# Patient Record
Sex: FEMALE | Race: WHITE | NOT HISPANIC OR LATINO | Employment: FULL TIME | URBAN - METROPOLITAN AREA
[De-identification: names, ages, dates, MRNs, and addresses within clinical notes are randomized per-mention and may not be internally consistent; named-entity substitution may affect disease eponyms.]

---

## 2017-01-10 ENCOUNTER — OFFICE VISIT (OUTPATIENT)
Dept: URGENT CARE | Age: 27
End: 2017-01-10
Payer: COMMERCIAL

## 2017-01-10 PROCEDURE — 99213 OFFICE O/P EST LOW 20 MIN: CPT | Performed by: FAMILY MEDICINE

## 2017-02-13 ENCOUNTER — GENERIC CONVERSION - ENCOUNTER (OUTPATIENT)
Dept: OTHER | Facility: OTHER | Age: 27
End: 2017-02-13

## 2017-02-14 ENCOUNTER — ALLSCRIPTS OFFICE VISIT (OUTPATIENT)
Dept: OTHER | Facility: OTHER | Age: 27
End: 2017-02-14

## 2017-02-14 LAB
BACTERIA UR QL AUTO: NORMAL
CLUE CELL (HISTORICAL): NORMAL
HYPHAL YEAST (HISTORICAL): NORMAL
KOH PREP (HISTORICAL): NORMAL
PH UR STRIP.AUTO: 5 [PH]
TRICHOMONAS (HISTORICAL): NORMAL
YEAST (HISTORICAL): NORMAL

## 2017-04-25 ENCOUNTER — LAB REQUISITION (OUTPATIENT)
Dept: LAB | Facility: HOSPITAL | Age: 27
End: 2017-04-25
Payer: COMMERCIAL

## 2017-04-25 ENCOUNTER — ALLSCRIPTS OFFICE VISIT (OUTPATIENT)
Dept: OTHER | Facility: OTHER | Age: 27
End: 2017-04-25

## 2017-04-25 DIAGNOSIS — R31.9 HEMATURIA: ICD-10-CM

## 2017-04-25 DIAGNOSIS — R35.0 FREQUENCY OF MICTURITION: ICD-10-CM

## 2017-04-25 DIAGNOSIS — N30.90 CYSTITIS WITHOUT HEMATURIA: ICD-10-CM

## 2017-04-25 LAB
BACTERIA UR QL AUTO: ABNORMAL /HPF
BILIRUB UR QL STRIP: NEGATIVE
CLARITY UR: ABNORMAL
COLOR UR: YELLOW
GLUCOSE UR STRIP-MCNC: NEGATIVE MG/DL
HGB UR QL STRIP.AUTO: ABNORMAL
KETONES UR STRIP-MCNC: NEGATIVE MG/DL
LEUKOCYTE ESTERASE UR QL STRIP: ABNORMAL
NITRITE UR QL STRIP: POSITIVE
NON-SQ EPI CELLS URNS QL MICRO: ABNORMAL /HPF
PH UR STRIP.AUTO: 6 [PH] (ref 4.5–8)
PROT UR STRIP-MCNC: ABNORMAL MG/DL
RBC #/AREA URNS AUTO: ABNORMAL /HPF
SP GR UR STRIP.AUTO: 1.02 (ref 1–1.03)
UROBILINOGEN UR QL STRIP.AUTO: 0.2 E.U./DL
WBC #/AREA URNS AUTO: ABNORMAL /HPF

## 2017-04-25 PROCEDURE — 87186 SC STD MICRODIL/AGAR DIL: CPT | Performed by: PHYSICIAN ASSISTANT

## 2017-04-25 PROCEDURE — 81001 URINALYSIS AUTO W/SCOPE: CPT | Performed by: PHYSICIAN ASSISTANT

## 2017-04-25 PROCEDURE — 87077 CULTURE AEROBIC IDENTIFY: CPT | Performed by: PHYSICIAN ASSISTANT

## 2017-04-25 PROCEDURE — 87086 URINE CULTURE/COLONY COUNT: CPT | Performed by: PHYSICIAN ASSISTANT

## 2017-04-27 LAB
BACTERIA UR CULT: NORMAL
BACTERIA UR CULT: NORMAL

## 2017-05-15 ENCOUNTER — APPOINTMENT (OUTPATIENT)
Dept: LAB | Facility: CLINIC | Age: 27
End: 2017-05-15
Payer: COMMERCIAL

## 2017-05-15 DIAGNOSIS — N30.90 CYSTITIS WITHOUT HEMATURIA: ICD-10-CM

## 2017-05-15 LAB
BACTERIA UR QL AUTO: ABNORMAL /HPF
BILIRUB UR QL STRIP: NEGATIVE
CLARITY UR: ABNORMAL
COLOR UR: YELLOW
GLUCOSE UR STRIP-MCNC: NEGATIVE MG/DL
HGB UR QL STRIP.AUTO: ABNORMAL
HYALINE CASTS #/AREA URNS LPF: ABNORMAL /LPF
KETONES UR STRIP-MCNC: NEGATIVE MG/DL
LEUKOCYTE ESTERASE UR QL STRIP: ABNORMAL
NITRITE UR QL STRIP: NEGATIVE
NON-SQ EPI CELLS URNS QL MICRO: ABNORMAL /HPF
PH UR STRIP.AUTO: 7.5 [PH] (ref 4.5–8)
PROT UR STRIP-MCNC: NEGATIVE MG/DL
RBC #/AREA URNS AUTO: ABNORMAL /HPF
SP GR UR STRIP.AUTO: 1.01 (ref 1–1.03)
UROBILINOGEN UR QL STRIP.AUTO: 0.2 E.U./DL
WBC #/AREA URNS AUTO: ABNORMAL /HPF

## 2017-05-15 PROCEDURE — 87086 URINE CULTURE/COLONY COUNT: CPT

## 2017-05-15 PROCEDURE — 81001 URINALYSIS AUTO W/SCOPE: CPT

## 2017-05-16 LAB — BACTERIA UR CULT: NORMAL

## 2017-05-30 ENCOUNTER — ALLSCRIPTS OFFICE VISIT (OUTPATIENT)
Dept: OTHER | Facility: OTHER | Age: 27
End: 2017-05-30

## 2017-07-28 ENCOUNTER — TRANSCRIBE ORDERS (OUTPATIENT)
Dept: ADMINISTRATIVE | Facility: HOSPITAL | Age: 27
End: 2017-07-28

## 2017-07-28 ENCOUNTER — ALLSCRIPTS OFFICE VISIT (OUTPATIENT)
Dept: OTHER | Facility: OTHER | Age: 27
End: 2017-07-28

## 2017-07-28 ENCOUNTER — APPOINTMENT (OUTPATIENT)
Dept: LAB | Facility: HOSPITAL | Age: 27
End: 2017-07-28
Attending: UROLOGY
Payer: COMMERCIAL

## 2017-07-28 DIAGNOSIS — R35.0 FREQUENCY OF MICTURITION: ICD-10-CM

## 2017-07-28 DIAGNOSIS — R35.0 URINARY FREQUENCY: Primary | ICD-10-CM

## 2017-07-28 DIAGNOSIS — R31.9 HEMATURIA: ICD-10-CM

## 2017-07-28 DIAGNOSIS — N39.0 URINARY TRACT INFECTION: ICD-10-CM

## 2017-07-28 LAB
BACTERIA UR QL AUTO: NORMAL /HPF
BILIRUB UR QL STRIP: NEGATIVE
BILIRUB UR QL STRIP: NORMAL
CLARITY UR: CLEAR
CLARITY UR: NORMAL
COLOR UR: YELLOW
COLOR UR: YELLOW
GLUCOSE (HISTORICAL): NORMAL
GLUCOSE UR STRIP-MCNC: NEGATIVE MG/DL
HGB UR QL STRIP.AUTO: NEGATIVE
HGB UR QL STRIP.AUTO: NORMAL
HYALINE CASTS #/AREA URNS LPF: NORMAL /LPF
KETONES UR STRIP-MCNC: NEGATIVE MG/DL
KETONES UR STRIP-MCNC: NORMAL MG/DL
LEUKOCYTE ESTERASE UR QL STRIP: NEGATIVE
LEUKOCYTE ESTERASE UR QL STRIP: NORMAL
NITRITE UR QL STRIP: NEGATIVE
NITRITE UR QL STRIP: NORMAL
NON-SQ EPI CELLS URNS QL MICRO: NORMAL /HPF
PH UR STRIP.AUTO: 6 [PH] (ref 4.5–8)
PH UR STRIP.AUTO: 7 [PH]
PROT UR STRIP-MCNC: NEGATIVE MG/DL
PROT UR STRIP-MCNC: NORMAL MG/DL
RBC #/AREA URNS AUTO: NORMAL /HPF
SP GR UR STRIP.AUTO: 1.01
SP GR UR STRIP.AUTO: 1.01 (ref 1–1.03)
UROBILINOGEN UR QL STRIP.AUTO: 0.2 E.U./DL
UROBILINOGEN UR QL STRIP.AUTO: NORMAL
WBC #/AREA URNS AUTO: NORMAL /HPF

## 2017-07-28 PROCEDURE — 87086 URINE CULTURE/COLONY COUNT: CPT

## 2017-07-28 PROCEDURE — 81001 URINALYSIS AUTO W/SCOPE: CPT

## 2017-07-29 LAB — BACTERIA UR CULT: NORMAL

## 2017-08-17 ENCOUNTER — HOSPITAL ENCOUNTER (OUTPATIENT)
Dept: ULTRASOUND IMAGING | Facility: HOSPITAL | Age: 27
Discharge: HOME | End: 2017-08-17
Attending: UROLOGY
Payer: COMMERCIAL

## 2017-08-17 DIAGNOSIS — R35.0 URINARY FREQUENCY: ICD-10-CM

## 2017-08-17 PROCEDURE — 51798 US URINE CAPACITY MEASURE: CPT

## 2017-08-23 ENCOUNTER — ALLSCRIPTS OFFICE VISIT (OUTPATIENT)
Dept: OTHER | Facility: OTHER | Age: 27
End: 2017-08-23

## 2017-11-06 ENCOUNTER — GENERIC CONVERSION - ENCOUNTER (OUTPATIENT)
Dept: OTHER | Facility: OTHER | Age: 27
End: 2017-11-06

## 2017-11-07 ENCOUNTER — LAB REQUISITION (OUTPATIENT)
Dept: LAB | Facility: HOSPITAL | Age: 27
End: 2017-11-07
Payer: COMMERCIAL

## 2017-11-07 ENCOUNTER — ALLSCRIPTS OFFICE VISIT (OUTPATIENT)
Dept: OTHER | Facility: OTHER | Age: 27
End: 2017-11-07

## 2017-11-07 DIAGNOSIS — R39.9 UNSPECIFIED SYMPTOMS AND SIGNS INVOLVING THE GENITOURINARY SYSTEM: ICD-10-CM

## 2017-11-07 DIAGNOSIS — R10.2 PELVIC AND PERINEAL PAIN: ICD-10-CM

## 2017-11-07 DIAGNOSIS — N39.0 URINARY TRACT INFECTION: ICD-10-CM

## 2017-11-07 DIAGNOSIS — N93.0 POSTCOITAL AND CONTACT BLEEDING: ICD-10-CM

## 2017-11-07 DIAGNOSIS — Z87.440 PERSONAL HISTORY OF URINARY INFECTION: ICD-10-CM

## 2017-11-07 LAB
BACTERIA UR QL AUTO: NORMAL
CLUE CELL (HISTORICAL): NORMAL
HYPHAL YEAST (HISTORICAL): NORMAL
KOH PREP (HISTORICAL): NORMAL
PH UR STRIP.AUTO: 4 [PH]
TRICHOMONAS (HISTORICAL): NORMAL
YEAST (HISTORICAL): NORMAL

## 2017-11-07 PROCEDURE — 87510 GARDNER VAG DNA DIR PROBE: CPT | Performed by: PHYSICIAN ASSISTANT

## 2017-11-07 PROCEDURE — G0145 SCR C/V CYTO,THINLAYER,RESCR: HCPCS | Performed by: PHYSICIAN ASSISTANT

## 2017-11-07 PROCEDURE — 87591 N.GONORRHOEAE DNA AMP PROB: CPT | Performed by: PHYSICIAN ASSISTANT

## 2017-11-07 PROCEDURE — 87480 CANDIDA DNA DIR PROBE: CPT | Performed by: PHYSICIAN ASSISTANT

## 2017-11-07 PROCEDURE — 87491 CHLMYD TRACH DNA AMP PROBE: CPT | Performed by: PHYSICIAN ASSISTANT

## 2017-11-07 PROCEDURE — 87660 TRICHOMONAS VAGIN DIR PROBE: CPT | Performed by: PHYSICIAN ASSISTANT

## 2017-11-09 LAB
CANDIDA RRNA VAG QL PROBE: NEGATIVE
CHLAMYDIA DNA CVX QL NAA+PROBE: NORMAL
G VAGINALIS RRNA GENITAL QL PROBE: NEGATIVE
N GONORRHOEA DNA GENITAL QL NAA+PROBE: NORMAL
T VAGINALIS RRNA GENITAL QL PROBE: NEGATIVE

## 2017-11-14 ENCOUNTER — HOSPITAL ENCOUNTER (OUTPATIENT)
Dept: ULTRASOUND IMAGING | Facility: HOSPITAL | Age: 27
Discharge: HOME | End: 2017-11-14
Payer: COMMERCIAL

## 2017-11-14 DIAGNOSIS — R10.2 PELVIC AND PERINEAL PAIN: ICD-10-CM

## 2017-11-14 PROCEDURE — 76830 TRANSVAGINAL US NON-OB: CPT

## 2017-11-14 PROCEDURE — 76856 US EXAM PELVIC COMPLETE: CPT

## 2017-11-15 LAB
LAB AP GYN PRIMARY INTERPRETATION: NORMAL
Lab: NORMAL

## 2017-11-23 NOTE — PROGRESS NOTES
Assessment    1  Pelvic cramping (625 9) (R10 2)   2  Cervicitis (616 0) (N72)    Plan  Cervicitis    · Clindamycin Phosphate 2 % Vaginal Cream; INSERT 1 APPLICATORFULINTRAVAGINALLY AT BEDTIME   Rx By: Nilda Villaseñor; Dispense: 7 Days ; #:1 X 40 GM Tube; Refill: 0;Cervicitis; LIMA = N; Verified Transmission to [address] [place] [address]; Last Updated By: System, [employer]; 11/7/2017 8:02:21 AM  Postcoital bleeding    · (1) CHLAMYDIA/GC AMPLIFIED DNA, PCR; Source:Vaginal; Status:Active; Requestedfor:33Oni8796;    Perform:[facility] [place]; TBQ:67XCZ0508; Ordered; For:Postcoital bleeding; Ordered By:Carol Banks;  Postcoital bleeding, Vaginal discharge    · (1) VAGINITIS/ VAGINOSIS, DNA ( AFFIRM); Status:Active; Requested JIP:31MAB0258;    Perform:[address]; PKW:33LRC2858; Ordered; For:Postcoital bleeding, Vaginal discharge; Ordered By:Carol Banks;  Vaginal discharge    · [address]; Status:Complete;   Done: 39GTZ9784 09:22AM   Performed: In Office; QRY:87YHH2929;MVBEOMF; Today;discharge; Ordered By:Carol Banks; Discussion/Summary  Discussion Summary: We will treat for cervicitis with clindamycin cream QHS x 7  Will await GC/chlamydia cultures, Affirm, and Pap  Will check pelvic ultrasound  Chief Complaint  Chief Complaint Free Text Note Form: Pt is here c/o vaginal discharge, pelvic pain, postcoital bleeding and painful intercourse  History of Present Illness  HPI: 24year old white female here for evaluation  She had a normal LMP 10/21  Following that she has had continued irregular brown spotting off and on, worse with intercourse  She has some left pelvic discomfort  She notes vaginal discharge without itching, burning and with a sour cream odor  She has the same sexual partner  She now has pain with intercourse which is new for her  Active Problems  1  Bulging lumbar disc (722 10) (M51 26)   2   Degenerative lumbar disc (389 99) (M51 36)   3  Encounter for gynecological examination without abnormal finding (V72 31) (Z01 419)   4  Hematuria (599 70) (R31 9)   5  Increased frequency of urination (788 41) (R35 0)   6  Lumbar radiculopathy (724 4) (M54 16)   7  Piriformis syndrome (355 0) (G57 00)   8  Sacroiliitis (720 2) (M46 1)   9  Urinary tract infection (599 0) (N39 0)   10  Vitamin D insufficiency (268 9) (E55 9)    Past Medical History  1  History of Acute cystitis without hematuria (595 0) (N30 00)   2  History of Acute UTI (599 0) (N39 0)   3  History of Encounter for routine gynecological examination (V72 31) (Z01 419)   4  History of backache (V13 59) (Z87 39)   5  History of candidal vulvovaginitis (V13 29) (Z86 19)   6  History of candidiasis (V12 09) (Z86 19)   7  History of vaginal discharge (V13 29) (Z87 42)   8  History of Postcoital bleeding (626 7) (N93 0)   9  History of Screening examination for STD (sexually transmitted disease) (V74 5) (Z11 3)   10  Urinary tract infection (599 0) (N39 0)   11  History of Urinary tract infection (599 0) (N39 0)   12  History of Vaginal itching (698 1) (L29 8)    Surgical History  1  History of Dental Surgery    Family History  Mother    1  Family history of hypothyroidism (V18 19) (Z83 49)  Family History    2  Family history of Arthritis (V17 7)   3  Family history of Benign Essential Hypertension   4  Family history of Breast Cancer (V16 3)   5  Family history of Breast Disorders   6  Family history of amyotrophic lateral sclerosis (V17 2) (Z82 0)   7  Family history of Osteoporosis (V17 81)    Social History     · Denied: History of Being A Social Drinker   · Caffeine Use   · Exercising Regularly   · Never a smoker   · Denied: History of Never Used Drugs    Current Meds   1  Oxybutynin Chloride ER 5 MG Oral Tablet Extended Release 24 Hour; Take 1 tabet PO QHS; Therapy: 26Yxd4161 to (Evaluate:81Jly7433); Last Rx:70Ngn0953 Ordered   2   Xulane 150-35 MCG/24HR Transdermal Patch Weekly; APPLY 1 PATCH WEEKLY  USE FOR 3 WEEKS ON AND 1 WEEK OFF; Therapy: 47AVD7830 to (Last Rx:82Bcp3026)  Requested for: 12ORY4411 Ordered    Allergies  1  Bactrim TABS   2  Sulfa Drugs    Vitals  Vital Signs    Recorded: 17QNK8886 33:13CK   Systolic 700, LUE, Sitting   Diastolic 60, LUE, Sitting   Weight 138 lb    LMP 21Oct2017       Physical Exam   Constitutional  General appearance: No acute distress, well appearing and well nourished  Neck  Neck: Normal, supple, trachea midline, no masses  Genitourinary  External genitalia: Normal and no lesions appreciated  Vagina: Abnormal  -- small amt yellowish discharge  Urethra: Normal    Urethral meatus: Normal    Bladder: Normal, soft, non-tender and no prolapse or masses appreciated  Cervix: Abnormal  -- friable  Uterus: Normal, non-tender, not enlarged, and no palpable masses  Adnexa/parametria: Abnormal  -- full on the right, nontender  Left is clear  Abdomen  Abdomen: Normal, non-tender, and no organomegaly noted         Results/Data  [facility] 65UOY9240 09:22AM Michael Hamilton     Test Name Result Flag Reference   BACTERIA neg     CLUE CELL neg     TRICHOMONAS neg     YEAST WITH HYPHAE neg     YEAST neg     [place] 30 UA 4 0     KOH PREP neg whiff         Future Appointments    Date/Time Provider Specialty Site   05/31/2018 08:20 SHAYNA Hamilton [facility] Obstetrics/Gynecology [facility]       Signatures   Electronically signed by : Lauri Perez [facility]; Nov 7 2017  8:03AM EST                       (Author)    Electronically signed by : FAIZA Finnegan ; Nov 22 2017 10:32AM EST                       (Author)

## 2017-11-27 ENCOUNTER — GENERIC CONVERSION - ENCOUNTER (OUTPATIENT)
Dept: OTHER | Facility: OTHER | Age: 27
End: 2017-11-27

## 2017-11-28 ENCOUNTER — GENERIC CONVERSION - ENCOUNTER (OUTPATIENT)
Dept: OTHER | Facility: OTHER | Age: 27
End: 2017-11-28

## 2017-12-07 ENCOUNTER — LAB REQUISITION (OUTPATIENT)
Dept: LAB | Facility: HOSPITAL | Age: 27
End: 2017-12-07
Payer: COMMERCIAL

## 2017-12-07 ENCOUNTER — ALLSCRIPTS OFFICE VISIT (OUTPATIENT)
Dept: OTHER | Facility: OTHER | Age: 27
End: 2017-12-07

## 2017-12-07 DIAGNOSIS — N93.0 POSTCOITAL AND CONTACT BLEEDING: ICD-10-CM

## 2017-12-07 DIAGNOSIS — R10.2 PELVIC AND PERINEAL PAIN: ICD-10-CM

## 2017-12-07 PROCEDURE — 88305 TISSUE EXAM BY PATHOLOGIST: CPT | Performed by: OBSTETRICS & GYNECOLOGY

## 2017-12-08 NOTE — PROCEDURES
Assessment  1  Postcoital bleeding (626 7) (N93 0)1      1 Amended By: Trey Solorzano; Dec 07 2017 10:36 AM EST    Active Problems  1  Bulging lumbar disc (722 10) (M51 26)  2  Cervicitis (616 0) (N72)  3  Degenerative lumbar disc (722 52) (M51 36)  4  Encounter for gynecological examination without abnormal finding (V72 31) (Z01 419)  5  Hematuria (599 70) (R31 9)  6  History of UTI (V13 02) (Z87 440)  7  Increased frequency of urination (788 41) (R35 0)  8  Lumbar radiculopathy (724 4) (M54 16)  9  Pelvic cramping (625 9) (R10 2)  10  Piriformis syndrome (355 0) (G57 00)  11  Postcoital bleeding (626 7) (N93 0)  12  Sacroiliitis (720 2) (M46 1)  13  Urinary tract infection (599 0) (N39 0)  14  UTI symptoms (788 99) (R39 9)  15  Vaginal discharge (623 5) (N89 8)  16  Vitamin D insufficiency (268 9) (E55 9)    Current Meds    1  Xulane 150-35 MCG/24HR Transdermal Patch Weekly; APPLY 1 PATCH WEEKLY  USE FOR 3 WEEKS ON AND 1 WEEK OFF; Therapy: 97QOO1150 to (Last Rx:22Vdc9893)  Requested for: 16NCJ3688 Ordered    2  Oxybutynin Chloride ER 5 MG Oral Tablet Extended Release 24 Hour; Take 1 tabet PO QHS; Therapy: 81Pmr7028 to (Evaluate:09Vne9648); Last Rx:30Ehm3856 Ordered    3  Nitrofurantoin Macrocrystal 100 MG Oral Capsule; Therapy: 69BZW4045 to Recorded    Allergies    1  Bactrim TABS  2  Sulfa Drugs    Procedure   Procedure: colposcopy  Post Coital Bleeding  Were discussed with the patient  We discussed possible complications, including infection,-- bleeding-- and-- allergic reaction  Written consent was obtained prior to the procedure  Procedure Note:   A cervical Pap smear was not performed  The squamocolumnar junction was fully visualized  Observation without staining showed no abnormalities  After bathing the cervix in acetic acid, evaluation showed no abnormalities  Staining with Lugol's solution showed normal staining  Vaginal Vault: no abnormalities seen    Cervical Biopsy: 1 biopsies taken of the cervix  -- the biopsies were taken at 6 o'clock  Hemostasis was obtained with Monsel's solution  Patient Status: the patient tolerated the procedure well  Complications: there were no complications       nl T zone         Future Appointments    Date/Time Provider Specialty Site   05/31/2018 08:20 AM Ari Brumfield, [facility] Obstetrics/Gynecology [facility]       Signatures   Electronically signed by : FAIZA Vance ; Dec  7 2017 10:36AM EST                       (Author)    Electronically signed by : FAIZA Vance ; Dec  7 2017 10:36AM EST                       (Author)

## 2017-12-28 ENCOUNTER — APPOINTMENT (OUTPATIENT)
Dept: LAB | Age: 27
End: 2017-12-28
Attending: FAMILY MEDICINE
Payer: COMMERCIAL

## 2017-12-28 ENCOUNTER — APPOINTMENT (OUTPATIENT)
Dept: LAB | Age: 27
End: 2017-12-28
Payer: COMMERCIAL

## 2017-12-28 ENCOUNTER — OFFICE VISIT (OUTPATIENT)
Dept: URGENT CARE | Age: 27
End: 2017-12-28
Payer: COMMERCIAL

## 2017-12-28 ENCOUNTER — TRANSCRIBE ORDERS (OUTPATIENT)
Dept: URGENT CARE | Age: 27
End: 2017-12-28

## 2017-12-28 DIAGNOSIS — R39.9 UNSPECIFIED SYMPTOMS AND SIGNS INVOLVING THE GENITOURINARY SYSTEM: ICD-10-CM

## 2017-12-28 LAB
BILIRUB UR QL STRIP: NORMAL
CLARITY UR: NORMAL
COLOR UR: YELLOW
GLUCOSE (HISTORICAL): NORMAL
HGB UR QL STRIP.AUTO: NORMAL
KETONES UR STRIP-MCNC: NORMAL MG/DL
LEUKOCYTE ESTERASE UR QL STRIP: NORMAL
NITRITE UR QL STRIP: NORMAL
PH UR STRIP.AUTO: 5 [PH]
PROT UR STRIP-MCNC: NORMAL MG/DL
SP GR UR STRIP.AUTO: 1.02
UROBILINOGEN UR QL STRIP.AUTO: 0.2

## 2017-12-28 PROCEDURE — 87086 URINE CULTURE/COLONY COUNT: CPT

## 2017-12-28 PROCEDURE — 87186 SC STD MICRODIL/AGAR DIL: CPT

## 2017-12-28 PROCEDURE — S9088 SERVICES PROVIDED IN URGENT: HCPCS | Performed by: FAMILY MEDICINE

## 2017-12-28 PROCEDURE — 87077 CULTURE AEROBIC IDENTIFY: CPT

## 2017-12-28 PROCEDURE — 81002 URINALYSIS NONAUTO W/O SCOPE: CPT | Performed by: FAMILY MEDICINE

## 2017-12-28 PROCEDURE — 99213 OFFICE O/P EST LOW 20 MIN: CPT | Performed by: FAMILY MEDICINE

## 2017-12-29 NOTE — PROGRESS NOTES
Assessment   1  Urinary tract infection (599 0) (N39 0)    Plan   Urinary tract infection    · Ciprofloxacin HCl - 500 MG Oral Tablet (Cipro); TAKE 1 TABLET TWICE DAILY  UTI symptoms    · (1) URINE CULTURE; Source:Urine, Clean Catch; Status:Active - Retrospective    Authorization; Requested for:91Kbq8394;    · Urine Dip Non-Automated- POC; Status:Active - Retrospective By Protocol Authorization; Requested for:27Yiw7807;     Discussion/Summary   Discussion Summary:    Cipro twice a day until finished ( please take probiotics)  fluids (cranberry juice )  follow-up with family physician, GYN, or Urology as needed  go to the hospital emergency department if worse  Medication Side Effects Reviewed: Possible side effects of new medications were reviewed with the patient/guardian today  Understands and agrees with treatment plan: The treatment plan was reviewed with the patient/guardian  The patient/guardian understands and agrees with the treatment plan    Counseling Documentation With Imm: The patient was counseled regarding  Follow Up Instructions: Follow Up with your Primary Care Provider in 7-10 days  If your symptoms worsen, go to the nearest [place] [address] Emergency Department  Chief Complaint   1  Urinary Frequency  Chief Complaint Free Text Note Form: urinary frequency, burning x2 days      History of Present Illness   HPI: Urinary frequency, urinary burning; no chills/fever, no nausea / vomiting, no back pain, no vaginal discharge or lesions per patient; patient states history of previous urinary tract infections - states recently on [facility] Required Assessment:      Pain Assessment      the patient states they do not have pain  Depression And Suicide Screen  Yes, the patient has had thoughts of hurting themself  No, the patient has not felt depressed in the past 7 days  Prefered Language is  [place]  Primary Language is  English  Review of Systems   Focused-Female:      Constitutional: as noted in HPI,-- no fever-- and-- no chills  ENT: no ear ache, no loss of hearing, no nosebleeds or nasal discharge, no sore throat or hoarseness  Cardiovascular: no complaints of slow or fast heart rate, no chest pain, no palpitations, no leg claudication or lower extremity edema  Respiratory: no complaints of shortness of breath, no wheezing, no dyspnea on exertion, no orthopnea or PND  Breasts: no complaints of breast pain, breast lump or nipple discharge  Gastrointestinal: no complaints of abdominal pain, no constipation, no nausea or diarrhea, no vomiting, no bloody stools,-- no nausea-- and-- no vomiting  Genitourinary: dysuria, but-- as noted in HPI,-- no vaginal discharge,-- no incontinence-- and-- no unexplained vaginal bleeding  Musculoskeletal: no complaints of arthralgia, no myalgia, no joint swelling or stiffness, no limb pain or swelling  Integumentary: no complaints of skin rash or lesion, no itching or dry skin, no skin wounds  Neurological: no complaints of headache, no confusion, no numbness or tingling, no dizziness or fainting  ROS Reviewed:    ROS reviewed  Active Problems   1  Bulging lumbar disc (722 10) (M51 26)   2  Cervicitis (616 0) (N72)   3  Degenerative lumbar disc (722 52) (M51 36)   4  Encounter for gynecological examination without abnormal finding (V72 31) (Z01 419)   5  Hematuria (599 70) (R31 9)   6  History of UTI (V13 02) (Z87 440)   7  Increased frequency of urination (788 41) (R35 0)   8  Lumbar radiculopathy (724 4) (M54 16)   9  Pelvic cramping (625 9) (R10 2)   10  Piriformis syndrome (355 0) (G57 00)   11  Postcoital bleeding (626 7) (N93 0)   12  Sacroiliitis (720 2) (M46 1)   13  Urinary tract infection (599 0) (N39 0)   14  UTI symptoms (788 99) (R39 9)   15  Vaginal discharge (623 5) (N89 8)   16   Vitamin D insufficiency (268 9) (E55 9)    Past Medical History   1  History of Acute cystitis without hematuria (595 0) (N30 00)   2  History of Acute UTI (599 0) (N39 0)   3  History of Encounter for routine gynecological examination (V72 31) (Z01 419)   4  History of backache (V13 59) (Z87 39)   5  History of candidal vulvovaginitis (V13 29) (Z86 19)   6  History of candidiasis (V12 09) (Z86 19)   7  History of vaginal discharge (V13 29) (Z87 42)   8  History of Postcoital bleeding (626 7) (N93 0)   9  History of Screening examination for STD (sexually transmitted disease) (V74 5) (Z11 3)   10  Urinary tract infection (599 0) (N39 0)   11  History of Urinary tract infection (599 0) (N39 0)   12  History of Vaginal itching (698 1) (L29 8)  Active Problems And Past Medical History Reviewed: The active problems and past medical history were reviewed and updated today  Family History   Mother    1  Family history of hypothyroidism (V18 19) (Z83 49)  Family History    2  Family history of Arthritis (V17 7)   3  Family history of Benign Essential Hypertension   4  Family history of Breast Cancer (V16 3)   5  Family history of Breast Disorders   6  Family history of amyotrophic lateral sclerosis (V17 2) (Z82 0)   7  Family history of Osteoporosis (V17 81)  Family History Reviewed: The family history was reviewed and updated today  Social History    · Denied: History of Being A Social Drinker   · Caffeine Use   · Exercising Regularly   · Never a smoker   · Denied: History of Never Used Drugs  Social History Reviewed: The social history was reviewed and updated today  The social history was reviewed and is unchanged  Surgical History   1  History of Colposcopy Cervix With Biopsy(S) With Endocervical Curettage   2  History of Dental Surgery  Surgical History Reviewed: The surgical history was reviewed and updated today  Current Meds    1  Oxybutynin Chloride ER 5 MG Oral Tablet Extended Release 24 Hour; Take 1 tabet PO     QHS;      Therapy: 93GBH0177 to (Evaluate:85Pef2740); Last Rx:08Lkd3201 Ordered   2  Xulane 150-35 MCG/24HR Transdermal Patch Weekly; APPLY 1 PATCH WEEKLY  USE     FOR 3 WEEKS ON AND 1 WEEK OFF; Therapy: 33LHV0244 to (Last Rx:48Xlh6787)  Requested for: 76AVI9332 Ordered  Medication List Reviewed: The medication list was reviewed and updated today  Allergies   1  Bactrim TABS   2  Sulfa Drugs    Vitals   Signs   Recorded: 44Mav5932 09:39AM   Temperature: 97 7 F  Heart Rate: 97  Respiration: 16  Systolic: 324  Diastolic: 59  Height: 5 ft 2 5 in  Weight: 138 lb   BMI Calculated: 24 84  BSA Calculated: 1 64  O2 Saturation: 99  LMP: 34VHV8344    Physical Exam        Constitutional      General appearance: No acute distress, well appearing and well nourished         Psychiatric      Orientation to person, place, and time: Normal        Mood and affect: Normal        Future Appointments      Date/Time Provider Specialty Site   05/31/2018 08:20 AM Rosy De Jesus [facility] Obstetrics/Gynecology [facility]     Signatures    Electronically signed by : Becky Sebastian DO; Dec 28 2017  9:54AM EST                       (Author)

## 2017-12-30 LAB — BACTERIA UR CULT: ABNORMAL

## 2018-01-12 VITALS — DIASTOLIC BLOOD PRESSURE: 72 MMHG | WEIGHT: 135 LBS | SYSTOLIC BLOOD PRESSURE: 100 MMHG

## 2018-01-12 NOTE — MISCELLANEOUS
Message   Recorded as Task   Date: 11/06/2017 10:19 AM, Created By: Shea Gordon   Task Name: Medical Complaint Callback   Assigned To: Shea Gordon   Regarding Patient: Mary Cortez, Status: In Progress   Nader Munguia - 06 Nov 2017 10:19 AM     TASK CREATED  Caller: Self; Medical Complaint; [phone] (Home); [phone] (Work)  Lesley Rose-    Patient is calling with multiple symptoms  She c/o itching, vaginal discharge that is whitish/brown in color, bleeding with intercourse, and dyspareunia  She does notice a slight odor as well  She has been seen for BV previously  No new partner  She states this has been going on for about a week and a half now  She wants to know if she needs a OVL with you? Elsie Banks - 06 Nov 2017 10:32 AM     TASK REPLIED TO: Previously Assigned To Elsie Banks  yes she needs an appointment   Shea Gordon - 06 Nov 2017 11:36 AM     TASK IN PROGRESS   Rita Amaya - 06 Nov 2017 11:39 AM     TASK EDITED  Scheduled for 11/6/17 @ 7:20 with W87  Active Problems    1  Bulging lumbar disc (722 10) (M51 26)   2  Degenerative lumbar disc (722 52) (M51 36)   3  Encounter for gynecological examination without abnormal finding (V72 31) (Z01 419)   4  Hematuria (599 70) (R31 9)   5  Increased frequency of urination (788 41) (R35 0)   6  Lumbar radiculopathy (724 4) (M54 16)   7  Piriformis syndrome (355 0) (G57 00)   8  Sacroiliitis (720 2) (M46 1)   9  Urinary tract infection (599 0) (N39 0)   10  Vitamin D insufficiency (268 9) (E55 9)    Current Meds   1  Oxybutynin Chloride ER 5 MG Oral Tablet Extended Release 24 Hour; Take 1 tabet PO   QHS; Therapy: 41Mxs9452 to (Evaluate:80Adi9718); Last Rx:82Qmf8643 Ordered   2  Xulane 150-35 MCG/24HR Transdermal Patch Weekly; APPLY 1 PATCH WEEKLY  USE   FOR 3 WEEKS ON AND 1 WEEK OFF; Therapy: 25LTN9963 to (Last Rx:85Qdv9984)  Requested for: 77RVA5754 Ordered    Allergies    1  Bactrim TABS   2   Sulfa Drugs    Signatures Electronically signed by :  Demar Farley, ; Nov 6 2017 11:40AM EST                       (Author)

## 2018-01-13 VITALS
BODY MASS INDEX: 24.32 KG/M2 | WEIGHT: 137.25 LBS | HEART RATE: 72 BPM | HEIGHT: 63 IN | DIASTOLIC BLOOD PRESSURE: 72 MMHG | SYSTOLIC BLOOD PRESSURE: 110 MMHG

## 2018-01-13 VITALS
WEIGHT: 133 LBS | DIASTOLIC BLOOD PRESSURE: 72 MMHG | BODY MASS INDEX: 23.57 KG/M2 | HEIGHT: 63 IN | SYSTOLIC BLOOD PRESSURE: 114 MMHG

## 2018-01-13 NOTE — MISCELLANEOUS
Message   Recorded as Task   Date: 11/24/2017 01:00 PM, Created By: Angie Chew   Task Name: Care Coordination   Assigned To: ROBERTO GYN,Team   Regarding Patient: Ulysses Slipper, Status: In Progress   Comment:    Angie Chew - 24 Nov 2017 1:00 PM     TASK CREATED  This patient called the office requesting results from her U/S performed on 11/14/17  They are in her chart  Please advise  Best contact # for pt is [phone]  Ella Diaz - 27 Nov 2017 8:14 AM     TASK IN PROGRESS   Ella Diaz - 27 Nov 2017 8:19 AM     TASK EDITED  Spoke with pt - she is aware of the pelvic u/s results - she is still having left pelvic pain continuously  During intercourse she states she is still spotting and in pain  Wants to know if she should have an OV? Please advise  Thanks! Elsie Banks - 27 Nov 2017 10:31 AM     TASK REPLIED TO: Previously Assigned To Elsie Banks  she can come in for colpo to further evaluate her spotting with intercourse and also to discuss her pain with a doc   Patricia Galvin - 27 Nov 2017 1:09 PM     TASK EDITED  Pt given colp apt - 2 advil prior        Active Problems    1  Bulging lumbar disc (722 10) (M51 26)   2  Cervicitis (616 0) (N72)   3  Degenerative lumbar disc (722 52) (M51 36)   4  Encounter for gynecological examination without abnormal finding (V72 31) (Z01 419)   5  Hematuria (599 70) (R31 9)   6  Increased frequency of urination (788 41) (R35 0)   7  Lumbar radiculopathy (724 4) (M54 16)   8  Pelvic cramping (625 9) (R10 2)   9  Piriformis syndrome (355 0) (G57 00)   10  Postcoital bleeding (626 7) (N93 0)   11  Sacroiliitis (720 2) (M46 1)   12  Urinary tract infection (599 0) (N39 0)   13  Vaginal discharge (623 5) (N89 8)   14  Vitamin D insufficiency (268 9) (E55 9)    Current Meds   1  Clindamycin Phosphate 2 % Vaginal Cream; INSERT 1 APPLICATORFUL   INTRAVAGINALLY AT BEDTIME;    Therapy: 14SBP6170 to (Evaluate:34Jmd8771)  Requested for: 45TLU8695; Last MI:86TWE1704 Ordered   2  Oxybutynin Chloride ER 5 MG Oral Tablet Extended Release 24 Hour; Take 1 tabet PO   QHS; Therapy: 37Guj4930 to (Evaluate:18Cxh6866); Last Rx:03Tqd5809 Ordered   3  Xulane 150-35 MCG/24HR Transdermal Patch Weekly; APPLY 1 PATCH WEEKLY  USE   FOR 3 WEEKS ON AND 1 WEEK OFF; Therapy: 34UOD6730 to (Last Rx:25Lkz3563)  Requested for: 34ZEP8186 Ordered    Allergies    1  Bactrim TABS   2  Sulfa Drugs    Signatures   Electronically signed by :  Gypsy Franks, ; Nov 27 2017  1:09PM EST                       (Author)

## 2018-01-14 VITALS — DIASTOLIC BLOOD PRESSURE: 62 MMHG | BODY MASS INDEX: 23.56 KG/M2 | WEIGHT: 133 LBS | SYSTOLIC BLOOD PRESSURE: 100 MMHG

## 2018-01-15 VITALS
BODY MASS INDEX: 24.1 KG/M2 | HEART RATE: 76 BPM | HEIGHT: 63 IN | SYSTOLIC BLOOD PRESSURE: 98 MMHG | WEIGHT: 136 LBS | DIASTOLIC BLOOD PRESSURE: 66 MMHG

## 2018-01-15 VITALS — BODY MASS INDEX: 24.84 KG/M2 | DIASTOLIC BLOOD PRESSURE: 60 MMHG | SYSTOLIC BLOOD PRESSURE: 108 MMHG | WEIGHT: 138 LBS

## 2018-01-15 NOTE — MISCELLANEOUS
Message   Recorded as Task   Date: 09/26/2016 12:20 PM, Created By: Sobeida Bonilla   Task Name: Call Back   Assigned To: Calista Tucker   Regarding Patient: Tc Pierson, Status: In Progress   Comment:    MegShyanne - 26 Sep 2016 12:20 PM     TASK CREATED  Caller: Self; Other; [phone] (Home)  Pt  called an stated she still has the yeast infection and have took all the prescribed medication and will like to know what she should do next  Martene Aj - 26 Sep 2016 1:11 PM     TASK IN PROGRESS   Martene Aj - 26 Sep 2016 1:24 PM     TASK EDITED                 discharge yellow d/c and strong smell  rx [facility] sent for metronidazole  Active Problems    1  Bulging lumbar disc (722 10) (M51 26)   2  Chronic UTI (599 0) (N39 0)   3  Degenerative lumbar disc (722 52) (M51 36)   4  Encounter for gynecological examination without abnormal finding (V72 31) (Z01 419)   5  Increased frequency of urination (788 41) (R35 0)   6  Low back pain (724 2) (M54 5)   7  Lumbar radiculopathy (724 4) (M54 16)   8  Piriformis syndrome (355 0) (G57 00)   9  Sacroiliitis (720 2) (M46 1)   10  Vaginal discharge (623 5) (N89 8)   11  Vaginal odor (625 8) (N89 8)   12  Vitamin D insufficiency (268 9) (E55 9)    Current Meds   1  Fluconazole 150 MG Oral Tablet; 1 tab every other day for 3 doses; Therapy: 24Kye2082 to (Last Rx:97Wwt8067)  Requested for: 40Uqq1395 Ordered   2  Meloxicam 15 MG Oral Tablet; TAKE 1 TABLET DAILY WITH FOOD; Therapy: 75ILH3529 to (Marcy Dc)  Requested for: 49UTB2891; Last   Rx:20Tjh9581 Ordered   3  Xulane 150-35 MCG/24HR Transdermal Patch Weekly; APPLY 1 PATCH WEEKLY  USE   FOR 3 WEEKS ON AND 1 WEEK OFF; Therapy: 70MRX2426 to (Evaluate:16Tnz2052); Last Rx:16Pbt2428 Ordered    Allergies    1  Bactrim TABS   2   Sulfa Drugs    Signatures   Electronically signed by : Dylan Gomez LPN; Sep 26 4287  1:36TY EST                       (Author)

## 2018-01-16 NOTE — MISCELLANEOUS
Message   Recorded as Task   Date: 05/26/2016 04:20 PM, Created By: Darryl Stanford   Task Name: Follow Up   Assigned To: [facility]line clinical,Team   Regarding Patient: Regi Woods, Status: Active   Comment:    Babs Whipple - 26 May 2016 4:20 PM     TASK CREATED  please contact patient and let her know that Dr Katya Lock reviewed her info and does not feel like RFA would be appropriate tx for her  Jannet Joy - 27 May 2016 8:35 AM     TASK EDITED  Pt advised of the same  Active Problems    1  Bulging lumbar disc (722 10) (M51 26)   2  Chronic UTI (599 0) (N39 0)   3  Degenerative lumbar disc (722 52) (M51 36)   4  Encounter for gynecological examination without abnormal finding (V72 31) (Z01 419)   5  Increased frequency of urination (788 41) (R35 0)   6  Low back pain (724 2) (M54 5)   7  Lumbar radiculopathy (724 4) (M54 16)   8  Piriformis syndrome (355 0) (G57 00)   9  Sacroiliitis (720 2) (M46 1)   10  Vitamin D insufficiency (268 9) (E55 9)    Current Meds   1  Meloxicam 15 MG Oral Tablet; TAKE 1 TABLET DAILY WITH FOOD; Therapy: 17DFB4507 to (Michelet Camacho)  Requested for: 32QGI7118; Last   Rx:42Ndu9224 Ordered   2  Xulane 150-35 MCG/24HR Transdermal Patch Weekly; APPLY 1 PATCH WEEKLY  USE   FOR 3 WEEKS ON AND 1 WEEK OFF; Therapy: 80LVF2324 to (Evaluate:15Ygk5215); Last Rx:44Jwt1254 Ordered    Allergies    1  Bactrim TABS   2   Sulfa Drugs    Signatures   Electronically signed by : Raya Chong, ; May 27 2016  8:35AM EST                       (Author)

## 2018-01-16 NOTE — RESULT NOTES
Message   Recorded as Task   Date: 09/12/2016 12:26 PM, Created By: Dilia Nelson   Task Name: Verify Patient Results   Assigned To: Rosa Lundberg   Regarding Patient: Marino Coyne, Status: Active   CommentDeruth Christiansen - 12 Sep 2016 12:26 PM        Rosa Lundberg - 12 Sep 2016 1:59 PM     TASK EDITED   Called patient and informed her she has a yeast infection prescription was put through for Diflucan and discussed ways to avoid more yeast infections  Plan  Vaginal discharge    · Fluconazole 150 MG Oral Tablet; 1 tab every other day for 3 doses    Signatures   Electronically signed by : Minh Turner CNM;  Sep 12 2016  1:59PM EST                       (Author)

## 2018-01-18 NOTE — MISCELLANEOUS
Message   Recorded as Task   Date: 02/13/2017 01:15 PM, Created By: Abundio Issa   Task Name: Medical Complaint Callback   Assigned To: Vinicius Serna   Regarding Patient: Nova Rodriguez, Status: In Progress   Comment:    ManuelmaxwellAngela - 13 Feb 2017 1:15 PM     TASK CREATED  Caller: Self; Medical Complaint; [phone] (Home)  Pt called w complaint of BV and would like an Rx if possible  Pt is @ [phone]   Glenisdany Warddon - 13 Feb 2017 1:44 PM     TASK IN PROGRESS   Glenisdany Rosie - 13 Feb 2017 2:05 PM     TASK EDITED  Pt has discharge - white, yellow, itching, some odor  I gave pt ov for cultures        Active Problems    1  Bacterial vaginosis (616 10,041 9) (N76 0,B96 89)   2  Bulging lumbar disc (722 10) (M51 26)   3  Chronic UTI (599 0) (N39 0)   4  Degenerative lumbar disc (722 52) (M51 36)   5  Dysfunction of left eustachian tube (381 81) (H69 82)   6  Encounter for gynecological examination without abnormal finding (V72 31) (Z01 419)   7  Increased frequency of urination (788 41) (R35 0)   8  Low back pain (724 2) (M54 5)   9  Lumbar radiculopathy (724 4) (M54 16)   10  Piriformis syndrome (355 0) (G57 00)   11  Sacroiliitis (720 2) (M46 1)   12  Vaginal discharge (623 5) (N89 8)   13  Vaginal odor (625 8) (N89 8)   14  Vitamin D insufficiency (268 9) (E55 9)    Current Meds   1  Xulane 150-35 MCG/24HR Transdermal Patch Weekly; APPLY 1 PATCH WEEKLY  USE   FOR 3 WEEKS ON AND 1 WEEK OFF; Therapy: [id number] to (Evaluate:[id number]); Last Rx:[id number] Ordered    Allergies    1  Bactrim TABS   2  Sulfa Drugs    Signatures   Electronically signed by :  Shraddha Franks, ; Feb 13 2017  2:05PM EST                       (Author)

## 2018-01-23 VITALS
HEIGHT: 63 IN | WEIGHT: 138 LBS | SYSTOLIC BLOOD PRESSURE: 107 MMHG | BODY MASS INDEX: 24.45 KG/M2 | TEMPERATURE: 97.7 F | HEART RATE: 97 BPM | RESPIRATION RATE: 16 BRPM | DIASTOLIC BLOOD PRESSURE: 59 MMHG | OXYGEN SATURATION: 99 %

## 2018-01-23 VITALS
BODY MASS INDEX: 24.45 KG/M2 | WEIGHT: 138 LBS | SYSTOLIC BLOOD PRESSURE: 108 MMHG | DIASTOLIC BLOOD PRESSURE: 62 MMHG | HEIGHT: 63 IN

## 2018-02-08 ENCOUNTER — OFFICE VISIT (OUTPATIENT)
Dept: URGENT CARE | Age: 28
End: 2018-02-08
Payer: COMMERCIAL

## 2018-02-08 VITALS
DIASTOLIC BLOOD PRESSURE: 72 MMHG | TEMPERATURE: 98.1 F | HEART RATE: 66 BPM | HEIGHT: 62 IN | OXYGEN SATURATION: 99 % | WEIGHT: 139.2 LBS | RESPIRATION RATE: 18 BRPM | SYSTOLIC BLOOD PRESSURE: 108 MMHG | BODY MASS INDEX: 25.62 KG/M2

## 2018-02-08 DIAGNOSIS — J06.9 VIRAL UPPER RESPIRATORY TRACT INFECTION: Primary | ICD-10-CM

## 2018-02-08 PROCEDURE — S9088 SERVICES PROVIDED IN URGENT: HCPCS | Performed by: FAMILY MEDICINE

## 2018-02-08 PROCEDURE — 99213 OFFICE O/P EST LOW 20 MIN: CPT | Performed by: FAMILY MEDICINE

## 2018-02-08 RX ORDER — OXYBUTYNIN CHLORIDE 5 MG/1
1 TABLET, EXTENDED RELEASE ORAL DAILY PRN
COMMUNITY
Start: 2017-08-23 | End: 2020-05-13 | Stop reason: ALTCHOICE

## 2018-02-09 NOTE — PROGRESS NOTES
[facility]        NAME: Giovanny Patton is a 24 y o  female  : 1990    MRN: [id number]  DATE: 2018  TIME: 10:02 PM    Assessment and Plan   Viral upper respiratory tract infection [J06 9, B97 89]  1  Viral upper respiratory tract infection           Patient Instructions   Very unlikely to be influenza  Pt opts out of testing  Supportive care  OTC cold meds as needed/as directed  Sinus massages  Follow up with PCP in 3-5 days  Proceed to  ER if symptoms worsen  Chief Complaint     Chief Complaint   Patient presents with    Cold Like Symptoms     Since Tuesday - nasal congestion with PND, ticklish throat, dry cough, HA, and facial pain  No N/V or diarrhea  History of Present Illness       25 y/o female with c/o nasal congestion, sinus pressure, no nasal drainage, mild scratchy sore throat, and slight cough x 3 days  No fevers  Had chills this morning, but states it is cold where she works  SHe was exposed to flu  Of note, 2 weeks ago, she had fevers, harsh cough, chest pain and extreme fatigue x 2 days  Review of Systems   Review of Systems   Constitutional: Positive for chills  Negative for activity change, fatigue and fever  HENT: Positive for congestion, sinus pain and sore throat  Negative for ear pain, facial swelling and rhinorrhea  Respiratory: Positive for cough  Negative for shortness of breath and wheezing  Cardiovascular: Negative for chest pain  Neurological: Negative for headaches  All other systems reviewed and are negative          Current Medications     Long-Term Prescriptions   Medication Sig Dispense Refill    norelgestromin-ethinyl estradiol Clorinda Gab) 150-35 MCG/24HR Place on the skin once a week      oxybutynin (DITROPAN-XL) 5 mg 24 hr tablet Take 1 tablet by mouth daily as needed         Current Allergies     Allergies as of 2018 - Reviewed 2018   Allergen Reaction Noted    Sulfa antibiotics Fever and Fatigue 08/28/2015    Sulfamethoxazole-trimethoprim Fever and Fatigue 01/12/2015            The following portions of the patient's history were reviewed and updated as appropriate: allergies, current medications, past family history, past medical history, past social history, past surgical history and problem list     Objective   /72 (BP Location: Right arm, Patient Position: Sitting, Cuff Size: Standard)   Pulse 66   Temp 98 1 °F (36 7 °C) (Oral)   Resp 18   Ht 5' 2" (1 575 m)   Wt 63 1 kg (139 lb 3 2 oz)   LMP 01/08/2018 (Approximate)   SpO2 99%   BMI 25 46 kg/m²        Physical Exam     Physical Exam   Constitutional: She is oriented to person, place, and time  She appears well-developed and well-nourished  Pt very pleasant, smiles and laughs during exam   HENT:   Head: Normocephalic and atraumatic  Right Ear: External ear normal    Left Ear: External ear normal    Nose: Mucosal edema present  Mouth/Throat: Oropharynx is clear and moist    Neck: Neck supple  Cardiovascular: Normal rate and regular rhythm  Pulmonary/Chest: Effort normal and breath sounds normal    Neurological: She is alert and oriented to person, place, and time  Psychiatric: She has a normal mood and affect  Her behavior is normal  Thought content normal    Nursing note and vitals reviewed

## 2018-02-09 NOTE — PATIENT INSTRUCTIONS
Upper Respiratory Infection   AMBULATORY CARE:   An upper respiratory infection  is also called a common cold  It can affect your nose, throat, ears, and sinuses  Common signs and symptoms include the following:  Cold symptoms are usually worst for the first 3 to 5 days  You may have any of the following:  · Runny or stuffy nose    · Sneezing and coughing    · Sore throat or hoarseness    · Red, watery, and sore eyes    · Fatigue     · Chills and fever    · Headache, body aches, or sore muscles  Seek care immediately if:   · You have chest pain or trouble breathing  Contact your healthcare provider if:   · You have a fever over 102ºF (39°C)  · Your sore throat gets worse or you see white or yellow spots in your throat  · Your symptoms get worse after 3 to 5 days or your cold is not better in 14 days  · You have a rash anywhere on your skin  · You have large, tender lumps in your neck  · You have thick, green or yellow drainage from your nose  · You cough up thick yellow, green, or bloody mucus  · You have vomiting for more than 24 hours and cannot keep fluids down  · You have a bad earache  · You have questions or concerns about your condition or care  Treatment for a cold: There is no cure for the common cold  Colds are caused by viruses and do not get better with antibiotics  Most people get better in 7 to 14 days  You may continue to cough for 2 to 3 weeks  The following may help decrease your symptoms:  · Decongestants  help reduce nasal congestion and help you breathe more easily  If you take decongestant pills, they may make you feel restless or not able to sleep  Do not use decongestant sprays for more than a few days  · Cough suppressants  help reduce coughing  Ask your healthcare provider which type of cough medicine is best for you  · NSAIDs , such as ibuprofen, help decrease swelling, pain, and fever   NSAIDs can cause stomach bleeding or kidney problems in certain people  If you take blood thinner medicine, always ask your healthcare provider if NSAIDs are safe for you  Always read the medicine label and follow directions  · Acetaminophen  decreases pain and fever  It is available without a doctor's order  Ask how much to take and how often to take it  Follow directions  Read the labels of all other medicines you are using to see if they also contain acetaminophen, or ask your doctor or pharmacist  Acetaminophen can cause liver damage if not taken correctly  Do not use more than 4 grams (4,000 milligrams) total of acetaminophen in one day  Manage your cold:   · Rest as much as possible  Slowly start to do more each day  · Drink more liquids as directed  Liquids will help thin and loosen mucus so you can cough it up  Liquids will also help prevent dehydration  Liquids that help prevent dehydration include water, fruit juice, and broth  Do not drink liquids that contain caffeine  Caffeine can increase your risk for dehydration  Ask your healthcare provider how much liquid to drink each day  · Soothe a sore throat  Gargle with warm salt water  This helps your sore throat feel better  Make salt water by dissolving ¼ teaspoon salt in 1 cup warm water  You may also suck on hard candy or throat lozenges  You may use a sore throat spray  · Use a humidifier or vaporizer  Use a cool mist humidifier or a vaporizer to increase air moisture in your home  This may make it easier for you to breathe and help decrease your cough  · Use saline nasal drops as directed  These help relieve congestion  · Apply petroleum-based jelly around the outside of your nostrils  This can decrease irritation from blowing your nose  · Do not smoke  Nicotine and other chemicals in cigarettes and cigars can make your symptoms worse  They can also cause infections such as bronchitis or pneumonia   Ask your healthcare provider for information if you currently smoke and need help to quit  E-cigarettes or smokeless tobacco still contain nicotine  Talk to your healthcare provider before you use these products  Prevent spreading your cold to others:   · Try to stay away from other people during the first 2 to 3 days of your cold when it is more easily spread  · Do not share food or drinks  · Do not share hand towels with household members  · Wash your hands often, especially after you blow your nose  Turn away from other people and cover your mouth and nose with a tissue when you sneeze or cough  Follow up with your healthcare provider as directed:  Write down your questions so you remember to ask them during your visits  © 2017 [facility][note 3] Information is for End User's use only and may not be sold, redistributed or otherwise used for commercial purposes  All illustrations and images included in CareNotes® are the copyrighted property of [employer]  or Anuj Lin  The above information is an  only  It is not intended as medical advice for individual conditions or treatments  Talk to your doctor, nurse or pharmacist before following any medical regimen to see if it is safe and effective for you

## 2018-02-26 ENCOUNTER — OFFICE VISIT (OUTPATIENT)
Dept: URGENT CARE | Age: 28
End: 2018-02-26
Payer: COMMERCIAL

## 2018-02-26 VITALS
WEIGHT: 130 LBS | TEMPERATURE: 98 F | HEART RATE: 71 BPM | RESPIRATION RATE: 18 BRPM | DIASTOLIC BLOOD PRESSURE: 58 MMHG | OXYGEN SATURATION: 99 % | HEIGHT: 62 IN | SYSTOLIC BLOOD PRESSURE: 109 MMHG | BODY MASS INDEX: 23.92 KG/M2

## 2018-02-26 DIAGNOSIS — B83.9 WORMS IN STOOL: Primary | ICD-10-CM

## 2018-02-26 DIAGNOSIS — R19.7 DIARRHEA, UNSPECIFIED TYPE: ICD-10-CM

## 2018-02-26 PROCEDURE — 99213 OFFICE O/P EST LOW 20 MIN: CPT | Performed by: PREVENTIVE MEDICINE

## 2018-02-26 PROCEDURE — S9088 SERVICES PROVIDED IN URGENT: HCPCS | Performed by: PREVENTIVE MEDICINE

## 2018-02-26 NOTE — PATIENT INSTRUCTIONS
Obtain stool specimen containers and instructions from [facility]  After obtaining specimen(s), return them to lab  Await test results  Results take approximatey 3-7 days until return  Transmission precautions advised

## 2018-02-26 NOTE — PROGRESS NOTES
[facility]        NAME: Scott Suárez is a 24 y o  female  : 1990    MRN: [id number]  DATE: 2018  TIME: 12:32 PM    Assessment and Plan   Worms in stool [B83 9]  1  Worms in stool     2  Diarrhea, unspecified type       Handwritten prescription given for stool ova and parasites  This is a send out lab and [facility] microbiology informs me that results typically take 3-7 days to return  Patient Instructions     Patient Instructions   Obtain stool specimen containers and instructions from [facility] [id number] [facility]  After obtaining specimen(s), return them to lab  Await test results  Results take approximatey 3-7 days until return  Transmission precautions advised  Chief Complaint     Chief Complaint   Patient presents with    Diarrhea     c/o worms in stool x today, c/o increased mucous in stool x months         History of Present Illness   Scott Suárez presents to the clinic c/o    30-year-old female that comes in with complaint of passing worm in stool recently  She said she has had looser stools than normal for the last couple weeks  Denies any known exposures, travel  She has had some antibiotics in the recent past for frequent urinary tract infections and bacterial vaginosis  She does not eat raw meat  She typically eats chicken  She does not like to eat beef  She does not like sushi  She brings in a picture of what she saw in the toilet  It is approximately 5-6 inches  It was not moving  There was some flat and some round component to the narrow strand  She has had some increased mucus in her stools over the last couple months  No jose m bleeding but has on occasion had a little bit of bright red blood on toilet paper twice she believes  No weight loss, unusual fatigue, fever or chills  No rectal itching or pain  Patient brings in photos of worm  She states she cannot get into her family doctor until next week          Review of Systems   Review of Systems   Constitutional: Negative  Respiratory: Negative  Cardiovascular: Negative  Gastrointestinal: Negative  Genitourinary: Negative  Skin: Negative  Current Medications     Long-Term Prescriptions   Medication Sig Dispense Refill    norelgestromin-ethinyl estradiol Zoeleelee Tevin) 150-35 MCG/24HR Place on the skin once a week      oxybutynin (DITROPAN-XL) 5 mg 24 hr tablet Take 1 tablet by mouth daily as needed         Current Allergies     Allergies as of 02/26/2018 - Reviewed 02/26/2018   Allergen Reaction Noted    Sulfa antibiotics Fever and Fatigue 08/28/2015    Sulfamethoxazole-trimethoprim Fever and Fatigue 01/12/2015            The following portions of the patient's history were reviewed and updated as appropriate: allergies, current medications, past family history, past medical history, past social history, past surgical history and problem list     Objective   /58   Pulse 71   Temp 98 °F (36 7 °C) (Temporal)   Resp 18   Ht 5' 2" (1 575 m)   Wt 59 kg (130 lb)   LMP 02/05/2018   SpO2 99%   BMI 23 78 kg/m²        Physical Exam     Physical Exam   Constitutional: She is oriented to person, place, and time  She appears well-developed and well-nourished  No distress  Eyes: Conjunctivae are normal  Pupils are equal, round, and reactive to light  No scleral icterus  Neck: Normal range of motion  Neck supple  Cardiovascular: Normal rate, regular rhythm and normal heart sounds  Pulmonary/Chest: Effort normal and breath sounds normal    Abdominal: Soft  Bowel sounds are normal  She exhibits no distension and no mass  There is no tenderness  There is no rebound and no guarding  Lymphadenopathy:     She has no cervical adenopathy  Neurological: She is alert and oriented to person, place, and time  Skin: Skin is warm and dry  No rash noted  She is not diaphoretic  Psychiatric: She has a normal mood and affect  Nursing note and vitals reviewed

## 2018-03-06 ENCOUNTER — OFFICE VISIT (OUTPATIENT)
Dept: FAMILY MEDICINE CLINIC | Facility: CLINIC | Age: 28
End: 2018-03-06
Payer: COMMERCIAL

## 2018-03-06 VITALS
SYSTOLIC BLOOD PRESSURE: 110 MMHG | DIASTOLIC BLOOD PRESSURE: 80 MMHG | TEMPERATURE: 97.5 F | WEIGHT: 140.2 LBS | HEART RATE: 76 BPM | HEIGHT: 63 IN | BODY MASS INDEX: 24.84 KG/M2 | RESPIRATION RATE: 16 BRPM

## 2018-03-06 DIAGNOSIS — B82.0 INTESTINAL WORMS: Primary | ICD-10-CM

## 2018-03-06 PROBLEM — N72 CERVICITIS: Status: ACTIVE | Noted: 2017-11-07

## 2018-03-06 PROBLEM — N93.0 POSTCOITAL BLEEDING: Status: ACTIVE | Noted: 2017-11-07

## 2018-03-06 PROCEDURE — 99203 OFFICE O/P NEW LOW 30 MIN: CPT | Performed by: FAMILY MEDICINE

## 2018-03-06 NOTE — ASSESSMENT & PLAN NOTE
Will treat for roundworm (photo looks like whipworm) and await Stool O+P  Patient denies pregnancy  On OCPs with no missed periods  Patient notified that boyfriend should be evaluated by his PCP if stool + recommend treatment

## 2018-03-06 NOTE — PROGRESS NOTES
Timothy Marsh 1990 female MRN: [id number]    Family Medicine- New Patient- Establish Care    ASSESSMENT/PLAN  Problem List Items Addressed This Visit     Intestinal worms - Primary     Will treat for roundworm (photo looks like whipworm) and await Stool O+P  Patient denies pregnancy  On OCPs with no missed periods  Patient notified that boyfriend should be evaluated by his PCP if stool + recommend treatment  Relevant Medications    mebendazole (VERMOX) 100 MG chewable tablet              Future Appointments  Date Time Provider Emmy Masters   5/31/2018 8:20 AM Katlyn Carreon PA-C [facility]          SUBJECTIVE  CC: Establish Care (new PT found worm in stool, feb 26th )      HPI:  Timothy Marsh is a 24 y o  female who presents for incidental finding of worm in her stool  Brings photo of what looks like a whipworm  Was seen at [facility] and stool sample ordered  She brought that today for submission to the lab  She has been asymptomatic  If anything, she may have noticed some rectal itching and a slight change in her Bowel habits over the past 2 months-- she is having BM daily and is slightly softer than her usual which is to have BM every 3 days with larger quantity and firmer stool  Denies fever, malaise, abd pain, weight loss  Review of Systems   Constitutional: Negative for activity change, appetite change, fatigue, fever and unexpected weight change  Gastrointestinal: Negative  Negative for abdominal pain, blood in stool, diarrhea, nausea, rectal pain and vomiting  Musculoskeletal: Negative for arthralgias and myalgias  Skin: Negative for rash  Neurological: Negative for light-headedness         Historical Information   The patient history was reviewed as follows:    Past Medical History:   Diagnosis Date    Candidal vulvovaginitis     Last Assessed 2/25/2016    Degenerative lumbar disc 8/28/2015    Overactive bladder     Postcoital bleeding     Last Assessed 5/28/2015    Vaginal discharge     Resolved 2/25/2016    Vaginal itching     Resolved Vaginal itching    Vitamin D deficiency      Past Surgical History:   Procedure Laterality Date    COLPOSCOPY W/ BIOPSY / CURETTAGE      Resolved 12/07/2017    WISDOM TOOTH EXTRACTION       Family History   Problem Relation Age of Onset    Hypothyroidism Mother     Arthritis Other     Hypertension Other      Benign Essential    Breast cancer Other     ALS Other     Osteoporosis Other       Social History   History   Alcohol Use    1 2 oz/week    1 Glasses of wine, 1 Standard drinks or equivalent per week     Comment: monthly; Denied per allscript     History   Drug Use No     History   Smoking Status    Never Smoker   Smokeless Tobacco    Never Used       Medications:     Current Outpatient Prescriptions:     norelgestromin-ethinyl estradiol (Vonnie Alisudheer) 150-35 MCG/24HR, Place on the skin once a week, Disp: , Rfl:     oxybutynin (DITROPAN-XL) 5 mg 24 hr tablet, Take 1 tablet by mouth daily as needed, Disp: , Rfl:     mebendazole (VERMOX) 100 MG chewable tablet, Chew 1 tablet (100 mg total) 2 (two) times a day for 3 days, Disp: 6 tablet, Rfl: 0  Allergies   Allergen Reactions    Sulfa Antibiotics Fever and Fatigue    Sulfamethoxazole-Trimethoprim Fever and Fatigue       OBJECTIVE    Vitals:   Vitals:    03/06/18 1034   BP: 110/80   Pulse: 76   Resp: 16   Temp: 97 5 °F (36 4 °C)   Weight: 63 6 kg (140 lb 3 2 oz)   Height: 5' 3 1" (1 603 m)           Physical Exam   Constitutional: She is oriented to person, place, and time  She appears well-developed and well-nourished  HENT:   Head: Normocephalic and atraumatic  Mouth/Throat: Oropharynx is clear and moist    Eyes: Conjunctivae are normal  Pupils are equal, round, and reactive to light  No scleral icterus  Neck: Neck supple  No thyromegaly present  Cardiovascular: Normal rate, regular rhythm, normal heart sounds and intact distal pulses      Pulmonary/Chest: Effort normal and breath sounds normal    Abdominal: Soft  Bowel sounds are normal  She exhibits no mass  There is no tenderness  Neurological: She is alert and oriented to person, place, and time  Skin: Skin is warm and dry  No rash noted  Psychiatric: She has a normal mood and affect                    Solis Palma, [address] [place] [address]   3/6/2018

## 2018-03-07 ENCOUNTER — LAB REQUISITION (OUTPATIENT)
Dept: LAB | Facility: HOSPITAL | Age: 28
End: 2018-03-07
Payer: COMMERCIAL

## 2018-03-07 DIAGNOSIS — B83.9: ICD-10-CM

## 2018-03-07 DIAGNOSIS — R19.7 DIARRHEA: ICD-10-CM

## 2018-03-07 PROCEDURE — 87177 OVA AND PARASITES SMEARS: CPT | Performed by: PHYSICIAN ASSISTANT

## 2018-03-07 PROCEDURE — 87209 SMEAR COMPLEX STAIN: CPT | Performed by: PHYSICIAN ASSISTANT

## 2018-03-12 LAB — O+P STL CONC: NORMAL

## 2018-03-13 ENCOUNTER — TELEPHONE (OUTPATIENT)
Dept: URGENT CARE | Age: 28
End: 2018-03-13

## 2018-03-13 NOTE — TELEPHONE ENCOUNTER
Call to patient to inform that stool O and P results were negative  She said that she artery access them on her my chart account  She has sought treatment from her family doctor who prescribed a pill  She has not been able to get it but thinks she will be able to get it tomorrow  Has not seen any more warms in her stool  Not having any abdominal pain or diarrhea at this time  Instructed to follow up with her family doctor as needed  She expresses understanding

## 2018-03-19 DIAGNOSIS — Z30.45 ENCOUNTER FOR SURVEILLANCE OF TRANSDERMAL PATCH HORMONAL CONTRACEPTIVE DEVICE: Primary | ICD-10-CM

## 2018-03-19 RX ORDER — NORELGESTROMIN AND ETHINYL ESTRADIOL 150; 35 UG/D; UG/D
PATCH TRANSDERMAL
Qty: 1 PATCH | Refills: 2 | Status: SHIPPED | OUTPATIENT
Start: 2018-03-19 | End: 2018-06-06 | Stop reason: SDUPTHER

## 2018-06-06 ENCOUNTER — ANNUAL EXAM (OUTPATIENT)
Dept: OBGYN CLINIC | Facility: CLINIC | Age: 28
End: 2018-06-06
Payer: COMMERCIAL

## 2018-06-06 VITALS
WEIGHT: 139 LBS | HEIGHT: 63 IN | BODY MASS INDEX: 24.63 KG/M2 | SYSTOLIC BLOOD PRESSURE: 100 MMHG | DIASTOLIC BLOOD PRESSURE: 64 MMHG

## 2018-06-06 DIAGNOSIS — R35.0 URINARY FREQUENCY: ICD-10-CM

## 2018-06-06 DIAGNOSIS — Z30.45 ENCOUNTER FOR SURVEILLANCE OF TRANSDERMAL PATCH HORMONAL CONTRACEPTIVE DEVICE: ICD-10-CM

## 2018-06-06 DIAGNOSIS — Z01.419 ENCNTR FOR GYN EXAM (GENERAL) (ROUTINE) W/O ABN FINDINGS: Primary | ICD-10-CM

## 2018-06-06 PROBLEM — B82.0 INTESTINAL WORMS: Status: RESOLVED | Noted: 2018-03-06 | Resolved: 2018-06-06

## 2018-06-06 PROBLEM — N32.81 OVERACTIVE BLADDER: Status: ACTIVE | Noted: 2018-06-06

## 2018-06-06 PROBLEM — N93.0 POSTCOITAL BLEEDING: Status: RESOLVED | Noted: 2017-11-07 | Resolved: 2018-06-06

## 2018-06-06 PROCEDURE — 87147 CULTURE TYPE IMMUNOLOGIC: CPT | Performed by: PHYSICIAN ASSISTANT

## 2018-06-06 PROCEDURE — 87086 URINE CULTURE/COLONY COUNT: CPT | Performed by: PHYSICIAN ASSISTANT

## 2018-06-06 PROCEDURE — 99395 PREV VISIT EST AGE 18-39: CPT | Performed by: PHYSICIAN ASSISTANT

## 2018-06-06 NOTE — PROGRESS NOTES
Doreen Viera  1990    CC:  Yearly exam    S:  24 y o  female here for yearly exam  Her cycles are regular, not heavy or crampy  She is sexually active with the same partner  She uses Xulane patches for contraception  She does not request STD testing today  She notes urinary frequency and urgency; she had evaluation by Urology and is felt to have overactive bladder  She was given oxybutynin to use "as needed when I don't feel like peeing every 10 minutes "  She is still very bothered by her symptoms  I suggested using this every day  She admits she doesn't like taking pills  I did tell her she could try the OTC patch available to see if that was helpful  About a month ago she had a two week period where she had significant left sided pelvic pain during intercourse  This has since almost completely resolved and now she has rare discomfort with intercourse  Her postcoital bleeding has completely resolved  Last Pap 2017 neg      Current Outpatient Prescriptions:     oxybutynin (DITROPAN-XL) 5 mg 24 hr tablet, Take 1 tablet by mouth daily as needed, Disp: , Rfl:     XULANE 150-35 MCG/24HR, APPLY 1 PATCH WEEKLY USE FOR 3 WEEKS ON AND 1 WEEK OFF, Disp: 1 patch, Rfl: 2  Social History     Social History    Marital status: Single     Spouse name: N/A    Number of children: N/A    Years of education: N/A     Occupational History    Not on file       Social History Main Topics    Smoking status: Never Smoker    Smokeless tobacco: Never Used    Alcohol use 1 2 oz/week     1 Glasses of wine, 1 Standard drinks or equivalent per week      Comment: monthly; Denied per allscript    Drug use: No    Sexual activity: Yes     Partners: Male     Birth control/ protection: Patch     Other Topics Concern    Not on file     Social History Narrative    Caffeine Use    Exercising Regularly     always seatbelt in care     employed      Family History   Problem Relation Age of Onset    Hypothyroidism Mother     Arthritis Other     Hypertension Other      Benign Essential    Breast cancer Other     ALS Other     Osteoporosis Other     ALS Maternal Grandfather      Past Medical History:   Diagnosis Date    Abnormal Pap smear of cervix     Candidal vulvovaginitis     Last Assessed 2/25/2016    Degenerative lumbar disc 8/28/2015    Overactive bladder     Postcoital bleeding     Last Assessed 5/28/2015    Urinary tract infection     Vaginal discharge     Resolved 2/25/2016    Vaginal itching     Resolved Vaginal itching    Vitamin D deficiency          O:  Blood pressure 100/64, height 5' 2 6" (1 59 m), weight 63 kg (139 lb), last menstrual period 05/30/2018  Patient appears well and is not in distress  Neck is supple without masses  Breasts are symmetrical without mass, tenderness, nipple discharge, skin changes or adenopathy  Abdomen is soft and nontender without masses  External genitals are normal without lesions or rashes  Vagina is normal without discharge or bleeding  Cervix is normal without discharge or lesion  Uterus is normal, mobile, nontender without palpable mass  Adnexa are normal, nontender, without palpable mass  A:  Yearly exam      P:   Pap 2020    Xulane patches sent to mail order    RTO one year for yearly exam or sooner as needed

## 2018-06-07 LAB — BACTERIA UR CULT: ABNORMAL

## 2018-06-11 ENCOUNTER — TELEPHONE (OUTPATIENT)
Dept: OBGYN CLINIC | Facility: CLINIC | Age: 28
End: 2018-06-11

## 2018-06-11 DIAGNOSIS — R10.2 PELVIC PAIN: Primary | ICD-10-CM

## 2018-06-11 NOTE — TELEPHONE ENCOUNTER
----- Message from Mike Mcburney, PA-C sent at 6/11/2018  9:07 AM EDT -----  Regarding: urine culture  I believe I already addressed this, but Enriqueta's urine culture is negative  GBS is normal in the urine and we only treat if she is pregnant (which she is not)

## 2018-06-11 NOTE — PROGRESS NOTES
Spoke with pt  Left sided pelvic pain during intercourse persists  Will check pelvic US  Also now with some left flank pain - urine culture neg, will see PCP for this

## 2018-06-11 NOTE — TELEPHONE ENCOUNTER
Spoke with pt - she is aware of urine results  Patient stated she would like to have W87 to call her back personally  She wants to talk about pain she is having  She is having kidney pain that started yesterday and other pain  Stated she didn't want to go into details with me   Best # to reach her is [phone]

## 2018-06-19 ENCOUNTER — TELEPHONE (OUTPATIENT)
Dept: OBGYN CLINIC | Facility: CLINIC | Age: 28
End: 2018-06-19

## 2018-07-12 ENCOUNTER — HOSPITAL ENCOUNTER (OUTPATIENT)
Dept: ULTRASOUND IMAGING | Facility: HOSPITAL | Age: 28
Discharge: HOME | End: 2018-07-12
Payer: COMMERCIAL

## 2018-07-12 DIAGNOSIS — R10.2 PELVIC PAIN: ICD-10-CM

## 2018-07-12 PROCEDURE — 76856 US EXAM PELVIC COMPLETE: CPT

## 2018-07-12 PROCEDURE — 76830 TRANSVAGINAL US NON-OB: CPT

## 2018-08-08 ENCOUNTER — TELEPHONE (OUTPATIENT)
Dept: FAMILY MEDICINE CLINIC | Facility: CLINIC | Age: 28
End: 2018-08-08

## 2018-08-28 ENCOUNTER — OFFICE VISIT (OUTPATIENT)
Dept: FAMILY MEDICINE CLINIC | Facility: CLINIC | Age: 28
End: 2018-08-28
Payer: COMMERCIAL

## 2018-08-28 ENCOUNTER — APPOINTMENT (OUTPATIENT)
Dept: LAB | Facility: CLINIC | Age: 28
End: 2018-08-28
Payer: COMMERCIAL

## 2018-08-28 VITALS
WEIGHT: 143.6 LBS | RESPIRATION RATE: 16 BRPM | TEMPERATURE: 97.7 F | BODY MASS INDEX: 25.45 KG/M2 | SYSTOLIC BLOOD PRESSURE: 120 MMHG | HEART RATE: 78 BPM | DIASTOLIC BLOOD PRESSURE: 80 MMHG | HEIGHT: 63 IN

## 2018-08-28 DIAGNOSIS — R53.83 FATIGUE, UNSPECIFIED TYPE: ICD-10-CM

## 2018-08-28 DIAGNOSIS — Z00.00 HEALTHCARE MAINTENANCE: Primary | ICD-10-CM

## 2018-08-28 DIAGNOSIS — Z00.00 HEALTHCARE MAINTENANCE: ICD-10-CM

## 2018-08-28 LAB
25(OH)D3 SERPL-MCNC: 32 NG/ML (ref 30–100)
ALBUMIN SERPL BCP-MCNC: 3.9 G/DL (ref 3.5–5)
ALP SERPL-CCNC: 54 U/L (ref 46–116)
ALT SERPL W P-5'-P-CCNC: 23 U/L (ref 12–78)
ANION GAP SERPL CALCULATED.3IONS-SCNC: 7 MMOL/L (ref 4–13)
AST SERPL W P-5'-P-CCNC: 34 U/L (ref 5–45)
BASOPHILS # BLD AUTO: 0.07 THOUSANDS/ΜL (ref 0–0.1)
BASOPHILS NFR BLD AUTO: 1 % (ref 0–1)
BILIRUB SERPL-MCNC: 0.68 MG/DL (ref 0.2–1)
BUN SERPL-MCNC: 16 MG/DL (ref 5–25)
CALCIUM SERPL-MCNC: 8.9 MG/DL (ref 8.3–10.1)
CHLORIDE SERPL-SCNC: 103 MMOL/L (ref 100–108)
CHOLEST SERPL-MCNC: 197 MG/DL (ref 50–200)
CO2 SERPL-SCNC: 27 MMOL/L (ref 21–32)
CREAT SERPL-MCNC: 0.84 MG/DL (ref 0.6–1.3)
EOSINOPHIL # BLD AUTO: 0.32 THOUSAND/ΜL (ref 0–0.61)
EOSINOPHIL NFR BLD AUTO: 5 % (ref 0–6)
ERYTHROCYTE [DISTWIDTH] IN BLOOD BY AUTOMATED COUNT: 12.1 % (ref 11.6–15.1)
EST. AVERAGE GLUCOSE BLD GHB EST-MCNC: 91 MG/DL
FERRITIN SERPL-MCNC: 8 NG/ML (ref 8–388)
GFR SERPL CREATININE-BSD FRML MDRD: 97 ML/MIN/1.73SQ M
GLUCOSE P FAST SERPL-MCNC: 77 MG/DL (ref 65–99)
HBA1C MFR BLD: 4.8 % (ref 4.2–6.3)
HCT VFR BLD AUTO: 41 % (ref 34.8–46.1)
HDLC SERPL-MCNC: 83 MG/DL (ref 40–60)
HGB BLD-MCNC: 12.9 G/DL (ref 11.5–15.4)
IMM GRANULOCYTES # BLD AUTO: 0.02 THOUSAND/UL (ref 0–0.2)
IMM GRANULOCYTES NFR BLD AUTO: 0 % (ref 0–2)
LDLC SERPL CALC-MCNC: 100 MG/DL (ref 0–100)
LYMPHOCYTES # BLD AUTO: 2.64 THOUSANDS/ΜL (ref 0.6–4.47)
LYMPHOCYTES NFR BLD AUTO: 42 % (ref 14–44)
MCH RBC QN AUTO: 29.7 PG (ref 26.8–34.3)
MCHC RBC AUTO-ENTMCNC: 31.5 G/DL (ref 31.4–37.4)
MCV RBC AUTO: 95 FL (ref 82–98)
MONOCYTES # BLD AUTO: 0.54 THOUSAND/ΜL (ref 0.17–1.22)
MONOCYTES NFR BLD AUTO: 9 % (ref 4–12)
NEUTROPHILS # BLD AUTO: 2.67 THOUSANDS/ΜL (ref 1.85–7.62)
NEUTS SEG NFR BLD AUTO: 43 % (ref 43–75)
NONHDLC SERPL-MCNC: 114 MG/DL
NRBC BLD AUTO-RTO: 0 /100 WBCS
PLATELET # BLD AUTO: 331 THOUSANDS/UL (ref 149–390)
PMV BLD AUTO: 10.9 FL (ref 8.9–12.7)
POTASSIUM SERPL-SCNC: 3.7 MMOL/L (ref 3.5–5.3)
PROT SERPL-MCNC: 8 G/DL (ref 6.4–8.2)
RBC # BLD AUTO: 4.34 MILLION/UL (ref 3.81–5.12)
SODIUM SERPL-SCNC: 137 MMOL/L (ref 136–145)
TRIGL SERPL-MCNC: 71 MG/DL
TSH SERPL DL<=0.05 MIU/L-ACNC: 1.79 UIU/ML (ref 0.36–3.74)
WBC # BLD AUTO: 6.26 THOUSAND/UL (ref 4.31–10.16)

## 2018-08-28 PROCEDURE — 85025 COMPLETE CBC W/AUTO DIFF WBC: CPT

## 2018-08-28 PROCEDURE — 84443 ASSAY THYROID STIM HORMONE: CPT

## 2018-08-28 PROCEDURE — 99395 PREV VISIT EST AGE 18-39: CPT | Performed by: NURSE PRACTITIONER

## 2018-08-28 PROCEDURE — 82306 VITAMIN D 25 HYDROXY: CPT

## 2018-08-28 PROCEDURE — 80053 COMPREHEN METABOLIC PANEL: CPT

## 2018-08-28 PROCEDURE — 82728 ASSAY OF FERRITIN: CPT

## 2018-08-28 PROCEDURE — 36415 COLL VENOUS BLD VENIPUNCTURE: CPT

## 2018-08-28 PROCEDURE — 80061 LIPID PANEL: CPT

## 2018-08-28 PROCEDURE — 83036 HEMOGLOBIN GLYCOSYLATED A1C: CPT

## 2018-08-28 NOTE — PROGRESS NOTES
Assessment/Plan     Healthy female exam  Problem List Items Addressed This Visit     Healthcare maintenance - Primary    Relevant Orders    Lipid panel    HEMOGLOBIN A1C W/ EAG ESTIMATION      Other Visit Diagnoses     Fatigue, unspecified type        Relevant Orders    TSH, 3rd generation with Free T4 reflex    CBC and differential    Comprehensive metabolic panel    Vitamin D 25 hydroxy    Ferritin    Lipid panel    HEMOGLOBIN A1C W/ EAG ESTIMATION       Patient Counseling:  --Nutrition: Stressed importance of moderation in sodium/caffeine intake, saturated fat and cholesterol, caloric balance, sufficient intake of fresh fruits, vegetables, fiber, calcium, iron, and 1 mg of folate   --Exercise: Stressed the importance of regular exercise  --Injury prevention: Discussed safety   --Dental health: Discussed importance of regular tooth brushing, flossing, and dental visits  --Immunizations reviewed  3  Discussed the patient's BMI with her  The BMI is in the acceptable range  4  Follow up in one year  Subjective     Laney Bustillo is a 24 y o  female and is here for a comprehensive physical exam  The patient reports problems - fatigue and concern about Thyroid and Anemia d/t family history'  Patient also working outside in heat and for long hours as a  at [employer]  Wants labs for Healthy You program and fatigue- Family history    Do you take any herbs or supplements that were not prescribed by a doctor? no  Are you taking calcium supplements? no  Are you taking aspirin daily? no     History:  LMP: Patient's last menstrual period was 2018 (exact date)      Last pap date: 2017  Abnormal pap? no  : 0      The following portions of the patient's history were reviewed and updated as appropriate: allergies, current medications, past family history, past medical history, past social history, past surgical history and problem list     Review of Systems  Do you have pain that bothers you in your daily life? yes but manageable, does not affect QO:  Pertinent items are noted in HPI       Objective     /80   Pulse 78   Temp 97 7 °F (36 5 °C)   Resp 16   Ht 5' 3 1" (1 603 m)   Wt 65 1 kg (143 lb 9 6 oz)   LMP 08/24/2018 (Exact Date)   BMI 25 36 kg/m²     General Appearance:    Alert, cooperative, no distress, appears stated age   Head:    Normocephalic, without obvious abnormality, atraumatic   Eyes:    PERRL, conjunctiva/corneas clear   Ears:    Normal TM's and external ear canals, both ears   Nose:   Nares normal, septum midline, mucosa normal, no drainage    or sinus tenderness   Throat:   Lips, mucosa, and tongue normal; teeth and gums normal   Neck:   Supple, symmetrical, trachea midline, no adenopathy;     thyroid:  no enlargement/tenderness/nodules; no carotid    bruit or JVD   Back:     Symmetric, no curvature, ROM normal, no CVA tenderness   Lungs:     Clear to auscultation bilaterally, respirations unlabored   Chest Wall:    No tenderness or deformity    Heart:    Regular rate and rhythm, S1 and S2 normal, no murmur, rub   or gallop       Abdomen:     Soft, non-tender, bowel sounds active all four quadrants,     no masses, no organomegaly           Extremities:   Extremities normal, atraumatic, no cyanosis or edema   Pulses:   2+ and symmetric all extremities   Skin:   Skin color, texture, turgor normal, no rashes or lesions   Lymph nodes:   Cervical, supraclavicular, and axillary nodes normal   Neurologic:    normal strength, sensation and reflexes     throughout

## 2018-08-28 NOTE — PATIENT INSTRUCTIONS

## 2018-09-18 ENCOUNTER — OFFICE VISIT (OUTPATIENT)
Dept: FAMILY MEDICINE CLINIC | Facility: CLINIC | Age: 28
End: 2018-09-18
Payer: COMMERCIAL

## 2018-09-18 VITALS
WEIGHT: 144.2 LBS | DIASTOLIC BLOOD PRESSURE: 72 MMHG | SYSTOLIC BLOOD PRESSURE: 118 MMHG | TEMPERATURE: 97.4 F | HEIGHT: 63 IN | BODY MASS INDEX: 25.55 KG/M2 | HEART RATE: 76 BPM | RESPIRATION RATE: 16 BRPM

## 2018-09-18 DIAGNOSIS — Z00.00 HEALTHCARE MAINTENANCE: Primary | ICD-10-CM

## 2018-09-18 PROCEDURE — 99213 OFFICE O/P EST LOW 20 MIN: CPT | Performed by: NURSE PRACTITIONER

## 2018-09-18 PROCEDURE — 3008F BODY MASS INDEX DOCD: CPT | Performed by: NURSE PRACTITIONER

## 2018-09-18 NOTE — ASSESSMENT & PLAN NOTE
Labs normal  Continue with vitamin D  Get flu shot  Exercise and healthy eating  Follow up with GYN regarding contraception management

## 2018-09-18 NOTE — PROGRESS NOTES
Assessment/Plan:    No problem-specific Assessment & Plan notes found for this encounter  Problem List Items Addressed This Visit     Healthcare maintenance - Primary     Labs normal  Continue with vitamin D  Get flu shot  Exercise and healthy eating  Follow up with GYN regarding contraception management                 Subjective:      Patient ID: Mode Du is a 24 y o  female  24year old female presents for health maintenance and to review labs  She has normal menses is on estrogen patch for the past 3 years  She is an  at [employer]  She takes vitamin D on most days  No complaints offered except has some breakthrough bleeding  Will follow up with her GYN  She has a history of overactive bladder and was taking Ditropan but is no longer using this medication  The following portions of the patient's history were reviewed and updated as appropriate: allergies, current medications, past family history, past medical history, past social history, past surgical history and problem list     Review of Systems   Constitutional: Negative  HENT: Negative  Eyes: Negative  Respiratory: Negative  Cardiovascular: Negative  Gastrointestinal: Negative  Endocrine: Negative  Genitourinary: Positive for menstrual problem  Musculoskeletal: Negative  Skin: Negative  Allergic/Immunologic: Negative  Neurological: Negative  Hematological: Negative  Psychiatric/Behavioral: Negative  All other systems reviewed and are negative  Objective:      /72   Pulse 76   Temp (!) 97 4 °F (36 3 °C)   Resp 16   Ht 5' 3 2" (1 605 m)   Wt 65 4 kg (144 lb 3 2 oz)   LMP 08/24/2018 (Exact Date)   BMI 25 38 kg/m²          Physical Exam   Constitutional: She is oriented to person, place, and time  She appears well-developed and well-nourished  HENT:   Head: Normocephalic and atraumatic  Eyes: Pupils are equal, round, and reactive to light  Neck: Normal range of motion  Neck supple  Cardiovascular: Normal rate and regular rhythm  Pulmonary/Chest: Effort normal and breath sounds normal    Musculoskeletal: Normal range of motion  Neurological: She is alert and oriented to person, place, and time  Skin: Skin is warm and dry  Psychiatric: She has a normal mood and affect   Her behavior is normal  Judgment and thought content normal

## 2018-09-18 NOTE — PATIENT INSTRUCTIONS
All labs are within normal limits  Follow up with GYN to discuss birth control methods  Get flu shot

## 2018-10-30 ENCOUNTER — TELEPHONE (OUTPATIENT)
Dept: OBGYN CLINIC | Facility: CLINIC | Age: 28
End: 2018-10-30

## 2018-10-30 DIAGNOSIS — Z30.45 ENCOUNTER FOR SURVEILLANCE OF TRANSDERMAL PATCH HORMONAL CONTRACEPTIVE DEVICE: ICD-10-CM

## 2018-10-30 DIAGNOSIS — R35.0 FREQUENT URINATION: Primary | ICD-10-CM

## 2018-10-30 RX ORDER — NITROFURANTOIN 25; 75 MG/1; MG/1
100 CAPSULE ORAL 2 TIMES DAILY
Qty: 10 CAPSULE | Refills: 0 | Status: SHIPPED | OUTPATIENT
Start: 2018-10-30 | End: 2018-11-04

## 2018-10-30 NOTE — TELEPHONE ENCOUNTER
Patient returned call - symptoms started 2 days ago  She has frequent urination, abdominal pressure and pain at the end of urine stream  Does not know if her urine is cloudy - is on day three of her menses  No lower back pain  No OTC product used  Patient is going for a urine culture and ua  Orders already in pt chart  Per protocol, ok to order Bactrim DS? Please advise  Patient would also like to have a refill of her OCP  OK to fill both?

## 2018-10-30 NOTE — TELEPHONE ENCOUNTER
Patient called and said she needs a refill of her Birth control sent to the [address]  Patient also thinks she may have a UTI and would like medication for that

## 2018-10-30 NOTE — TELEPHONE ENCOUNTER
Patient is allergic to sulfa - Will order Macrobid, Take 1 pill twice a day for 5 days per W[facility]  Orders in pt chart  Please sign  Thanks! Wants refill of the birth control and medication to to go to [address]   It is listed in pt chart

## 2018-11-01 ENCOUNTER — APPOINTMENT (OUTPATIENT)
Dept: LAB | Facility: CLINIC | Age: 28
End: 2018-11-01
Payer: COMMERCIAL

## 2018-11-01 DIAGNOSIS — N39.0 URINARY TRACT INFECTION: ICD-10-CM

## 2018-11-01 DIAGNOSIS — Z87.440 PERSONAL HISTORY OF URINARY INFECTION: ICD-10-CM

## 2018-11-01 DIAGNOSIS — R35.0 FREQUENT URINATION: ICD-10-CM

## 2018-11-01 DIAGNOSIS — R39.9 UNSPECIFIED SYMPTOMS AND SIGNS INVOLVING THE GENITOURINARY SYSTEM: ICD-10-CM

## 2018-11-01 LAB
BILIRUB UR QL STRIP: NEGATIVE
CLARITY UR: CLEAR
COLOR UR: YELLOW
GLUCOSE UR STRIP-MCNC: NEGATIVE MG/DL
HGB UR QL STRIP.AUTO: NEGATIVE
KETONES UR STRIP-MCNC: NEGATIVE MG/DL
LEUKOCYTE ESTERASE UR QL STRIP: NEGATIVE
NITRITE UR QL STRIP: NEGATIVE
PH UR STRIP.AUTO: 6 [PH] (ref 4.5–8)
PROT UR STRIP-MCNC: NEGATIVE MG/DL
SP GR UR STRIP.AUTO: 1.02 (ref 1–1.03)
UROBILINOGEN UR QL STRIP.AUTO: 0.2 E.U./DL

## 2018-11-01 PROCEDURE — 81003 URINALYSIS AUTO W/O SCOPE: CPT

## 2018-11-01 PROCEDURE — 87086 URINE CULTURE/COLONY COUNT: CPT

## 2018-11-02 ENCOUNTER — TELEPHONE (OUTPATIENT)
Dept: OBGYN CLINIC | Facility: CLINIC | Age: 28
End: 2018-11-02

## 2018-11-02 LAB — BACTERIA UR CULT: NORMAL

## 2018-11-02 NOTE — TELEPHONE ENCOUNTER
----- Message from Margot Guerrero PA-C sent at 11/2/2018 11:26 AM EDT -----  Please let Krysta Pavan know that her urine showed no infection  If she is feeling better, great  If not, find out what's going on   Thx

## 2018-11-02 NOTE — TELEPHONE ENCOUNTER
Left voicemail message today @ [phone] per Pt's signed communication consent form in Pt's EHR  Pt informed, via vm message, that her recent UC result showed no infection per TOÑO Banks' review  Pt further informed , via vm message, that if she still is not feeling well to contact office

## 2018-12-10 ENCOUNTER — TELEPHONE (OUTPATIENT)
Dept: OBGYN CLINIC | Facility: CLINIC | Age: 28
End: 2018-12-10

## 2018-12-10 NOTE — TELEPHONE ENCOUNTER
Pt called said she is still having pain with intercourse, she said she has had testing done and has followed your recommendations but still has this problem, not sure if you want to see her for ovl again, she has also had heavy bleeding but has not been using the patches because she said her pharmacy never called her to say they were ready and therefore never picked them up

## 2018-12-10 NOTE — TELEPHONE ENCOUNTER
Can come in for reevaluation - I haven't seen her since June when she told me the pain with intercourse was rare and not bothersome to her    She can restart patches whenever she wants to

## 2018-12-19 ENCOUNTER — OFFICE VISIT (OUTPATIENT)
Dept: OBGYN CLINIC | Facility: CLINIC | Age: 28
End: 2018-12-19
Payer: COMMERCIAL

## 2018-12-19 VITALS — WEIGHT: 144 LBS | DIASTOLIC BLOOD PRESSURE: 80 MMHG | BODY MASS INDEX: 25.35 KG/M2 | SYSTOLIC BLOOD PRESSURE: 110 MMHG

## 2018-12-19 DIAGNOSIS — N94.10 DYSPAREUNIA, FEMALE: ICD-10-CM

## 2018-12-19 DIAGNOSIS — N93.0 POSTCOITAL BLEEDING: Primary | ICD-10-CM

## 2018-12-19 PROCEDURE — 87591 N.GONORRHOEAE DNA AMP PROB: CPT | Performed by: PHYSICIAN ASSISTANT

## 2018-12-19 PROCEDURE — G0145 SCR C/V CYTO,THINLAYER,RESCR: HCPCS | Performed by: PHYSICIAN ASSISTANT

## 2018-12-19 PROCEDURE — 87491 CHLMYD TRACH DNA AMP PROBE: CPT | Performed by: PHYSICIAN ASSISTANT

## 2018-12-19 PROCEDURE — 99213 OFFICE O/P EST LOW 20 MIN: CPT | Performed by: PHYSICIAN ASSISTANT

## 2018-12-19 NOTE — PROGRESS NOTES
Félix Garcia  1990    S:  34 y o  female here for a problem visit  She has two issues:     1  Pain with intercourse  She reports that she is with the same partner for years  This was initially not an issue  She says that she feels a deep pain with penetration, like he is hitting a wall  She admits that she is not always very aroused prior to intercourse but that she does become aroused through the course of relations and she has this pain the entire time  They have tried lubricants which help a little  She reports that this pain is in any position  2  Postcoital bleeding  She had a normal Pap and cultures in December 2017 and a subsequently normal colpo for the same problem  Her bleeding had resolved but has now recurred again  She denies dryness with intercourse  She was treated in the past with clindamycin cream which made no change in her postcoital bleeding  3  Irregular bleeding  There was a problem at the pharmacy level where they did not give her her patches on time and she was out of them for two months  During that time, she had daily and worsening vaginal bleeding  She restarted her patches 3 weeks ago and her irregular bleeding has now resolved  They are getting  next year and plan a pregnancy in about 1 1/2 years       Past Medical History:   Diagnosis Date    Degenerative lumbar disc 8/28/2015    Overactive bladder      Family History   Problem Relation Age of Onset    Hypothyroidism Mother     Arthritis Other     Hypertension Other         Benign Essential    Breast cancer Other     ALS Other     Osteoporosis Other     ALS Maternal Grandfather      Social History     Social History    Marital status: Single     Spouse name: N/A    Number of children: N/A    Years of education: N/A     Social History Main Topics    Smoking status: Never Smoker    Smokeless tobacco: Never Used    Alcohol use 1 2 oz/week     1 Glasses of wine, 1 Standard drinks or equivalent per week      Comment: monthly; Denied per allscript    Drug use: No    Sexual activity: Yes     Partners: Male     Birth control/ protection: Patch     Other Topics Concern    None     Social History Narrative    Caffeine Use    Exercising Regularly     always seatbelt in care     employed        O:  /80 (BP Location: Right arm, Patient Position: Sitting, Cuff Size: Standard)   Wt 65 3 kg (144 lb)   BMI 25 35 kg/m²   She appears well and is in no distress  Abdomen is soft and nontender  External genitals are normal without lesions or rashes  Vagina is normal, no discharge or bleeding noted  Cervix is friable in the endocervix, no lesions or discharge  Uterus is nontender, no masses  Adnexa are nontender, no pelvic masses appreciated    A/P:  1  Dyspareunia  Referred to pelvic PT for further evaluation  2  Postcoital bleeding  Check Pap and cultures  May need to consider LEEP in the future if this is persistent

## 2018-12-21 LAB
C TRACH DNA SPEC QL NAA+PROBE: NEGATIVE
N GONORRHOEA DNA SPEC QL NAA+PROBE: NEGATIVE

## 2018-12-24 LAB
LAB AP GYN PRIMARY INTERPRETATION: NORMAL
Lab: NORMAL

## 2019-03-27 DIAGNOSIS — Z30.45 ENCOUNTER FOR SURVEILLANCE OF TRANSDERMAL PATCH HORMONAL CONTRACEPTIVE DEVICE: ICD-10-CM

## 2019-03-27 RX ORDER — NORELGESTROMIN AND ETHINYL ESTRADIOL 150; 35 UG/D; UG/D
PATCH TRANSDERMAL
Qty: 9 PATCH | Refills: 0 | Status: SHIPPED | OUTPATIENT
Start: 2019-03-27 | End: 2019-06-12 | Stop reason: ALTCHOICE

## 2019-06-12 ENCOUNTER — ANNUAL EXAM (OUTPATIENT)
Dept: OBGYN CLINIC | Facility: CLINIC | Age: 29
End: 2019-06-12
Payer: COMMERCIAL

## 2019-06-12 VITALS
HEIGHT: 62 IN | DIASTOLIC BLOOD PRESSURE: 62 MMHG | SYSTOLIC BLOOD PRESSURE: 110 MMHG | BODY MASS INDEX: 26.31 KG/M2 | WEIGHT: 143 LBS

## 2019-06-12 DIAGNOSIS — Z01.419 ENCNTR FOR GYN EXAM (GENERAL) (ROUTINE) W/O ABN FINDINGS: ICD-10-CM

## 2019-06-12 DIAGNOSIS — N93.0 POSTCOITAL BLEEDING: Primary | ICD-10-CM

## 2019-06-12 PROCEDURE — 99395 PREV VISIT EST AGE 18-39: CPT | Performed by: PHYSICIAN ASSISTANT

## 2019-06-12 PROCEDURE — G0145 SCR C/V CYTO,THINLAYER,RESCR: HCPCS | Performed by: PHYSICIAN ASSISTANT

## 2019-06-20 ENCOUNTER — EVALUATION (OUTPATIENT)
Dept: PHYSICAL THERAPY | Facility: CLINIC | Age: 29
End: 2019-06-20
Payer: COMMERCIAL

## 2019-06-20 DIAGNOSIS — N94.10 PAIN IN FEMALE GENITALIA ON INTERCOURSE: Primary | ICD-10-CM

## 2019-06-20 DIAGNOSIS — M54.50 CHRONIC BILATERAL LOW BACK PAIN WITHOUT SCIATICA: ICD-10-CM

## 2019-06-20 DIAGNOSIS — N39.41 URGENCY INCONTINENCE: ICD-10-CM

## 2019-06-20 DIAGNOSIS — G89.29 CHRONIC BILATERAL LOW BACK PAIN WITHOUT SCIATICA: ICD-10-CM

## 2019-06-20 LAB
LAB AP GYN PRIMARY INTERPRETATION: NORMAL
Lab: NORMAL

## 2019-06-20 PROCEDURE — 97112 NEUROMUSCULAR REEDUCATION: CPT | Performed by: PHYSICAL THERAPIST

## 2019-06-20 PROCEDURE — 97162 PT EVAL MOD COMPLEX 30 MIN: CPT | Performed by: PHYSICAL THERAPIST

## 2019-06-25 ENCOUNTER — OFFICE VISIT (OUTPATIENT)
Dept: PHYSICAL THERAPY | Facility: CLINIC | Age: 29
End: 2019-06-25
Payer: COMMERCIAL

## 2019-06-25 DIAGNOSIS — G89.29 CHRONIC BILATERAL LOW BACK PAIN WITHOUT SCIATICA: ICD-10-CM

## 2019-06-25 DIAGNOSIS — N94.10 PAIN IN FEMALE GENITALIA ON INTERCOURSE: Primary | ICD-10-CM

## 2019-06-25 DIAGNOSIS — M54.50 CHRONIC BILATERAL LOW BACK PAIN WITHOUT SCIATICA: ICD-10-CM

## 2019-06-25 DIAGNOSIS — N39.41 URGENCY INCONTINENCE: ICD-10-CM

## 2019-06-25 PROCEDURE — 97112 NEUROMUSCULAR REEDUCATION: CPT | Performed by: PHYSICAL THERAPIST

## 2019-07-01 ENCOUNTER — OFFICE VISIT (OUTPATIENT)
Dept: PHYSICAL THERAPY | Facility: CLINIC | Age: 29
End: 2019-07-01
Payer: COMMERCIAL

## 2019-07-01 DIAGNOSIS — N94.10 PAIN IN FEMALE GENITALIA ON INTERCOURSE: Primary | ICD-10-CM

## 2019-07-01 DIAGNOSIS — G89.29 CHRONIC BILATERAL LOW BACK PAIN WITHOUT SCIATICA: ICD-10-CM

## 2019-07-01 DIAGNOSIS — M54.50 CHRONIC BILATERAL LOW BACK PAIN WITHOUT SCIATICA: ICD-10-CM

## 2019-07-01 DIAGNOSIS — N39.41 URGENCY INCONTINENCE: ICD-10-CM

## 2019-07-01 PROCEDURE — 97110 THERAPEUTIC EXERCISES: CPT | Performed by: PHYSICAL THERAPIST

## 2019-07-01 PROCEDURE — 97112 NEUROMUSCULAR REEDUCATION: CPT | Performed by: PHYSICAL THERAPIST

## 2019-07-01 NOTE — PROGRESS NOTES
Daily Note     Today's date: 2019  Patient name: Rae Lowe  : 1990  MRN: [id number]  Referring provider: Candis Marcial PA-C  Dx:   Encounter Diagnosis     ICD-10-CM    1  Pain in female genitalia on intercourse N94 10    2  Urgency incontinence N39 41    3  Chronic bilateral low back pain without sciatica M54 5     G89 29                   Subjective: Patient reports that she did her exercises every day  She notes that during relaxation phase, she only has a brief pause instead of the 2 phase relaxation she had last visit  Objective: See treatment diary below    Diagnosis: Urinary urge incontinence, LBP, and pain with intercourse   Precautions: POTS syndrome  Manual Therapy 19                             Pelvic floor assessment  HJS, PT              Exercise Diary         Multifidus NMR        Kegel with focus on relaxing  [id number]      Kegel + TrA        Kegel, Glut set, relax glut set, relax PF  10x      Bridges        BI, ARLIN kegel  [id number] Biofeedback 10x     BI, ARLIN TrA act  [id number] Biofeedback 10x     Biofeedback         - PFMC endurance holds   20x10 sec     -PFMC quick flicks 3 reps   00Y     - BI, ARLIN, Kegel, TrA   10x     - BI, ARLIN, Kegel, Bridge, hold PFMC on descent   10x                                                              Pt education PT, expectations, and POC Relaxation techniques HEP, biofeedback     Modalities                                Assessment: Tolerated treatment well  Patient exhibited good technique with therapeutic exercises  Patient fatigued quickly, and required cues to perform exercises without compensation  She is improving slowly toward long term goals  Plan: Continue per plan of care

## 2019-07-08 ENCOUNTER — APPOINTMENT (OUTPATIENT)
Dept: PHYSICAL THERAPY | Facility: CLINIC | Age: 29
End: 2019-07-08
Payer: COMMERCIAL

## 2019-07-15 ENCOUNTER — APPOINTMENT (OUTPATIENT)
Dept: PHYSICAL THERAPY | Facility: CLINIC | Age: 29
End: 2019-07-15
Payer: COMMERCIAL

## 2019-07-17 ENCOUNTER — PROCEDURE VISIT (OUTPATIENT)
Dept: OBGYN CLINIC | Age: 29
End: 2019-07-17
Payer: COMMERCIAL

## 2019-07-17 VITALS
BODY MASS INDEX: 26.87 KG/M2 | HEIGHT: 62 IN | SYSTOLIC BLOOD PRESSURE: 100 MMHG | WEIGHT: 146 LBS | DIASTOLIC BLOOD PRESSURE: 82 MMHG

## 2019-07-17 DIAGNOSIS — N93.0 PCB (POST COITAL BLEEDING): Primary | ICD-10-CM

## 2019-07-17 DIAGNOSIS — N94.10 DYSPAREUNIA, FEMALE: ICD-10-CM

## 2019-07-17 PROCEDURE — 99215 OFFICE O/P EST HI 40 MIN: CPT | Performed by: OBSTETRICS & GYNECOLOGY

## 2019-07-17 PROCEDURE — 88305 TISSUE EXAM BY PATHOLOGIST: CPT | Performed by: PATHOLOGY

## 2019-07-17 PROCEDURE — 57455 BIOPSY OF CERVIX W/SCOPE: CPT | Performed by: OBSTETRICS & GYNECOLOGY

## 2019-07-17 NOTE — PROGRESS NOTES
Colposcopy  Date/Time: 7/17/2019 5:36 PM  Performed by: Dajuan Painter DO  Authorized by: Dajuan Painter DO     Consent:     Consent obtained:  Written    Consent given by:  Patient    Procedural risks discussed:  Bleeding, damage to other organs, infection and repeat procedure    Patient questions answered: yes      Patient agrees, verbalizes understanding, and wants to proceed: yes    Pre-procedure:     Prepped with: acetic acid    Indication:     Indications: Postcoital bleeding  Procedure:     Procedure: Colposcopy w/ biopsy of cervix      Under satisfactory analgesia the patient was prepped and draped in the dorsal lithotomy position: yes      [place] speculum was placed in the vagina: yes      Under colposcopic examination the transition zone was seen in entirety: yes      Cervical biopsy performed with a cervical biopsy punch: yes      Biopsy(s): yes      Location:  Cervix at 12 o'clock and 11 o'clock    Specimen to pathology: yes    Post-procedure:     Findings: Bleeding      Impression: Low grade cervical dysplasia    Comments:      Patient demonstrated 2 visible vesicular red lesions on the cervix that are consistent with endometriosis implants  These were biopsied  There was also a biopsy obtained from the aceto-white lesion that was consistent with mild cervical dysplasia  Follow-up will be by phone with the above results

## 2019-07-17 NOTE — PROGRESS NOTES
Assessment/Plan:    No problem-specific Assessment & Plan notes found for this encounter  Diagnoses and all orders for this visit:    PCB (post coital bleeding)  -     Tissue Exam    Dyspareunia, female        Extensive discussion was had with the patient  She also underwent colposcopy which will be under a separate heading  Grossly and under colposcopic evaluation there were 2 separate red vesicular lesions on the cervix consistent with endometriosis implants  There was also a separate aceto-white lesion that will be described in this colposcopic report  Given the constellation of symptoms as well as findings today on examination, I strongly suspect that this patient has endometriosis  I believe her cervical implants will return as endometriosis  We discussed the diagnosis as well as implications of endometriosis including some of the challenges with management  I have asked that the patient remain off of her contraceptive patch for the next few months to see how her pelvic pain and other symptoms respond as some of her cervical issues may be related to estrogen stimulation  I suspect the patient will respond better to suppression with a contraceptive method it if endometriosis is confirmed  I will forego any consideration for cryotherapy at this time as that was a consideration for helping with her postcoital bleeding  There may be more complexity to this and therefore we will await the biopsy results as well as her clinical course of the next few cycles  I discussed the need for close follow-up as there was a aceto-white lesion recognized on colposcopy  Follow-up for these issues will be discussed directly with the patient when those results become available  Patient voiced understanding with the plan as well as my clinical suspicions  Patient will return to the office in about 2 months for re-evaluation pending above results      Total face-to-face time for this visit was 45 minutes with greater than 50% of that afforded to counseling and coordination of care  Subjective:      Patient ID: Doreen Viera is a 34 y o  female  Patient returns for consultation relative to a complex history  Patient with history of dyspareunia for many years  Patient also with complex pelvic pain disorder that has resulted in procedures including cystoscopy as well as pelvic ultrasound  Both of these have failed to yield a discrete etiology for her discomfort  Patient now with a extended history of postcoital bleeding  This has been significant at times with need for multiple pads or tampons  Patient has some improvement in her overall symptoms when she is on hormonal contraception  She does continue to have some bladder spasm and urgency  She does have deep dyspareunia  She also has pain with menses  She has discontinued her contraceptive hormone patch for the past month  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 [employer]  The following portions of the patient's history were reviewed and updated as appropriate:   She  has a past medical history of Degenerative lumbar disc (8/28/2015), Overactive bladder, and Urinary tract infection  She   Patient Active Problem List    Diagnosis Date Noted    Dyspareunia, female 12/19/2018    Healthcare maintenance 08/28/2018    Overactive bladder 06/06/2018    Lumbar radiculopathy 05/26/2016    Bulging lumbar disc 08/28/2015    Degenerative lumbar disc 08/28/2015    Vitamin D insufficiency 08/12/2015    Piriformis syndrome 06/25/2015    Sacroiliitis ([facility] [address] ) 06/25/2015     She  has a past surgical history that includes [place] tooth extraction; Colposcopy w/ biopsy / curettage; and Colposcopy  Her family history includes ALS in her maternal grandfather and other; Arthritis in her other; Breast cancer in her other; Hypertension in her other; Hypothyroidism in her mother; Osteoporosis in her other  She  reports that she has never smoked  She has never used smokeless tobacco  She reports that she drinks about 2 0 standard drinks of alcohol per week  She reports that she does not use drugs  Current Outpatient Medications   Medication Sig Dispense Refill    oxybutynin (DITROPAN-XL) 5 mg 24 hr tablet Take 1 tablet by mouth daily as needed       No current facility-administered medications for this visit  Current Outpatient Medications on File Prior to Visit   Medication Sig    oxybutynin (DITROPAN-XL) 5 mg 24 hr tablet Take 1 tablet by mouth daily as needed     No current facility-administered medications on file prior to visit  She is allergic to sulfa antibiotics and sulfamethoxazole-trimethoprim       Review of Systems   All other systems reviewed and are negative  Objective:      /82 (BP Location: Left arm, Patient Position: Sitting, Cuff Size: Standard)   Ht 5' 1 81" (1 57 m)   Wt 66 2 kg (146 lb)   LMP 06/26/2019 (Within Days)   Breastfeeding? No   BMI 26 87 kg/m²          Physical Exam   Constitutional: She appears well-developed and well-nourished  Abdominal: Soft  There is no tenderness  Genitourinary:   Genitourinary Comments: External genitalia demonstrate normal female without lesions  Vagina healthy without lesions or discharge  Cervix to separate 2-3 mm purple red lesions are noted at the 11-12 o'clock quadrant of the anterior lip of the cervix  Additional findings under the colposcopy heading  Uterus normal size, exquisitely tender to deep palpation without obvious mass in the posterior cul-de-sac  Adnexa mild tenderness bilaterally without obvious mass

## 2019-07-18 ENCOUNTER — OFFICE VISIT (OUTPATIENT)
Dept: PHYSICAL THERAPY | Facility: CLINIC | Age: 29
End: 2019-07-18
Payer: COMMERCIAL

## 2019-07-18 DIAGNOSIS — G89.29 CHRONIC BILATERAL LOW BACK PAIN WITHOUT SCIATICA: ICD-10-CM

## 2019-07-18 DIAGNOSIS — M54.50 CHRONIC BILATERAL LOW BACK PAIN WITHOUT SCIATICA: ICD-10-CM

## 2019-07-18 DIAGNOSIS — N94.10 PAIN IN FEMALE GENITALIA ON INTERCOURSE: Primary | ICD-10-CM

## 2019-07-18 DIAGNOSIS — N39.41 URGENCY INCONTINENCE: ICD-10-CM

## 2019-07-18 PROCEDURE — 97112 NEUROMUSCULAR REEDUCATION: CPT | Performed by: PHYSICAL THERAPIST

## 2019-07-18 PROCEDURE — 97110 THERAPEUTIC EXERCISES: CPT | Performed by: PHYSICAL THERAPIST

## 2019-07-18 NOTE — PROGRESS NOTES
Daily Note     Today's date: 2019  Patient name: Yury Stack  : 1990  MRN: [id number]  Referring provider: Chase Paz PA-C  Dx:   Encounter Diagnosis     ICD-10-CM    1  Pain in female genitalia on intercourse N94 10    2  Urgency incontinence N39 41    3  Chronic bilateral low back pain without sciatica M54 5     G89 29                   Subjective: Patient reports that she had a culposcopy yesterday, and the physician believes that she has cervical endometriosis  She reports that she will have the results of the biopsy back in 1 week  Objective: See treatment diary below    Diagnosis: Urinary urge incontinence, LBP, and pain with intercourse   Precautions: POTS syndrome  Manual Therapy 19                            Pelvic floor assessment  HJS, PT              Exercise Diary         Multifidus NMR    NV    Triple threats        X-walks        Leg lengtheners        Sitting multifidus activation                                Bridges        BI, ARLIN kegel  [id number] Biofeedback 10x     BI, ARLIN TrA act  [id number] Biofeedback 10x D/c     Biofeedback         - PFMC endurance holds   20x10 sec 20x 10sec    -PFMC quick flicks 3 reps   93N BI/ARLIN/bridge/ kegel 3x relax: 10x    - BI, ARLIN, Kegel, TrA   10x 10x    - Bridge, Kegel, hold PFMC on descent   10x  10x    BI, ARLIN, kegel, bridge, hold kegel until resting    10x                                                     Pt education PT, expectations, and POC Relaxation techniques HEP, biofeedback HEP    Modalities                                    Assessment: Tolerated treatment well  Patient exhibited good technique with therapeutic exercises  Patient was able to perform all exercises today without increased pain, but did require the use of biofeedback to perform them  She is progressing well toward long term goals  Will focus on LBP at next visit  Plan: Continue per plan of care

## 2019-07-22 ENCOUNTER — OFFICE VISIT (OUTPATIENT)
Dept: PHYSICAL THERAPY | Facility: CLINIC | Age: 29
End: 2019-07-22
Payer: COMMERCIAL

## 2019-07-22 DIAGNOSIS — N39.41 URGENCY INCONTINENCE: ICD-10-CM

## 2019-07-22 DIAGNOSIS — G89.29 CHRONIC BILATERAL LOW BACK PAIN WITHOUT SCIATICA: Primary | ICD-10-CM

## 2019-07-22 DIAGNOSIS — M54.50 CHRONIC BILATERAL LOW BACK PAIN WITHOUT SCIATICA: Primary | ICD-10-CM

## 2019-07-22 DIAGNOSIS — N94.10 PAIN IN FEMALE GENITALIA ON INTERCOURSE: ICD-10-CM

## 2019-07-22 PROCEDURE — 97112 NEUROMUSCULAR REEDUCATION: CPT | Performed by: PHYSICAL THERAPIST

## 2019-07-22 PROCEDURE — 97110 THERAPEUTIC EXERCISES: CPT | Performed by: PHYSICAL THERAPIST

## 2019-07-22 NOTE — PROGRESS NOTES
Daily Note     Today's date: 2019  Patient name: Jose Francisco Busby  : 1990  MRN: [id number]  Referring provider: Daphne Martinez PA-C  Dx:   Encounter Diagnosis     ICD-10-CM    1  Chronic bilateral low back pain without sciatica M54 5     G89 29    2  Pain in female genitalia on intercourse N94 10    3  Urgency incontinence N39 41                   Subjective: Patient reports that she has increased back pain today secondary to an 11 5 hour car ride yesterday  She reports that the leaking and urgency has improved tremendously with the exercises  She reports that she will have increased pain in the low back with bending over and lifting (like in a rowing motion)  Objective: See treatment diary below    Diagnosis: Urinary urge incontinence, LBP, and pain with intercourse   Precautions: POTS syndrome  Manual Therapy 19                           Pelvic floor assessment  HJS, PT              Exercise Diary         Multifidus NMR    NV [id number]   Triple threats        X-walks     YTB 2x    Leg lengtheners     20x   Sitting multifidus activation        Supine nerve glides     20x ea                   Bridges        BI, ARLIN kegel  [id number] Biofeedback 10x     BI, ARLIN TrA act  [id number] Biofeedback 10x D/c     Biofeedback         - PFMC endurance holds   20x10 sec 20x 10sec    -PFMC quick flicks 3 reps   19S BI/ARLIN/bridge/ kegel 3x relax: 10x    - BI, ARLIN, Kegel, TrA   10x 10x    - Bridge, Kegel, hold PFMC on descent   10x  10x    BI, ARLIN, kegel, bridge, hold kegel until resting    10x                                                     Pt education PT, expectations, and POC Relaxation techniques HEP, biofeedback HEP HEP, multifidus   Modalities                                    Assessment: Tolerated treatment well  Patient exhibited good technique with therapeutic exercises    Patient was able to perform all exercises noted today, but did need cues to perform without compensation  She is improving slowly toward long term goals  She reports decreased pain after extensive cuing with exercises to activate multifidus  Plan: Continue per plan of care

## 2019-07-25 ENCOUNTER — TELEPHONE (OUTPATIENT)
Dept: OBGYN CLINIC | Facility: CLINIC | Age: 29
End: 2019-07-25

## 2019-07-25 NOTE — TELEPHONE ENCOUNTER
Reviewed colposcopic biopsies with patient  Patient does not manifest evidence of endometriosis unfortunately  There was also no evidence cervical dysplasia  We discussed having patient remain hormone free for the next 2 months to see what her bleeding pattern is  Patient will return to the office in approximately 2 months to re-evaluate  I did discuss with her that now that she appears to have focal lesions that are chronic inflammatory in nature and vascular that we would be able to re-enter attain the idea of cryotherapy to these specific areas  Patient's questions were answered to her satisfaction

## 2019-09-18 ENCOUNTER — OFFICE VISIT (OUTPATIENT)
Dept: OBGYN CLINIC | Facility: CLINIC | Age: 29
End: 2019-09-18
Payer: COMMERCIAL

## 2019-09-18 VITALS — DIASTOLIC BLOOD PRESSURE: 60 MMHG | SYSTOLIC BLOOD PRESSURE: 92 MMHG | BODY MASS INDEX: 27.64 KG/M2 | WEIGHT: 150.2 LBS

## 2019-09-18 DIAGNOSIS — R10.2 PELVIC PAIN: Primary | ICD-10-CM

## 2019-09-18 DIAGNOSIS — N93.0 POSTCOITAL AND CONTACT BLEEDING: ICD-10-CM

## 2019-09-18 PROCEDURE — 57511 CRYOCAUTERY OF CERVIX: CPT | Performed by: OBSTETRICS & GYNECOLOGY

## 2019-09-18 PROCEDURE — 99213 OFFICE O/P EST LOW 20 MIN: CPT | Performed by: OBSTETRICS & GYNECOLOGY

## 2019-09-18 RX ORDER — DROSPIRENONE AND ETHINYL ESTRADIOL 0.02-3(28)
1 KIT ORAL DAILY
Qty: 84 TABLET | Refills: 1 | Status: SHIPPED | OUTPATIENT
Start: 2019-09-18 | End: 2020-01-01 | Stop reason: SDUPTHER

## 2019-09-18 NOTE — PROGRESS NOTES
Assessment/Plan:    No problem-specific Assessment & Plan notes found for this encounter  Diagnoses and all orders for this visit:    Pelvic pain  -     drospirenone-ethinyl estradiol (SCOTT) 3-0 02 MG per tablet; Take 1 tablet by mouth daily    Postcoital and contact bleeding        We discussed action plan of restarting a hormonal contraceptive  We have both agreed on a low-dose OCP she will begin this with the onset of her next menses she will use condoms during the 1st month for protection  Patient also interested in proceeding with cryotherapy today this was accomplished and will be noted under separate heading  Patient to return to the office in 3 months for re-evaluation  Subjective:      Patient ID: Tootie Norman is a 34 y o  female  Patient returns for re-evaluation of pelvic pain as well as postcoital spotting  Patient did have a normal colposcopy with normal colposcopic biopsies  It was initially thought that she had endometriotic implants on her cervix however pathology returned as normal   There was no evidence of dysplasia  Patient did discontinue the patch and has noted exacerbation of her pelvic pain as well as her acne  She has desire to go back on something hormonal   We had also discussed re-evaluation of her cervix and potential cryotherapy for the friable lesions  The following portions of the patient's history were reviewed and updated as appropriate:   She  has a past medical history of Degenerative lumbar disc (8/28/2015), Overactive bladder, and Urinary tract infection    She   Patient Active Problem List    Diagnosis Date Noted    Postcoital and contact bleeding 09/18/2019    Pelvic pain 09/18/2019    Dyspareunia, female 12/19/2018    Healthcare maintenance 08/28/2018    Overactive bladder 06/06/2018    Lumbar radiculopathy 05/26/2016    Bulging lumbar disc 08/28/2015    Degenerative lumbar disc 08/28/2015    Vitamin D insufficiency 08/12/2015    Piriformis syndrome 06/25/2015    Sacroiliitis ([facility] [address] ) 06/25/2015     She  has a past surgical history that includes [place] tooth extraction; Colposcopy w/ biopsy / curettage; and Colposcopy  Her family history includes ALS in her maternal grandfather and other; Arthritis in her other; Breast cancer in her other; Hypertension in her other; Hypothyroidism in her mother; Osteoporosis in her other  She  reports that she has never smoked  She has never used smokeless tobacco  She reports that she drinks about 2 0 standard drinks of alcohol per week  She reports that she does not use drugs  Current Outpatient Medications   Medication Sig Dispense Refill    oxybutynin (DITROPAN-XL) 5 mg 24 hr tablet Take 1 tablet by mouth daily as needed      drospirenone-ethinyl estradiol (SCOTT) 3-0 02 MG per tablet Take 1 tablet by mouth daily 84 tablet 1     No current facility-administered medications for this visit  Current Outpatient Medications on File Prior to Visit   Medication Sig    oxybutynin (DITROPAN-XL) 5 mg 24 hr tablet Take 1 tablet by mouth daily as needed     No current facility-administered medications on file prior to visit  She is allergic to sulfa antibiotics and sulfamethoxazole-trimethoprim       Review of Systems   All other systems reviewed and are negative  Objective:      BP 92/60 (BP Location: Left arm, Patient Position: Sitting, Cuff Size: Standard)   Wt 68 1 kg (150 lb 3 2 oz)   LMP 08/29/2019   BMI 27 64 kg/m²          Physical Exam   Constitutional: She appears well-developed and well-nourished  Abdominal: Soft  There is no tenderness  Genitourinary:   Genitourinary Comments: External genitalia demonstrate normal female without lesions  Vagina healthy without lesions or discharge  Cervix healthy without lesions or discharge; previously noted friable areas at 11-12 o clock   as well as a new vascular appearing area at 2:00   Uterus normal size nontender  Adnexa nontender without obvious mass

## 2019-09-18 NOTE — PROGRESS NOTES
Lesion Destruction  Date/Time: 9/18/2019 11:47 AM  Performed by: Britt Cherry DO  Authorized by: Britt Cherry DO     Procedure Details - Lesion Destruction:     Number of Lesions:  3  Lesion 1:     Body area: Anogenital    Initial size (mm):  3    Final defect size (mm):  3    Malignancy: benign lesion      Destruction method: cryotherapy    Lesion 2:     Body area: Anogenital    Initial size (mm):  3    Final defect size (mm):  3    Malignancy: benign lesion      Destruction method: cryotherapy    Lesion 3:     Body area: Anogenital    Initial size (mm):  3    Final defect size (mm):  3    Malignancy: benign lesion      Destruction method: cryotherapy    Lesion 6:      Three separate lesions were cryo cauterized on the anterior cervix  Response was excellent with no visible lesion following the procedure  Patient tolerated the procedure well  She was given post cryo instructions  We will follow up as scheduled for her visit in 3 months

## 2019-09-22 ENCOUNTER — OFFICE VISIT (OUTPATIENT)
Dept: URGENT CARE | Facility: CLINIC | Age: 29
End: 2019-09-22
Payer: COMMERCIAL

## 2019-09-22 VITALS
WEIGHT: 145 LBS | SYSTOLIC BLOOD PRESSURE: 102 MMHG | RESPIRATION RATE: 18 BRPM | OXYGEN SATURATION: 98 % | TEMPERATURE: 97.9 F | BODY MASS INDEX: 26.68 KG/M2 | HEART RATE: 82 BPM | DIASTOLIC BLOOD PRESSURE: 62 MMHG | HEIGHT: 62 IN

## 2019-09-22 DIAGNOSIS — N30.90 CYSTITIS WITHOUT HEMATURIA: Primary | ICD-10-CM

## 2019-09-22 LAB
SL AMB  POCT GLUCOSE, UA: NEGATIVE
SL AMB LEUKOCYTE ESTERASE,UA: ABNORMAL
SL AMB POCT BILIRUBIN,UA: NEGATIVE
SL AMB POCT BLOOD,UA: NEGATIVE
SL AMB POCT CLARITY,UA: CLEAR
SL AMB POCT COLOR,UA: YELLOW
SL AMB POCT KETONES,UA: NEGATIVE
SL AMB POCT NITRITE,UA: NEGATIVE
SL AMB POCT PH,UA: 8.5
SL AMB POCT SPECIFIC GRAVITY,UA: 1
SL AMB POCT URINE PROTEIN: 300
SL AMB POCT UROBILINOGEN: 0.2

## 2019-09-22 PROCEDURE — 99213 OFFICE O/P EST LOW 20 MIN: CPT | Performed by: PHYSICIAN ASSISTANT

## 2019-09-22 PROCEDURE — 87086 URINE CULTURE/COLONY COUNT: CPT | Performed by: PHYSICIAN ASSISTANT

## 2019-09-22 PROCEDURE — S9088 SERVICES PROVIDED IN URGENT: HCPCS | Performed by: PHYSICIAN ASSISTANT

## 2019-09-22 PROCEDURE — 81002 URINALYSIS NONAUTO W/O SCOPE: CPT | Performed by: PHYSICIAN ASSISTANT

## 2019-09-22 RX ORDER — NITROFURANTOIN 25; 75 MG/1; MG/1
100 CAPSULE ORAL 2 TIMES DAILY
Qty: 10 CAPSULE | Refills: 0 | Status: SHIPPED | OUTPATIENT
Start: 2019-09-22 | End: 2019-09-27

## 2019-09-22 NOTE — PROGRESS NOTES
[address]        NAME: Cory Evans is a 34 y o  female  : 1990    MRN: [id number]  DATE: 2019  TIME: 12:46 PM    Assessment and Plan   Cystitis without hematuria [N30 90]  1  Cystitis without hematuria  POCT urine dip    Urine culture    nitrofurantoin (MACROBID) 100 mg capsule         Patient Instructions     Macrobid as directed  Follow up with PCP in 3-5 days  Proceed to  ER if symptoms worsen  Chief Complaint     Chief Complaint   Patient presents with    Possible UTI     x1 day, frequency, burning, urgency          History of Present Illness       Pt is a 34 yr old female presenting for burning with urination, urgency, frequency, bladder burning since this morning  No hematuria, f/c, back pain  Review of Systems   Review of Systems   Constitutional: Negative for chills and fever  Gastrointestinal: Negative for abdominal pain, nausea and vomiting  Genitourinary: Positive for dysuria, frequency and urgency  Negative for flank pain and hematuria  Musculoskeletal: Negative for back pain           Current Medications       Current Outpatient Medications:     drospirenone-ethinyl estradiol (SCOTT) 3-0 02 MG per tablet, Take 1 tablet by mouth daily, Disp: 84 tablet, Rfl: 1    oxybutynin (DITROPAN-XL) 5 mg 24 hr tablet, Take 1 tablet by mouth daily as needed, Disp: , Rfl:     nitrofurantoin (MACROBID) 100 mg capsule, Take 1 capsule (100 mg total) by mouth 2 (two) times a day for 5 days, Disp: 10 capsule, Rfl: 0    Current Allergies     Allergies as of 2019 - Reviewed 2019   Allergen Reaction Noted    Sulfa antibiotics Fever and Fatigue 2015    Sulfamethoxazole-trimethoprim Fever and Fatigue 2015            The following portions of the patient's history were reviewed and updated as appropriate: allergies, current medications, past family history, past medical history, past social history, past surgical history and problem list  Past Medical History:   Diagnosis Date    Degenerative lumbar disc 8/28/2015    Overactive bladder     Urinary tract infection        Past Surgical History:   Procedure Laterality Date    COLPOSCOPY      COLPOSCOPY W/ BIOPSY / CURETTAGE      Resolved 12/07/2017    WISDOM TOOTH EXTRACTION         Family History   Problem Relation Age of Onset    Hypothyroidism Mother     Arthritis Other     Hypertension Other         Benign Essential    Breast cancer Other     ALS Other     Osteoporosis Other     ALS Maternal Grandfather          Medications have been verified  Objective   /62   Pulse 82   Temp 97 9 °F (36 6 °C)   Resp 18   Ht 5' 2" (1 575 m)   Wt 65 8 kg (145 lb)   LMP 08/29/2019   SpO2 98%   BMI 26 52 kg/m²        Physical Exam     Physical Exam   Constitutional: She is oriented to person, place, and time  She appears well-developed and well-nourished  She does not have a sickly appearance  She does not appear ill  No distress  HENT:   Head: Normocephalic and atraumatic  Eyes: Conjunctivae are normal    Cardiovascular: Normal rate, regular rhythm and normal heart sounds  Pulmonary/Chest: Effort normal and breath sounds normal    Abdominal: There is no tenderness  There is no rigidity, no rebound, no guarding and no CVA tenderness  Neurological: She is alert and oriented to person, place, and time  Skin: Skin is warm and dry  She is not diaphoretic  Psychiatric: She has a normal mood and affect   Her behavior is normal

## 2019-09-23 LAB — BACTERIA UR CULT: NORMAL

## 2019-09-30 NOTE — PROGRESS NOTES
Patient has contacted by email to state she will be holding off on PT at this time  She will be doing further diagnostic testing  She will contact us should she need our services in the future

## 2020-01-01 DIAGNOSIS — R10.2 PELVIC PAIN: ICD-10-CM

## 2020-01-02 RX ORDER — DROSPIRENONE AND ETHINYL ESTRADIOL 0.02-3(28)
1 KIT ORAL DAILY
Qty: 84 TABLET | Refills: 0 | Status: SHIPPED | OUTPATIENT
Start: 2020-01-02 | End: 2020-04-23 | Stop reason: SDUPTHER

## 2020-01-08 ENCOUNTER — OFFICE VISIT (OUTPATIENT)
Dept: OBGYN CLINIC | Facility: CLINIC | Age: 30
End: 2020-01-08
Payer: COMMERCIAL

## 2020-01-08 VITALS — WEIGHT: 157 LBS | SYSTOLIC BLOOD PRESSURE: 110 MMHG | BODY MASS INDEX: 28.72 KG/M2 | DIASTOLIC BLOOD PRESSURE: 62 MMHG

## 2020-01-08 DIAGNOSIS — N94.10 DYSPAREUNIA, FEMALE: ICD-10-CM

## 2020-01-08 DIAGNOSIS — N93.0 POSTCOITAL AND CONTACT BLEEDING: Primary | ICD-10-CM

## 2020-01-08 PROCEDURE — 99213 OFFICE O/P EST LOW 20 MIN: CPT | Performed by: OBSTETRICS & GYNECOLOGY

## 2020-01-08 NOTE — PROGRESS NOTES
Maggie Mahoney  1990    S:  34 y o  female here for a problem visit  She was last seen in September - was started on OCP and also had cryotherapy of her cervix due to persistent post-coital bleeding  Since that time she has had significant improvement in her postcoital bleeding, and only now has very rare spotting  She has had persistent deep dyspareunia  She did see pelvic floor PT who cleared her and did not think she had any ongoing issues  She does think this has improved some  We discussed potential endometriosis and diagnosis/treatment with laparoscopy which she declines  Past Medical History:   Diagnosis Date    Degenerative lumbar disc 8/28/2015    Overactive bladder     Urinary tract infection      Family History   Problem Relation Age of Onset    Hypothyroidism Mother     Arthritis Other     Hypertension Other         Benign Essential    Breast cancer Other     ALS Other     Osteoporosis Other     ALS Maternal Grandfather      Social History     Socioeconomic History    Marital status: Single     Spouse name: Not on file    Number of children: Not on file    Years of education: Not on file    Highest education level: Not on file   Occupational History    Not on file   Social Needs    Financial resource strain: Not on file    Food insecurity:     Worry: Not on file     Inability: Not on file    Transportation needs:     Medical: Not on file     Non-medical: Not on file   Tobacco Use    Smoking status: Never Smoker    Smokeless tobacco: Never Used   Substance and Sexual Activity    Alcohol use:  Yes     Alcohol/week: 2 0 standard drinks     Types: 1 Glasses of wine, 1 Standard drinks or equivalent per week     Comment: monthly; Denied per allscript    Drug use: No    Sexual activity: Yes     Partners: Male     Birth control/protection: Condom   Lifestyle    Physical activity:     Days per week: Not on file     Minutes per session: Not on file    Stress: Not on file   Relationships    Social connections:     Talks on phone: Not on file     Gets together: Not on file     Attends Orthodoxy service: Not on file     Active member of club or organization: Not on file     Attends meetings of clubs or organizations: Not on file     Relationship status: Not on file    Intimate partner violence:     Fear of current or ex partner: Not on file     Emotionally abused: Not on file     Physically abused: Not on file     Forced sexual activity: Not on file   Other Topics Concern    Not on file   Social History Narrative    Caffeine Use    Exercising Regularly     always seatbelt in care     employed        O:  /62 (BP Location: Left arm, Patient Position: Sitting, Cuff Size: Standard)   Wt 71 2 kg (157 lb)   LMP 12/25/2019 (Exact Date)   BMI 28 72 kg/m²   She appears well and is in no distress  Abdomen is soft and nontender  External genitals are normal without lesions or rashes  Vagina is normal, no discharge or bleeding noted  Cervix is normal, no lesions or discharge    A/P:  Postcoital Bleeding - resolved s/p cryo  Deep Dyspareunia - improving  Continue combined OCP, call if needs refills

## 2020-02-13 ENCOUNTER — TELEPHONE (OUTPATIENT)
Dept: OBGYN CLINIC | Facility: CLINIC | Age: 30
End: 2020-02-13

## 2020-02-13 DIAGNOSIS — N30.00 ACUTE CYSTITIS WITHOUT HEMATURIA: Primary | ICD-10-CM

## 2020-02-13 NOTE — TELEPHONE ENCOUNTER
Dear Dr Vera Esparza:      Pt called office today c/o UTI symptoms  Pt states she has UTI history  Pt saw you on 1/8/20 ([facility])  Pt was previously prescribed Macrobid 100 mg tablet (take 1 capsule PO BID for 5 days) by PCP on 9/22/19  Pt states she has been experiencing urinary symptoms for two days now  Pt reports urinary burning, and urinary frequency accompanied by weak urine stream   Pt further reports vaginal cramping, previously diagnosed with endometriosis  Pt denies fever, pyelo symptoms, and gross hematuria/"cloudy" urine at this time  UA & UC lab orders completed in Epic (Pt states she uses [facility] lab facilities)  Pt states she is allergic to sulfa drugs  Should Pt be prescribed medication per your UTI protocol or be scheduled for an appointment to see a provider? Please advise  Thank you!     Sapna Blankenship MA

## 2020-02-20 ENCOUNTER — TRANSCRIBE ORDERS (OUTPATIENT)
Dept: ADMINISTRATIVE | Age: 30
End: 2020-02-20

## 2020-02-20 ENCOUNTER — APPOINTMENT (OUTPATIENT)
Dept: LAB | Age: 30
End: 2020-02-20
Payer: COMMERCIAL

## 2020-02-20 DIAGNOSIS — N30.00 ACUTE CYSTITIS WITHOUT HEMATURIA: ICD-10-CM

## 2020-02-20 LAB
BACTERIA UR QL AUTO: ABNORMAL /HPF
BILIRUB UR QL STRIP: NEGATIVE
CLARITY UR: ABNORMAL
COLOR UR: YELLOW
GLUCOSE UR STRIP-MCNC: NEGATIVE MG/DL
HGB UR QL STRIP.AUTO: NEGATIVE
HYALINE CASTS #/AREA URNS LPF: ABNORMAL /LPF
KETONES UR STRIP-MCNC: NEGATIVE MG/DL
LEUKOCYTE ESTERASE UR QL STRIP: ABNORMAL
NITRITE UR QL STRIP: NEGATIVE
NON-SQ EPI CELLS URNS QL MICRO: ABNORMAL /HPF
PH UR STRIP.AUTO: 6 [PH]
PROT UR STRIP-MCNC: NEGATIVE MG/DL
RBC #/AREA URNS AUTO: ABNORMAL /HPF
SP GR UR STRIP.AUTO: 1.03 (ref 1–1.03)
UROBILINOGEN UR QL STRIP.AUTO: 0.2 E.U./DL
WBC #/AREA URNS AUTO: ABNORMAL /HPF

## 2020-02-20 PROCEDURE — 81001 URINALYSIS AUTO W/SCOPE: CPT

## 2020-02-20 PROCEDURE — 87086 URINE CULTURE/COLONY COUNT: CPT

## 2020-02-21 LAB — BACTERIA UR CULT: NORMAL

## 2020-02-28 DIAGNOSIS — R30.9 PAIN WITH URINATION: Primary | ICD-10-CM

## 2020-02-28 RX ORDER — NITROFURANTOIN 25; 75 MG/1; MG/1
CAPSULE ORAL
Qty: 10 CAPSULE | Refills: 0 | OUTPATIENT
Start: 2020-02-28

## 2020-02-28 NOTE — TELEPHONE ENCOUNTER
What issues is she having? Burning with urination? Frequency, urgency?    Her urine testing is normal

## 2020-02-28 NOTE — TELEPHONE ENCOUNTER
Pt advised then she said she is still having an issue and she saw on my chart the red arrows and thinks there is a problem with her urine

## 2020-02-28 NOTE — TELEPHONE ENCOUNTER
Her urine culture was negative, so no antibiotic is needed  Please check with her to see how she is feeling

## 2020-04-23 DIAGNOSIS — R10.2 PELVIC PAIN: ICD-10-CM

## 2020-04-24 RX ORDER — DROSPIRENONE AND ETHINYL ESTRADIOL 0.02-3(28)
1 KIT ORAL DAILY
Qty: 84 TABLET | Refills: 1 | Status: SHIPPED | OUTPATIENT
Start: 2020-04-24 | End: 2020-05-13

## 2020-05-13 ENCOUNTER — TELEMEDICINE (OUTPATIENT)
Dept: OBGYN CLINIC | Facility: CLINIC | Age: 30
End: 2020-05-13
Payer: COMMERCIAL

## 2020-05-13 DIAGNOSIS — R63.5 WEIGHT GAIN: ICD-10-CM

## 2020-05-13 DIAGNOSIS — Z30.09 BIRTH CONTROL COUNSELING: Primary | ICD-10-CM

## 2020-05-13 PROBLEM — Z00.00 HEALTHCARE MAINTENANCE: Status: RESOLVED | Noted: 2018-08-28 | Resolved: 2020-05-13

## 2020-05-13 PROCEDURE — 99214 OFFICE O/P EST MOD 30 MIN: CPT | Performed by: PHYSICIAN ASSISTANT

## 2020-07-16 ENCOUNTER — OFFICE VISIT (OUTPATIENT)
Dept: FAMILY MEDICINE CLINIC | Facility: CLINIC | Age: 30
End: 2020-07-16
Payer: COMMERCIAL

## 2020-07-16 VITALS
WEIGHT: 156.8 LBS | HEIGHT: 62 IN | RESPIRATION RATE: 16 BRPM | TEMPERATURE: 97.6 F | HEART RATE: 75 BPM | OXYGEN SATURATION: 99 % | BODY MASS INDEX: 28.85 KG/M2 | DIASTOLIC BLOOD PRESSURE: 66 MMHG | SYSTOLIC BLOOD PRESSURE: 108 MMHG

## 2020-07-16 DIAGNOSIS — R07.2 PRECORDIAL PAIN: ICD-10-CM

## 2020-07-16 DIAGNOSIS — R00.2 PALPITATION: Primary | ICD-10-CM

## 2020-07-16 PROCEDURE — 1036F TOBACCO NON-USER: CPT | Performed by: FAMILY MEDICINE

## 2020-07-16 PROCEDURE — 3008F BODY MASS INDEX DOCD: CPT | Performed by: FAMILY MEDICINE

## 2020-07-16 PROCEDURE — 99214 OFFICE O/P EST MOD 30 MIN: CPT | Performed by: FAMILY MEDICINE

## 2020-07-16 NOTE — ASSESSMENT & PLAN NOTE
Unclear etiology, though the constellation of symptoms suggests arrhythmia  With history of murmur, should also consider structural abnormality  Transition to new estrogen containing birth control correlates to some extent    Recommend checking CBC, CMP, TSH, Holter, echocardiogram   If unremarkable, will consider cardiology referral

## 2020-07-16 NOTE — PROGRESS NOTES
Family Medicine Follow-Up Office Visit  Rudy Godfrey 34 y o  female   MRN: [id number] : 1990  ENCOUNTER: 2020 10:10 AM    Assessment and Plan   Precordial pain  Unclear etiology, though the constellation of symptoms suggests arrhythmia  With history of murmur, should also consider structural abnormality  Transition to new estrogen containing birth control correlates to some extent  Recommend checking CBC, CMP, TSH, Holter, echocardiogram   If unremarkable, will consider cardiology referral     I will call with results and discuss next steps    Chief Complaint     Chief Complaint   Patient presents with   [address]     chest pain/tightness since        History of Present Illness   Rudy Godfrey is a 34y o -year-old female who presents today for establishment of care  She would like to discuss acute on chronic chest pain  Chest pain:  - had near-black out, fell into wall, had chest pain  Had normal EKG, echo, holter, barium swallow that the time  Never saw cardiologist   Resolved itself  Since then, has come and gone over the intervening years, and now has had 4-5 episodes in the past month  Often in the shower  Sometimes feels long periods without a heartbeat  Pain is central and stabbing, with some tightness and SOB  Episodes lasting longer, most recently had HA afterwards  No clear tie to sleep, nutrition, substances  Had a murmur as a baby, but was told that she outgrew it  Exercises frequently  Nutrition is "pretty good "  Tries to eat plenty of vegetables, though she doesn't like many  Bakes over fries her food  Little if any caffeine  Very little alcohol  No drugs  Sees GYN for dyspareunia, bleeding, has been told she has endometriosis  Using [place] instead of generic Lennie  This transition was made about 6 weeks ago  Had wisdom tooth extraction, no other surgeries        Family hx remarkable for thyroid disease (mother is hypothyroid)  Review of Systems   Review of Systems   Constitutional: Negative for activity change, chills, fatigue and fever  HENT: Negative for congestion, sinus pressure, sinus pain and sore throat  Respiratory: Positive for chest tightness and shortness of breath  Negative for cough and wheezing  Cardiovascular: Negative for chest pain, palpitations and leg swelling  Gastrointestinal: Negative for abdominal pain, diarrhea, nausea and vomiting  Genitourinary: Negative for decreased urine volume, dysuria, frequency and urgency  Musculoskeletal: Negative for arthralgias, myalgias, neck pain and neck stiffness  Skin: Negative for rash  Neurological: Positive for headaches  Negative for dizziness, light-headedness and numbness  Active Problem List     Patient Active Problem List   Diagnosis    Vitamin D insufficiency    Overactive bladder    Dyspareunia, female    Pelvic pain    Palpitation    Precordial pain       Past Medical History, Past Surgical History, Family History, and Social History were reviewed and updated today as appropriate  Objective   /66 (BP Location: Right arm, Patient Position: Sitting, Cuff Size: Adult)   Pulse 75   Temp 97 6 °F (36 4 °C) (Tympanic)   Resp 16   Ht 5' 2" (1 575 m)   Wt 71 1 kg (156 lb 12 8 oz)   SpO2 99%   BMI 28 68 kg/m²     Physical Exam   Constitutional: She is oriented to person, place, and time  She appears well-developed and well-nourished  No distress  HENT:   Head: Normocephalic and atraumatic  Eyes: Pupils are equal, round, and reactive to light  Neck: Normal range of motion  Neck supple  Cardiovascular: Normal rate, regular rhythm and normal heart sounds  Exam reveals no gallop and no friction rub  No murmur heard  Pulmonary/Chest: Effort normal and breath sounds normal  No respiratory distress  She has no wheezes  She has no rales  Abdominal: Soft  She exhibits no distension     Musculoskeletal: Normal range of motion  Neurological: She is alert and oriented to person, place, and time  Psychiatric: She has a normal mood and affect  Her behavior is normal  Thought content normal      Diabetic Foot Exam    Pertinent Laboratory/Diagnostic Studies:  Lab Results   Component Value Date    BUN 16 08/28/2018    CREATININE 0 84 08/28/2018    CALCIUM 8 9 08/28/2018    K 3 7 08/28/2018    CO2 27 08/28/2018     08/28/2018     Lab Results   Component Value Date    ALT 23 08/28/2018    AST 34 08/28/2018    ALKPHOS 54 08/28/2018       Lab Results   Component Value Date    WBC 6 26 08/28/2018    HGB 12 9 08/28/2018    HCT 41 0 08/28/2018    MCV 95 08/28/2018     08/28/2018       No results found for: TSH    No results found for: CHOL  Lab Results   Component Value Date    TRIG 71 08/28/2018     Lab Results   Component Value Date    HDL 83 (H) 08/28/2018     Lab Results   Component Value Date    LDLCALC 100 08/28/2018     Lab Results   Component Value Date    HGBA1C 4 8 08/28/2018       Results for orders placed or performed in visit on 02/20/20   Urine culture   Result Value Ref Range    Urine Culture 10,000-19,000 cfu/ml         Orders Placed This Encounter   Procedures    TSH, 3rd generation with Free T4 reflex    Lipid Panel with Direct LDL reflex    Comprehensive metabolic panel    CBC and differential    Holter monitor - 48 hour    Echo complete with contrast if indicated         Current Medications     Current Outpatient Medications   Medication Sig Dispense Refill    norgestrel-ethinyl estradiol (LO/OVRAL) 0 3 mg-30 mcg per tablet Take 1 tablet by mouth daily 90 tablet 0     No current facility-administered medications for this visit          ALLERGIES:  Allergies   Allergen Reactions    Sulfa Antibiotics Fever and Fatigue    Sulfamethoxazole-Trimethoprim Fever and Fatigue       Health Maintenance     Health Maintenance   Topic Date Due    DTaP,Tdap,and Td Vaccines (1 - Tdap) 10/28/2003    HIV Screening  10/28/2007    BMI: Followup Plan  10/28/2010    Annual Physical  06/12/2020    Depression Screening PHQ  06/20/2020    Influenza Vaccine  07/01/2020    BMI: Adult  07/16/2021    Cervical Cancer Screening  06/12/2022    Pneumococcal Vaccine: 65+ Years (1 of 2 - PCV13) 10/28/2057    Pneumococcal Vaccine: Pediatrics (0 to 5 Years) and At-Risk Patients (6 to 59 Years)  Aged Out    HIB Vaccine  Aged Out    Hepatitis B Vaccine  Aged Out    IPV Vaccine  Aged Out    Hepatitis A Vaccine  Aged Out    Meningococcal ACWY Vaccine  Aged Out    HPV Vaccine  Aged Out       There is no immunization history on file for this patient  Jonathan Li MD   [address]  7/16/2020  10:10 AM    Parts of this note were dictated using [employer] dictation software and may have sounds-like errors due to variation in pronunciation

## 2020-07-18 ENCOUNTER — APPOINTMENT (OUTPATIENT)
Dept: LAB | Facility: CLINIC | Age: 30
End: 2020-07-18
Payer: COMMERCIAL

## 2020-07-18 DIAGNOSIS — R00.2 PALPITATION: ICD-10-CM

## 2020-07-18 LAB
ALBUMIN SERPL BCP-MCNC: 4.1 G/DL (ref 3.5–5)
ALP SERPL-CCNC: 54 U/L (ref 46–116)
ALT SERPL W P-5'-P-CCNC: 28 U/L (ref 12–78)
ANION GAP SERPL CALCULATED.3IONS-SCNC: 6 MMOL/L (ref 4–13)
AST SERPL W P-5'-P-CCNC: 25 U/L (ref 5–45)
BASOPHILS # BLD AUTO: 0.05 THOUSANDS/ΜL (ref 0–0.1)
BASOPHILS NFR BLD AUTO: 1 % (ref 0–1)
BILIRUB SERPL-MCNC: 0.63 MG/DL (ref 0.2–1)
BUN SERPL-MCNC: 11 MG/DL (ref 5–25)
CALCIUM SERPL-MCNC: 8.9 MG/DL (ref 8.3–10.1)
CHLORIDE SERPL-SCNC: 102 MMOL/L (ref 100–108)
CHOLEST SERPL-MCNC: 183 MG/DL (ref 50–200)
CO2 SERPL-SCNC: 29 MMOL/L (ref 21–32)
CREAT SERPL-MCNC: 0.92 MG/DL (ref 0.6–1.3)
EOSINOPHIL # BLD AUTO: 0.15 THOUSAND/ΜL (ref 0–0.61)
EOSINOPHIL NFR BLD AUTO: 2 % (ref 0–6)
ERYTHROCYTE [DISTWIDTH] IN BLOOD BY AUTOMATED COUNT: 11.9 % (ref 11.6–15.1)
GFR SERPL CREATININE-BSD FRML MDRD: 86 ML/MIN/1.73SQ M
GLUCOSE P FAST SERPL-MCNC: 80 MG/DL (ref 65–99)
HCT VFR BLD AUTO: 43.9 % (ref 34.8–46.1)
HDLC SERPL-MCNC: 66 MG/DL
HGB BLD-MCNC: 14.1 G/DL (ref 11.5–15.4)
IMM GRANULOCYTES # BLD AUTO: 0.01 THOUSAND/UL (ref 0–0.2)
IMM GRANULOCYTES NFR BLD AUTO: 0 % (ref 0–2)
LDLC SERPL CALC-MCNC: 106 MG/DL (ref 0–100)
LYMPHOCYTES # BLD AUTO: 2.51 THOUSANDS/ΜL (ref 0.6–4.47)
LYMPHOCYTES NFR BLD AUTO: 38 % (ref 14–44)
MCH RBC QN AUTO: 30.1 PG (ref 26.8–34.3)
MCHC RBC AUTO-ENTMCNC: 32.1 G/DL (ref 31.4–37.4)
MCV RBC AUTO: 94 FL (ref 82–98)
MONOCYTES # BLD AUTO: 0.5 THOUSAND/ΜL (ref 0.17–1.22)
MONOCYTES NFR BLD AUTO: 8 % (ref 4–12)
NEUTROPHILS # BLD AUTO: 3.39 THOUSANDS/ΜL (ref 1.85–7.62)
NEUTS SEG NFR BLD AUTO: 51 % (ref 43–75)
NRBC BLD AUTO-RTO: 0 /100 WBCS
PLATELET # BLD AUTO: 295 THOUSANDS/UL (ref 149–390)
PMV BLD AUTO: 10 FL (ref 8.9–12.7)
POTASSIUM SERPL-SCNC: 3.6 MMOL/L (ref 3.5–5.3)
PROT SERPL-MCNC: 7.9 G/DL (ref 6.4–8.2)
RBC # BLD AUTO: 4.69 MILLION/UL (ref 3.81–5.12)
SODIUM SERPL-SCNC: 137 MMOL/L (ref 136–145)
TRIGL SERPL-MCNC: 57 MG/DL
TSH SERPL DL<=0.05 MIU/L-ACNC: 2.34 UIU/ML (ref 0.36–3.74)
WBC # BLD AUTO: 6.61 THOUSAND/UL (ref 4.31–10.16)

## 2020-07-18 PROCEDURE — 80053 COMPREHEN METABOLIC PANEL: CPT

## 2020-07-18 PROCEDURE — 36415 COLL VENOUS BLD VENIPUNCTURE: CPT

## 2020-07-18 PROCEDURE — 84443 ASSAY THYROID STIM HORMONE: CPT

## 2020-07-18 PROCEDURE — 85025 COMPLETE CBC W/AUTO DIFF WBC: CPT

## 2020-07-18 PROCEDURE — 80061 LIPID PANEL: CPT

## 2020-07-28 ENCOUNTER — HOSPITAL ENCOUNTER (OUTPATIENT)
Dept: NON INVASIVE DIAGNOSTICS | Facility: HOSPITAL | Age: 30
Discharge: HOME | End: 2020-07-28
Payer: COMMERCIAL

## 2020-07-28 DIAGNOSIS — R00.2 PALPITATION: ICD-10-CM

## 2020-07-28 PROCEDURE — 93226 XTRNL ECG REC<48 HR SCAN A/R: CPT

## 2020-07-28 PROCEDURE — 93225 XTRNL ECG REC<48 HRS REC: CPT

## 2020-07-28 PROCEDURE — 93306 TTE W/DOPPLER COMPLETE: CPT

## 2020-07-28 RX ADMIN — PERFLUTREN 0.3 ML/MIN: 6.52 INJECTION, SUSPENSION INTRAVENOUS at 09:07

## 2020-07-29 PROCEDURE — 93306 TTE W/DOPPLER COMPLETE: CPT | Performed by: INTERNAL MEDICINE

## 2020-08-09 PROCEDURE — 93227 XTRNL ECG REC<48 HR R&I: CPT | Performed by: INTERNAL MEDICINE

## 2020-08-17 DIAGNOSIS — Z30.09 BIRTH CONTROL COUNSELING: ICD-10-CM

## 2020-09-06 ENCOUNTER — HOSPITAL ENCOUNTER (EMERGENCY)
Facility: HOSPITAL | Age: 30
Discharge: HOME | End: 2020-09-06
Attending: EMERGENCY MEDICINE | Admitting: EMERGENCY MEDICINE
Payer: COMMERCIAL

## 2020-09-06 VITALS
WEIGHT: 159.83 LBS | SYSTOLIC BLOOD PRESSURE: 130 MMHG | DIASTOLIC BLOOD PRESSURE: 66 MMHG | TEMPERATURE: 98.6 F | HEART RATE: 67 BPM | BODY MASS INDEX: 29.23 KG/M2 | RESPIRATION RATE: 18 BRPM

## 2020-09-06 DIAGNOSIS — S61.112A LACERATION OF LEFT THUMB WITHOUT FOREIGN BODY WITH DAMAGE TO NAIL, INITIAL ENCOUNTER: Primary | ICD-10-CM

## 2020-09-06 PROCEDURE — 99284 EMERGENCY DEPT VISIT MOD MDM: CPT | Performed by: EMERGENCY MEDICINE

## 2020-09-06 PROCEDURE — 99282 EMERGENCY DEPT VISIT SF MDM: CPT

## 2020-09-06 PROCEDURE — 12001 RPR S/N/AX/GEN/TRNK 2.5CM/<: CPT | Performed by: EMERGENCY MEDICINE

## 2020-09-06 RX ORDER — LIDOCAINE HYDROCHLORIDE 10 MG/ML
2 INJECTION, SOLUTION EPIDURAL; INFILTRATION; INTRACAUDAL; PERINEURAL ONCE
Status: COMPLETED | OUTPATIENT
Start: 2020-09-06 | End: 2020-09-06

## 2020-09-06 RX ORDER — LIDOCAINE 40 MG/G
CREAM TOPICAL ONCE
Status: COMPLETED | OUTPATIENT
Start: 2020-09-06 | End: 2020-09-06

## 2020-09-06 RX ORDER — GINSENG 100 MG
1 CAPSULE ORAL ONCE
Status: COMPLETED | OUTPATIENT
Start: 2020-09-06 | End: 2020-09-06

## 2020-09-06 RX ADMIN — LIDOCAINE HYDROCHLORIDE 2 ML: 10 INJECTION, SOLUTION EPIDURAL; INFILTRATION; INTRACAUDAL; PERINEURAL at 17:17

## 2020-09-06 RX ADMIN — LIDOCAINE 1 APPLICATION: 40 CREAM TOPICAL at 17:06

## 2020-09-06 RX ADMIN — BACITRACIN 1 SMALL APPLICATION: 500 OINTMENT TOPICAL at 17:52

## 2020-09-06 NOTE — ED PROVIDER NOTES
History  Chief Complaint   Patient presents with    Finger Laceration     cut left thumb on knife     Patient is a 72-year-old female with no significant past medical history who presents with a left thumb laceration  Patient states that she was using a clean kitchen knife and cut her left thumb  She states that it was bleeding quite extensively but has subsided with direct pressure  She denies numbness or tingling in the fingertips  Her tetanus is up-to-date  She has no other complaints at this time  History provided by:  Patient  Finger Laceration   Location:  Finger  Finger laceration location:  L thumb  Length:  1 cm  Depth:  Cutaneous  Quality: straight    Laceration mechanism:  Knife  Pain details:     Severity:  Mild  Foreign body present:  No foreign bodies  Relieved by:  Pressure  Tetanus status:  Up to date  Associated symptoms: no fever, no rash, no redness and no swelling        Prior to Admission Medications   Prescriptions Last Dose Informant Patient Reported? Taking?   norgestrel-ethinyl estradiol (LO/OVRAL) 0 3 mg-30 mcg per tablet   No No   Sig: Take 1 tablet by mouth daily      Facility-Administered Medications: None       Past Medical History:   Diagnosis Date    Degenerative lumbar disc 8/28/2015    Overactive bladder     Urinary tract infection        Past Surgical History:   Procedure Laterality Date    COLPOSCOPY      COLPOSCOPY W/ BIOPSY / CURETTAGE      Resolved 12/07/2017    WISDOM TOOTH EXTRACTION         Family History   Problem Relation Age of Onset    Hypothyroidism Mother     Arthritis Other     Hypertension Other         Benign Essential    Breast cancer Other     ALS Other     Osteoporosis Other     ALS Maternal Grandfather      I have reviewed and agree with the history as documented      E-Cigarette/Vaping    E-Cigarette Use Never User      E-Cigarette/Vaping Substances    Nicotine No     THC No     CBD No     Flavoring No     Other No     Unknown No Social History     Tobacco Use    Smoking status: Never Smoker    Smokeless tobacco: Never Used   Substance Use Topics    Alcohol use: Yes     Alcohol/week: 2 0 standard drinks     Types: 1 Glasses of wine, 1 Standard drinks or equivalent per week     Frequency: Monthly or less     Drinks per session: 1 or 2     Binge frequency: Never     Comment: monthly; Denied per allscript    Drug use: No       Review of Systems   Constitutional: Negative for chills, diaphoresis and fever  HENT: Negative for nosebleeds, sore throat and trouble swallowing  Eyes: Negative for photophobia, pain and visual disturbance  Respiratory: Negative for cough, chest tightness and shortness of breath  Cardiovascular: Negative for chest pain, palpitations and leg swelling  Gastrointestinal: Negative for abdominal pain, constipation, diarrhea, nausea and vomiting  Endocrine: Negative for polydipsia and polyuria  Genitourinary: Negative for difficulty urinating, dysuria, hematuria, pelvic pain, vaginal bleeding and vaginal discharge  Musculoskeletal: Negative for back pain, neck pain and neck stiffness  Skin: Positive for wound  Negative for pallor and rash  Neurological: Negative for dizziness, seizures, light-headedness and headaches  All other systems reviewed and are negative  Physical Exam  Physical Exam  Vitals signs and nursing note reviewed  Constitutional:       General: She is not in acute distress  Appearance: She is well-developed  HENT:      Head: Normocephalic and atraumatic  Eyes:      Pupils: Pupils are equal, round, and reactive to light  Neck:      Musculoskeletal: Normal range of motion and neck supple  Cardiovascular:      Rate and Rhythm: Normal rate and regular rhythm  Pulses: Normal pulses  Heart sounds: Normal heart sounds  Pulmonary:      Effort: Pulmonary effort is normal  No respiratory distress  Breath sounds: Normal breath sounds     Abdominal: General: There is no distension  Palpations: Abdomen is soft  Abdomen is not rigid  Tenderness: There is no abdominal tenderness  There is no guarding or rebound  Musculoskeletal: Normal range of motion  General: No tenderness  Lymphadenopathy:      Cervical: No cervical adenopathy  Skin:     General: Skin is warm and dry  Capillary Refill: Capillary refill takes less than 2 seconds  Findings: Laceration (1 cm laceration to the left thumb  No active bleeding ) present  Neurological:      Mental Status: She is alert and oriented to person, place, and time  Cranial Nerves: No cranial nerve deficit  Sensory: No sensory deficit  Vital Signs  ED Triage Vitals [09/06/20 1621]   Temperature Pulse Respirations Blood Pressure SpO2   98 6 °F (37 °C) 67 18 130/66 --      Temp Source Heart Rate Source Patient Position - Orthostatic VS BP Location FiO2 (%)   Oral Monitor -- -- --      Pain Score       3           Vitals:    09/06/20 1621   BP: 130/66   Pulse: 67         Visual Acuity      ED Medications  Medications   lidocaine (LMX) 4 % cream (1 application Topical Given 9/6/20 1706)   lidocaine (PF) (XYLOCAINE-MPF) 1 % injection 2 mL (2 mL Infiltration Given by Other 9/6/20 1717)   bacitracin topical ointment 1 small application (1 small application Topical Given 9/6/20 1752)       Diagnostic Studies  Results Reviewed     None                 No orders to display              Procedures  Laceration repair    Date/Time: 9/6/2020 5:30 PM  Performed by: Jean Smith DO  Authorized by: Jean Smith DO   Consent: Verbal consent obtained  Written consent not obtained    Risks and benefits: risks, benefits and alternatives were discussed  Consent given by: patient  Patient understanding: patient states understanding of the procedure being performed  Patient identity confirmed: verbally with patient and arm band  Body area: upper extremity  Location details: left thumb  Laceration length: 1 5 cm  Foreign bodies: no foreign bodies  Tendon involvement: none  Nerve involvement: none  Vascular damage: no  Anesthesia: digital block    Anesthesia:  Local Anesthetic: lidocaine 1% without epinephrine  Anesthetic total: 2 mL    Wound Dehiscence:  Superficial Wound Dehiscence: simple closure      Procedure Details:  Preparation: Patient was prepped and draped in the usual sterile fashion  Irrigation solution: saline  Irrigation method: syringe  Amount of cleaning: standard  Debridement: none  Degree of undermining: none  Skin closure: 5-0 nylon  Number of sutures: 2  Technique: simple  Approximation: close  Approximation difficulty: simple  Dressing: gauze roll  Patient tolerance: Patient tolerated the procedure well with no immediate complications               ED Course       US AUDIT      Most Recent Value   Initial Alcohol Screen: US AUDIT-C    1  How often do you have a drink containing alcohol? 1 Filed at: 09/06/2020 1622   2  How many drinks containing alcohol do you have on a typical day you are drinking? 1 Filed at: 09/06/2020 1622   3a  Male UNDER 65: How often do you have five or more drinks on one occasion? 0 Filed at: 09/06/2020 1622   3b  FEMALE Any Age, or MALE 65+: How often do you have 4 or more drinks on one occassion? 0 Filed at: 09/06/2020 1622   Audit-C Score  2 Filed at: 09/06/2020 1622                  JULIO/DAST-10      Most Recent Value   JULIO: How many times in the past year have you    Used an illegal drug or used a prescription medication for non-medical reasons? Never Filed at: 09/06/2020 1622                                MDM  Number of Diagnoses or Management Options  Laceration of left thumb without foreign body with damage to nail, initial encounter: new and does not require workup  Diagnosis management comments: Patient presents with laceration to the left thumb    The laceration does involve the distal end of the nail but does not involve nail bed  Digital block performed and laceration repaired with 2 simple interrupted sutures  Patient buys to return if she develops signs or symptoms of wound infection  Otherwise she should follow-up in 10 days for suture removal   Patient is stable for discharge  Amount and/or Complexity of Data Reviewed  Review and summarize past medical records: yes    Risk of Complications, Morbidity, and/or Mortality  Presenting problems: moderate  Diagnostic procedures: minimal  Management options: moderate    Patient Progress  Patient progress: stable        Disposition  Final diagnoses:   Laceration of left thumb without foreign body with damage to nail, initial encounter     Time reflects when diagnosis was documented in both MDM as applicable and the Disposition within this note     Time User Action Codes Description Comment    9/6/2020  5:45 PM Lai Long Add [[id number] 679D] Laceration of left thumb without foreign body with damage to nail, initial encounter       ED Disposition     ED Disposition Condition Date/Time Comment    Discharge Stable Sun Sep 6, 2020  5:44 PM Karan Mora discharge to home/self care  Follow-up Information     Follow up With Specialties Details Why Contact Info    Elyse Monique MD Family Medicine, Obstetrics and Gynecology, Obstetrics, Gynecology Schedule an appointment as soon as possible for a visit  Follow-up in 10 days for suture removal   Return to ED sooner if you develop increased pain, swelling, redness or wound drainage  [address] [facility]  [phone]            Discharge Medication List as of 9/6/2020  5:45 PM      CONTINUE these medications which have NOT CHANGED    Details   norgestrel-ethinyl estradiol (LO/OVRAL) 0 3 mg-30 mcg per tablet Take 1 tablet by mouth daily, Starting Mon 8/17/2020, Normal           No discharge procedures on file      PDMP Review     None          ED Provider  Electronically Signed by           Raymond Luke Maureen Jenkins,   09/09/20 9557

## 2020-09-18 ENCOUNTER — ANNUAL EXAM (OUTPATIENT)
Dept: OBGYN CLINIC | Facility: CLINIC | Age: 30
End: 2020-09-18
Payer: COMMERCIAL

## 2020-09-18 VITALS
BODY MASS INDEX: 28.89 KG/M2 | WEIGHT: 157 LBS | HEIGHT: 62 IN | TEMPERATURE: 97.1 F | SYSTOLIC BLOOD PRESSURE: 116 MMHG | DIASTOLIC BLOOD PRESSURE: 60 MMHG

## 2020-09-18 DIAGNOSIS — Z01.419 ENCNTR FOR GYN EXAM (GENERAL) (ROUTINE) W/O ABN FINDINGS: Primary | ICD-10-CM

## 2020-09-18 PROBLEM — R10.2 PELVIC PAIN: Status: RESOLVED | Noted: 2019-09-18 | Resolved: 2020-09-18

## 2020-09-18 PROCEDURE — 99395 PREV VISIT EST AGE 18-39: CPT | Performed by: PHYSICIAN ASSISTANT

## 2020-09-18 NOTE — PROGRESS NOTES
Hiwot Brock  1990    CC:  Yearly exam    S:  34 y o  female here for yearly exam      Sexual activity: She is sexually active with the same partner    She continues to have issues with deep dyspareunia as well as bleeding with intercourse  She says that these occur separate from each other  Both are better than last year but certainly not resolved  Last year she had a normal Pap and GC/chlamydia testing and underwent cryotherapy of the cervix on 9/18/19  Since then, she notes less coital bleeding but it is still present  Contraception:  She uses Cryselle for contraception  Her cycles are light and not painful with this  We reviewed ASCCP guidelines for Pap testing  Last Pap  6/12/19 neg    Family hx of breast cancer: no  Family hx of ovarian cancer: no  Family hx of colon cancer: no      Current Outpatient Medications:     norgestrel-ethinyl estradiol (LO/OVRAL) 0 3 mg-30 mcg per tablet, Take 1 tablet by mouth daily, Disp: 90 tablet, Rfl: 0  Social History     Socioeconomic History    Marital status: Single     Spouse name: Not on file    Number of children: Not on file    Years of education: Not on file    Highest education level: Not on file   Occupational History     Employer: [employer]   Social Needs    Financial resource strain: Not hard at all   [employer] insecurity     Worry: Never true     Inability: Never true   [employer] needs     Medical: No     Non-medical: No   Tobacco Use    Smoking status: Never Smoker    Smokeless tobacco: Never Used   Substance and Sexual Activity    Alcohol use:  Yes     Alcohol/week: 2 0 standard drinks     Types: 1 Glasses of wine, 1 Standard drinks or equivalent per week     Frequency: Monthly or less     Drinks per session: 1 or 2     Binge frequency: Never     Comment: monthly; Denied per allscript    Drug use: No    Sexual activity: Yes     Partners: Male     Birth control/protection: OCP   Lifestyle    Physical activity     Days per week: 5 days     Minutes per session: 40 min    Stress: Not at all   Relationships    Social connections     Talks on phone: More than three times a week     Gets together: More than three times a week     Attends [employer] service: Not on file     Active member of club or organization: Not on file     Attends meetings of clubs or organizations: Not on file     Relationship status: Never     Intimate partner violence     Fear of current or ex partner: No     Emotionally abused: No     Physically abused: No     Forced sexual activity: No   Other Topics Concern    Not on file   Social History Narrative    Caffeine Use    Exercising Regularly     always seatbelt in care     employed      Family History   Problem Relation Age of Onset    Hypothyroidism Mother     Arthritis Other     Hypertension Other         Benign Essential    Breast cancer Other     ALS Other     Osteoporosis Other     ALS Maternal Grandfather      Past Medical History:   Diagnosis Date    Degenerative lumbar disc 8/28/2015    Overactive bladder     Urinary tract infection        Review of Systems   Respiratory: Negative  Cardiovascular: Negative  Gastrointestinal: Negative for constipation and diarrhea  Genitourinary: Negative for difficulty urinating, pelvic pain, vaginal bleeding, vaginal discharge, itching or odor  O:  Blood pressure 116/60, temperature (!) 97 1 °F (36 2 °C), temperature source Tympanic, height 5' 1 81" (1 57 m), weight 71 2 kg (157 lb), last menstrual period 09/05/2020, not currently breastfeeding  Patient appears well and is not in distress  Neck is supple without masses  Breasts are symmetrical without mass, tenderness, nipple discharge, skin changes or adenopathy  Abdomen is soft and nontender without masses  External genitals are normal without lesions or rashes    Urethra and urethral meatus are normal  Bladder is normal to palpation  Vagina is normal without discharge or bleeding  Cervix is normal without discharge or lesion  Friable in the endocervix  Uterus is normal, mobile, nontender without palpable mass  Adnexa are normal, nontender, without palpable mass  No pelvic floor tenderness     A:   Yearly exam     Dyspareunia   Postcoital bleeding    P:   Pap 2022  Postcoital bleeding  Long discussion with patient regarding option for LEEP procedure  At this point she and her  are considering pregnancy in the next few months; she would prefer to avoid this for now  Dyspareunia  Discussed option to use her OCPs continuously or transition to Depo, however, due to desire for pregnancy in the very near future, will postpone these changes for now  Desire for fertility  Discussed timed intercourse  Start prenatal vitamin now  Discontinue pill at end of a pack  RTO one year for yearly exam or sooner as needed

## 2020-10-22 ENCOUNTER — OFFICE VISIT (OUTPATIENT)
Dept: FAMILY MEDICINE CLINIC | Facility: CLINIC | Age: 30
End: 2020-10-22
Payer: COMMERCIAL

## 2020-10-22 VITALS
TEMPERATURE: 97.4 F | OXYGEN SATURATION: 98 % | SYSTOLIC BLOOD PRESSURE: 110 MMHG | DIASTOLIC BLOOD PRESSURE: 72 MMHG | HEIGHT: 61 IN | HEART RATE: 81 BPM | WEIGHT: 159 LBS | BODY MASS INDEX: 30.02 KG/M2

## 2020-10-22 DIAGNOSIS — R07.2 PRECORDIAL PAIN: Primary | ICD-10-CM

## 2020-10-22 DIAGNOSIS — B07.8 COMMON WART: ICD-10-CM

## 2020-10-22 PROCEDURE — 99214 OFFICE O/P EST MOD 30 MIN: CPT | Performed by: FAMILY MEDICINE

## 2020-11-19 ENCOUNTER — TELEPHONE (OUTPATIENT)
Dept: FAMILY MEDICINE CLINIC | Facility: CLINIC | Age: 30
End: 2020-11-19

## 2020-11-19 DIAGNOSIS — R07.2 PRECORDIAL PAIN: Primary | ICD-10-CM

## 2020-12-16 ENCOUNTER — OFFICE VISIT (OUTPATIENT)
Dept: GASTROENTEROLOGY | Facility: CLINIC | Age: 30
End: 2020-12-16
Payer: COMMERCIAL

## 2020-12-16 VITALS
BODY MASS INDEX: 30.58 KG/M2 | DIASTOLIC BLOOD PRESSURE: 70 MMHG | HEIGHT: 61 IN | HEART RATE: 74 BPM | SYSTOLIC BLOOD PRESSURE: 112 MMHG | WEIGHT: 162 LBS | TEMPERATURE: 97 F

## 2020-12-16 DIAGNOSIS — R07.89 ATYPICAL CHEST PAIN: ICD-10-CM

## 2020-12-16 PROCEDURE — 99244 OFF/OP CNSLTJ NEW/EST MOD 40: CPT | Performed by: INTERNAL MEDICINE

## 2020-12-21 ENCOUNTER — OFFICE VISIT (OUTPATIENT)
Dept: DERMATOLOGY | Facility: CLINIC | Age: 30
End: 2020-12-21
Payer: COMMERCIAL

## 2020-12-21 VITALS — TEMPERATURE: 98.7 F | WEIGHT: 162 LBS | HEIGHT: 61 IN | BODY MASS INDEX: 30.58 KG/M2

## 2020-12-21 DIAGNOSIS — B07.9 VERRUCA VULGARIS: Primary | ICD-10-CM

## 2020-12-21 PROCEDURE — 17110 DESTRUCTION B9 LES UP TO 14: CPT | Performed by: DERMATOLOGY

## 2020-12-23 ENCOUNTER — IMMUNIZATIONS (OUTPATIENT)
Dept: FAMILY MEDICINE CLINIC | Facility: HOSPITAL | Age: 30
End: 2020-12-23
Payer: COMMERCIAL

## 2020-12-23 DIAGNOSIS — Z23 ENCOUNTER FOR IMMUNIZATION: ICD-10-CM

## 2020-12-23 PROCEDURE — 91301 SARS-COV-2 / COVID-19 MRNA VACCINE (MODERNA) 100 MCG: CPT

## 2020-12-23 PROCEDURE — 0011A SARS-COV-2 / COVID-19 MRNA VACCINE (MODERNA) 100 MCG: CPT

## 2020-12-28 ENCOUNTER — TELEMEDICINE (OUTPATIENT)
Dept: FAMILY MEDICINE CLINIC | Facility: CLINIC | Age: 30
End: 2020-12-28
Payer: COMMERCIAL

## 2020-12-28 DIAGNOSIS — Z20.822 EXPOSURE TO COVID-19 VIRUS: Primary | ICD-10-CM

## 2020-12-28 PROCEDURE — 99214 OFFICE O/P EST MOD 30 MIN: CPT | Performed by: FAMILY MEDICINE

## 2020-12-29 DIAGNOSIS — Z20.822 EXPOSURE TO COVID-19 VIRUS: ICD-10-CM

## 2020-12-29 LAB — SARS-COV-2 RNA RESP QL NAA+PROBE: NEGATIVE

## 2020-12-29 PROCEDURE — 87635 SARS-COV-2 COVID-19 AMP PRB: CPT | Performed by: FAMILY MEDICINE

## 2020-12-29 NOTE — RESULT ENCOUNTER NOTE
Spoke to patient regarding negative testing, however, due to symptoms could be false negative and advised to stay home from work for 10 days from symptom onset, provided note to return to work on 1/6 as long as she remains fever free 24 hours prior and to call office for worsening symptoms and can retest if necessary

## 2021-01-12 ENCOUNTER — TELEPHONE (OUTPATIENT)
Dept: OBGYN CLINIC | Facility: CLINIC | Age: 31
End: 2021-01-12

## 2021-01-12 NOTE — TELEPHONE ENCOUNTER
Patient called, first pregnancy 6 weeks  For the past 16 hours she has had nausea and vomiting  She says this came on suddenly  She has not experienced any morning sickness in the pregnancy so far  She also said she has a headache this morning  I went over signs of dehydration, told her if she is unable to tolerate any fluids today she should call her PCP as this sounded more GI related than pregnancy related  She had COVID testing on 12/29 which were negative  I told her to check with her PCP if she should be tested again  I also told her she can call us back with any questions

## 2021-01-13 ENCOUNTER — TELEMEDICINE (OUTPATIENT)
Dept: FAMILY MEDICINE CLINIC | Facility: CLINIC | Age: 31
End: 2021-01-13
Payer: COMMERCIAL

## 2021-01-13 DIAGNOSIS — O21.9 NAUSEA AND VOMITING IN PREGNANCY: Primary | ICD-10-CM

## 2021-01-13 PROCEDURE — 99213 OFFICE O/P EST LOW 20 MIN: CPT | Performed by: FAMILY MEDICINE

## 2021-01-13 RX ORDER — PYRIDOXINE HCL (VITAMIN B6) 100 MG
100 TABLET ORAL 3 TIMES DAILY
Qty: 90 TABLET | Refills: 1 | Status: SHIPPED | OUTPATIENT
Start: 2021-01-13 | End: 2021-05-18 | Stop reason: ALTCHOICE

## 2021-01-13 RX ORDER — PYRIDOXINE HCL (VITAMIN B6) 100 MG
100 TABLET ORAL DAILY
Status: CANCELLED | OUTPATIENT
Start: 2021-01-13

## 2021-01-13 NOTE — ASSESSMENT & PLAN NOTE
- Pt has had nausea and vomiting with onset of Monday 1/11  Since has had a total of 7 episodes of vomiting  Pt has had one loose stool  She denies fevers, chills, cough, sore throat, sob, chest pain, loss of smell or taste  - Pt recently had positive pregnancy test and believes to be about 6 weeks along    - Prescribed B-6 for nausea TID daily   - Discussed adding Unisom before bed to help sleep and with nausea if not able to tolerate B6   - Discussed increasing PO hydration as tolerated, slow sips  - Discussed eating bland foods to tolerate PO intake  - Please make a follow up visit for Monday to check in or sooner if s/s worsen or no relief  - ED precautions discussed

## 2021-01-13 NOTE — LETTER
January 13, 2021     Patient: Gideon Favorite   YOB: 1990   Date of Visit: 1/13/2021       To Whom it May Concern:    Mahsa Rodolfo is under my professional care  She was seen in my office on 1/13/2021  She may return to work on 1/15/2021  If you have any questions or concerns, please don't hesitate to call           Sincerely,          Lisset Stanford DO        CC: No Recipients

## 2021-01-13 NOTE — PROGRESS NOTES
Virtual Regular Visit      Assessment/Plan:    Problem List Items Addressed This Visit        Digestive    Nausea and vomiting in pregnancy - Primary     - Pt has had nausea and vomiting with onset of Monday 1/11  Since has had a total of 7 episodes of vomiting  Pt has had one loose stool  She denies fevers, chills, cough, sore throat, sob, chest pain, loss of smell or taste  - Pt recently had positive pregnancy test and believes to be about 6 weeks along    - Prescribed B-6 for nausea TID daily   - Discussed adding Unisom before bed to help sleep and with nausea if not able to tolerate B6   - Discussed increasing PO hydration as tolerated, slow sips  - Discussed eating bland foods to tolerate PO intake  - Please make a follow up visit for Monday to check in or sooner if s/s worsen or no relief  - ED precautions discussed  Relevant Medications    pyridoxine (B-6) 100 MG tablet    doxylamine (UNISON) 25 MG tablet               Reason for visit is   Chief Complaint   Patient presents with    Virtual Regular Visit        Encounter provider Amari Frey DO    Provider located at [address] [place] [address]  [phone]      Recent Visits  No visits were found meeting these conditions  Showing recent visits within past 7 days and meeting all other requirements     Today's Visits  Date Type Provider Dept   01/13/21 Telemedicine [facility] today's visits and meeting all other requirements     Future Appointments  No visits were found meeting these conditions  Showing future appointments within next 150 days and meeting all other requirements        The patient was identified by name and date of birth  Christen Hung was informed that this is a telemedicine visit and that the visit is being conducted through [facility] and patient was informed that this is a secure, HIPAA-compliant platform   She agrees to proceed     My office door was closed  No one else was in the room  She acknowledged consent and understanding of privacy and security of the video platform  The patient has agreed to participate and understands they can discontinue the visit at any time  Patient is aware this is a billable service  Hanh Regalado is a 31 y o  female who presents via virtual visit for nausea and vomiting  Pt recently had positive pregnancy test and believes to be about 6 weeks along  Pt denies having any fevers, chills, cough, sob, chest pains  She has not been able to hold very much down  Patient is concerned about taking B6 as she feels like she will continued to be nauseous and throw that as well  Patient denies having diarrhea but admits to a couple loose stools  Patient denies any recent sick contacts recent travel  Patient has been masking appropriately at work wear protection  HPI     Past Medical History:   Diagnosis Date    Degenerative lumbar disc 8/28/2015    Overactive bladder     Urinary tract infection        Past Surgical History:   Procedure Laterality Date    COLPOSCOPY      COLPOSCOPY W/ BIOPSY / CURETTAGE      Resolved 12/07/2017    GYNECOLOGIC CRYOSURGERY      WISDOM TOOTH EXTRACTION         Current Outpatient Medications   Medication Sig Dispense Refill    doxylamine (UNISON) 25 MG tablet Take 1 tablet (25 mg total) by mouth daily at bedtime as needed for sleep or nausea 30 tablet 2    Famotidine (PEPCID PO) Take by mouth      norgestrel-ethinyl estradiol (LO/OVRAL) 0 3 mg-30 mcg per tablet Take 1 tablet by mouth daily (Patient not taking: Reported on 10/22/2020) 90 tablet 0    pyridoxine (B-6) 100 MG tablet Take 1 tablet (100 mg total) by mouth 3 (three) times a day 90 tablet 1     No current facility-administered medications for this visit           Allergies   Allergen Reactions    Sulfa Antibiotics Fever and Fatigue    Sulfamethoxazole-Trimethoprim Fever and Fatigue       Review of Systems   Constitutional: Negative for chills, fever and unexpected weight change  HENT: Negative for congestion, postnasal drip, rhinorrhea, sinus pain, sneezing, sore throat and trouble swallowing  Respiratory: Negative for cough, shortness of breath and wheezing  Cardiovascular: Negative for chest pain and palpitations  Gastrointestinal: Positive for nausea and vomiting  Negative for constipation and diarrhea  Endocrine: Negative for polydipsia and polyuria  Genitourinary: Negative for dysuria, frequency and hematuria  Neurological: Positive for headaches  Negative for dizziness and weakness  Psychiatric/Behavioral: Negative for confusion  The patient is not nervous/anxious  Video Exam    There were no vitals filed for this visit  Physical Exam  Constitutional:       Appearance: Normal appearance  HENT:      Head: Normocephalic and atraumatic  Nose: Nose normal    Eyes:      General: No scleral icterus  Pulmonary:      Comments: No conversational dyspnea noted, no cough  Skin:     Comments: No facial rash noted, no facial edema   Neurological:      Mental Status: She is alert and oriented to person, place, and time  Psychiatric:         Mood and Affect: Mood normal          Behavior: Behavior normal           I spent 15 minutes directly with the patient during this visit      Lori Matthews 229 acknowledges that she has consented to an online visit or consultation  She understands that the online visit is based solely on information provided by her, and that, in the absence of a face-to-face physical evaluation by the physician, the diagnosis she receives is both limited and provisional in terms of accuracy and completeness  This is not intended to replace a full medical face-to-face evaluation by the physician  Christen Hung understands and accepts these terms

## 2021-01-17 ENCOUNTER — DOCUMENTATION (OUTPATIENT)
Dept: LABOR AND DELIVERY | Facility: HOSPITAL | Age: 31
End: 2021-01-17

## 2021-01-17 ENCOUNTER — TELEPHONE (OUTPATIENT)
Dept: OTHER | Facility: OTHER | Age: 31
End: 2021-01-17

## 2021-01-17 DIAGNOSIS — O21.9 NAUSEA AND VOMITING DURING PREGNANCY: Primary | ICD-10-CM

## 2021-01-17 RX ORDER — ONDANSETRON 4 MG/1
4 TABLET, ORALLY DISINTEGRATING ORAL EVERY 6 HOURS PRN
Qty: 20 TABLET | Refills: 1 | Status: SHIPPED | OUTPATIENT
Start: 2021-01-17 | End: 2021-01-27 | Stop reason: SDUPTHER

## 2021-01-17 NOTE — PROGRESS NOTES
Copied from [place]:    "Patient calling 7 weeks pregnant and vomiting  Patient says she cannot keep anything down  Wants advice from on call "    STP:  Has been struggling with n/v and food aversion most of the week  Fatigued and feeling week,  Tried Unisom + B6; worked until today  Rx'd Zofran 4mg and encouraged focusing on hydration only  If no improvement within 6 hours come to ED for IV hydration

## 2021-01-17 NOTE — TELEPHONE ENCOUNTER
Patient calling 7 weeks pregnant and vomiting  Patient says she cannot keep anything down  Wants advice from on call

## 2021-01-18 ENCOUNTER — TELEMEDICINE (OUTPATIENT)
Dept: FAMILY MEDICINE CLINIC | Facility: CLINIC | Age: 31
End: 2021-01-18
Payer: COMMERCIAL

## 2021-01-18 DIAGNOSIS — O21.9 NAUSEA AND VOMITING IN PREGNANCY: Primary | ICD-10-CM

## 2021-01-18 PROCEDURE — 99213 OFFICE O/P EST LOW 20 MIN: CPT | Performed by: FAMILY MEDICINE

## 2021-01-18 NOTE — PROGRESS NOTES
Virtual Regular Visit      Assessment/Plan:    Problem List Items Addressed This Visit        Digestive    Nausea and vomiting in pregnancy - Primary     Improved with addition of ondansetron  Continue as needed use, along with consistent doxylamine/B6 use  Call precautions emphasized  Pt will keep her scheduled appt with OB/GYN later this month  Reason for visit is   Chief Complaint   Patient presents with    Virtual Regular Visit        Encounter provider Jeannine Cody MD    Provider located at [address] [place] [id number] [phone]      Recent Visits  Date Type Provider Dept   01/13/21 [address], [address] recent visits within past 7 days and meeting all other requirements     Today's Visits  Date Type Provider Dept   01/18/21 Telemedicine Jeannine Cody MD [address] today's visits and meeting all other requirements     Future Appointments  No visits were found meeting these conditions  Showing future appointments within next 150 days and meeting all other requirements        The patient was identified by name and date of birth  Sreekanth Arnold was informed that this is a telemedicine visit and that the visit is being conducted through [facility] and patient was informed that this is a secure, HIPAA-compliant platform  She agrees to proceed     My office door was closed  No one else was in the room  She acknowledged consent and understanding of privacy and security of the video platform  The patient has agreed to participate and understands they can discontinue the visit at any time  Patient is aware this is a billable service  Subjective  Sreekanth Arnold is a 31 y o  female seen in followup of N&V associated with pregnancy    The patient spoke with OB/GYN team over the weekend and was prescribed ondansetron after having been given B6/Pyridoxone last week  The reason for the ondansetron is that she reached the point of being unable to keep even her PN vitamin down yesterday  Since then, she has been doing better - still nauseated but able to keep food/drink down, despite significant food aversion  Colt Castro has her first PN visit scheduled later this month  HPI     Past Medical History:   Diagnosis Date    Degenerative lumbar disc 8/28/2015    Overactive bladder     Urinary tract infection        Past Surgical History:   Procedure Laterality Date    COLPOSCOPY      COLPOSCOPY W/ BIOPSY / CURETTAGE      Resolved 12/07/2017    GYNECOLOGIC CRYOSURGERY      WISDOM TOOTH EXTRACTION         Current Outpatient Medications   Medication Sig Dispense Refill    doxylamine (UNISON) 25 MG tablet Take 1 tablet (25 mg total) by mouth daily at bedtime as needed for sleep or nausea 30 tablet 2    Famotidine (PEPCID PO) Take by mouth      norgestrel-ethinyl estradiol (LO/OVRAL) 0 3 mg-30 mcg per tablet Take 1 tablet by mouth daily (Patient not taking: Reported on 10/22/2020) 90 tablet 0    ondansetron (ZOFRAN-ODT) 4 mg disintegrating tablet Take 1 tablet (4 mg total) by mouth every 6 (six) hours as needed for nausea or vomiting 20 tablet 1    pyridoxine (B-6) 100 MG tablet Take 1 tablet (100 mg total) by mouth 3 (three) times a day 90 tablet 1     No current facility-administered medications for this visit  Allergies   Allergen Reactions    Sulfa Antibiotics Fever and Fatigue    Sulfamethoxazole-Trimethoprim Fever and Fatigue       Review of Systems    Video Exam    There were no vitals filed for this visit  Physical Exam  Constitutional:       General: She is not in acute distress  Appearance: She is well-developed  She is not diaphoretic  HENT:      Head: Normocephalic and atraumatic  Eyes:      General: No scleral icterus  Right eye: No discharge  Left eye: No discharge        Conjunctiva/sclera: Conjunctivae normal  Pupils: Pupils are equal, round, and reactive to light  Pulmonary:      Effort: Pulmonary effort is normal    Neurological:      Mental Status: She is alert and oriented to person, place, and time  Psychiatric:         Behavior: Behavior normal          Thought Content: Thought content normal          Judgment: Judgment normal           I spent 15 minutes directly with the patient during this visit      Lori [address] acknowledges that she has consented to an online visit or consultation  She understands that the online visit is based solely on information provided by her, and that, in the absence of a face-to-face physical evaluation by the physician, the diagnosis she receives is both limited and provisional in terms of accuracy and completeness  This is not intended to replace a full medical face-to-face evaluation by the physician  Johanna Nelson understands and accepts these terms

## 2021-01-18 NOTE — ASSESSMENT & PLAN NOTE
Improved with addition of ondansetron  Continue as needed use, along with consistent doxylamine/B6 use  Call precautions emphasized  Pt will keep her scheduled appt with OB/GYN later this month

## 2021-01-19 ENCOUNTER — IMMUNIZATIONS (OUTPATIENT)
Dept: FAMILY MEDICINE CLINIC | Facility: HOSPITAL | Age: 31
End: 2021-01-19

## 2021-01-19 DIAGNOSIS — Z23 ENCOUNTER FOR IMMUNIZATION: Primary | ICD-10-CM

## 2021-01-19 PROCEDURE — 0012A SARS-COV-2 / COVID-19 MRNA VACCINE (MODERNA) 100 MCG: CPT

## 2021-01-19 PROCEDURE — 91301 SARS-COV-2 / COVID-19 MRNA VACCINE (MODERNA) 100 MCG: CPT

## 2021-01-27 ENCOUNTER — ULTRASOUND (OUTPATIENT)
Dept: OBGYN CLINIC | Facility: CLINIC | Age: 31
End: 2021-01-27
Payer: COMMERCIAL

## 2021-01-27 VITALS — WEIGHT: 167 LBS | SYSTOLIC BLOOD PRESSURE: 114 MMHG | DIASTOLIC BLOOD PRESSURE: 78 MMHG | BODY MASS INDEX: 31.55 KG/M2

## 2021-01-27 DIAGNOSIS — N91.2 AMENORRHEA: Primary | ICD-10-CM

## 2021-01-27 DIAGNOSIS — O21.9 NAUSEA AND VOMITING DURING PREGNANCY: ICD-10-CM

## 2021-01-27 PROCEDURE — 76817 TRANSVAGINAL US OBSTETRIC: CPT | Performed by: STUDENT IN AN ORGANIZED HEALTH CARE EDUCATION/TRAINING PROGRAM

## 2021-01-27 RX ORDER — ONDANSETRON 4 MG/1
4 TABLET, ORALLY DISINTEGRATING ORAL EVERY 6 HOURS PRN
Qty: 30 TABLET | Refills: 1 | Status: SHIPPED | OUTPATIENT
Start: 2021-01-27 | End: 2021-02-19 | Stop reason: SDUPTHER

## 2021-01-27 NOTE — PROGRESS NOTES
EARLY PREGNANCY ULTRASOUND    Ultrasound Probe Disinfection    A transvaginal ultrasound was performed  Prior to use, disinfection was performed with High Level Disinfection Process ([employer])  Probe serial number SLOGA-B: [id number] was used    Rhett Marquez MD  21  9:18 AM      SUBJECTIVE    HPI: Johanna Nelson is a 31 y o   here today for early pregnancy ultrasound  Patient's last menstrual period was 2020 (exact date)    Menses are regular  This pregnancy was planned; she has been off OCPs for 3 months  She is accompanied by her  today  Taking a prenatal vitamin  Also taking B6, unisom, zofran for nausea  Not adequately controlled with B6, unisom, but zofran has been VERY helpful  She is taking q6 scheduled    Allergies   Allergen Reactions    Sulfa Antibiotics Fever and Fatigue    Sulfamethoxazole-Trimethoprim Fever and Fatigue       Current Outpatient Medications:     doxylamine (UNISON) 25 MG tablet, Take 1 tablet (25 mg total) by mouth daily at bedtime as needed for sleep or nausea, Disp: 30 tablet, Rfl: 2    ondansetron (ZOFRAN-ODT) 4 mg disintegrating tablet, Take 1 tablet (4 mg total) by mouth every 6 (six) hours as needed for nausea or vomiting, Disp: 30 tablet, Rfl: 1    pyridoxine (B-6) 100 MG tablet, Take 1 tablet (100 mg total) by mouth 3 (three) times a day, Disp: 90 tablet, Rfl: 1    Famotidine (PEPCID PO), Take by mouth, Disp: , Rfl:       OBJECTIVE  Vitals:    21 0838   BP: 114/78   BP Location: Left arm   Patient Position: Sitting   Cuff Size: Standard   Weight: 75 8 kg (167 lb)         Early OB Ultrasound Procedure Note: Transvaginal US    Technician: Study performed by the interpreting physician    Indications:  Early gestation, dating & viability    Procedure Details   The entire study was done at settings of 6 0 to 8 0 MHz      Gestational Sac: Present  Yolk sac: Present  Crown-rump length is 1 74cm and calculates to an estimated gestational age of 8 weeks, 1 days  Embryonic cardiac activity is seen at a rate of 169 b/min  Description of fetal anatomy Normal    Cul-de-sac: no fluid  Left ovary: wnl  Right ovary: wnl    Findings:  Viable, britt intrauterine pregnancy      ASSESSMENT  Early pregnancy at 8 weeks 2 days with a calculated LISBETH of Sept 6, 2021 based on LMP c/w early sono      PLAN    1  RTO for OB interview and PN-1 visit  2 - zofran controlling nausea, refilled today          All questions were answered & patient expressed understanding      Tiff Ardon MD

## 2021-02-10 ENCOUNTER — TELEMEDICINE (OUTPATIENT)
Dept: OBGYN CLINIC | Facility: CLINIC | Age: 31
End: 2021-02-10

## 2021-02-10 VITALS — WEIGHT: 150 LBS | HEIGHT: 62 IN | BODY MASS INDEX: 27.6 KG/M2

## 2021-02-10 DIAGNOSIS — Z34.01 FIRST PREGNANCY, FIRST TRIMESTER: Primary | ICD-10-CM

## 2021-02-10 PROCEDURE — OBC: Performed by: STUDENT IN AN ORGANIZED HEALTH CARE EDUCATION/TRAINING PROGRAM

## 2021-02-10 NOTE — PROGRESS NOTES
OB INTAKE INTERVIEW  **  *Hx of  delivery prior to 36 weeks 6 days: no  *Last Menstrual Period: Pt's LMP was: 20  *Ultrasound date:21   8weeks 1days  *Estimated date of delivery: 21   * Confirmed by: US    *Signs/Symptoms of Pregnancy   *Current pregnancy symptoms: Nausea, taking unisom, Vit b6, and Zofran  Nausea had been getting better but still struggles  Has significant food aversions  Eating pop tarts and ramen noodles often  Has been having heartburn      *Constipation - Yes, advised increased fluid intake, increased fiber, increased rufage   *Headaches - No    *Cramping/spotting - mild lower left sided cramping/pain, seems to occur with coughing and sneezing, present prior to pregnancy   *PICA cravings - No    *Diabetes- If you answer YES to 1 of the following, please order 1 hour GTT, 50g    *History of GDM: No    *BMI >35: No    *History of PCOS and/or on Metformin: No    *Prior history of LGA/Macrosomia (>9lb): NO    -If you answer YES to 2 or more of the following, please order 1 hour GTT, 50g    *BMI >30: No  *First degree relative with type 2 diabetes: NO    *AMA with other risk factors: No    *History of CHTN, Hyperlipidemia, Elevated A1c: No    *High risk race (, , ,  or [place]): No    *Hypertension- if you answer yes, please order preeclampsia labs including 24 hour urine protein   *Hx of chronic HTN: No   *Hx of gestational HTN: No   *Hx of preeclampsia, eclampsia, or HELLP syndrome: No    *Infection Screening-    *Does the pt have a hx of MRSA?: Yes    *If yes- please follow MRSA protocol and obtain a nasal swab for MRSA culture   *History of herpes?: No   *Ok for blood transfusion: Yes    *Immunizations:   *Influenza vaccine given today: 2020   *Discussed Tdap vaccine: Yes   Has had COVID vaccine x2     *Interview education   *[facility] Pregnancy Essentials Book reviewed and discussed: advised of web site   *Handouts given:    *[facility] and Me support center    *[facility]     *Discussed genetic testing: Yes     *Appointment at [facility] made: Yes      *Routine Prenatal lab work ordered: Yes     *Did patients risk factors prompt additional lab work at this time?: No     *Nurse/Family Partnership- pt may qualify No; referral placed No     *4 P's- substance abuse screening    Presently using? No    Past use? No    Partner using? No    Parents/Family using? No    *Details that I feel the provider should be aware of:  has been dealing with significant nausea which seems to be getting better  Medications are effective  Doing well otherwise  Would like to take Pepcid occasionally for heartburn, advised Tums and intermittent use of Pepcid      PN1 visit scheduled  The patient was oriented to our practice and all questions were answered    Phone visit lasted approx 45 mins       Interviewed by: Renetta DAVIS

## 2021-02-10 NOTE — PATIENT INSTRUCTIONS
Nausea and Vomiting in Pregnancy   AMBULATORY CARE:   Nausea and vomiting in pregnancy  can happen any time of day  These symptoms usually start before the 9th week of pregnancy, and end by the 14th week (second trimester)  Some women can have nausea and vomiting for a longer time  These symptoms can affect some women throughout the entire pregnancy  Nausea and vomiting do not harm your baby  These symptoms can make it hard for you to do your daily activities  Seek care immediately if:   · You have signs of dehydration  Examples are dark yellow urine, dry mouth and lips, dry skin, fast heartbeat, and urinating less than usual     · You have severe abdominal pain  · You feel too weak or dizzy to stand up  · You see blood in your vomit or bowel movements  Contact your healthcare provider if:   · You vomit more than 4 times in 1 day  · You have not been able to keep liquids down for more than 1 day  · You lose more than 2 pounds  · You have a fever  · Your nausea and vomiting continue longer than 14 weeks  · You have questions or concerns about your condition or care  Treatment  for nausea and vomiting in pregnancy is usually not needed  You can make changes in the foods you eat and in your activities to help manage your symptoms  You may need to try several things to learn what works for you  Talk to your healthcare provider if your symptoms do not decrease with the changes suggested below  You may need vitamin B6 and medicine if these changes do not help, or your symptoms become severe  Nutrition changes you can make to manage nausea and vomiting:   · Eat small meals throughout the day instead of 3 large meals  You may be more likely to have nausea and vomiting when your stomach is empty  Eat foods that are low in fat and high in protein  Examples are lean meat, beans, turkey, and chicken without the skin   Eat a small snack, such as crackers, dry cereal, or a small sandwich before you go to bed  · Eat some crackers or dry toast before you get out of bed in the morning  Get out of bed slowly  Sudden movements could cause you to get dizzy and nauseated  · Eat bland foods when you feel nauseated  Examples of bland foods include dry toast, dry cereal, plain pasta, white rice, and bread  Other bland foods include saltine crackers, bananas, gelatin, and pretzels  Avoid spicy, greasy, and fried foods  Avoid any other foods that make you feel nauseated  · Drink liquids that contain ginger  Drink ginger ale made with real ginger or ginger tea made with fresh grated ginger  Ginger capsules or ginger candies may also help to decrease nausea and vomiting  · Drink liquids between meals instead of with meals  Wait at least 30 minutes after you eat to drink liquids  Drink small amounts of liquids often throughout the day to prevent dehydration  Ask how much liquid you should drink each day  Other changes you can make to manage nausea and vomiting:   · Avoid smells that bother you  Strong odors may cause nausea and vomiting to start, or make it worse  Take a short walk, turn on a fan, or try to sleep with the window open to get fresh air  When you are cooking, open windows to get rid of smells that may cause nausea  · Do not brush your teeth right after you eat  if it makes you nauseated  · Rest when you need to  Start activity slowly and work up to your usual routine as you start to feel better  · Talk to your healthcare provider about your prenatal vitamins  Prenatal vitamins can cause nausea for some women  Try taking your prenatal vitamin at night or with a snack  If this change does not help, your healthcare provider may recommend a different type of vitamin  · Do not use any medicines, vitamins, or supplements to manage your symptoms without asking your healthcare provider  Many medicines can harm an unborn baby       · Light to moderate exercise  may help to decrease your symptoms  It may also help you to sleep better at night  Ask your healthcare provider about the best exercise plan for you  Follow up with your healthcare provider as directed:  Write down your questions so you remember to ask them during your visits  © Copyright [address] Information is for End User's use only and may not be sold, redistributed or otherwise used for commercial purposes  All illustrations and images included in CareNotes® are the copyrighted property of A D A M , Inc  or Humberto Johnson   The above information is an  only  It is not intended as medical advice for individual conditions or treatments  Talk to your doctor, nurse or pharmacist before following any medical regimen to see if it is safe and effective for you  Pregnancy at 11 to 14 Weeks   AMBULATORY CARE:   Changes happening to your body: You are now at the end of your first trimester and entering your second trimester  Morning sickness usually goes away by this time  You may have other symptoms such as fatigue, frequent urination, and headaches  You may have gained 2 to 4 pounds by now  Seek care immediately if:   · You have pain or cramping in your abdomen or low back  · You have heavy vaginal bleeding or clotting  · You pass material that looks like tissue or large clots  Collect the material and bring it with you  Call your doctor or obstetrician if:   · You cannot keep food or drinks down, and you are losing weight  · You have light vaginal bleeding  · You have chills or a fever  · You have vaginal itching, burning, or pain  · You have yellow, green, white, or foul-smelling vaginal discharge  · You have pain or burning when you urinate, less urine than usual, or pink or bloody urine  · You have questions or concerns about your condition or care      How to care for yourself at this stage of your pregnancy:   · Get plenty of rest   You may feel more tired than normal  You may need to take naps or go to bed earlier  · Manage nausea and vomiting  Avoid fatty and spicy foods  Eat small meals throughout the day instead of large meals  Charisse may help to decrease nausea  Ask your healthcare provider about other ways of decreasing nausea and vomiting  · Eat a variety of healthy foods  Healthy foods include fruits, vegetables, whole-grain breads, low-fat dairy foods, beans, lean meats, and fish  Drink liquids as directed  Ask how much liquid to drink each day and which liquids are best for you  Limit caffeine to less than 200 milligrams each day  Limit your intake of fish to 2 servings each week  Choose fish low in mercury such as canned light tuna, shrimp, salmon, cod, or tilapia  Do not  eat fish high in mercury such as swordfish, tilefish, imer mackerel, and shark  · Take prenatal vitamins as directed  Your need for certain vitamins and minerals, such as folic acid, increases during pregnancy  Prenatal vitamins provide some of the extra vitamins and minerals you need  Prenatal vitamins may also help to decrease the risk of certain birth defects  · Do not smoke  Smoking increases your risk of a miscarriage and other health problems during your pregnancy  Smoking can cause your baby to be born too early or weigh less at birth  Ask your healthcare provider for information if you need help quitting  · Do not drink alcohol  Alcohol passes from your body to your baby through the placenta  It can affect your baby's brain development and cause fetal alcohol syndrome (FAS)  FAS is a group of conditions that causes mental, behavior, and growth problems  · Talk to your healthcare provider before you take any medicines  Many medicines may harm your baby if you take them when you are pregnant  Do not take any medicines, vitamins, herbs, or supplements without first talking to your healthcare provider   Never use illegal or street drugs (such as marijuana or cocaine) while you are pregnant  Safety tips during pregnancy:   · Avoid hot tubs and saunas  Do not use a hot tub or sauna while you are pregnant, especially during your first trimester  Hot tubs and saunas may raise your baby's temperature and increase the risk of birth defects  · Avoid toxoplasmosis  This is an infection caused by eating raw meat or being around infected cat feces  It can cause birth defects, miscarriages, and other problems  Wash your hands after you touch raw meat  Make sure any meat is well-cooked before you eat it  Avoid raw eggs and unpasteurized milk  Use gloves or ask someone else to clean your cat's litter box while you are pregnant  Changes happening with your baby: Your baby has fully formed fingernails and toenails  Your baby's heartbeat can now be heard  Ask your healthcare provider if you can listen to your baby's heartbeat  By week 14, your baby is over 4 inches long from the top of the head to the rump (baby's bottom)  Your baby weighs over 3 ounces  Prenatal care:  Prenatal care is a series of visits with your healthcare provider throughout your pregnancy  During the first 28 weeks of your pregnancy, you will see your healthcare provider 1 time each month  Prenatal care can help prevent problems during pregnancy and childbirth  Your healthcare provider will check your blood pressure and weight  Your baby's heart rate will also be checked  You may also need the following at some visits:  · A pelvic exam  allows your healthcare provider to see your cervix (the bottom part of your uterus)  Your healthcare provider will use a speculum to open your vagina  He or she will check the size and shape of your uterus  · Blood tests  may be done to check for any of the following:     ? Gestational diabetes or anemia (low iron level)    ? Blood type or Rh factor, or certain birth defects    ? Immunity to certain diseases, such as chickenpox or rubella    ?  An infection, such as a sexually transmitted infection, HIV, or hepatitis B    · Hepatitis B  may need to be prevented or treated  Hepatitis B is inflammation of the liver caused by the hepatitis B virus (HBV)  HBV can spread from a mother to her baby during delivery  You will be checked for HBV as early as possible in the first trimester of each pregnancy  You need the test even if you received the hepatitis B vaccine or were tested before  You may need to have an HBV infection treated before you give birth  · Urine tests  may also be done to check for sugar and protein  These can be signs of gestational diabetes or preeclampsia  Urine tests may also be done to check for signs of infection  · A fetal ultrasound  shows pictures of your baby inside your uterus  The pictures are used to check your baby's development, movement, and position  · Genetic disorder screening tests  may be offered to you  These tests check your baby's risk for genetic disorders such as Down syndrome  A screening test includes a blood test and ultrasound  Follow up with your doctor or obstetrician as directed:  Go to all prenatal visits  Write down your questions so you remember to ask them during your visits  © Copyright [address] Information is for End User's use only and may not be sold, redistributed or otherwise used for commercial purposes  All illustrations and images included in CareNotes® are the copyrighted property of [employer]  or [facility] Jerrell Elizabeth   The above information is an  only  It is not intended as medical advice for individual conditions or treatments  Talk to your doctor, nurse or pharmacist before following any medical regimen to see if it is safe and effective for you

## 2021-02-15 ENCOUNTER — LAB (OUTPATIENT)
Dept: LAB | Facility: CLINIC | Age: 31
End: 2021-02-15
Payer: COMMERCIAL

## 2021-02-15 ENCOUNTER — TRANSCRIBE ORDERS (OUTPATIENT)
Dept: LAB | Facility: CLINIC | Age: 31
End: 2021-02-15

## 2021-02-15 DIAGNOSIS — Z34.01 FIRST PREGNANCY, FIRST TRIMESTER: ICD-10-CM

## 2021-02-15 LAB
ABO GROUP BLD: NORMAL
BACTERIA UR QL AUTO: ABNORMAL /HPF
BASOPHILS # BLD AUTO: 0.05 THOUSANDS/ΜL (ref 0–0.1)
BASOPHILS NFR BLD AUTO: 1 % (ref 0–1)
BILIRUB UR QL STRIP: NEGATIVE
BLD GP AB SCN SERPL QL: NEGATIVE
CLARITY UR: ABNORMAL
COLOR UR: YELLOW
EOSINOPHIL # BLD AUTO: 0.13 THOUSAND/ΜL (ref 0–0.61)
EOSINOPHIL NFR BLD AUTO: 2 % (ref 0–6)
ERYTHROCYTE [DISTWIDTH] IN BLOOD BY AUTOMATED COUNT: 12.1 % (ref 11.6–15.1)
GLUCOSE UR STRIP-MCNC: NEGATIVE MG/DL
HBV SURFACE AG SER QL: NORMAL
HCT VFR BLD AUTO: 40.2 % (ref 34.8–46.1)
HGB BLD-MCNC: 13.2 G/DL (ref 11.5–15.4)
HGB UR QL STRIP.AUTO: NEGATIVE
IMM GRANULOCYTES # BLD AUTO: 0.03 THOUSAND/UL (ref 0–0.2)
IMM GRANULOCYTES NFR BLD AUTO: 0 % (ref 0–2)
KETONES UR STRIP-MCNC: ABNORMAL MG/DL
LEUKOCYTE ESTERASE UR QL STRIP: ABNORMAL
LYMPHOCYTES # BLD AUTO: 2.27 THOUSANDS/ΜL (ref 0.6–4.47)
LYMPHOCYTES NFR BLD AUTO: 27 % (ref 14–44)
MCH RBC QN AUTO: 30.3 PG (ref 26.8–34.3)
MCHC RBC AUTO-ENTMCNC: 32.8 G/DL (ref 31.4–37.4)
MCV RBC AUTO: 92 FL (ref 82–98)
MONOCYTES # BLD AUTO: 0.62 THOUSAND/ΜL (ref 0.17–1.22)
MONOCYTES NFR BLD AUTO: 7 % (ref 4–12)
NEUTROPHILS # BLD AUTO: 5.44 THOUSANDS/ΜL (ref 1.85–7.62)
NEUTS SEG NFR BLD AUTO: 63 % (ref 43–75)
NITRITE UR QL STRIP: NEGATIVE
NON-SQ EPI CELLS URNS QL MICRO: ABNORMAL /HPF
NRBC BLD AUTO-RTO: 0 /100 WBCS
PH UR STRIP.AUTO: 6 [PH]
PLATELET # BLD AUTO: 299 THOUSANDS/UL (ref 149–390)
PMV BLD AUTO: 10.3 FL (ref 8.9–12.7)
PROT UR STRIP-MCNC: NEGATIVE MG/DL
RBC # BLD AUTO: 4.36 MILLION/UL (ref 3.81–5.12)
RBC #/AREA URNS AUTO: ABNORMAL /HPF
RH BLD: POSITIVE
RUBV IGG SERPL IA-ACNC: 106.6 IU/ML
SP GR UR STRIP.AUTO: 1.03 (ref 1–1.03)
URATE CRY URNS QL MICRO: ABNORMAL /HPF
UROBILINOGEN UR QL STRIP.AUTO: 0.2 E.U./DL
WBC # BLD AUTO: 8.54 THOUSAND/UL (ref 4.31–10.16)
WBC #/AREA URNS AUTO: ABNORMAL /HPF

## 2021-02-15 PROCEDURE — 80081 OBSTETRIC PANEL INC HIV TSTG: CPT

## 2021-02-15 PROCEDURE — 36415 COLL VENOUS BLD VENIPUNCTURE: CPT

## 2021-02-15 PROCEDURE — 87086 URINE CULTURE/COLONY COUNT: CPT

## 2021-02-15 PROCEDURE — 81001 URINALYSIS AUTO W/SCOPE: CPT

## 2021-02-16 LAB
BACTERIA UR CULT: NORMAL
HIV 1+2 AB+HIV1 P24 AG SERPL QL IA: NORMAL
RPR SER QL: NORMAL

## 2021-02-19 DIAGNOSIS — O21.9 NAUSEA AND VOMITING DURING PREGNANCY: ICD-10-CM

## 2021-02-19 RX ORDER — ONDANSETRON 4 MG/1
4 TABLET, ORALLY DISINTEGRATING ORAL EVERY 6 HOURS PRN
Qty: 30 TABLET | Refills: 1 | Status: SHIPPED | OUTPATIENT
Start: 2021-02-19 | End: 2021-03-26 | Stop reason: SDUPTHER

## 2021-02-19 NOTE — TELEPHONE ENCOUNTER
Pt needs refill of zofran called into pharmacy  [facility] [address]   Pt of Dr Demetrius Oviedo

## 2021-02-24 ENCOUNTER — INITIAL PRENATAL (OUTPATIENT)
Dept: OBGYN CLINIC | Facility: CLINIC | Age: 31
End: 2021-02-24

## 2021-02-24 VITALS — BODY MASS INDEX: 29.37 KG/M2 | DIASTOLIC BLOOD PRESSURE: 66 MMHG | SYSTOLIC BLOOD PRESSURE: 108 MMHG | WEIGHT: 160.6 LBS

## 2021-02-24 DIAGNOSIS — Z34.01 ENCOUNTER FOR SUPERVISION OF LOW-RISK FIRST PREGNANCY IN FIRST TRIMESTER: ICD-10-CM

## 2021-02-24 DIAGNOSIS — Z11.3 SCREENING FOR STDS (SEXUALLY TRANSMITTED DISEASES): Primary | ICD-10-CM

## 2021-02-24 LAB
SL AMB  POCT GLUCOSE, UA: NEGATIVE
SL AMB POCT URINE PROTEIN: NEGATIVE

## 2021-02-24 PROCEDURE — PNV: Performed by: NURSE PRACTITIONER

## 2021-02-24 NOTE — PATIENT INSTRUCTIONS
Pregnancy at [address] to [address]:   What changes are happening in my body? You are now at the end of your first trimester and entering your second trimester  Morning sickness usually goes away by this time  You may have other symptoms such as fatigue, frequent urination, and headaches  You may have gained 2 to 4 pounds by now  How do I care for myself at this stage of my pregnancy? · Get plenty of rest   You may feel more tired than normal  You may need to take naps or go to bed earlier  · Manage nausea and vomiting  Avoid fatty and spicy foods  Eat small meals throughout the day instead of large meals  Charisse may help to decrease nausea  Ask your healthcare provider about other ways of decreasing nausea and vomiting  · Eat a variety of healthy foods  Healthy foods include fruits, vegetables, whole-grain breads, low-fat dairy foods, beans, lean meats, and fish  Drink liquids as directed  Ask how much liquid to drink each day and which liquids are best for you  Limit caffeine to less than 200 milligrams each day  Limit your intake of fish to 2 servings each week  Choose fish low in mercury such as canned light tuna, shrimp, salmon, cod, or tilapia  Do not  eat fish high in mercury such as swordfish, tilefish, imer mackerel, and shark  · Take prenatal vitamins as directed  Your need for certain vitamins and minerals, such as folic acid, increases during pregnancy  Prenatal vitamins provide some of the extra vitamins and minerals you need  Prenatal vitamins may also help to decrease the risk of certain birth defects  · Do not smoke  Smoking increases your risk of a miscarriage and other health problems during your pregnancy  Smoking can cause your baby to be born too early or weigh less at birth  Ask your healthcare provider for information if you need help quitting  · Do not drink alcohol  Alcohol passes from your body to your baby through the placenta   It can affect your baby's brain development and cause fetal alcohol syndrome (FAS)  FAS is a group of conditions that causes mental, behavior, and growth problems  · Talk to your healthcare provider before you take any medicines  Many medicines may harm your baby if you take them when you are pregnant  Do not take any medicines, vitamins, herbs, or supplements without first talking to your healthcare provider  Never use illegal or street drugs (such as marijuana or cocaine) while you are pregnant  What are some safety tips during pregnancy? · Avoid hot tubs and saunas  Do not use a hot tub or sauna while you are pregnant, especially during your first trimester  Hot tubs and saunas may raise your baby's temperature and increase the risk of birth defects  · Avoid toxoplasmosis  This is an infection caused by eating raw meat or being around infected cat feces  It can cause birth defects, miscarriages, and other problems  Wash your hands after you touch raw meat  Make sure any meat is well-cooked before you eat it  Avoid raw eggs and unpasteurized milk  Use gloves or ask someone else to clean your cat's litter box while you are pregnant  What changes are happening with my baby? Your baby has fully formed fingernails and toenails  Your baby's heartbeat can now be heard  Ask your healthcare provider if you can listen to your baby's heartbeat  By week 14, your baby is over 4 inches long from the top of the head to the rump (baby's bottom)  Your baby weighs over 3 ounces  What do I need to know about prenatal care? Prenatal care is a series of visits with your healthcare provider throughout your pregnancy  During the first 28 weeks of your pregnancy, you will see your healthcare provider 1 time each month  Prenatal care can help prevent problems during pregnancy and childbirth  Your healthcare provider will check your blood pressure and weight  Your baby's heart rate will also be checked   You may also need the following at some visits:  · A pelvic exam  allows your healthcare provider to see your cervix (the bottom part of your uterus)  Your healthcare provider will use a speculum to open your vagina  He or she will check the size and shape of your uterus  · Blood tests  may be done to check for any of the following:     ? Gestational diabetes or anemia (low iron level)    ? Blood type or Rh factor, or certain birth defects    ? Immunity to certain diseases, such as chickenpox or rubella    ? An infection, such as a sexually transmitted infection, HIV, or hepatitis B    · Hepatitis B  may need to be prevented or treated  Hepatitis B is inflammation of the liver caused by the hepatitis B virus (HBV)  HBV can spread from a mother to her baby during delivery  You will be checked for HBV as early as possible in the first trimester of each pregnancy  You need the test even if you received the hepatitis B vaccine or were tested before  You may need to have an HBV infection treated before you give birth  · Urine tests  may also be done to check for sugar and protein  These can be signs of gestational diabetes or preeclampsia  Urine tests may also be done to check for signs of infection  · A fetal ultrasound  shows pictures of your baby inside your uterus  The pictures are used to check your baby's development, movement, and position  · Genetic disorder screening tests  may be offered to you  These tests check your baby's risk for genetic disorders such as Down syndrome  A screening test includes a blood test and ultrasound  When should I seek immediate care? · You have pain or cramping in your abdomen or low back  · You have heavy vaginal bleeding or clotting  · You pass material that looks like tissue or large clots  Collect the material and bring it with you  When should I call my doctor or obstetrician? · You cannot keep food or drinks down, and you are losing weight      · You have light bleeding  · You have chills or a fever  · You have vaginal itching, burning, or pain  · You have yellow, green, white, or foul-smelling vaginal discharge  · You have pain or burning when you urinate, less urine than usual, or pink or bloody urine  · You have questions or concerns about your condition or care  CARE AGREEMENT:   You have the right to help plan your care  Learn about your health condition and how it may be treated  Discuss treatment options with your healthcare providers to decide what care you want to receive  You always have the right to refuse treatment  The above information is an  only  It is not intended as medical advice for individual conditions or treatments  Talk to your doctor, nurse or pharmacist before following any medical regimen to see if it is safe and effective for you  © Copyright [address] Information is for End User's use only and may not be sold, redistributed or otherwise used for commercial purposes   All illustrations and images included in CareNotes® are the copyrighted property of A D A M , Inc  or [address] [place] [address]

## 2021-02-24 NOTE — ASSESSMENT & PLAN NOTE
[facility] Rosa Cheung  is a 31 y o  Rincon Littler @ 12w2d who presents for her initial prenatal visit  She has complaints of heartburn, N/V  Taking pepcid and Zofran  Denies pelvic pain, bleeding, headaches  Exam WNL    by Doppler  Patient has completed prenatal labs  O POS, RI, urine culture, RPR, HIV, Hep B negative  Pap up to date and GC/CT sent today  S/P flu and COVID vaccine  Patient is scheduled for genetics @ [facility] on 3/2/21  Has level 2 ultrasound with MFM scheduled as well  Reviewed diet and activity recommendations, practice set up, visit intervals and reasons to call  Blue packet given and reviewed  RTO in 4 weeks

## 2021-02-24 NOTE — PROGRESS NOTES
Encounter for supervision of low-risk first pregnancy in first trimester    Karsten Lindsey  is a 31 y o   @ 12w2d who presents for her initial prenatal visit  She has complaints of heartburn, N/V  Taking pepcid and Zofran  Denies pelvic pain, bleeding, headaches  Exam WNL    by Doppler  Patient has completed prenatal labs  O POS, RI, urine culture, RPR, HIV, Hep B negative  Pap up to date and GC/CT sent today  S/P flu and COVID vaccine  Patient is scheduled for genetics @ [facility] on 3/2/21  Has level 2 ultrasound with MFM scheduled as well  Reviewed diet and activity recommendations, practice set up, visit intervals and reasons to call  Blue packet given and reviewed  RTO in 4 weeks  Diagnoses and all orders for this visit:    Screening for STDs (sexually transmitted diseases)  -     Chlamydia/GC amplified DNA by PCR    Encounter for supervision of low-risk first pregnancy in first trimester  -     POCT urine dip    Other orders  -     Prenatal Vit-Fe Fumarate-FA (PRENATAL PO);  Take by mouth         Carlos Araujo  1990    CC:  PN 1 exam    S:  Carlos Araujo is a 31 y o  female here for PN 1 exam    Pregnancy HX:  # 1 current  Medical HX:  denies  Family Hx:  Denies       Current Outpatient Medications:     doxylamine (UNISON) 25 MG tablet, Take 1 tablet (25 mg total) by mouth daily at bedtime as needed for sleep or nausea, Disp: 30 tablet, Rfl: 2    Famotidine (PEPCID PO), Take by mouth, Disp: , Rfl:     ondansetron (ZOFRAN-ODT) 4 mg disintegrating tablet, Take 1 tablet (4 mg total) by mouth every 6 (six) hours as needed for nausea or vomiting, Disp: 30 tablet, Rfl: 1    Prenatal Vit-Fe Fumarate-FA (PRENATAL PO), Take by mouth, Disp: , Rfl:     pyridoxine (B-6) 100 MG tablet, Take 1 tablet (100 mg total) by mouth 3 (three) times a day (Patient not taking: Reported on 2021), Disp: 90 tablet, Rfl: 1  Social History     Socioeconomic History    Marital status: Single Spouse name: Not on file    Number of children: Not on file    Years of education: Not on file    Highest education level: Not on file   Occupational History     Employer: [address] [place] [address] [address] Needs    Financial resource strain: Not hard at all    Food insecurity     Worry: Never true     Inability: Never true   [employer] needs     Medical: No     Non-medical: No   Tobacco Use    Smoking status: Never Smoker    Smokeless tobacco: Never Used   Substance and Sexual Activity    Alcohol use: Not Currently     Alcohol/week: 2 0 standard drinks     Types: 1 Glasses of wine, 1 Standard drinks or equivalent per week     Frequency: Monthly or less     Drinks per session: 1 or 2     Binge frequency: Never     Comment: monthly; Denied per allscript    Drug use: No    Sexual activity: Yes     Partners: Male     Birth control/protection: None   Lifestyle    Physical activity     Days per week: 5 days     Minutes per session: 40 min    Stress: Not at all   Relationships    Social connections     Talks on phone: More than three times a week     Gets together: More than three times a week     Attends Baptism service: Not on file     Active member of club or organization: Not on file     Attends meetings of clubs or organizations: Not on file     Relationship status: Never     Intimate partner violence     Fear of current or ex partner: No     Emotionally abused: No     Physically abused: No     Forced sexual activity: No   Other Topics Concern    Not on file   Social History Narrative    Caffeine Use    Exercising Regularly     always seatbelt in care     employed      Family History   Problem Relation Age of Onset    Hypothyroidism Mother     Rheum arthritis Mother     Fibroids Mother     Arthritis Other     Hypertension Other         Benign Essential    Breast cancer Other     ALS Other     Osteoporosis Other     ALS Maternal Grandfather     Parkinsonism Maternal Grandfather     Hiatal hernia Maternal Grandmother     Osteoporosis Maternal Grandmother     Endometrial cancer Maternal Grandmother     Endometriosis Maternal Grandmother     COPD Maternal Grandmother     Parkinsonism Maternal Grandmother     Alcohol abuse Father     No Known Problems Brother     No Known Problems Brother      Past Medical History:   Diagnosis Date    Degenerative lumbar disc 8/28/2015    Overactive bladder     Urinary tract infection      Review of Systems   Constitutional: Negative for fatigue and unexpected weight change  Respiratory: Negative for shortness of breath  Cardiovascular: Negative for chest pain and leg swelling  Breasts:  negative for tenderness or masses  Gastrointestinal: Negative for abdominal pain  + for N/V and heartburn in pregnancy  Endocrine: Negative for cold intolerance and heat intolerance  Genitourinary: Negative for dyspareunia, dysuria, flank pain, frequency, genital sores, hematuria and pelvic pain  Musculoskeletal: Negative for back pain  Neurological: Negative for headaches  O:  Blood pressure 108/66, weight 72 8 kg (160 lb 9 6 oz), last menstrual period 11/30/2020, not currently breastfeeding  Patient appears well and is not in distress  ROM normal   Neck is supple without masses  Normal thyroid  Heart regular rate and rhythm  Lungs CTA bilaterally   Breasts are symmetrical without mass, tenderness, nipple discharge, skin changes or adenopathy  Abdomen is soft and nontender without masses  External genitals are normal without lesions or rashes  Urethral meatus and urethra are normal  Bladder is normal to palpation  Vagina is normal without discharge or bleeding  Cervix is normal without discharge or lesion  Uterus is normal, mobile, nontender without palpable mass  12 week size   Adnexa are normal, nontender, without palpable mass     Skin warm and dry  Capillary refill < 2 seconds  Alert and oriented x 3 with normal affect

## 2021-03-01 ENCOUNTER — TELEPHONE (OUTPATIENT)
Dept: PERINATAL CARE | Facility: OTHER | Age: 31
End: 2021-03-01

## 2021-03-01 NOTE — TELEPHONE ENCOUNTER
Spoke with patient and confirmed appointment with [facility]  1 support person ( must be over age of 15) may accompany patient  Will you and your support person be able to wear a mask ,without a valve , during entire appointment? YES   To minimize your exposure in our waiting area,check in and rooming questions will be done via phone  When you arrive in the parking lot please call the following inside line # prior to entering office:    [place]: [phone]    Have you or your support person traveled outside the state in the last 2 weeks? NO  If yes, what state did you travel to? Do you or your support person have:  Fever or flu- like symptoms? NO  Symptoms of upper respiratory infection like runny nose, sore throat or cough? NO  Do you have new headache that you have not had in the past?NO  Have you experienced any new shortness of breath recently? NO  Do you have any new loss of taste or smell? NO  Do you have any new diarrhea, nausea or vomiting? NO  Have you recently been in contact with anyone who has been sick or diagnosed with COVID-19 infection? NO  Have you been recommended to quarantine because of an exposure to a confirmed positive COVID19 person? NO  Have you recently been tested for COVID19? NO    Patient verbalized understanding of all instructions   -------------------------------------------------------------

## 2021-03-02 ENCOUNTER — ROUTINE PRENATAL (OUTPATIENT)
Dept: PERINATAL CARE | Facility: OTHER | Age: 31
End: 2021-03-02
Payer: COMMERCIAL

## 2021-03-02 VITALS
HEIGHT: 62 IN | SYSTOLIC BLOOD PRESSURE: 112 MMHG | DIASTOLIC BLOOD PRESSURE: 69 MMHG | WEIGHT: 160.72 LBS | BODY MASS INDEX: 29.58 KG/M2 | HEART RATE: 83 BPM

## 2021-03-02 DIAGNOSIS — Z3A.13 13 WEEKS GESTATION OF PREGNANCY: ICD-10-CM

## 2021-03-02 DIAGNOSIS — Z36.82 ENCOUNTER FOR NUCHAL TRANSLUCENCY TESTING: Primary | ICD-10-CM

## 2021-03-02 DIAGNOSIS — Z34.01 FIRST PREGNANCY, FIRST TRIMESTER: ICD-10-CM

## 2021-03-02 PROCEDURE — 76813 OB US NUCHAL MEAS 1 GEST: CPT | Performed by: OBSTETRICS & GYNECOLOGY

## 2021-03-02 PROCEDURE — 99203 OFFICE O/P NEW LOW 30 MIN: CPT | Performed by: OBSTETRICS & GYNECOLOGY

## 2021-03-02 NOTE — PROGRESS NOTES
Vita Rao presents today for a genetic screening ultrasound  This is her 1st pregnancy  She has no significant medical, surgical history  She does have nausea vomiting of pregnancy for which she is currently on Zofran  She has had no significant weight loss with this pregnancy  Family history and social history otherwise unremarkable  She has received her COVID-19 vaccine  A review of systems is otherwise negative  We discussed the options for genetic screening, including but not limited to first trimester screening, second trimester screening, combined first and second trimester screening, noninvasive prenatal testing (NIPT) for patients at high risk and diagnostic screening through the use of CVS and amniocentesis  We discussed the risks and benefits of each approach including the sensitivities and false positive rates as well as the difference between a screening test and a diagnostic test   At the conclusion of our discussion the patient elected Sequential Screening to delineate her risk for fetal aneuploidy  A maternal blood sample was obtained on the day of the ultrasound  The first trimester portion of the screening results, encompassing age, nuchal translucency, and biochemistry should be available within one week of testing and will be reported from [facility]   The second stage of sequential screening should be completed between the 15th and 21st week of pregnancy (ideally between 16-18 weeks)  We will call the patient with any and all results and the results should be available in the EMR  We discussed follow-up in detail and I recommend an anatomy ultrasound be scheduled for 20 weeks gestation  Thank you very much for allowing us to participate in the care of this very nice patient  Should you have any questions, please do not hesitate to contact our office      Please note, medical decision making complexity low based upon review genetic screening options and coordination follow-up  Portions of the record may have been created with voice recognition software  Occasional wrong word or "sound a like" substitutions may have occurred due to the inherent limitations of voice recognition software  Read the chart carefully and recognize, using context, where substitutions have occurred

## 2021-03-15 PROBLEM — Z34.02 ENCOUNTER FOR SUPERVISION OF LOW-RISK FIRST PREGNANCY IN SECOND TRIMESTER: Status: ACTIVE | Noted: 2021-02-24

## 2021-03-22 ENCOUNTER — ROUTINE PRENATAL (OUTPATIENT)
Dept: OBGYN CLINIC | Facility: CLINIC | Age: 31
End: 2021-03-22

## 2021-03-22 VITALS — WEIGHT: 164.2 LBS | DIASTOLIC BLOOD PRESSURE: 68 MMHG | SYSTOLIC BLOOD PRESSURE: 119 MMHG | BODY MASS INDEX: 30.03 KG/M2

## 2021-03-22 DIAGNOSIS — O21.9 NAUSEA AND VOMITING IN PREGNANCY: ICD-10-CM

## 2021-03-22 DIAGNOSIS — Z11.3 SCREENING FOR STD (SEXUALLY TRANSMITTED DISEASE): ICD-10-CM

## 2021-03-22 DIAGNOSIS — Z34.02 ENCOUNTER FOR SUPERVISION OF LOW-RISK FIRST PREGNANCY IN SECOND TRIMESTER: Primary | ICD-10-CM

## 2021-03-22 LAB
SL AMB  POCT GLUCOSE, UA: NORMAL
SL AMB POCT URINE PROTEIN: NORMAL

## 2021-03-22 PROCEDURE — PNV: Performed by: STUDENT IN AN ORGANIZED HEALTH CARE EDUCATION/TRAINING PROGRAM

## 2021-03-22 PROCEDURE — 87591 N.GONORRHOEAE DNA AMP PROB: CPT | Performed by: STUDENT IN AN ORGANIZED HEALTH CARE EDUCATION/TRAINING PROGRAM

## 2021-03-22 PROCEDURE — 87491 CHLMYD TRACH DNA AMP PROBE: CPT | Performed by: STUDENT IN AN ORGANIZED HEALTH CARE EDUCATION/TRAINING PROGRAM

## 2021-03-22 NOTE — PROGRESS NOTES
Problem List Items Addressed This Visit        Digestive    Nausea and vomiting in pregnancy     Improved with Zofran  Worsening GERD, managed with Pepcid, will inform us if desires additional medication            Other    Encounter for supervision of low-risk first pregnancy in second trimester - Primary     30 yo  at 12 0/7 weeks routine PNV  Denies leakage of fluid, vaginal bleeding, contractions  Not yet feeling fetal movement    -s/p flu, COVID  -GCCT collected today (unable to be run last visit)  -Level 2 ultrasound scheduled         Relevant Orders    POCT urine dip (Completed)      Other Visit Diagnoses     Screening for STD (sexually transmitted disease)        Relevant Orders    Chlamydia/GC amplified DNA by PCR

## 2021-03-22 NOTE — PROGRESS NOTES
16 weeks  O pos  S/p flu and covid vaccines  New GC/CH collected today  Feels better than 1st trimester  Takes zofran daily still  Still getting nauseous  Heart burn is a lot worse now, also feels really tired  Denies any VB or cramping- just round ligament pain  No LOF  Level II US scheduled for 4/19

## 2021-03-22 NOTE — ASSESSMENT & PLAN NOTE
30 yo  at 16 0/7 weeks routine PNV  Denies leakage of fluid, vaginal bleeding, contractions  Not yet feeling fetal movement    -s/p flu, COVID  -GCCT collected today (unable to be run last visit)  -Level 2 ultrasound scheduled

## 2021-03-22 NOTE — ASSESSMENT & PLAN NOTE
Improved with Zofran  Worsening GERD, managed with Pepcid, will inform us if desires additional medication

## 2021-03-24 LAB
C TRACH DNA SPEC QL NAA+PROBE: NEGATIVE
N GONORRHOEA DNA SPEC QL NAA+PROBE: NEGATIVE

## 2021-03-26 DIAGNOSIS — O21.9 NAUSEA AND VOMITING DURING PREGNANCY: Primary | ICD-10-CM

## 2021-03-26 RX ORDER — ONDANSETRON 4 MG/1
4 TABLET, ORALLY DISINTEGRATING ORAL EVERY 6 HOURS PRN
Qty: 30 TABLET | Refills: 1 | Status: SHIPPED | OUTPATIENT
Start: 2021-03-26 | End: 2021-09-09 | Stop reason: ALTCHOICE

## 2021-03-26 NOTE — TELEPHONE ENCOUNTER
Pt left a voicemail requesting a refill of zofran to [facility]  Please sign off  Lat visit was 03/22 with provider jane

## 2021-04-19 ENCOUNTER — ROUTINE PRENATAL (OUTPATIENT)
Dept: PERINATAL CARE | Facility: OTHER | Age: 31
End: 2021-04-19
Payer: COMMERCIAL

## 2021-04-19 VITALS
WEIGHT: 167.55 LBS | BODY MASS INDEX: 30.83 KG/M2 | SYSTOLIC BLOOD PRESSURE: 105 MMHG | DIASTOLIC BLOOD PRESSURE: 68 MMHG | HEART RATE: 78 BPM | HEIGHT: 62 IN

## 2021-04-19 DIAGNOSIS — Z36.89 ENCOUNTER FOR FETAL ANATOMIC SURVEY: Primary | ICD-10-CM

## 2021-04-19 DIAGNOSIS — Z36.86 ENCOUNTER FOR ANTENATAL SCREENING FOR CERVICAL LENGTH: ICD-10-CM

## 2021-04-19 DIAGNOSIS — Z3A.20 20 WEEKS GESTATION OF PREGNANCY: ICD-10-CM

## 2021-04-19 PROBLEM — R07.2 PRECORDIAL PAIN: Status: RESOLVED | Noted: 2020-07-16 | Resolved: 2021-04-19

## 2021-04-19 PROCEDURE — 76817 TRANSVAGINAL US OBSTETRIC: CPT | Performed by: OBSTETRICS & GYNECOLOGY

## 2021-04-19 PROCEDURE — 76805 OB US >/= 14 WKS SNGL FETUS: CPT | Performed by: OBSTETRICS & GYNECOLOGY

## 2021-04-19 PROCEDURE — 99213 OFFICE O/P EST LOW 20 MIN: CPT | Performed by: OBSTETRICS & GYNECOLOGY

## 2021-04-19 NOTE — PROGRESS NOTES
[address]: Ms Aris Barthel was seen today at 20w0d for anatomic survey and cervical length screening ultrasound  See ultrasound report under "OB Procedures" tab    Please don't hesitate to contact our office with any concerns or questions   -Jian Begum MD

## 2021-04-19 NOTE — PROGRESS NOTES
Ultrasound Probe Disinfection    A transvaginal ultrasound was performed  Prior to use, disinfection was performed with High Level Disinfection Process (Trophon)  Probe serial number A1: [id number] was used        Jovanny Sanchez  04/19/21  9:47 AM

## 2021-04-19 NOTE — LETTER
April 19, 2021     [facility]  [address]    Patient: Nilam George   YOB: 1990   Date of Visit: 4/19/2021       Dear Dr Zarate He: Thank you for referring Sultana Mendez to me for evaluation  Below are my notes for this consultation  If you have questions, please do not hesitate to call me  I look forward to following your patient along with you  Sincerely,        Laura Brunner MD        CC: No Recipients  Laura Brunner MD  4/19/2021 10:30 AM  Sign when Signing Visit  [address]: Ms Darwin Arellano was seen today at 20w0d for anatomic survey and cervical length screening ultrasound  See ultrasound report under "OB Procedures" tab    Please don't hesitate to contact our office with any concerns or questions   -Laura Brunner MD

## 2021-04-21 ENCOUNTER — ROUTINE PRENATAL (OUTPATIENT)
Dept: OBGYN CLINIC | Facility: CLINIC | Age: 31
End: 2021-04-21

## 2021-04-21 VITALS — BODY MASS INDEX: 30.91 KG/M2 | WEIGHT: 169 LBS | SYSTOLIC BLOOD PRESSURE: 104 MMHG | DIASTOLIC BLOOD PRESSURE: 70 MMHG

## 2021-04-21 DIAGNOSIS — Z34.82 ENCOUNTER FOR SUPERVISION OF OTHER NORMAL PREGNANCY IN SECOND TRIMESTER: Primary | ICD-10-CM

## 2021-04-21 DIAGNOSIS — Z34.02 ENCOUNTER FOR SUPERVISION OF LOW-RISK FIRST PREGNANCY IN SECOND TRIMESTER: ICD-10-CM

## 2021-04-21 LAB
SL AMB  POCT GLUCOSE, UA: NEGATIVE
SL AMB POCT URINE PROTEIN: NEGATIVE

## 2021-04-21 PROCEDURE — PNV: Performed by: STUDENT IN AN ORGANIZED HEALTH CARE EDUCATION/TRAINING PROGRAM

## 2021-04-21 NOTE — ASSESSMENT & PLAN NOTE
-precautions reviewed  -s/p COVID/flu vaccines  -prepregnancy BMI 27 with goal weight gain 15-25#: TWG = 19#

## 2021-05-17 ENCOUNTER — OFFICE VISIT (OUTPATIENT)
Dept: DERMATOLOGY | Facility: CLINIC | Age: 31
End: 2021-05-17
Payer: COMMERCIAL

## 2021-05-17 VITALS — BODY MASS INDEX: 31.28 KG/M2 | HEIGHT: 62 IN | WEIGHT: 170 LBS | TEMPERATURE: 96.9 F

## 2021-05-17 DIAGNOSIS — B07.9 VERRUCA VULGARIS: Primary | ICD-10-CM

## 2021-05-17 PROCEDURE — 17110 DESTRUCTION B9 LES UP TO 14: CPT | Performed by: DERMATOLOGY

## 2021-05-17 NOTE — PROGRESS NOTES
[facility] Follow Up Note    Patient Name: Cristy Homans  Encounter Date: 5/17/2021    Today's Chief Concerns:  [facility] Concern #1:  F/U Verruca    Concern #2:    [facility] Concern #3:      Current Medications:    Current Outpatient Medications:     doxylamine (UNISON) 25 MG tablet, Take 1 tablet (25 mg total) by mouth daily at bedtime as needed for sleep or nausea, Disp: 30 tablet, Rfl: 2    Famotidine (PEPCID PO), Take by mouth, Disp: , Rfl:     ondansetron (ZOFRAN-ODT) 4 mg disintegrating tablet, Take 1 tablet (4 mg total) by mouth every 6 (six) hours as needed for nausea or vomiting, Disp: 30 tablet, Rfl: 1    Prenatal Vit-Fe Fumarate-FA (PRENATAL PO), Take by mouth, Disp: , Rfl:     pyridoxine (B-6) 100 MG tablet, Take 1 tablet (100 mg total) by mouth 3 (three) times a day, Disp: 90 tablet, Rfl: 1    CONSTITUTIONAL:   There were no vitals filed for this visit  Specific Alerts:    Have you been seen by a [facility] Dermatologist in the last 3 years? YES    Are you pregnant or planning to become pregnant? YES    Are you currently or planning to be nursing or breast feeding? YES    Allergies   Allergen Reactions    Sulfa Antibiotics Fever and Fatigue    Sulfamethoxazole-Trimethoprim Fever and Fatigue       May we call your Preferred Phone number to discuss your specific medical information? YES    May we leave a detailed message that includes your specific medical information? YES    Have you traveled outside of the [facility] in the past 3 months? No    Do you currently have a pacemaker or defibrillator? No    Do you have any artificial heart valves, joints, plates, screws, rods, stents, pins, etc? No   - If Yes, were any placed within the last 2 years? Do you require any medications prior to a surgical procedure? No   - If Yes, for which procedure? - If Yes, what medications to you require?      Are you taking any medications that cause you to bleed more easily ("blood thinners") No    Have you ever experienced a rapid heartbeat with epinephrine? No    Have you ever been treated with "gold" (gold sodium thiomalate) therapy? No    [facility] can help with wrinkles, "laugh lines," facial volume loss, "double chin," "love handles," age spots, and more  Are you interested in learning today about some of the skin enhancement procedures that we offer? (If Yes, please provide more detail) No    Review of Systems:  Have you recently had or currently have any of the following? · Fever or chills: No  · Night Sweats: No  · Headaches: No  · Weight Gain: No  · Weight Loss: No  · Blurry Vision: No  · Nausea: No  · Vomiting: No  · Diarrhea: No  · Blood in Stool: No  · Abdominal Pain: No  · Itchy Skin: No  · Painful Joints: No  · Swollen Joints: No  · Muscle Pain: No  · Irregular Mole: No  · Sun Burn: No  · Dry Skin: YES  · Skin Color Changes: No  · Scar or Keloid: No  · Cold Sores/Fever Blisters: No  · Bacterial Infections/MRSA: No  · Anxiety: No  · Depression: No  · Suicidal or Homicidal Thoughts: No      PSYCH: Normal mood and affect  EYES: Normal conjunctiva  ENT: Normal lips and oral mucosa  CARDIOVASCULAR: No edema  RESPIRATORY: Normal respirations  HEME/LYMPH/IMMUNO:  No regional lymphadenopathy except as noted below in ASSESSMENT AND PLAN BY DIAGNOSIS    FULL ORGAN SYSTEM SKIN EXAM (SKIN)    Face Normal except as noted below in Assessment   Neck, Cervical Chain Nodes    Right Hand/Fingers Normal except as noted below in Assessment   Left Arm/Hand/Fingers    Chest/Breasts/Axillae    Abdomen, Umbilicus    Back/Spine    Groin/Genitalia/Buttocks    Right Leg, Foot, Toes    Left Leg, Foot, Toes      1  VERRUCA VULGARIS ("Common Warts")    Physical Exam:   Anatomic Location Affected:  Right hand, 1 4 mm verrucous papule and several small verrucous papules   Morphological Description:  Verrucous papules   Pertinent Positives:   Pertinent Negatives:     Additional History of Present Condition:  Patient had previous cryotherapy and is here to have additional treatment  Assessment and Plan:  Based on a thorough discussion of this condition and the management approach to it (including a comprehensive discussion of the known risks, side effects and potential benefits of treatment), the patient (family) agrees to implement the following specific plan:   Cryotherapy done in office today  Verruca Vulgaris  A verruca is a common growth of the skin caused by infection by human papilloma virus (HPV)  There are many strains of the virus that cause different types of warts on the body  The virus infects the most superficial layers of the skin, causing increased production of skin cells and thickening  Warts can be spread through direct contact with infected skin and may spread to other parts of the body if scratched or picked  A verruca is more commonly called a "wart " Warts are particularly common in school-aged children but can arise at any age  Patients who have a history of eczema are especially prone due to impaired skin barrier  Those taking immunosuppressive drugs or with HIV infections may experience prolonged symptoms despite treatment  Warts generally have a rough surface with a tiny black dot sometimes observed in the middle of each scaly spot  They can range in size from a small bump to large scaly growths  Common warts are often found on the backs of fingers or toes, around the nails, and on the knees  Plantar warts can grow inwardly on the soles of the feet causing pain  There are many possible ways to treat warts and sometimes several different treatments are needed to get the warts to go away completely  There is no single perfect treatment for warts, and successful treatment can take many months  In-office treatments usually require multiple visits, and include:  1) Cryotherapy   a cold spray with liquid nitrogen will destroy the infected cells but may lead to discomfort and blistering  It may also leave a permanent white darling or scar  2) Electrosurgery (curettage and cautery) can be used for large resistant warts which involves shaving the growth down and burning the base  It is performed under local anesthesia and may leave a permanent scar    3) Candida (yeast) antigen injections  These are extracts of the common yeast (Candida) that cannot cause an infection  The medication is injected into/under the wart  It is thought to stimulate the immune system to recognize the wart virus and attack it  Multiple injections are often needed about one month apart  There are also several at-home wart treatments:    1) Soak the warts in warm water for 5 minutes every night followed by gentle filing with a nail file or pumice stone  2) Topical salicylic acid or similar compounds work by removing the dead surface skin cells  a  Apply the medicine directly to the wart, wait for it to dry completely, then cover with duct tape overnight   b  Repeat until the wart is gone, which can take 2-4 months  c  Do not use on the face or groin area   d  If the wart paint makes the skin sore, stop treatment until the discomfort has settled, then recommence as above   e  Take care to keep the chemical off normal skin  3) Podophyllin is a cytotoxic agent used in some products and must not be used in pregnancy or women considering pregnancy  4) Some prescription medications include   a  Topical retinoids (adapalene, tretinoin, tazarotene), 5-fluorouracil (Efudex) or imiquimod (Aldara) creams are sometimes used to treat flat warts or warts on the face and other sensitive anatomical areas  They are usually applied directly to the warts once a day for 2-4 months and can be irritating  These treatments should only be used as directed by your health care provider    b  Systemic treatment with oral cimetidine (Tagamet) may help boost the immune system against the wart virus in patients, some of the time  Initiation of cimetidine therapy should ONLY be done under the supervision of your health care provider, who can discuss possible side effects and drug-to-drug interactions of this specific treatment  PROCEDURE:  DESTRUCTION OF BENIGN LESIONS  After a thorough discussion of treatment options and risk/benefits/alternatives (including but not limited to local pain, scarring, dyspigmentation, blistering, and possible superinfection), verbal and written consent were obtained and the aforementioned lesions were treated on with cryotherapy using liquid nitrogen x 1 cycle for 5-10 seconds   TOTAL NUMBER of 5 lesions were treated today on the ANATOMIC LOCATION: Right hand  The patient tolerated the procedure well, and after-care instructions were provided        Scribe Attestation    I,:  Ines Coulter am acting as a scribe while in the presence of the attending physician :       I,:  Willy Rondon MD personally performed the services described in this documentation    as scribed in my presence :

## 2021-05-17 NOTE — PATIENT INSTRUCTIONS
VERRUCA VULGARIS ("Common Warts")    Assessment and Plan:  Based on a thorough discussion of this condition and the management approach to it (including a comprehensive discussion of the known risks, side effects and potential benefits of treatment), the patient (family) agrees to implement the following specific plan:   Cryotherapy done in office today  Verruca Vulgaris  A verruca is a common growth of the skin caused by infection by human papilloma virus (HPV)  There are many strains of the virus that cause different types of warts on the body  The virus infects the most superficial layers of the skin, causing increased production of skin cells and thickening  Warts can be spread through direct contact with infected skin and may spread to other parts of the body if scratched or picked  A verruca is more commonly called a "wart " Warts are particularly common in school-aged children but can arise at any age  Patients who have a history of eczema are especially prone due to impaired skin barrier  Those taking immunosuppressive drugs or with HIV infections may experience prolonged symptoms despite treatment  Warts generally have a rough surface with a tiny black dot sometimes observed in the middle of each scaly spot  They can range in size from a small bump to large scaly growths  Common warts are often found on the backs of fingers or toes, around the nails, and on the knees  Plantar warts can grow inwardly on the soles of the feet causing pain  There are many possible ways to treat warts and sometimes several different treatments are needed to get the warts to go away completely  There is no single perfect treatment for warts, and successful treatment can take many months  In-office treatments usually require multiple visits, and include:  1) Cryotherapy  a cold spray with liquid nitrogen will destroy the infected cells but may lead to discomfort and blistering   It may also leave a permanent white darling or scar  2) Electrosurgery (curettage and cautery) can be used for large resistant warts which involves shaving the growth down and burning the base  It is performed under local anesthesia and may leave a permanent scar    3) Candida (yeast) antigen injections  These are extracts of the common yeast (Candida) that cannot cause an infection  The medication is injected into/under the wart  It is thought to stimulate the immune system to recognize the wart virus and attack it  Multiple injections are often needed about one month apart  There are also several at-home wart treatments:    1) Soak the warts in warm water for 5 minutes every night followed by gentle filing with a nail file or pumice stone  2) Topical salicylic acid or similar compounds work by removing the dead surface skin cells  a  Apply the medicine directly to the wart, wait for it to dry completely, then cover with duct tape overnight   b  Repeat until the wart is gone, which can take 2-4 months  c  Do not use on the face or groin area   d  If the wart paint makes the skin sore, stop treatment until the discomfort has settled, then recommence as above   e  Take care to keep the chemical off normal skin  3) Podophyllin is a cytotoxic agent used in some products and must not be used in pregnancy or women considering pregnancy  4) Some prescription medications include   a  Topical retinoids (adapalene, tretinoin, tazarotene), 5-fluorouracil (Efudex) or imiquimod (Aldara) creams are sometimes used to treat flat warts or warts on the face and other sensitive anatomical areas  They are usually applied directly to the warts once a day for 2-4 months and can be irritating  These treatments should only be used as directed by your health care provider  b  Systemic treatment with oral cimetidine (Tagamet) may help boost the immune system against the wart virus in patients, some of the time    Initiation of cimetidine therapy should ONLY be done under the supervision of your health care provider, who can discuss possible side effects and drug-to-drug interactions of this specific treatment  PROCEDURE:  DESTRUCTION OF BENIGN LESIONS  After a thorough discussion of treatment options and risk/benefits/alternatives (including but not limited to local pain, scarring, dyspigmentation, blistering, and possible superinfection), verbal and written consent were obtained and the aforementioned lesions were treated on with cryotherapy using liquid nitrogen x 1 cycle for 5-10 seconds   TOTAL NUMBER of 5 lesions were treated today on the ANATOMIC LOCATION: Right hand  The patient tolerated the procedure well, and after-care instructions were provided

## 2021-05-18 ENCOUNTER — ROUTINE PRENATAL (OUTPATIENT)
Dept: OBGYN CLINIC | Facility: CLINIC | Age: 31
End: 2021-05-18

## 2021-05-18 VITALS — BODY MASS INDEX: 32.63 KG/M2 | WEIGHT: 178.4 LBS | DIASTOLIC BLOOD PRESSURE: 62 MMHG | SYSTOLIC BLOOD PRESSURE: 108 MMHG

## 2021-05-18 DIAGNOSIS — K21.9 GASTROESOPHAGEAL REFLUX DISEASE, UNSPECIFIED WHETHER ESOPHAGITIS PRESENT: ICD-10-CM

## 2021-05-18 DIAGNOSIS — Z34.02 ENCOUNTER FOR SUPERVISION OF LOW-RISK FIRST PREGNANCY IN SECOND TRIMESTER: ICD-10-CM

## 2021-05-18 DIAGNOSIS — Z34.82 ENCOUNTER FOR SUPERVISION OF OTHER NORMAL PREGNANCY IN SECOND TRIMESTER: Primary | ICD-10-CM

## 2021-05-18 LAB
SL AMB  POCT GLUCOSE, UA: NORMAL
SL AMB POCT URINE PROTEIN: NORMAL

## 2021-05-18 PROCEDURE — PNV: Performed by: PHYSICIAN ASSISTANT

## 2021-05-18 RX ORDER — OMEPRAZOLE 20 MG/1
20 CAPSULE, DELAYED RELEASE ORAL DAILY
Qty: 90 CAPSULE | Refills: 3 | Status: SHIPPED | OUTPATIENT
Start: 2021-05-18 | End: 2021-09-09 | Stop reason: ALTCHOICE

## 2021-05-18 NOTE — PROGRESS NOTES
Problem List Items Addressed This Visit        Other    Encounter for supervision of low-risk first pregnancy in second trimester     31 y o  female here for routine PN visit at 24w1d  Feels well overall  Good fetal movement  28 week lab slip given  Reflux very bothersome despite Pepcid, elevating head of bed   Will change to omeprazole 20mg po daily  Has MFM f/u for missed views, cord presentation           Other Visit Diagnoses     Encounter for supervision of other normal pregnancy in second trimester    -  Primary    Relevant Orders    POCT urine dip    CBC and differential    Glucose, 1H PG    RPR    Gastroesophageal reflux disease, unspecified whether esophagitis present        Relevant Medications    omeprazole (PriLOSEC) 20 mg delayed release capsule

## 2021-05-18 NOTE — ASSESSMENT & PLAN NOTE
31 y o  female here for routine PN visit at [address] well overall  Good fetal movement  28 week lab slip given  Reflux very bothersome despite Pepcid, elevating head of bed   Will change to omeprazole 20mg po daily  Has MFM f/u for missed views, cord presentation

## 2021-06-11 ENCOUNTER — APPOINTMENT (OUTPATIENT)
Dept: LAB | Age: 31
End: 2021-06-11
Payer: COMMERCIAL

## 2021-06-11 DIAGNOSIS — Z34.82 ENCOUNTER FOR SUPERVISION OF OTHER NORMAL PREGNANCY IN SECOND TRIMESTER: ICD-10-CM

## 2021-06-11 LAB
BASOPHILS # BLD AUTO: 0.04 THOUSANDS/ΜL (ref 0–0.1)
BASOPHILS NFR BLD AUTO: 0 % (ref 0–1)
EOSINOPHIL # BLD AUTO: 0.12 THOUSAND/ΜL (ref 0–0.61)
EOSINOPHIL NFR BLD AUTO: 1 % (ref 0–6)
ERYTHROCYTE [DISTWIDTH] IN BLOOD BY AUTOMATED COUNT: 12.8 % (ref 11.6–15.1)
GLUCOSE 1H P 50 G GLC PO SERPL-MCNC: 129 MG/DL (ref 40–134)
HCT VFR BLD AUTO: 35.4 % (ref 34.8–46.1)
HGB BLD-MCNC: 11.5 G/DL (ref 11.5–15.4)
IMM GRANULOCYTES # BLD AUTO: 0.11 THOUSAND/UL (ref 0–0.2)
IMM GRANULOCYTES NFR BLD AUTO: 1 % (ref 0–2)
LYMPHOCYTES # BLD AUTO: 1.96 THOUSANDS/ΜL (ref 0.6–4.47)
LYMPHOCYTES NFR BLD AUTO: 18 % (ref 14–44)
MCH RBC QN AUTO: 30.3 PG (ref 26.8–34.3)
MCHC RBC AUTO-ENTMCNC: 32.5 G/DL (ref 31.4–37.4)
MCV RBC AUTO: 93 FL (ref 82–98)
MONOCYTES # BLD AUTO: 0.67 THOUSAND/ΜL (ref 0.17–1.22)
MONOCYTES NFR BLD AUTO: 6 % (ref 4–12)
NEUTROPHILS # BLD AUTO: 8.31 THOUSANDS/ΜL (ref 1.85–7.62)
NEUTS SEG NFR BLD AUTO: 74 % (ref 43–75)
NRBC BLD AUTO-RTO: 0 /100 WBCS
PLATELET # BLD AUTO: 261 THOUSANDS/UL (ref 149–390)
PMV BLD AUTO: 10.8 FL (ref 8.9–12.7)
RBC # BLD AUTO: 3.8 MILLION/UL (ref 3.81–5.12)
WBC # BLD AUTO: 11.21 THOUSAND/UL (ref 4.31–10.16)

## 2021-06-11 PROCEDURE — 36415 COLL VENOUS BLD VENIPUNCTURE: CPT

## 2021-06-11 PROCEDURE — 85025 COMPLETE CBC W/AUTO DIFF WBC: CPT

## 2021-06-11 PROCEDURE — 82950 GLUCOSE TEST: CPT

## 2021-06-11 PROCEDURE — 86592 SYPHILIS TEST NON-TREP QUAL: CPT

## 2021-06-14 LAB — RPR SER QL: NORMAL

## 2021-06-15 ENCOUNTER — ROUTINE PRENATAL (OUTPATIENT)
Dept: OBGYN CLINIC | Facility: CLINIC | Age: 31
End: 2021-06-15
Payer: COMMERCIAL

## 2021-06-15 VITALS — SYSTOLIC BLOOD PRESSURE: 116 MMHG | DIASTOLIC BLOOD PRESSURE: 72 MMHG | BODY MASS INDEX: 33.29 KG/M2 | WEIGHT: 182 LBS

## 2021-06-15 DIAGNOSIS — Z34.03 ENCOUNTER FOR SUPERVISION OF NORMAL FIRST PREGNANCY IN THIRD TRIMESTER: Primary | ICD-10-CM

## 2021-06-15 DIAGNOSIS — O21.9 NAUSEA AND VOMITING IN PREGNANCY: ICD-10-CM

## 2021-06-15 DIAGNOSIS — Z23 NEED FOR TDAP VACCINATION: ICD-10-CM

## 2021-06-15 DIAGNOSIS — Z34.03 ENCOUNTER FOR SUPERVISION OF LOW-RISK FIRST PREGNANCY IN THIRD TRIMESTER: ICD-10-CM

## 2021-06-15 LAB
SL AMB  POCT GLUCOSE, UA: NORMAL
SL AMB POCT URINE PROTEIN: NORMAL

## 2021-06-15 PROCEDURE — 90715 TDAP VACCINE 7 YRS/> IM: CPT

## 2021-06-15 PROCEDURE — 90471 IMMUNIZATION ADMIN: CPT

## 2021-06-15 PROCEDURE — PNV: Performed by: STUDENT IN AN ORGANIZED HEALTH CARE EDUCATION/TRAINING PROGRAM

## 2021-06-15 NOTE — ASSESSMENT & PLAN NOTE
Has repeat MFM ultrasound at 32 weeks  Aware funic presentation, risks  Reviewed if breaks water to check for cord - emergency, elevated head of cord

## 2021-06-15 NOTE — PROGRESS NOTES
Problem List Items Addressed This Visit        Digestive    Nausea and vomiting in pregnancy     Previously improved, now with occasional episodes, not as bad as first trimester  Conituing omeprazole and B6  Zofran as needed    Pregravid BMI 27, goal for pregnancy 15-25  TWG to date 32#  Reviewed daily walks after lunch, goal 30 minutes five times per week            Other    Encounter for supervision of low-risk first pregnancy in third trimester     32 yo  at 29 1/7 weeks routine PNV  Denies leakage of fluid, vaginal bleeding, contractions  Good fetal movement  Recently changed work schedule, instead of second shift now working early mornings - adjusting but having a bit of a hard time    -precautions reviewed  -tdap today  -delivery consent reviewed and signed today  -upcoming ultrasound for missed views and cord presentation  -28 week labs normal  -routine 2 week follow up         Presentation of cord     Has repeat MFM ultrasound at 32 weeks  Aware funic presentation, risks  Reviewed if breaks water to check for cord - emergency, elevated head of cord           Other Visit Diagnoses     Encounter for supervision of normal first pregnancy in third trimester    -  Primary    Relevant Orders    POCT urine dip (Completed)    Need for Tdap vaccination        Relevant Orders    TDAP VACCINE GREATER THAN OR EQUAL TO 8YO IM

## 2021-06-15 NOTE — PROGRESS NOTES
28w1d  O pos  S/p flu shot and covid vaccines  Tdap given today along with VIS  Gave yellow folder  Delivery consent signed  Already signed breast pump form  Denies any VB but does have some lower abdominal cramping, felt like period cramps, comes and goes  No LOF  GFM, some days more than others  28 week labs completed  Wants to discuss some recent weight loss, was sick- gaining weight prior  Notices food aversions came back

## 2021-06-15 NOTE — ASSESSMENT & PLAN NOTE
32 yo  at 29 1/7 weeks routine PNV  Denies leakage of fluid, vaginal bleeding, contractions  Good fetal movement  Recently changed work schedule, instead of second shift now working early mornings - adjusting but having a bit of a hard time    -precautions reviewed  -tdap today  -delivery consent reviewed and signed today  -upcoming ultrasound for missed views and cord presentation  -28 week labs normal  -routine 2 week follow up

## 2021-06-15 NOTE — ASSESSMENT & PLAN NOTE
Previously improved, now with occasional episodes, not as bad as first trimester  Conituing omeprazole and B6  Zofran as needed    Pregravid BMI 27, goal for pregnancy 15-25   TWG to date 32#  Reviewed daily walks after lunch, goal 30 minutes five times per week

## 2021-06-23 NOTE — PROGRESS NOTES
Patient's Lab: [facility]          P: 0  Blood Type: O+  Glucose Results: Normal  GBS: N/A  Labs Up-to-date: Yes  Folders Given: Yes  LOF, VB: No  Contractions: No  Breast/Bottle: Breast  Pump: Signed/has pump  FM: Yes  Delivery Consent: Signed/Scanned  Gender: Boy  S/P Injections/Vaccines: Flu/TDap/Covid  TWlbs 3 2oz  Cervical Check Today?: N/A  Urine Today: -/-    Check on heartburn and nausea  Nausea better and heart burn bothers her when she misses a dose    Concerns: None

## 2021-06-26 NOTE — PROGRESS NOTES
Here for OB follow up at 30 weeks  TWG 36 lbs  4 lb weight gain in last 2 weeks  Declines referral to nutritionist  Exercising regularly  Will monitor diet more closely  Admits to recent increased heartburn and  non compliance with omperazole and eating trigger foods  Encouraged to return paperwork from yellow folder at next appt  Denies LOF, vaginal bleeding or contractions  TDAP and covid vaccines are UTD  FKC's done daily  Continue omperazole 20 mg po daily  MFM appt on 7/13/21  Normal 28 weeks labs  Delivery consent already signed  RTO in 2 weeks

## 2021-06-28 ENCOUNTER — ROUTINE PRENATAL (OUTPATIENT)
Dept: OBGYN CLINIC | Facility: CLINIC | Age: 31
End: 2021-06-28

## 2021-06-28 ENCOUNTER — OFFICE VISIT (OUTPATIENT)
Dept: DERMATOLOGY | Facility: CLINIC | Age: 31
End: 2021-06-28
Payer: COMMERCIAL

## 2021-06-28 VITALS — TEMPERATURE: 97.8 F | WEIGHT: 186 LBS | HEIGHT: 62 IN | BODY MASS INDEX: 34.23 KG/M2

## 2021-06-28 VITALS
DIASTOLIC BLOOD PRESSURE: 68 MMHG | BODY MASS INDEX: 34.06 KG/M2 | SYSTOLIC BLOOD PRESSURE: 110 MMHG | WEIGHT: 186.2 LBS | HEART RATE: 84 BPM

## 2021-06-28 DIAGNOSIS — L81.4 LENTIGO: ICD-10-CM

## 2021-06-28 DIAGNOSIS — Z34.83 ENCOUNTER FOR SUPERVISION OF OTHER NORMAL PREGNANCY IN THIRD TRIMESTER: Primary | ICD-10-CM

## 2021-06-28 DIAGNOSIS — B07.9 VERRUCA VULGARIS: Primary | ICD-10-CM

## 2021-06-28 DIAGNOSIS — O26.03 EXCESSIVE WEIGHT GAIN DURING PREGNANCY IN THIRD TRIMESTER: ICD-10-CM

## 2021-06-28 DIAGNOSIS — R12 HEART BURN: ICD-10-CM

## 2021-06-28 LAB
SL AMB  POCT GLUCOSE, UA: NEGATIVE
SL AMB POCT URINE PROTEIN: NEGATIVE

## 2021-06-28 PROCEDURE — PNV: Performed by: NURSE PRACTITIONER

## 2021-06-28 PROCEDURE — 17110 DESTRUCTION B9 LES UP TO 14: CPT | Performed by: DERMATOLOGY

## 2021-06-28 PROCEDURE — 99213 OFFICE O/P EST LOW 20 MIN: CPT | Performed by: DERMATOLOGY

## 2021-06-28 NOTE — PATIENT INSTRUCTIONS
Kick Counts in Pregnancy   WHAT YOU NEED TO KNOW:   Kick counts measure how much your baby is moving in your womb  A kick from your baby can be felt as a twist, turn, swish, roll, or jab  It is common to feel your baby kicking at 26 to 28 weeks of pregnancy  You may feel your baby kick as early as 20 weeks of pregnancy  You may want to start counting at 28 weeks  DISCHARGE INSTRUCTIONS:   Contact your healthcare provider immediately if:   · You feel a change in the number of kicks or movements of your baby  · You feel fewer than 10 kicks within 2 hours  · You have questions or concerns about your baby's movements  Why measure kick counts:  Your baby's movement may provide information about your baby's health  He or she may move less, or not at all, if there are problems  Your baby may move less if he or she is not getting enough oxygen or nutrition from the placenta  Do not smoke while you are pregnant  Smoking decreases the amount of oxygen that gets to your baby  Talk to your healthcare provider if you need help to quit smoking  Tell your healthcare provider as soon as you feel a change in your baby's movements  When to measure kick counts:   · Measure kick counts at the same time every day  · Measure kick counts when your baby is awake and most active  Your baby may be most active in the evening  How to measure kick counts:  Check that your baby is awake before you measure kick counts  You can wake up your baby by lightly pushing on your belly, walking, or drinking something cold  Your healthcare provider may tell you different ways to measure kick counts  You may be told to do the following:  · Use a chart or clock to keep track of the time you start and finish counting  · Sit in a chair or lie on your left side  · Place your hands on the largest part of your belly  · Count until you reach 10 kicks  Write down how much time it takes to count 10 kicks       · It may take 30 minutes to 2 hours to count 10 kicks  It should not take more than 2 hours to count 10 kicks  Follow up with your healthcare provider as directed:  Write down your questions so you remember to ask them during your visits  © Copyright [address] Information is for End User's use only and may not be sold, redistributed or otherwise used for commercial purposes  All illustrations and images included in CareNotes® are the copyrighted property of A D [employer] M [employer] Inc  or [facility] Melania Johnson   The above information is an  only  It is not intended as medical advice for individual conditions or treatments  Talk to your doctor, nurse or pharmacist before following any medical regimen to see if it is safe and effective for you

## 2021-06-28 NOTE — PROGRESS NOTES
[facility] Follow Up Note    Patient Name: Jasmyne Gonzalez  Encounter Date: 6/28/2021    Today's Chief Concerns:  Marco Prater Concern #1:  F/U Verruca   Concern #2:    Marco Prater Concern #3:      Current Medications:    Current Outpatient Medications:     doxylamine (UNISON) 25 MG tablet, Take 1 tablet (25 mg total) by mouth daily at bedtime as needed for sleep or nausea, Disp: 30 tablet, Rfl: 2    Famotidine (PEPCID PO), Take by mouth (Patient not taking: Reported on 6/28/2021), Disp: , Rfl:     omeprazole (PriLOSEC) 20 mg delayed release capsule, Take 1 capsule (20 mg total) by mouth daily, Disp: 90 capsule, Rfl: 3    ondansetron (ZOFRAN-ODT) 4 mg disintegrating tablet, Take 1 tablet (4 mg total) by mouth every 6 (six) hours as needed for nausea or vomiting, Disp: 30 tablet, Rfl: 1    Prenatal Vit-Fe Fumarate-FA (PRENATAL PO), Take by mouth, Disp: , Rfl:     CONSTITUTIONAL:   There were no vitals filed for this visit  Specific Alerts:    Have you been seen by a [facility]ologist in the last 3 years? YES    Are you pregnant or planning to become pregnant? No    Are you currently or planning to be nursing or breast feeding? No    Allergies   Allergen Reactions    Sulfa Antibiotics Fever and Fatigue    Sulfamethoxazole-Trimethoprim Fever and Fatigue       May we call your Preferred Phone number to discuss your specific medical information? YES    May we leave a detailed message that includes your specific medical information? YES    Have you traveled outside of the [facility] in the past 3 months? YES    Do you currently have a pacemaker or defibrillator? No    Do you have any artificial heart valves, joints, plates, screws, rods, stents, pins, etc? No   - If Yes, were any placed within the last 2 years? Do you require any medications prior to a surgical procedure? No   - If Yes, for which procedure? - If Yes, what medications to you require?      Are you taking any medications that cause you to bleed more easily ("blood thinners") No    Have you ever experienced a rapid heartbeat with epinephrine? No    Have you ever been treated with "gold" (gold sodium thiomalate) therapy? No    [facility] can help with wrinkles, "laugh lines," facial volume loss, "double chin," "love handles," age spots, and more  Are you interested in learning today about some of the skin enhancement procedures that we offer? (If Yes, please provide more detail) No    Review of Systems:  Have you recently had or currently have any of the following? · Fever or chills: No  · Night Sweats: No  · Headaches: No  · Weight Gain: No  · Weight Loss: No  · Blurry Vision: No  · Nausea: No  · Vomiting: No  · Diarrhea: No  · Blood in Stool: No  · Abdominal Pain: No  · Itchy Skin: No  · Painful Joints: No  · Swollen Joints: No  · Muscle Pain: No  · Irregular Mole: No  · Sun Burn: No  · Dry Skin: No  · Skin Color Changes: No  · Scar or Keloid: No  · Cold Sores/Fever Blisters: No  · Bacterial Infections/MRSA: No  · Anxiety: No  · Depression: No  · Suicidal or Homicidal Thoughts: No      PSYCH: Normal mood and affect  EYES: Normal conjunctiva  ENT: Normal lips and oral mucosa  CARDIOVASCULAR: No edema  RESPIRATORY: Normal respirations  HEME/LYMPH/IMMUNO:  No regional lymphadenopathy except as noted below in ASSESSMENT AND PLAN BY DIAGNOSIS    FULL ORGAN SYSTEM SKIN EXAM (SKIN)    Face Normal except as noted below in Assessment   Neck, Cervical Chain Nodes    Right Arm/Hand/Fingers Normal except as noted below in Assessment   Left Arm/Hand/Fingers    Chest/Breasts/Axillae    Abdomen, Umbilicus    Back/Spine    Groin/Genitalia/Buttocks    Right Leg, Foot, Toes    Left Leg, Foot, Toes        1    FOLLOW UP VERRUCA VULGARIS ("Common Warts")    Physical Exam:   Anatomic Location Affected:  Right hand, One 4 mm verrucous papule and several small verrucous papules   Morphological Description:  Verrucous papules   Pertinent Positives:   Pertinent Negatives: Additional History of Present Condition:  Cryotherapy done at last visit  Assessment and Plan:  Based on a thorough discussion of this condition and the management approach to it (including a comprehensive discussion of the known risks, side effects and potential benefits of treatment), the patient (family) agrees to implement the following specific plan:   Cryotherapy done in the office today  Consent signed today  Verruca Vulgaris  A verruca is a common growth of the skin caused by infection by human papilloma virus (HPV)  There are many strains of the virus that cause different types of warts on the body  The virus infects the most superficial layers of the skin, causing increased production of skin cells and thickening  Warts can be spread through direct contact with infected skin and may spread to other parts of the body if scratched or picked  A verruca is more commonly called a "wart " Warts are particularly common in school-aged children but can arise at any age  Patients who have a history of eczema are especially prone due to impaired skin barrier  Those taking immunosuppressive drugs or with HIV infections may experience prolonged symptoms despite treatment  Warts generally have a rough surface with a tiny black dot sometimes observed in the middle of each scaly spot  They can range in size from a small bump to large scaly growths  Common warts are often found on the backs of fingers or toes, around the nails, and on the knees  Plantar warts can grow inwardly on the soles of the feet causing pain  There are many possible ways to treat warts and sometimes several different treatments are needed to get the warts to go away completely  There is no single perfect treatment for warts, and successful treatment can take many months  In-office treatments usually require multiple visits, and include:  1) Cryotherapy   a cold spray with liquid nitrogen will destroy the infected cells but may lead to discomfort and blistering  It may also leave a permanent white darling or scar  2) Electrosurgery (curettage and cautery) can be used for large resistant warts which involves shaving the growth down and burning the base  It is performed under local anesthesia and may leave a permanent scar    3) Candida (yeast) antigen injections  These are extracts of the common yeast (Candida) that cannot cause an infection  The medication is injected into/under the wart  It is thought to stimulate the immune system to recognize the wart virus and attack it  Multiple injections are often needed about one month apart  There are also several at-home wart treatments:    1) Soak the warts in warm water for 5 minutes every night followed by gentle filing with a nail file or pumice stone  2) Topical salicylic acid or similar compounds work by removing the dead surface skin cells  a  Apply the medicine directly to the wart, wait for it to dry completely, then cover with duct tape overnight   b  Repeat until the wart is gone, which can take 2-4 months  c  Do not use on the face or groin area   d  If the wart paint makes the skin sore, stop treatment until the discomfort has settled, then recommence as above   e  Take care to keep the chemical off normal skin  3) Podophyllin is a cytotoxic agent used in some products and must not be used in pregnancy or women considering pregnancy  4) Some prescription medications include   a  Topical retinoids (adapalene, tretinoin, tazarotene), 5-fluorouracil (Efudex) or imiquimod (Aldara) creams are sometimes used to treat flat warts or warts on the face and other sensitive anatomical areas  They are usually applied directly to the warts once a day for 2-4 months and can be irritating  These treatments should only be used as directed by your health care provider    b  Systemic treatment with oral cimetidine (Tagamet) may help boost the immune system against the wart virus in patients, some of the time  Initiation of cimetidine therapy should ONLY be done under the supervision of your health care provider, who can discuss possible side effects and drug-to-drug interactions of this specific treatment  PROCEDURE:  DESTRUCTION OF BENIGN LESIONS  After a thorough discussion of treatment options and risk/benefits/alternatives (including but not limited to local pain, scarring, dyspigmentation, blistering, and possible superinfection), verbal and written consent were obtained and the aforementioned lesions were treated on with cryotherapy using liquid nitrogen x 1 cycle for 5-10 seconds   TOTAL NUMBER of 2 lesions were treated today on the ANATOMIC LOCATION: Right hand  The patient tolerated the procedure well, and after-care instructions were provided  2   LENTIGO    Physical Exam:   Anatomic Location Affected:  Left cheek   Morphological Description:  Light brown macules   Pertinent Positives:   Pertinent Negatives: Additional History of Present Condition:      Assessment and Plan:  Based on a thorough discussion of this condition and the management approach to it (including a comprehensive discussion of the known risks, side effects and potential benefits of treatment), the patient (family) agrees to implement the following specific plan:   Recommend at the time to apply Vitamin C to area   Offered cryotherapy today but patient declined  What is a lentigo? A lentigo is a pigmented flat or slightly raised lesion with a clearly defined edge  Unlike an ephelis (freckle), it does not fade in the winter months  There are several kinds of lentigo  The name lentigo originally referred to its appearance resembling a small lentil  The plural of lentigo is lentigines, although lentigos is also in common use  Who gets lentigines? Lentigines can affect males and females of all ages and races   Solar lentigines are especially prevalent in fair skinned adults  Lentigines associated with syndromes are present at birth or arise during childhood  What causes lentigines? Common forms of lentigo are due to exposure to ultraviolet radiation:   Sun damage including sunburn    Indoor tanning    Phototherapy, especially photochemotherapy (PUVA)    Ionizing radiation, eg radiation therapy, can also cause lentigines  Several familial syndromes associated with widespread lentigines originate from mutations in Abilio-MAP kinase, mTOR signaling and PTEN pathways  What are the clinical features of lentigines? Lentigines have been classified into several different types depending on what they look like, where they appear on the body, causative factors, and whether they are associated to other diseases or conditions  Lentigines may be solitary or more often, multiple  Most lentigines are smaller than 5 mm in diameter      Lentigo simplex   A precursor to junctional naevus    Arises during childhood and early adult life    Found on trunk and limbs    Small brown round or oval macule or thin plaque    Jagged or smooth edge    May have a dry surface    May disappear in time  Solar lentigo   A precursor to seborrhoeic keratosis    Found on chronically sun exposed sites such as hands, face, lower legs    May also follow sunburn to shoulders    Yellow, light or dark brown regular or irregular macule or thin plaque    May have a dry surface    Often has moth-eaten outline    Can slowly enlarge to several centimeters in diameter    May disappear, often through the process known as lichenoid keratosis    When atypical in appearance, may be difficult to distinguish from melanoma in situ  Ink spot lentigo   Also known as reticulated lentigo    Few in number compared to solar lentigines    Follows sunburn in very fair skinned individuals    Dark brown to black irregular ink spot-like macule  PUVA lentigo   Similar to ink spot lentigo but follows photochemotherapy (PUVA)    Location anywhere exposed to PUVA  Tanning bed lentigo   Similar to ink spot lentigo but follows indoor tanning    Location anywhere exposed to tanning bed  Radiation lentigo   Occurs in site of irradiation (accidental or therapeutic)    Associated with late-stage radiation dermatitis: epidermal atrophy, subcutaneous fibrosis, keratosis, telangiectasias  Melanotic macule   Mucosal surfaces or adjacent glabrous skin eg lip, vulva, penis, anus    Light to dark brown    Also called mucosal melanosis  Generalised lentigines   Found on any exposed or covered site from early childhood    Small macules may merge to form larger patches    Not associated with a syndrome    Also called lentigines profusa, multiple lentigines  Agminated lentigines   Naevoid eruption of lentigos confined to a single segmental area    Sharp demarcation in midline    May have associated neurological and developmental abnormalities  Patterned lentigines   Inherited tendency to lentigines on face, lips, buttocks, palms, soles    Recognised mainly in people of  ethnicity  Centrofacial neurodysraphic lentiginosis  Associated with mental retardation  Lentiginosis syndromes   Syndromes include LEOPARD/[place], Peutz-Jeghers, Laugier-Hunziker, Moynahan, Xeroderma pigmentosum, myxoma syndromes (DENNIS, NAME, Chavez), Ruvalcaba-Myhre-Haines, Bannayan-Zonnana syndrome, Cowden disease (multiple hamartoma syndrome )    Inheritance is autosomal dominant; sporadic cases common    Widespread lentigines present at birth or arise in early childhood    Associated with neural, endocrine, and mesenchymal tumors    How is the diagnosis made? Lentigines are usually diagnosed clinically by their typical appearance   Concern regarding possibility of melanoma may lead to:   Dermatoscopy    Diagnostic excision biopsy    Histopathology of a lentigo shows:   Thickened epidermis    An increased number of melanocytes along the basal layer of epidermis    Unlike junctional melanocytic naevus, there are no nests of melanocytes    Increased melanin pigment within the keratinocytes    Additional features depending on type of lentigo    In contrast, an ephelis (freckle) shows sun-induced increased melanin within the keratinocytes, without an increase in number of cells  What is the treatment for lentigines? Most lentigines are left alone  Attempts to lighten them may not be successful  The following approaches are used:   SPF 50+ broad-spectrum sunscreen    Hydroquinone bleaching cream    Alpha hydroxy acids    Vitamin C    Retinoids    Azelaic acid    Chemical peels  Individual lesions can be permanently removed using:   Cryotherapy    Intense pulsed light    Pigment lasers    How can lentigines be prevented? Lentigines associated with exposure ultraviolet radiation can be prevented by very careful sun protection  Clothing is more successful at preventing new lentigines than are sunscreens  What is the outlook for lentigines? Lentigines usually persist  They may increase in number with age and sun exposure  Some in sun-protected sites may fade and disappear      Scribe Attestation    I,:  Sammie Fabian am acting as a scribe while in the presence of the attending physician :       I,:  Vee Briggs MD personally performed the services described in this documentation    as scribed in my presence :

## 2021-06-28 NOTE — PATIENT INSTRUCTIONS
FOLLOW UP VERRUCA VULGARIS ("Common Warts")    Assessment and Plan:  Based on a thorough discussion of this condition and the management approach to it (including a comprehensive discussion of the known risks, side effects and potential benefits of treatment), the patient (family) agrees to implement the following specific plan:   Cryotherapy done in the office today  Consent signed today  Verruca Vulgaris  A verruca is a common growth of the skin caused by infection by human papilloma virus (HPV)  There are many strains of the virus that cause different types of warts on the body  The virus infects the most superficial layers of the skin, causing increased production of skin cells and thickening  Warts can be spread through direct contact with infected skin and may spread to other parts of the body if scratched or picked  A verruca is more commonly called a "wart " Warts are particularly common in school-aged children but can arise at any age  Patients who have a history of eczema are especially prone due to impaired skin barrier  Those taking immunosuppressive drugs or with HIV infections may experience prolonged symptoms despite treatment  Warts generally have a rough surface with a tiny black dot sometimes observed in the middle of each scaly spot  They can range in size from a small bump to large scaly growths  Common warts are often found on the backs of fingers or toes, around the nails, and on the knees  Plantar warts can grow inwardly on the soles of the feet causing pain  There are many possible ways to treat warts and sometimes several different treatments are needed to get the warts to go away completely  There is no single perfect treatment for warts, and successful treatment can take many months  In-office treatments usually require multiple visits, and include:  1) Cryotherapy   a cold spray with liquid nitrogen will destroy the infected cells but may lead to discomfort and blistering  It may also leave a permanent white darling or scar  2) Electrosurgery (curettage and cautery) can be used for large resistant warts which involves shaving the growth down and burning the base  It is performed under local anesthesia and may leave a permanent scar    3) Candida (yeast) antigen injections  These are extracts of the common yeast (Candida) that cannot cause an infection  The medication is injected into/under the wart  It is thought to stimulate the immune system to recognize the wart virus and attack it  Multiple injections are often needed about one month apart  There are also several at-home wart treatments:    1) Soak the warts in warm water for 5 minutes every night followed by gentle filing with a nail file or pumice stone  2) Topical salicylic acid or similar compounds work by removing the dead surface skin cells  a  Apply the medicine directly to the wart, wait for it to dry completely, then cover with duct tape overnight   b  Repeat until the wart is gone, which can take 2-4 months  c  Do not use on the face or groin area   d  If the wart paint makes the skin sore, stop treatment until the discomfort has settled, then recommence as above   e  Take care to keep the chemical off normal skin  3) Podophyllin is a cytotoxic agent used in some products and must not be used in pregnancy or women considering pregnancy  4) Some prescription medications include   a  Topical retinoids (adapalene, tretinoin, tazarotene), 5-fluorouracil (Efudex) or imiquimod (Aldara) creams are sometimes used to treat flat warts or warts on the face and other sensitive anatomical areas  They are usually applied directly to the warts once a day for 2-4 months and can be irritating  These treatments should only be used as directed by your health care provider  b  Systemic treatment with oral cimetidine (Tagamet) may help boost the immune system against the wart virus in patients, some of the time  Initiation of cimetidine therapy should ONLY be done under the supervision of your health care provider, who can discuss possible side effects and drug-to-drug interactions of this specific treatment  PROCEDURE:  DESTRUCTION OF BENIGN LESIONS  After a thorough discussion of treatment options and risk/benefits/alternatives (including but not limited to local pain, scarring, dyspigmentation, blistering, and possible superinfection), verbal and written consent were obtained and the aforementioned lesions were treated on with cryotherapy using liquid nitrogen x 1 cycle for 5-10 seconds   TOTAL NUMBER of 2 lesions were treated today on the ANATOMIC LOCATION: Right hand  The patient tolerated the procedure well, and after-care instructions were provided  2   LENTIGO    Assessment and Plan:  Based on a thorough discussion of this condition and the management approach to it (including a comprehensive discussion of the known risks, side effects and potential benefits of treatment), the patient (family) agrees to implement the following specific plan:   Recommend at the time to apply Vitamin C to area   Offered cryotherapy today but patient declined  What is a lentigo? A lentigo is a pigmented flat or slightly raised lesion with a clearly defined edge  Unlike an ephelis (freckle), it does not fade in the winter months  There are several kinds of lentigo  The name lentigo originally referred to its appearance resembling a small lentil  The plural of lentigo is lentigines, although lentigos is also in common use  Who gets lentigines? Lentigines can affect males and females of all ages and races  Solar lentigines are especially prevalent in fair skinned adults  Lentigines associated with syndromes are present at birth or arise during childhood  What causes lentigines?   Common forms of lentigo are due to exposure to ultraviolet radiation:   Sun damage including sunburn    Indoor tanning  Phototherapy, especially photochemotherapy (PUVA)    Ionizing radiation, eg radiation therapy, can also cause lentigines  Several familial syndromes associated with widespread lentigines originate from mutations in Abilio-MAP kinase, mTOR signaling and PTEN pathways  What are the clinical features of lentigines? Lentigines have been classified into several different types depending on what they look like, where they appear on the body, causative factors, and whether they are associated to other diseases or conditions  Lentigines may be solitary or more often, multiple  Most lentigines are smaller than 5 mm in diameter      Lentigo simplex   A precursor to junctional naevus    Arises during childhood and early adult life    Found on trunk and limbs    Small brown round or oval macule or thin plaque    Jagged or smooth edge    May have a dry surface    May disappear in time  Solar lentigo   A precursor to seborrhoeic keratosis    Found on chronically sun exposed sites such as hands, face, lower legs    May also follow sunburn to shoulders    Yellow, light or dark brown regular or irregular macule or thin plaque    May have a dry surface    Often has moth-eaten outline    Can slowly enlarge to several centimeters in diameter    May disappear, often through the process known as lichenoid keratosis    When atypical in appearance, may be difficult to distinguish from melanoma in situ  Ink spot lentigo   Also known as reticulated lentigo    Few in number compared to solar lentigines    Follows sunburn in very fair skinned individuals    Dark brown to black irregular ink spot-like macule  PUVA lentigo   Similar to ink spot lentigo but follows photochemotherapy (PUVA)    Location anywhere exposed to PUVA  Tanning bed lentigo   Similar to ink spot lentigo but follows indoor tanning    Location anywhere exposed to tanning bed  Radiation lentigo   Occurs in site of irradiation (accidental or therapeutic)    Associated with late-stage radiation dermatitis: epidermal atrophy, subcutaneous fibrosis, keratosis, telangiectasias  Melanotic macule   Mucosal surfaces or adjacent glabrous skin eg lip, vulva, penis, anus    Light to dark brown    Also called mucosal melanosis  Generalised lentigines   Found on any exposed or covered site from early childhood    Small macules may merge to form larger patches    Not associated with a syndrome    Also called lentigines profusa, multiple lentigines  Agminated lentigines   Naevoid eruption of lentigos confined to a single segmental area    Sharp demarcation in midline    May have associated neurological and developmental abnormalities  Patterned lentigines   Inherited tendency to lentigines on face, lips, buttocks, palms, soles    Recognised mainly in people of  ethnicity  Centrofacial neurodysraphic lentiginosis  Associated with mental retardation  Lentiginosis syndromes   Syndromes include LEOPARD/Pablo, Peutz-Jeghers, Laugier-Hunziker, Moynahan, Xeroderma pigmentosum, myxoma syndromes (DENNIS, NAME, Chavez), Ruvalcaba-Myhre-Haines, Bannayan-Zonnana syndrome, Cowden disease (multiple hamartoma syndrome )    Inheritance is autosomal dominant; sporadic cases common    Widespread lentigines present at birth or arise in early childhood    Associated with neural, endocrine, and mesenchymal tumors    How is the diagnosis made? Lentigines are usually diagnosed clinically by their typical appearance   Concern regarding possibility of melanoma may lead to:   Dermatoscopy    Diagnostic excision biopsy    Histopathology of a lentigo shows:   Thickened epidermis    An increased number of melanocytes along the basal layer of epidermis    Unlike junctional melanocytic naevus, there are no nests of melanocytes    Increased melanin pigment within the keratinocytes    Additional features depending on type of lentigo    In contrast, an ephelis (freckle) shows sun-induced increased melanin within the keratinocytes, without an increase in number of cells  What is the treatment for lentigines? Most lentigines are left alone  Attempts to lighten them may not be successful  The following approaches are used:   SPF 50+ broad-spectrum sunscreen    Hydroquinone bleaching cream    Alpha hydroxy acids    Vitamin C    Retinoids    Azelaic acid    Chemical peels  Individual lesions can be permanently removed using:   Cryotherapy    Intense pulsed light    Pigment lasers    How can lentigines be prevented? Lentigines associated with exposure ultraviolet radiation can be prevented by very careful sun protection  Clothing is more successful at preventing new lentigines than are sunscreens  What is the outlook for lentigines? Lentigines usually persist  They may increase in number with age and sun exposure  Some in sun-protected sites may fade and disappear

## 2021-07-13 ENCOUNTER — ULTRASOUND (OUTPATIENT)
Dept: PERINATAL CARE | Facility: CLINIC | Age: 31
End: 2021-07-13
Payer: COMMERCIAL

## 2021-07-13 VITALS
DIASTOLIC BLOOD PRESSURE: 65 MMHG | SYSTOLIC BLOOD PRESSURE: 118 MMHG | WEIGHT: 189.6 LBS | HEIGHT: 62 IN | HEART RATE: 89 BPM | BODY MASS INDEX: 34.89 KG/M2

## 2021-07-13 DIAGNOSIS — Z03.72 PLACENTAL PROBLEM SUSPECTED BUT NOT FOUND: ICD-10-CM

## 2021-07-13 DIAGNOSIS — Z3A.32 32 WEEKS GESTATION OF PREGNANCY: ICD-10-CM

## 2021-07-13 DIAGNOSIS — Z36.86 ENCOUNTER FOR ANTENATAL SCREENING FOR CERVICAL LENGTH: ICD-10-CM

## 2021-07-13 PROCEDURE — 76816 OB US FOLLOW-UP PER FETUS: CPT | Performed by: OBSTETRICS & GYNECOLOGY

## 2021-07-13 PROCEDURE — 99212 OFFICE O/P EST SF 10 MIN: CPT | Performed by: OBSTETRICS & GYNECOLOGY

## 2021-07-13 PROCEDURE — 76817 TRANSVAGINAL US OBSTETRIC: CPT | Performed by: OBSTETRICS & GYNECOLOGY

## 2021-07-13 NOTE — PROGRESS NOTES
The patient was seen today for an ultrasound  Please see ultrasound report (located under Ob Procedures) for additional details  Thank you very much for allowing us to participate in the care of this very nice patient  Should you have any questions, please do not hesitate to contact me  Monty Amador MD [address] [facility]  Attending Physician, [place]

## 2021-07-13 NOTE — PROGRESS NOTES
Ultrasound Probe Disinfection    A transvaginal ultrasound was performed  Prior to use, disinfection was performed with High Level Disinfection Process ([employer])  Probe serial number B2: [id number] was used        Jose Carranza  07/13/21  3:01 PM

## 2021-07-15 ENCOUNTER — ROUTINE PRENATAL (OUTPATIENT)
Dept: OBGYN CLINIC | Facility: CLINIC | Age: 31
End: 2021-07-15

## 2021-07-15 VITALS — SYSTOLIC BLOOD PRESSURE: 126 MMHG | BODY MASS INDEX: 34.75 KG/M2 | WEIGHT: 190 LBS | DIASTOLIC BLOOD PRESSURE: 70 MMHG

## 2021-07-15 DIAGNOSIS — Z34.03 ENCOUNTER FOR SUPERVISION OF LOW-RISK FIRST PREGNANCY IN THIRD TRIMESTER: Primary | ICD-10-CM

## 2021-07-15 DIAGNOSIS — Z34.93 ENCOUNTER FOR PREGNANCY RELATED EXAMINATION IN THIRD TRIMESTER: ICD-10-CM

## 2021-07-15 LAB
SL AMB  POCT GLUCOSE, UA: NEGATIVE
SL AMB POCT URINE PROTEIN: NEGATIVE

## 2021-07-15 PROCEDURE — PNV: Performed by: NURSE PRACTITIONER

## 2021-07-15 NOTE — PROGRESS NOTES
Pt presents for a pnv, she is doing well     S/p flu, tdap, and covid  28 week labs were normal  Urine p/g both negative today

## 2021-07-15 NOTE — ASSESSMENT & PLAN NOTE
Denies OB complaints  Good fetal movement  Denies contractions, cramping, leakage of fluid or vaginal bleeding  21: EFW 50%, AC 53%, THUY 11 6  S/p covid-19, tdap, and flu vaccines  Reviewed  labor precautions and FKCs

## 2021-07-15 NOTE — PROGRESS NOTES
Problem List Items Addressed This Visit        Other    Encounter for supervision of low-risk first pregnancy in third trimester - Primary     Denies OB complaints  Good fetal movement  Denies contractions, cramping, leakage of fluid or vaginal bleeding  21: EFW 50%, AC 53%, THUY 11 6  S/p covid-19, tdap, and flu vaccines  Reviewed  labor precautions and FKCs                Other Visit Diagnoses     Encounter for pregnancy related examination in third trimester        Relevant Orders    POCT urine dip (Completed)

## 2021-07-26 ENCOUNTER — APPOINTMENT (OUTPATIENT)
Dept: LAB | Age: 31
End: 2021-07-26
Payer: COMMERCIAL

## 2021-07-26 ENCOUNTER — ROUTINE PRENATAL (OUTPATIENT)
Dept: OBGYN CLINIC | Facility: CLINIC | Age: 31
End: 2021-07-26

## 2021-07-26 VITALS — BODY MASS INDEX: 34.57 KG/M2 | DIASTOLIC BLOOD PRESSURE: 72 MMHG | WEIGHT: 189 LBS | SYSTOLIC BLOOD PRESSURE: 108 MMHG

## 2021-07-26 DIAGNOSIS — Z34.03 ENCOUNTER FOR SUPERVISION OF LOW-RISK FIRST PREGNANCY IN THIRD TRIMESTER: Primary | ICD-10-CM

## 2021-07-26 DIAGNOSIS — O99.713 PRURITUS OF PREGNANCY IN THIRD TRIMESTER: ICD-10-CM

## 2021-07-26 DIAGNOSIS — L29.9 PRURITUS OF PREGNANCY IN THIRD TRIMESTER: ICD-10-CM

## 2021-07-26 DIAGNOSIS — Z3A.34 34 WEEKS GESTATION OF PREGNANCY: ICD-10-CM

## 2021-07-26 LAB
ALT SERPL W P-5'-P-CCNC: 17 U/L (ref 12–78)
AST SERPL W P-5'-P-CCNC: 20 U/L (ref 5–45)
SL AMB  POCT GLUCOSE, UA: NEGATIVE
SL AMB POCT URINE PROTEIN: NEGATIVE

## 2021-07-26 PROCEDURE — PNV: Performed by: NURSE PRACTITIONER

## 2021-07-26 PROCEDURE — 84460 ALANINE AMINO (ALT) (SGPT): CPT

## 2021-07-26 PROCEDURE — 84450 TRANSFERASE (AST) (SGOT): CPT

## 2021-07-26 PROCEDURE — 82240 BILE ACIDS CHOLYLGLYCINE: CPT

## 2021-07-26 PROCEDURE — 36415 COLL VENOUS BLD VENIPUNCTURE: CPT

## 2021-07-26 NOTE — PROGRESS NOTES
Patient's Lab: [facility]        OB-Problem Visit-Possible Cholestasis     On-set: Friday night  Today it's into the daytime but it was just at night prior  No change in her daily routine with soaps or anything  Symptoms today: itching (also more of a nausea rush today which she isn't certain is correlated)   Located: All over her body   Hands, wrists, ankles, back of calves           P: 0  Blood Type: O+  Glucose Results: Normal  GBS: N/A  Labs Up-to-date: Yes  Folders Given: Yes  LOF, VB: No  Contractions: No  Breast/Bottle: Breast  Pump: Signed/has pump  FM: Yes   Delivery Consent: Signed/Scanned  Gender: Boy  S/P Injections/Vaccines: Flu/TDap/Covid  TWlbs  Cervical Check Today?: N/A  Urine Today:  -/-           Concerns: None

## 2021-07-26 NOTE — PROGRESS NOTES
Here for OB visit at 34  weeks  TWG 39 lbs  Here today for a problem visit  Started with generalize body pruritis that has worsened overnight in her wrists, hands, calves and ankles  Denies rash, new skin products, medications, laundry detergent or exposure stating "there is nothing new in our house " Itching is unrelieved by hydrocortisone  She works as an  (wrestlers) for [employer]  Normal skin exam  No rash or erythema noted in areas of pruritis  Denies LOF, vaginal bleeding or contractions  Performing FKC's daily 10 in 2 hours  Flu, TDAP and covid vaccine UTD  AST, ALT and bile acids ordered  Complete today  Unisom to sleepor benadryl for sleep/itching as needed  Call office with any worsening of symptoms  Will touch base tomorrow     RTO 2 weeks

## 2021-07-27 ENCOUNTER — TELEPHONE (OUTPATIENT)
Dept: OBGYN CLINIC | Facility: CLINIC | Age: 31
End: 2021-07-27

## 2021-07-27 NOTE — PATIENT INSTRUCTIONS
Pregnancy at 31 to 34 Weeks   AMBULATORY CARE:   What changes are happening to your body: You may continue to have symptoms such as shortness of breath, heartburn, contractions, or swelling of your ankles and feet  You may be gaining about 1 pound a week now  Seek care immediately if:   · You develop a severe headache that does not go away  · You have new or increased vision changes, such as blurred or spotted vision  · You have new or increased swelling in your face or hands  · You have vaginal spotting or bleeding  · Your water broke or you feel warm water gushing or trickling from your vagina  Contact your healthcare provider if:   · You have more than 5 contractions in 1 hour  · You notice any changes in your baby's movements  · You have abdominal cramps, pressure, or tightening  · You have a change in vaginal discharge  · You have chills or a fever  · You have vaginal itching, burning, or pain  · You have yellow, green, white, or foul-smelling vaginal discharge  · You have pain or burning when you urinate, less urine than usual, or pink or bloody urine  · You have questions or concerns about your condition or care  How to care for yourself at this stage of your pregnancy:   · Eat a variety of healthy foods  Healthy foods include fruits, vegetables, whole-grain breads, low-fat dairy foods, beans, lean meats, and fish  Drink liquids as directed  Ask how much liquid to drink each day and which liquids are best for you  Limit caffeine to less than 200 milligrams each day  Limit your intake of fish to 2 servings each week  Choose fish low in mercury such as canned light tuna, shrimp, salmon, cod, or tilapia  Do not  eat fish high in mercury such as swordfish, tilefish, imer mackerel, and shark  · Manage heartburn  by eating 4 or 5 small meals each day instead of large meals  Avoid spicy food  · Manage swelling  by lying down and putting your feet up  · Take prenatal vitamins as directed  Your need for certain vitamins and minerals, such as folic acid, increases during pregnancy  Prenatal vitamins provide some of the extra vitamins and minerals you need  Prenatal vitamins may also help to decrease the risk of certain birth defects  · Talk to your healthcare provider about exercise  Moderate exercise can help you stay fit  Your healthcare provider will help you plan an exercise program that is safe for you during pregnancy  · Do not smoke  Smoking increases your risk of a miscarriage and other health problems during your pregnancy  Smoking can cause your baby to be born too early or weigh less at birth  Ask your healthcare provider for information if you need help quitting  · Do not drink alcohol  Alcohol passes from your body to your baby through the placenta  It can affect your baby's brain development and cause fetal alcohol syndrome (FAS)  FAS is a group of conditions that causes mental, behavior, and growth problems  · Talk to your healthcare provider before you take any medicines  Many medicines may harm your baby if you take them when you are pregnant  Do not take any medicines, vitamins, herbs, or supplements without first talking to your healthcare provider  Never use illegal or street drugs (such as marijuana or cocaine) while you are pregnant  Safety tips during pregnancy:   · Avoid hot tubs and saunas  Do not use a hot tub or sauna while you are pregnant, especially during your first trimester  Hot tubs and saunas may raise your baby's temperature and increase the risk of birth defects  · Avoid toxoplasmosis  This is an infection caused by eating raw meat or being around infected cat feces  It can cause birth defects, miscarriages, and other problems  Wash your hands after you touch raw meat  Make sure any meat is well-cooked before you eat it  Avoid raw eggs and unpasteurized milk   Use gloves or ask someone else to clean your cat's litter box while you are pregnant  Changes that are happening with your baby:  By 34 weeks, your baby may weigh more than 5 pounds  Your baby will be about 12 ½ inches long from the top of the head to the rump (baby's bottom)  Your baby is gaining about ½ pound a week  Your baby's eyes open and close now  Your baby's kicks and movements are more forceful at this time  What you need to know about prenatal care: Your healthcare provider will check your blood pressure and weight  You may also need the following:  · A urine test  may also be done to check for sugar and protein  These can be signs of gestational diabetes or infection  Protein in your urine may also be a sign of preeclampsia  Preeclampsia is a condition that can develop during week 20 or later of your pregnancy  It causes high blood pressure, and it can cause problems with your kidneys and other organs  · A Tdap vaccine  may be recommended by your healthcare provider  · Fundal height  is a measurement of your uterus to check your baby's growth  This number is usually the same as the number of weeks that you have been pregnant  Your healthcare provider may also check your baby's position  · Your baby's heart rate  will be checked  © Copyright [employer] 2021 Information is for End User's use only and may not be sold, redistributed or otherwise used for commercial purposes  All illustrations and images included in CareNotes® are the copyrighted property of [employer]  or Humberto Johnson   The above information is an  only  It is not intended as medical advice for individual conditions or treatments  Talk to your doctor, nurse or pharmacist before following any medical regimen to see if it is safe and effective for you

## 2021-07-28 ENCOUNTER — TELEPHONE (OUTPATIENT)
Dept: OBGYN CLINIC | Facility: MEDICAL CENTER | Age: 31
End: 2021-07-28

## 2021-07-28 LAB — BILE AC SERPL-SCNC: 3.2 UMOL/L (ref 0–10)

## 2021-08-11 ENCOUNTER — ANESTHESIA EVENT (INPATIENT)
Dept: LABOR AND DELIVERY | Facility: HOSPITAL | Age: 31
End: 2021-08-11
Payer: COMMERCIAL

## 2021-08-11 ENCOUNTER — NURSE TRIAGE (OUTPATIENT)
Dept: OTHER | Facility: OTHER | Age: 31
End: 2021-08-11

## 2021-08-11 ENCOUNTER — HOSPITAL ENCOUNTER (INPATIENT)
Facility: HOSPITAL | Age: 31
LOS: 4 days | Discharge: HOME | End: 2021-08-15
Attending: OBSTETRICS & GYNECOLOGY | Admitting: OBSTETRICS & GYNECOLOGY
Payer: COMMERCIAL

## 2021-08-11 ENCOUNTER — ANESTHESIA (INPATIENT)
Dept: LABOR AND DELIVERY | Facility: HOSPITAL | Age: 31
End: 2021-08-11
Payer: COMMERCIAL

## 2021-08-11 DIAGNOSIS — G89.18 POSTOPERATIVE PAIN: ICD-10-CM

## 2021-08-11 DIAGNOSIS — Z3A.36 36 WEEKS GESTATION OF PREGNANCY: Primary | ICD-10-CM

## 2021-08-11 LAB
ABO GROUP BLD: NORMAL
BLD GP AB SCN SERPL QL: NEGATIVE
ERYTHROCYTE [DISTWIDTH] IN BLOOD BY AUTOMATED COUNT: 13.3 % (ref 11.6–15.1)
EXTERNAL GROUP B STREP ANTIGEN: NORMAL
HCT VFR BLD AUTO: 34.6 % (ref 34.8–46.1)
HGB BLD-MCNC: 11.3 G/DL (ref 11.5–15.4)
MCH RBC QN AUTO: 29.2 PG (ref 26.8–34.3)
MCHC RBC AUTO-ENTMCNC: 32.7 G/DL (ref 31.4–37.4)
MCV RBC AUTO: 89 FL (ref 82–98)
PLATELET # BLD AUTO: 219 THOUSANDS/UL (ref 149–390)
PMV BLD AUTO: 10.8 FL (ref 8.9–12.7)
RBC # BLD AUTO: 3.87 MILLION/UL (ref 3.81–5.12)
RH BLD: POSITIVE
RPR SER QL: NORMAL
SPECIMEN EXPIRATION DATE: NORMAL
WBC # BLD AUTO: 9.37 THOUSAND/UL (ref 4.31–10.16)

## 2021-08-11 PROCEDURE — NC001 PR NO CHARGE: Performed by: OBSTETRICS & GYNECOLOGY

## 2021-08-11 PROCEDURE — 3E033VJ INTRODUCTION OF OTHER HORMONE INTO PERIPHERAL VEIN, PERCUTANEOUS APPROACH: ICD-10-PCS | Performed by: OBSTETRICS & GYNECOLOGY

## 2021-08-11 PROCEDURE — 4A1HXCZ MONITORING OF PRODUCTS OF CONCEPTION, CARDIAC RATE, EXTERNAL APPROACH: ICD-10-PCS | Performed by: OBSTETRICS & GYNECOLOGY

## 2021-08-11 PROCEDURE — 86900 BLOOD TYPING SEROLOGIC ABO: CPT | Performed by: OBSTETRICS & GYNECOLOGY

## 2021-08-11 PROCEDURE — 85027 COMPLETE CBC AUTOMATED: CPT | Performed by: OBSTETRICS & GYNECOLOGY

## 2021-08-11 PROCEDURE — 86850 RBC ANTIBODY SCREEN: CPT | Performed by: OBSTETRICS & GYNECOLOGY

## 2021-08-11 PROCEDURE — 99204 OFFICE O/P NEW MOD 45 MIN: CPT

## 2021-08-11 PROCEDURE — 86901 BLOOD TYPING SEROLOGIC RH(D): CPT | Performed by: OBSTETRICS & GYNECOLOGY

## 2021-08-11 PROCEDURE — 87150 DNA/RNA AMPLIFIED PROBE: CPT | Performed by: OBSTETRICS & GYNECOLOGY

## 2021-08-11 PROCEDURE — 86592 SYPHILIS TEST NON-TREP QUAL: CPT | Performed by: OBSTETRICS & GYNECOLOGY

## 2021-08-11 RX ORDER — BUTORPHANOL TARTRATE 1 MG/ML
1 INJECTION, SOLUTION INTRAMUSCULAR; INTRAVENOUS ONCE
Status: COMPLETED | OUTPATIENT
Start: 2021-08-11 | End: 2021-08-11

## 2021-08-11 RX ORDER — DIPHENHYDRAMINE HYDROCHLORIDE 50 MG/ML
25 INJECTION INTRAMUSCULAR; INTRAVENOUS EVERY 6 HOURS PRN
Status: DISCONTINUED | OUTPATIENT
Start: 2021-08-11 | End: 2021-08-12

## 2021-08-11 RX ORDER — LIDOCAINE HYDROCHLORIDE AND EPINEPHRINE 15; 5 MG/ML; UG/ML
INJECTION, SOLUTION EPIDURAL
Status: COMPLETED | OUTPATIENT
Start: 2021-08-11 | End: 2021-08-11

## 2021-08-11 RX ORDER — ONDANSETRON 2 MG/ML
4 INJECTION INTRAMUSCULAR; INTRAVENOUS EVERY 6 HOURS PRN
Status: DISCONTINUED | OUTPATIENT
Start: 2021-08-11 | End: 2021-08-12

## 2021-08-11 RX ORDER — BETAMETHASONE SODIUM PHOSPHATE AND BETAMETHASONE ACETATE 3; 3 MG/ML; MG/ML
12 INJECTION, SUSPENSION INTRA-ARTICULAR; INTRALESIONAL; INTRAMUSCULAR; SOFT TISSUE EVERY 24 HOURS
Status: DISPENSED | OUTPATIENT
Start: 2021-08-11 | End: 2021-08-13

## 2021-08-11 RX ORDER — CLONIDINE 100 UG/ML
INJECTION, SOLUTION EPIDURAL AS NEEDED
Status: DISCONTINUED | OUTPATIENT
Start: 2021-08-11 | End: 2021-08-12

## 2021-08-11 RX ORDER — BUPIVACAINE HYDROCHLORIDE 2.5 MG/ML
INJECTION, SOLUTION EPIDURAL; INFILTRATION; INTRACAUDAL AS NEEDED
Status: DISCONTINUED | OUTPATIENT
Start: 2021-08-11 | End: 2021-08-12

## 2021-08-11 RX ORDER — OXYTOCIN/RINGER'S LACTATE 30/500 ML
2 PLASTIC BAG, INJECTION (ML) INTRAVENOUS
Status: DISCONTINUED | OUTPATIENT
Start: 2021-08-11 | End: 2021-08-12

## 2021-08-11 RX ORDER — NALBUPHINE HCL 10 MG/ML
5 AMPUL (ML) INJECTION
Status: DISCONTINUED | OUTPATIENT
Start: 2021-08-11 | End: 2021-08-12

## 2021-08-11 RX ORDER — SODIUM CHLORIDE, SODIUM LACTATE, POTASSIUM CHLORIDE, CALCIUM CHLORIDE 600; 310; 30; 20 MG/100ML; MG/100ML; MG/100ML; MG/100ML
125 INJECTION, SOLUTION INTRAVENOUS CONTINUOUS
Status: DISCONTINUED | OUTPATIENT
Start: 2021-08-11 | End: 2021-08-15 | Stop reason: HOSPADM

## 2021-08-11 RX ADMIN — SODIUM CHLORIDE, SODIUM LACTATE, POTASSIUM CHLORIDE, AND CALCIUM CHLORIDE 125 ML/HR: .6; .31; .03; .02 INJECTION, SOLUTION INTRAVENOUS at 14:53

## 2021-08-11 RX ADMIN — BETAMETHASONE SODIUM PHOSPHATE AND BETAMETHASONE ACETATE 12 MG: 3; 3 INJECTION, SUSPENSION INTRA-ARTICULAR; INTRALESIONAL; INTRAMUSCULAR at 07:56

## 2021-08-11 RX ADMIN — SODIUM CHLORIDE, SODIUM LACTATE, POTASSIUM CHLORIDE, AND CALCIUM CHLORIDE 125 ML/HR: .6; .31; .03; .02 INJECTION, SOLUTION INTRAVENOUS at 07:20

## 2021-08-11 RX ADMIN — LIDOCAINE HYDROCHLORIDE AND EPINEPHRINE 3 ML: 15; 5 INJECTION, SOLUTION EPIDURAL at 17:35

## 2021-08-11 RX ADMIN — SODIUM CHLORIDE 5 MILLION UNITS: 0.9 INJECTION, SOLUTION INTRAVENOUS at 07:36

## 2021-08-11 RX ADMIN — MISOPROSTOL 25 MCG: 100 TABLET ORAL at 08:10

## 2021-08-11 RX ADMIN — ROPIVACAINE HYDROCHLORIDE: 2 INJECTION, SOLUTION EPIDURAL; INFILTRATION at 17:48

## 2021-08-11 RX ADMIN — SODIUM CHLORIDE, SODIUM LACTATE, POTASSIUM CHLORIDE, AND CALCIUM CHLORIDE 125 ML/HR: .6; .31; .03; .02 INJECTION, SOLUTION INTRAVENOUS at 18:00

## 2021-08-11 RX ADMIN — BUTORPHANOL TARTRATE 1 MG: 1 INJECTION, SOLUTION INTRAMUSCULAR; INTRAVENOUS at 16:17

## 2021-08-11 RX ADMIN — CLONIDINE 100 MCG: 100 INJECTION, SOLUTION EPIDURAL at 17:40

## 2021-08-11 RX ADMIN — Medication 2 MILLI-UNITS/MIN: at 12:12

## 2021-08-11 RX ADMIN — BUPIVACAINE HYDROCHLORIDE 5 ML: 2.5 INJECTION, SOLUTION EPIDURAL; INFILTRATION; INTRACAUDAL at 17:40

## 2021-08-11 RX ADMIN — SODIUM CHLORIDE 2.5 MILLION UNITS: 9 INJECTION, SOLUTION INTRAVENOUS at 15:34

## 2021-08-11 RX ADMIN — SODIUM CHLORIDE 2.5 MILLION UNITS: 9 INJECTION, SOLUTION INTRAVENOUS at 23:59

## 2021-08-11 RX ADMIN — SODIUM CHLORIDE 2.5 MILLION UNITS: 9 INJECTION, SOLUTION INTRAVENOUS at 11:48

## 2021-08-11 RX ADMIN — SODIUM CHLORIDE 2.5 MILLION UNITS: 9 INJECTION, SOLUTION INTRAVENOUS at 19:56

## 2021-08-11 NOTE — H&P
[address] O[address] Coma 31 y o  female MRN: [id number]  Unit/Bed#: LD  Encounter: [id number]      Assessment: 31 y o  Aminah Paulino at 36w2d admitted for  premature rupture of membranes  SVE: 1 /-2  FHT: Baseline 135, moderate variability, accelerations present   Clinical EFW: 50%tile on  ; Vertex confirmed by beside ultrasound   GBS status: unknown- collected in triage, PCN for ppx         Plan:   · Admit  · CBC, RPR, Type & Screen  · Analgesia at maternal request  · Will plan for PO cytotec to begin induction   · Antibiotics will begin PCN for GBS unknown status   · First dose of Betamethasone today     Dr Sebastien Sheppard aware        SUBJECTIVE:    Chief Complaint: leaking fluid    HPI: Mami Mora is a 31 y o  Aminah Paulino with an LISBETH of 2021, by Last Menstrual Period at 36w2d who is being admitted for  premature rupture of membranes  She denies having uterine contractions, has large amounts of fluid leaking, and reports no VB  She states she has felt good FM  Reports she woke up this morning at 0500 to a puddle of fluid in her bed  Reports fluid was clear in color  Reports intermittent cramping but no discernible contractions  Reports pregnancy has been uncomplicated  Pregnancy complications: none    Patient Active Problem List   Diagnosis    Vitamin D insufficiency    Overactive bladder    Dyspareunia, female    Palpitation    Verruca vulgaris    Nausea and vomiting in pregnancy    Encounter for supervision of low-risk first pregnancy in third trimester    36 weeks gestation of pregnancy     premature rupture of membranes (PPROM) with onset of labor after 24 hours of rupture in third trimester, antepartum       Baby complications/comments: EFW  50%tile, BPD 75%tile, AC 53%tile     Review of Systems   Constitutional: Negative  HENT: Negative  Eyes: Negative  Respiratory: Negative  Cardiovascular: Negative      Gastrointestinal: Negative  Endocrine: Negative  Genitourinary: Negative  Neurological: Negative  Psychiatric/Behavioral: Negative  OB History    Para Term  AB Living   1 0 0 0 0 0   SAB TAB Ectopic Multiple Live Births   0 0 0 0 0      # Outcome Date GA Lbr Maged/2nd Weight Sex Delivery Anes PTL Lv   1 Current                Past Medical History:   Diagnosis Date    Degenerative lumbar disc 2015    Overactive bladder     Urinary tract infection        Past Surgical History:   Procedure Laterality Date    COLPOSCOPY      COLPOSCOPY W/ BIOPSY / CURETTAGE      Resolved 2017    GYNECOLOGIC CRYOSURGERY      WISDOM TOOTH EXTRACTION         Social History     Tobacco Use    Smoking status: Never Smoker    Smokeless tobacco: Never Used   Substance Use Topics    Alcohol use: Not Currently     Alcohol/week: 2 0 standard drinks     Types: 1 Glasses of wine, 1 Standard drinks or equivalent per week     Comment: monthly; Denied per allscript       Allergies   Allergen Reactions    Sulfa Antibiotics Fever and Fatigue    Sulfamethoxazole-Trimethoprim Fever and Fatigue       Medications Prior to Admission   Medication    doxylamine (UNISON) 25 MG tablet    omeprazole (PriLOSEC) 20 mg delayed release capsule    Prenatal Vit-Fe Fumarate-FA (PRENATAL PO)    Famotidine (PEPCID PO)    ondansetron (ZOFRAN-ODT) 4 mg disintegrating tablet           OBJECTIVE:  Vitals:  Temp:  [98 2 °F (36 8 °C)] 98 2 °F (36 8 °C)  HR:  [100] 100  Resp:  [18] 18  BP: (123)/(64) 123/64  There is no height or weight on file to calculate BMI  Physical Exam:  Physical Exam  Constitutional:       Appearance: Normal appearance  Genitourinary:      Vulva normal       Vaginal exam comments: Pooling of fluid noted   Cardiovascular:      Rate and Rhythm: Normal rate  Pulmonary:      Effort: Pulmonary effort is normal    Abdominal:      Comments: gravid   Neurological:      General: No focal deficit present  Mental Status: She is alert  Skin:     General: Skin is warm and dry     Psychiatric:         Mood and Affect: Mood normal           SVE:   1 5/50/-2    FHT:  Baseline Rate: 135 bpm  Variability: Moderate 6-25 bpm  Accelerations: 15 x 15 or greater, At variable times  Decelerations: None    TOCO:   Contraction Frequency (minutes): 0  Contraction Quality: Not applicable    Lab Results   Component Value Date    WBC 11 21 (H) 06/11/2021    HGB 11 5 06/11/2021    HCT 35 4 06/11/2021     06/11/2021     Lab Results   Component Value Date    K 3 6 07/18/2020     07/18/2020    CO2 29 07/18/2020    BUN 11 07/18/2020    CREATININE 0 92 07/18/2020    AST 20 07/26/2021    ALT 17 07/26/2021       Prenatal Labs:     Blood type: O+  Antibody: negative  Group B strep: pending  HIV: negative  Hepatitis B: negative  RPR: non-reactive  Rubella: Immune  Varicella: Immune  1 hour Glucose: 129  Platelets: 985  Hgb: 13 2    >2 Midnights  INPATIENT       Debbie Qureshi MD  OB/GYN PGY-1  8/11/2021  7:04 AM SOB

## 2021-08-11 NOTE — PLAN OF CARE
Problem: Knowledge Deficit  Goal: Verbalizes understanding of labor plan  Description: Assess patient/family/caregiver's baseline knowledge level and ability to understand information  Provide education via patient/family/caregiver's preferred learning method at appropriate level of understanding  1  Provide teaching at level of understanding  2  Provide teaching via preferred learning method(s)  2021 by Violet Lacy RN  Outcome: Progressing  2021 by Violet Lacy RN  Outcome: Progressing     Problem: Labor & Delivery  Goal: Manages discomfort  Description: Assess and monitor for signs and symptoms of discomfort  Assess patient's pain level regularly and per hospital policy  Administer medications as ordered  Support use of nonpharmacological methods to help control pain such as distraction, imagery, relaxation, and application of heat and cold  Collaborate with interdisciplinary team and patient to determine appropriate pain management plan  1  Include patient in decisions related to comfort  2  Offer non-pharmacological pain management interventions  3  Report ineffective pain management to physician  2021 by Violet Lacy RN  Outcome: Progressing  2021 by Violet Lacy RN  Outcome: Progressing  Goal: Patient vital signs are stable  Description: 1  Assess vital signs - vaginal delivery    2021 by Violet Lacy RN  Outcome: Progressing  2021 by Violet Lacy RN  Outcome: Progressing     Problem: BIRTH - VAGINAL/ SECTION  Goal: Fetal and maternal status remain reassuring during the birth process  Description: INTERVENTIONS:  - Monitor vital signs  - Monitor fetal heart rate  - Monitor uterine activity  - Monitor labor progression (vaginal delivery)  - DVT prophylaxis  - Antibiotic prophylaxis  Outcome: Progressing  Goal: Emotionally satisfying birthing experience for mother/fetus  Description: Interventions:  - Assess, plan, implement and evaluate the nursing care given to the patient in labor  - Advocate the philosophy that each childbirth experience is a unique experience and support the family's chosen level of involvement and control during the labor process   - Actively participate in both the patient's and family's teaching of the birth process  - Consider cultural, Holiness and age-specific factors and plan care for the patient in labor  Outcome: Progressing

## 2021-08-11 NOTE — ANESTHESIA PROCEDURE NOTES
Epidural Block    Patient location during procedure: OB  Start time: 8/11/2021 5:35 PM  Reason for block: procedure for pain, at surgeon's request and primary anesthetic  Staffing  Performed: resident/CRNA   Anesthesiologist: Nichelle Fitzpatrick MD  Resident/CRNA: Maggy Verdin CRNA  Preanesthetic Checklist  Completed: patient identified, IV checked, site marked, risks and benefits discussed, surgical consent, monitors and equipment checked, pre-op evaluation and timeout performed  Epidural  Patient position: sitting  Prep: ChloraPrep  Patient monitoring: continuous pulse ox, frequent blood pressure checks, cardiac monitor and heart rate  Approach: midline  Location: lumbar  Injection technique: TARAS saline  Needle  Needle type: Tuohy   Needle gauge: 18 G  Catheter type: end hole  Catheter size: 20 G  Catheter at skin depth: 10 cm  Catheter securement method: stabilization device  Test dose: negativelidocaine 1 5% with epinephrine 1:200,000 test dose, 3 mL  Assessment  Sensory level: T10  Number of attempts: 1negative aspiration for CSF, negative aspiration for heme and no paresthesia on injection  patient tolerated the procedure well with no immediate complications

## 2021-08-11 NOTE — ANESTHESIA PREPROCEDURE EVALUATION
Procedure:  LABOR ANALGESIA    Relevant Problems   No relevant active problems        Chronic back pain, gets [facility]    Physical Exam    Airway       Dental       Cardiovascular  Rhythm: regular, Rate: normal,     Pulmonary  Breath sounds clear to auscultation,     Other Findings        Anesthesia Plan  ASA Score- 2     Anesthesia Type- epidural with ASA Monitors  Additional Monitors:   Airway Plan:           Plan Factors-Exercise tolerance (METS): >4 METS  Chart reviewed  Existing labs reviewed  Induction-     Postoperative Plan-     Informed Consent- Anesthetic plan and risks discussed with patient  I personally reviewed this patient with the CRNA  Discussed and agreed on the Anesthesia Plan with the CRNA  Silas Valle

## 2021-08-11 NOTE — OB LABOR/OXYTOCIN SAFETY PROGRESS
Oxytocin Safety Progress Check Note - Anirudh Hardwick 31 y o  female MRN: [id number]    Unit/Bed#: -01 Encounter: [id number]       Contraction Frequency (minutes): None  Contraction Quality: Mild  Tachysystole: No   Cervical Dilation: 1-2        Cervical Effacement: 50  Fetal Station: -3  Baseline Rate: 125 bpm  Fetal Heart Rate: 129 BPM  FHR Category: Category I  Oxytocin Safety Progress Check: Safety check completed            Vital Signs:   Vitals:    08/11/21 1000   BP:    Pulse:    Resp:    Temp: 97 8 °F (36 6 °C)           Notes/comments:      UNchanged from last check  OF note, [facility]  Plan to start pitocin titration     Tc Pierson MD 8/11/2021 10:33 AM

## 2021-08-11 NOTE — TELEPHONE ENCOUNTER
Reason for Disposition   Leakage of fluid from vagina    Answer Assessment - Initial Assessment Questions  1  ONSET: "When did the symptoms begin?"         Just started now  2  CONTRACTIONS: "Are you having any contractions?" If yes, ask: "Describe the contractions that you are having " (e g , duration, frequency, regularity, severity)      Denies   3  LISBETH: "What date are you expecting to deliver?"     Sept 6th   4  PARITY: "Have you had a baby before?" If Yes, ask: "How long did the labor last?"      First baby, No prior deliveries  5  FETAL MOVEMENT: "Has the baby's movement decreased or changed significantly from normal?"      Denies  6  OTHER SYMPTOMS: "Do you have any other symptoms?" (e g , abdominal pain, vaginal bleeding, fever, hand/facial swelling)       Woke up to a lot of fluid  Clear fluid  Leaking now  Gush of water      Protocols used: PREGNANCY - RUPTURE OF [facility]

## 2021-08-11 NOTE — OB LABOR/OXYTOCIN SAFETY PROGRESS
Oxytocin Safety Progress Check Note - Keenan Boyd 31 y o  female MRN: [id number]    Unit/Bed#: -01 Encounter: [id number]    Dose (stephanie-units/min) Oxytocin: 6 stephanie-units/min (dr Tasneem Payton aware)  Contraction Frequency (minutes): 2 mins  Contraction Quality: Strong  Tachysystole: No   Cervical Dilation: 5        Cervical Effacement: 90  Fetal Station: 0  Baseline Rate: 115 bpm  Fetal Heart Rate: 117 BPM  FHR Category: Category II  Oxytocin Safety Progress Check: Safety check completed            Vital Signs:   Vitals:    08/11/21 1807   BP: 111/56   Pulse: 90   Resp:    Temp:    SpO2:            Notes/comments: At bedside for recurrent late/early decelerations  SVE at this time 5/90/0  Maternal repositioning underway at this time  Pitocin decreased to 6 stephanie-units/min  FHT recovered to baseline of 115 with moderate variability  Dr Hemalatha Glynn at [facility]  Will continue to monitor       Serenity Hood MD 8/11/2021 6:09 PM

## 2021-08-11 NOTE — OB LABOR/OXYTOCIN SAFETY PROGRESS
Oxytocin Safety Progress Check Note - Thanh Rivero 31 y o  female MRN: [id number]    Unit/Bed#: -01 Encounter: [id number]    Dose (stephanie-units/min) Oxytocin: 10 stephanie-units/min  Contraction Frequency (minutes): 2-3 5  Contraction Quality: Moderate  Tachysystole: No   Cervical Dilation: 1-2        Cervical Effacement: 50  Fetal Station: -3  Baseline Rate: 115 bpm  Fetal Heart Rate: 120 BPM  FHR Category: Category I  Oxytocin Safety Progress Check: Safety check completed            Vital Signs:   Vitals:    08/11/21 1429   BP: 115/62   Pulse: 82   Resp:    Temp:            Notes/comments: Patient feeling more contractions but not very uncomfortable yet, wants an epidural, baby still very high and cervix posterior, will continue to increase pitocin       Vaughn Savage MD 8/11/2021 2:49 PM

## 2021-08-11 NOTE — OB LABOR/OXYTOCIN SAFETY PROGRESS
Oxytocin Safety Progress Check Note - Brianda Del Real 31 y o  female MRN: [id number]    Unit/Bed#: -01 Encounter: [id number]    Dose (stephanie-units/min) Oxytocin: 14 stephanie-units/min  Contraction Frequency (minutes): 1-3  Contraction Quality: Moderate  Tachysystole: No   Cervical Dilation: 3        Cervical Effacement: 70  Fetal Station: -2  Baseline Rate: 115 bpm  Fetal Heart Rate: 103 BPM  FHR Category: Category I  Oxytocin Safety Progress Check: Safety check completed            Vital Signs:   Vitals:    08/11/21 1700   BP: 124/62   Pulse: 62   Resp: 18   Temp: 98 5 °F (36 9 °C)           Notes/comments: IUPC and FSE placed to better monitor contraction timing, patient now requesting epidural due to stronger contractions, cervix is much thinner       Greg uDvall MD 8/11/2021 5:10 PM

## 2021-08-11 NOTE — TELEPHONE ENCOUNTER
Pt is 36 weeks pregnant and is calling in stating she woke up and felt a gush of water  Pt is still currently leaking per pt  Pt denies any other symptoms  TT out to on call provider to make aware  Per on call provider  Pt is to head to L&D  Pt made aware

## 2021-08-11 NOTE — TELEPHONE ENCOUNTER
Regarding: Possible Labor  ----- Message from Shoaib sent at 8/11/2021  5:05 AM EDT -----  "I am 36 wks and 2 days and I believe my water may have broken "

## 2021-08-12 PROBLEM — Z98.891 STATUS POST PRIMARY LOW TRANSVERSE CESAREAN SECTION: Status: ACTIVE | Noted: 2021-08-11

## 2021-08-12 LAB
BASE EXCESS BLDCOA CALC-SCNC: -3.3 MMOL/L (ref 3–11)
BASE EXCESS BLDCOV CALC-SCNC: -4.3 MMOL/L (ref 1–9)
BASOPHILS # BLD AUTO: 0.02 THOUSANDS/ΜL (ref 0–0.1)
BASOPHILS NFR BLD AUTO: 0 % (ref 0–1)
EOSINOPHIL # BLD AUTO: 0.01 THOUSAND/ΜL (ref 0–0.61)
EOSINOPHIL NFR BLD AUTO: 0 % (ref 0–6)
ERYTHROCYTE [DISTWIDTH] IN BLOOD BY AUTOMATED COUNT: 13.9 % (ref 11.6–15.1)
HCO3 BLDCOA-SCNC: 23.5 MMOL/L (ref 17.3–27.3)
HCO3 BLDCOV-SCNC: 20.6 MMOL/L (ref 12.2–28.6)
HCT VFR BLD AUTO: 27.2 % (ref 34.8–46.1)
HGB BLD-MCNC: 8.7 G/DL (ref 11.5–15.4)
IMM GRANULOCYTES # BLD AUTO: 0.13 THOUSAND/UL (ref 0–0.2)
IMM GRANULOCYTES NFR BLD AUTO: 1 % (ref 0–2)
LYMPHOCYTES # BLD AUTO: 1.31 THOUSANDS/ΜL (ref 0.6–4.47)
LYMPHOCYTES NFR BLD AUTO: 7 % (ref 14–44)
MCH RBC QN AUTO: 29.2 PG (ref 26.8–34.3)
MCHC RBC AUTO-ENTMCNC: 32 G/DL (ref 31.4–37.4)
MCV RBC AUTO: 91 FL (ref 82–98)
MONOCYTES # BLD AUTO: 0.79 THOUSAND/ΜL (ref 0.17–1.22)
MONOCYTES NFR BLD AUTO: 4 % (ref 4–12)
NEUTROPHILS # BLD AUTO: 15.5 THOUSANDS/ΜL (ref 1.85–7.62)
NEUTS SEG NFR BLD AUTO: 88 % (ref 43–75)
NRBC BLD AUTO-RTO: 0 /100 WBCS
O2 CT VFR BLDCOA CALC: 7.8 ML/DL
OXYHGB MFR BLDCOA: 36.8 %
OXYHGB MFR BLDCOV: 76.6 %
PCO2 BLDCOA: 48.7 MM[HG] (ref 30–60)
PCO2 BLDCOV: 37.3 MM HG (ref 27–43)
PH BLDCOA: 7.3 [PH] (ref 7.23–7.43)
PH BLDCOV: 7.36 [PH] (ref 7.19–7.49)
PLATELET # BLD AUTO: 189 THOUSANDS/UL (ref 149–390)
PMV BLD AUTO: 11 FL (ref 8.9–12.7)
PO2 BLDCOA: 17 MM HG (ref 5–25)
PO2 BLDCOV: 31.9 MM HG (ref 15–45)
RBC # BLD AUTO: 2.98 MILLION/UL (ref 3.81–5.12)
SAO2 % BLDCOV: 16.6 ML/DL
WBC # BLD AUTO: 17.76 THOUSAND/UL (ref 4.31–10.16)

## 2021-08-12 PROCEDURE — 88307 TISSUE EXAM BY PATHOLOGIST: CPT | Performed by: PATHOLOGY

## 2021-08-12 PROCEDURE — 85025 COMPLETE CBC W/AUTO DIFF WBC: CPT

## 2021-08-12 PROCEDURE — 59510 CESAREAN DELIVERY: CPT | Performed by: OBSTETRICS & GYNECOLOGY

## 2021-08-12 PROCEDURE — 82805 BLOOD GASES W/O2 SATURATION: CPT | Performed by: OBSTETRICS & GYNECOLOGY

## 2021-08-12 PROCEDURE — 99024 POSTOP FOLLOW-UP VISIT: CPT | Performed by: OBSTETRICS & GYNECOLOGY

## 2021-08-12 RX ORDER — ONDANSETRON 2 MG/ML
4 INJECTION INTRAMUSCULAR; INTRAVENOUS EVERY 4 HOURS PRN
Status: DISPENSED | OUTPATIENT
Start: 2021-08-12 | End: 2021-08-13

## 2021-08-12 RX ORDER — DEXAMETHASONE SODIUM PHOSPHATE 4 MG/ML
8 INJECTION, SOLUTION INTRA-ARTICULAR; INTRALESIONAL; INTRAMUSCULAR; INTRAVENOUS; SOFT TISSUE ONCE AS NEEDED
Status: ACTIVE | OUTPATIENT
Start: 2021-08-12 | End: 2021-08-13

## 2021-08-12 RX ORDER — KETOROLAC TROMETHAMINE 30 MG/ML
INJECTION, SOLUTION INTRAMUSCULAR; INTRAVENOUS AS NEEDED
Status: DISCONTINUED | OUTPATIENT
Start: 2021-08-12 | End: 2021-08-12

## 2021-08-12 RX ORDER — MEPERIDINE HYDROCHLORIDE 25 MG/ML
12.5 INJECTION INTRAMUSCULAR; INTRAVENOUS; SUBCUTANEOUS
Status: DISCONTINUED | OUTPATIENT
Start: 2021-08-12 | End: 2021-08-13

## 2021-08-12 RX ORDER — FENTANYL CITRATE/PF 50 MCG/ML
25 SYRINGE (ML) INJECTION
Status: DISCONTINUED | OUTPATIENT
Start: 2021-08-12 | End: 2021-08-13

## 2021-08-12 RX ORDER — CEFAZOLIN SODIUM 1 G/3ML
INJECTION, POWDER, FOR SOLUTION INTRAMUSCULAR; INTRAVENOUS AS NEEDED
Status: DISCONTINUED | OUTPATIENT
Start: 2021-08-12 | End: 2021-08-12

## 2021-08-12 RX ORDER — OXYTOCIN/RINGER'S LACTATE 30/500 ML
PLASTIC BAG, INJECTION (ML) INTRAVENOUS CONTINUOUS PRN
Status: DISCONTINUED | OUTPATIENT
Start: 2021-08-12 | End: 2021-08-12

## 2021-08-12 RX ORDER — TRISODIUM CITRATE DIHYDRATE AND CITRIC ACID MONOHYDRATE 500; 334 MG/5ML; MG/5ML
30 SOLUTION ORAL
Status: DISCONTINUED | OUTPATIENT
Start: 2021-08-12 | End: 2021-08-15 | Stop reason: HOSPADM

## 2021-08-12 RX ORDER — HYDROMORPHONE HCL/PF 1 MG/ML
0.5 SYRINGE (ML) INJECTION EVERY 2 HOUR PRN
Status: DISCONTINUED | OUTPATIENT
Start: 2021-08-12 | End: 2021-08-15 | Stop reason: HOSPADM

## 2021-08-12 RX ORDER — LIDOCAINE HYDROCHLORIDE AND EPINEPHRINE 20; 5 MG/ML; UG/ML
INJECTION, SOLUTION EPIDURAL; INFILTRATION; INTRACAUDAL; PERINEURAL AS NEEDED
Status: DISCONTINUED | OUTPATIENT
Start: 2021-08-12 | End: 2021-08-12

## 2021-08-12 RX ORDER — HYDROMORPHONE HCL/PF 1 MG/ML
0.5 SYRINGE (ML) INJECTION
Status: DISCONTINUED | OUTPATIENT
Start: 2021-08-12 | End: 2021-08-13

## 2021-08-12 RX ORDER — ONDANSETRON 2 MG/ML
4 INJECTION INTRAMUSCULAR; INTRAVENOUS ONCE AS NEEDED
Status: DISCONTINUED | OUTPATIENT
Start: 2021-08-12 | End: 2021-08-13

## 2021-08-12 RX ORDER — ACETAMINOPHEN 325 MG/1
650 TABLET ORAL EVERY 6 HOURS SCHEDULED
Status: DISCONTINUED | OUTPATIENT
Start: 2021-08-12 | End: 2021-08-15 | Stop reason: HOSPADM

## 2021-08-12 RX ORDER — ALBUTEROL SULFATE 2.5 MG/3ML
2.5 SOLUTION RESPIRATORY (INHALATION) ONCE AS NEEDED
Status: DISCONTINUED | OUTPATIENT
Start: 2021-08-12 | End: 2021-08-15 | Stop reason: HOSPADM

## 2021-08-12 RX ORDER — OXYTOCIN/RINGER'S LACTATE 30/500 ML
62.5 PLASTIC BAG, INJECTION (ML) INTRAVENOUS ONCE
Status: COMPLETED | OUTPATIENT
Start: 2021-08-12 | End: 2021-08-12

## 2021-08-12 RX ORDER — DIAPER,BRIEF,INFANT-TODD,DISP
1 EACH MISCELLANEOUS DAILY PRN
Status: DISCONTINUED | OUTPATIENT
Start: 2021-08-12 | End: 2021-08-15 | Stop reason: HOSPADM

## 2021-08-12 RX ORDER — KETOROLAC TROMETHAMINE 30 MG/ML
30 INJECTION, SOLUTION INTRAMUSCULAR; INTRAVENOUS EVERY 6 HOURS
Status: DISCONTINUED | OUTPATIENT
Start: 2021-08-12 | End: 2021-08-13

## 2021-08-12 RX ORDER — PROMETHAZINE HYDROCHLORIDE 25 MG/ML
12.5 INJECTION, SOLUTION INTRAMUSCULAR; INTRAVENOUS ONCE AS NEEDED
Status: DISCONTINUED | OUTPATIENT
Start: 2021-08-12 | End: 2021-08-13

## 2021-08-12 RX ORDER — CEFAZOLIN SODIUM 2 G/50ML
2000 SOLUTION INTRAVENOUS ONCE
Status: DISCONTINUED | OUTPATIENT
Start: 2021-08-12 | End: 2021-08-12

## 2021-08-12 RX ORDER — MORPHINE SULFATE 0.5 MG/ML
INJECTION, SOLUTION EPIDURAL; INTRATHECAL; INTRAVENOUS AS NEEDED
Status: DISCONTINUED | OUTPATIENT
Start: 2021-08-12 | End: 2021-08-12

## 2021-08-12 RX ORDER — HYDROXYZINE HYDROCHLORIDE 25 MG/1
25 TABLET, FILM COATED ORAL EVERY 6 HOURS PRN
Status: DISCONTINUED | OUTPATIENT
Start: 2021-08-12 | End: 2021-08-15 | Stop reason: HOSPADM

## 2021-08-12 RX ORDER — NALOXONE HYDROCHLORIDE 0.4 MG/ML
0.1 INJECTION, SOLUTION INTRAMUSCULAR; INTRAVENOUS; SUBCUTANEOUS
Status: ACTIVE | OUTPATIENT
Start: 2021-08-12 | End: 2021-08-13

## 2021-08-12 RX ORDER — NALBUPHINE HCL 10 MG/ML
3 AMPUL (ML) INJECTION
Status: DISCONTINUED | OUTPATIENT
Start: 2021-08-12 | End: 2021-08-15 | Stop reason: HOSPADM

## 2021-08-12 RX ORDER — SIMETHICONE 80 MG
80 TABLET,CHEWABLE ORAL 4 TIMES DAILY PRN
Status: DISCONTINUED | OUTPATIENT
Start: 2021-08-12 | End: 2021-08-15 | Stop reason: HOSPADM

## 2021-08-12 RX ORDER — METOCLOPRAMIDE HYDROCHLORIDE 5 MG/ML
5 INJECTION INTRAMUSCULAR; INTRAVENOUS EVERY 6 HOURS PRN
Status: ACTIVE | OUTPATIENT
Start: 2021-08-12 | End: 2021-08-13

## 2021-08-12 RX ORDER — FENTANYL CITRATE 50 UG/ML
INJECTION, SOLUTION INTRAMUSCULAR; INTRAVENOUS AS NEEDED
Status: DISCONTINUED | OUTPATIENT
Start: 2021-08-12 | End: 2021-08-12

## 2021-08-12 RX ORDER — KETOROLAC TROMETHAMINE 30 MG/ML
30 INJECTION, SOLUTION INTRAMUSCULAR; INTRAVENOUS EVERY 6 HOURS SCHEDULED
Status: DISCONTINUED | OUTPATIENT
Start: 2021-08-12 | End: 2021-08-12

## 2021-08-12 RX ORDER — ONDANSETRON 2 MG/ML
INJECTION INTRAMUSCULAR; INTRAVENOUS AS NEEDED
Status: DISCONTINUED | OUTPATIENT
Start: 2021-08-12 | End: 2021-08-12

## 2021-08-12 RX ORDER — CALCIUM CARBONATE 200(500)MG
1000 TABLET,CHEWABLE ORAL DAILY PRN
Status: DISCONTINUED | OUTPATIENT
Start: 2021-08-12 | End: 2021-08-15 | Stop reason: HOSPADM

## 2021-08-12 RX ORDER — DOCUSATE SODIUM 100 MG/1
100 CAPSULE, LIQUID FILLED ORAL 2 TIMES DAILY
Status: DISCONTINUED | OUTPATIENT
Start: 2021-08-12 | End: 2021-08-15 | Stop reason: HOSPADM

## 2021-08-12 RX ADMIN — ACETAMINOPHEN 650 MG: 325 TABLET, FILM COATED ORAL at 10:58

## 2021-08-12 RX ADMIN — ONDANSETRON 4 MG: 2 INJECTION INTRAMUSCULAR; INTRAVENOUS at 16:52

## 2021-08-12 RX ADMIN — FENTANYL CITRATE 50 MCG: 50 INJECTION, SOLUTION INTRAMUSCULAR; INTRAVENOUS at 08:06

## 2021-08-12 RX ADMIN — PHENYLEPHRINE HYDROCHLORIDE 20 MCG/MIN: 10 INJECTION INTRAVENOUS at 07:21

## 2021-08-12 RX ADMIN — LIDOCAINE HYDROCHLORIDE AND EPINEPHRINE 5 ML: 20; 5 INJECTION, SOLUTION EPIDURAL; INFILTRATION; INTRACAUDAL; PERINEURAL at 07:25

## 2021-08-12 RX ADMIN — ONDANSETRON 4 MG: 2 INJECTION INTRAMUSCULAR; INTRAVENOUS at 07:53

## 2021-08-12 RX ADMIN — LIDOCAINE HYDROCHLORIDE AND EPINEPHRINE 10 ML: 20; 5 INJECTION, SOLUTION EPIDURAL; INFILTRATION; INTRACAUDAL; PERINEURAL at 07:18

## 2021-08-12 RX ADMIN — Medication 62.5 MILLI-UNITS/MIN: at 09:30

## 2021-08-12 RX ADMIN — AZITHROMYCIN MONOHYDRATE 500 MG: 500 INJECTION, POWDER, LYOPHILIZED, FOR SOLUTION INTRAVENOUS at 07:27

## 2021-08-12 RX ADMIN — Medication 500 MILLI-UNITS/MIN: at 07:47

## 2021-08-12 RX ADMIN — SODIUM CITRATE AND CITRIC ACID MONOHYDRATE 30 ML: 500; 334 SOLUTION ORAL at 17:57

## 2021-08-12 RX ADMIN — KETOROLAC TROMETHAMINE 30 MG: 30 INJECTION, SOLUTION INTRAMUSCULAR at 07:53

## 2021-08-12 RX ADMIN — NALBUPHINE HYDROCHLORIDE 3 MG: 10 INJECTION, SOLUTION INTRAMUSCULAR; INTRAVENOUS; SUBCUTANEOUS at 20:02

## 2021-08-12 RX ADMIN — ACETAMINOPHEN 650 MG: 325 TABLET, FILM COATED ORAL at 17:56

## 2021-08-12 RX ADMIN — ONDANSETRON 4 MG: 2 INJECTION INTRAMUSCULAR; INTRAVENOUS at 01:10

## 2021-08-12 RX ADMIN — IRON SUCROSE 300 MG: 20 INJECTION, SOLUTION INTRAVENOUS at 20:02

## 2021-08-12 RX ADMIN — NALBUPHINE HYDROCHLORIDE 3 MG: 10 INJECTION, SOLUTION INTRAMUSCULAR; INTRAVENOUS; SUBCUTANEOUS at 10:58

## 2021-08-12 RX ADMIN — LIDOCAINE HYDROCHLORIDE AND EPINEPHRINE 5 ML: 20; 5 INJECTION, SOLUTION EPIDURAL; INFILTRATION; INTRACAUDAL; PERINEURAL at 07:44

## 2021-08-12 RX ADMIN — FENTANYL CITRATE 50 MCG: 50 INJECTION, SOLUTION INTRAMUSCULAR; INTRAVENOUS at 08:29

## 2021-08-12 RX ADMIN — SODIUM CHLORIDE, SODIUM LACTATE, POTASSIUM CHLORIDE, AND CALCIUM CHLORIDE 125 ML/HR: .6; .31; .03; .02 INJECTION, SOLUTION INTRAVENOUS at 10:58

## 2021-08-12 RX ADMIN — DOCUSATE SODIUM 100 MG: 100 CAPSULE ORAL at 17:56

## 2021-08-12 RX ADMIN — MORPHINE SULFATE 3 MG: 0.5 INJECTION, SOLUTION EPIDURAL; INTRATHECAL; INTRAVENOUS at 07:50

## 2021-08-12 RX ADMIN — KETOROLAC TROMETHAMINE 30 MG: 30 INJECTION, SOLUTION INTRAMUSCULAR; INTRAVENOUS at 14:04

## 2021-08-12 RX ADMIN — LIDOCAINE HYDROCHLORIDE AND EPINEPHRINE 5 ML: 20; 5 INJECTION, SOLUTION EPIDURAL; INFILTRATION; INTRACAUDAL; PERINEURAL at 07:23

## 2021-08-12 RX ADMIN — CEFAZOLIN SODIUM 2000 MG: 1 INJECTION, POWDER, FOR SOLUTION INTRAMUSCULAR; INTRAVENOUS at 07:18

## 2021-08-12 NOTE — UTILIZATION REVIEW
Inpatient Admission Authorization Request   Notification of Maternity/Delivery &  Birth Information for Admission   SERVICING FACILITY:   [address] [facility] [place], [address] [place] [address]  Tax ID: [id number]  NPI: [id number]  Place of Service: Inpatient [address] [facility]  [address]  Place of Service Code: 24     ATTENDING PROVIDER:  Attending Name and NPI#: Washington Settles, [address]  Address: Feroz Bernal  [place] [facility] [address] [place] [address]  Phone: [phone]     UTILIZATION REVIEW CONTACT:  Yadiel Lopez, Utilization   Network Utilization Review Department  Phone: [phone]  Fax [phone]  Email: [email or web address]     PHYSICIAN ADVISORY SERVICES:  FOR [id number] REVIEW - MEDICAL NECESSITY DENIAL  Phone: [phone]  Fax: [phone]  Email: [email or web address]     TYPE OF REQUEST:  Inpatient Status     ADMISSION INFORMATION:  ADMISSION DATE/TIME: 21  6:44 AM  PATIENT DIAGNOSIS CODE/DESCRIPTION:  36 weeks gestation of pregnancy [Z3A 36]  Encounter for  delivery without indication [O82] The primary encounter diagnosis was 36 weeks gestation of pregnancy  A diagnosis of Arrest of descent, delivered, current hospitalization was also pertinent to this visit  1  36 weeks gestation of pregnancy    2  Arrest of descent, delivered, current hospitalization      DISCHARGE DATE/TIME: No discharge date for patient encounter    DISCHARGE DISPOSITION (IF DISCHARGED): Home/Self Care     MOTHER AND  INFORMATION:  Mother: Mami Mora 1990   Delivering clinician: Yuliana   OB History        1    Para   1    Term   0       1    AB   0    Living   1       SAB   0    TAB   0    Ectopic   0    Multiple   0    Live Births   1                Name & MRN:   Information for the patient's :  Jhonny Weinberg) [[id number]]     [place] Delivery Information:  Sex: male  Delivered 2021 7:47 AM by , Low Transverse; Gestational Age: 43w3d    [place] Measurements:  Weight: 6 lb 1 5 oz (2765 g); Height: 18 5"    APGAR 1 minute 5 minutes 10 minutes   Totals: 9 9      [place] Birth Information: 31 y o  female MRN: [id number] Unit/Bed#: -01 Estimated Date of Delivery: 21  Birthweight: No birth weight on file  Gestational Age: <None> Delivery Type: , Low Transverse          APGARS  One minute Five minutes Ten minutes   Totals:               IMPORTANT INFORMATION:  Please contact the [facility] directly with any questions or concerns regarding this request  Department voicemails are confidential     Send requests for admission clinical reviews, concurrent reviews, approvals, and administrative denials due to lack of clinical to fax [phone]

## 2021-08-12 NOTE — LACTATION NOTE
This note was copied from a baby's chart  CONSULT - LACTATION  Baby Boy Erickson Johnson) Byron 229 0 days male MRN: [id number]    [facility] Room / Bed: (N)/(N) Encounter: [id number]    Maternal Information     MOTHER:  Nati Bagley  Maternal Age: 31 y o    OB History: # 1 - Date: 21, Sex: Male, Weight: 2765 g (6 lb 1 5 oz), GA: 36w3d, Delivery: , Low Transverse, Apgar1: 9, Apgar5: 9, Living: Living, Birth Comments: None   Previouse breast reduction surgery? No    Lactation history:   Has patient previously breast fed: No   How long had patient previously breast fed:     Previous breast feeding complications:       Past Surgical History:   Procedure Laterality Date    COLPOSCOPY      COLPOSCOPY W/ BIOPSY / CURETTAGE      Resolved 2017    GYNECOLOGIC CRYOSURGERY      WISDOM TOOTH EXTRACTION          Birth information:  YOB: 2019   Time of birth: 7:47 AM   Sex: male   Delivery type: , Low Transverse   Birth Weight: 2765 g (6 lb 1 5 oz)   Percent of Weight Change: 0%     Gestational Age: 43w3d   [unfilled]    Assessment     Breast and nipple assessment: normal assessment     Assessment: normal assessment    Feeding assessment: feeding well  LATCH:  Latch: Repeated attempts, hold nipple in mouth, stimulate to suck   Audible Swallowing: Spontaneous and intermittent (24 hours old)   Type of Nipple: Everted (After stimulation)   Comfort (Breast/Nipple): Soft/non-tender   Hold (Positioning): Partial assist, teach one side, mother does other, staff holds   LATCH Score: 8          Feeding recommendations:  breast feed on demand, attempting every 3 hours on LPI protocol     Met with mother  Provided mother with Ready, Set, Baby booklet  Discussed Skin to Skin contact an benefits to mom and baby  Talked about the delay of the first bath until baby has adjusted  Spoke about the benefits of rooming in   Feeding on cue and what that means for recognizing infant's hunger  Avoidance of pacifiers for the first month discussed  Talked about exclusive breastfeeding for the first 6 months  Positioning and latch reviewed as well as showing images of other feeding positions  Discussed the properties of a good latch in any position  Reviewed hand/manual expression  Discussed s/s that baby is getting enough milk and some s/s that breastfeeding dyad may need further help  Gave information on common concerns, what to expect the first few weeks after delivery, preparing for other caregivers, and how partners can help  Resources for support also provided  Information on hand expression given  Discussed benefits of knowing how to manually express breast including stimulating milk supply, softening nipple for latch and evacuating breast in the event of engorgement  Discussed 2nd night syndrome and ways to calm infant  Hand out given  Assisted Mom to place baby skin to skin in football hold  Discussed importance of alignment of baby's ear, shoulder, and hip in any preferred position  Worked on supporting baby at breast level and beginning the feed with baby's nose arriving at the nipple  Then, using areolar compression while guiding baby chin-forward to the breast to achieve a deep, comfortable latch  Baby does latch well after about 10 min of attempting while skin to skin with hand expression  Discussed hormonal response that occurs as well as normal  behaviors in the first 24 hours of life  Enc her to call for lactation support as needed throughout her stay       Uri Orozco RN 2021 6:28 PM

## 2021-08-12 NOTE — PLAN OF CARE
Problem: Knowledge Deficit  Goal: Verbalizes understanding of labor plan  Description: Assess patient/family/caregiver's baseline knowledge level and ability to understand information  Provide education via patient/family/caregiver's preferred learning method at appropriate level of understanding  1  Provide teaching at level of understanding  2  Provide teaching via preferred learning method(s)  Outcome: Completed  Goal: Patient/family/caregiver demonstrates understanding of disease process, treatment plan, medications, and discharge instructions  Description: Complete learning assessment and assess knowledge base  Interventions:  - Provide teaching at level of understanding  - Provide teaching via preferred learning methods  Outcome: Completed     Problem: Labor & Delivery  Goal: Manages discomfort  Description: Assess and monitor for signs and symptoms of discomfort  Assess patient's pain level regularly and per hospital policy  Administer medications as ordered  Support use of nonpharmacological methods to help control pain such as distraction, imagery, relaxation, and application of heat and cold  Collaborate with interdisciplinary team and patient to determine appropriate pain management plan  1  Include patient in decisions related to comfort  2  Offer non-pharmacological pain management interventions  3  Report ineffective pain management to physician  Outcome: Progressing  Goal: Patient vital signs are stable  Description: 1  Assess vital signs - vaginal delivery    Outcome: Progressing     Problem: BIRTH - VAGINAL/ SECTION  Goal: Fetal and maternal status remain reassuring during the birth process  Description: INTERVENTIONS:  - Monitor vital signs  - Monitor fetal heart rate  - Monitor uterine activity  - Monitor labor progression (vaginal delivery)  - DVT prophylaxis  - Antibiotic prophylaxis  Outcome: Completed  Goal: Emotionally satisfying birthing experience for mother/fetus  Description: Interventions:  - Assess, plan, implement and evaluate the nursing care given to the patient in labor  - Advocate the philosophy that each childbirth experience is a unique experience and support the family's chosen level of involvement and control during the labor process   - Actively participate in both the patient's and family's teaching of the birth process  - Consider cultural, Rastafarian and age-specific factors and plan care for the patient in labor  Outcome: Completed     Problem: POSTPARTUM  Goal: Experiences normal postpartum course  Description: INTERVENTIONS:  - Monitor maternal vital signs  - Assess uterine involution and lochia  Outcome: Progressing  Goal: Appropriate maternal -  bonding  Description: INTERVENTIONS:  - Identify family support  - Assess for appropriate maternal/infant bonding   -Encourage maternal/infant bonding opportunities  - Referral to  or  as needed  Outcome: Progressing  Goal: Establishment of infant feeding pattern  Description: INTERVENTIONS:  - Assess breast/bottle feeding  - Refer to lactation as needed  Outcome: Progressing  Goal: Incision(s), wounds(s) or drain site(s) healing without S/S of infection  Description: INTERVENTIONS  - Assess and document dressing, incision, wound bed, drain sites and surrounding tissue  - Provide patient and family education    Outcome: Progressing     Problem: PAIN - ADULT  Goal: Verbalizes/displays adequate comfort level or baseline comfort level  Description: Interventions:  - Encourage patient to monitor pain and request assistance  - Assess pain using appropriate pain scale  - Administer analgesics based on type and severity of pain and evaluate response  - Implement non-pharmacological measures as appropriate and evaluate response  - Consider cultural and social influences on pain and pain management  - Notify physician/advanced practitioner if interventions unsuccessful or patient reports new pain  Outcome: Progressing     Problem: INFECTION - ADULT  Goal: Absence or prevention of progression during hospitalization  Description: INTERVENTIONS:  - Assess and monitor for signs and symptoms of infection  - Monitor lab/diagnostic results  - Monitor all insertion sites, i e  indwelling lines, tubes, and drains  - Monitor endotracheal if appropriate and nasal secretions for changes in amount and color  - Panther Burn appropriate cooling/warming therapies per order  - Administer medications as ordered  - Instruct and encourage patient and family to use good hand hygiene technique  - Identify and instruct in appropriate isolation precautions for identified infection/condition  Outcome: Progressing  Goal: Absence of fever/infection during neutropenic period  Description: INTERVENTIONS:  - Monitor WBC    Outcome: Progressing     Problem: SAFETY ADULT  Goal: Patient will remain free of falls  Description: INTERVENTIONS:  - Educate patient/family on patient safety including physical limitations  - Instruct patient to call for assistance with activity   - Consult OT/PT to assist with strengthening/mobility   - Keep Call bell within reach  - Keep bed low and locked with side rails adjusted as appropriate  - Keep care items and personal belongings within reach  - Initiate and maintain comfort rounds  - Make Fall Risk Sign visible to staff    - Apply yellow socks and bracelet for high fall risk patients  - Consider moving patient to room near nurses station  Outcome: Progressing  Goal: Maintain or return to baseline ADL function  Description: INTERVENTIONS:  -  Assess patient's ability to carry out ADLs; assess patient's baseline for ADL function and identify physical deficits which impact ability to perform ADLs (bathing, care of mouth/teeth, toileting, grooming, dressing, etc )  - Assess/evaluate cause of self-care deficits   - Assess range of motion  - Assess patient's mobility; develop plan if impaired  - Assess patient's need for assistive devices and provide as appropriate  - Encourage maximum independence but intervene and supervise when necessary  - Involve family in performance of ADLs  - Assess for home care needs following discharge   - Consider OT consult to assist with ADL evaluation and planning for discharge  - Provide patient education as appropriate  Outcome: Progressing  Goal: Maintains/Returns to pre admission functional level  Description: INTERVENTIONS:  - Perform BMAT or MOVE assessment daily    - Set and communicate daily mobility goal to care team and patient/family/caregiver         - Out of bed for toileting  - Record patient progress and toleration of activity level   Outcome: Progressing     Problem: DISCHARGE PLANNING  Goal: Discharge to home or other facility with appropriate resources  Description: INTERVENTIONS:  - Identify barriers to discharge w/patient and caregiver  - Arrange for needed discharge resources and transportation as appropriate  - Identify discharge learning needs (meds, wound care, etc )  - Arrange for interpretive services to assist at discharge as needed  - Refer to Case Management Department for coordinating discharge planning if the patient needs post-hospital services based on physician/advanced practitioner order or complex needs related to functional status, cognitive ability, or social support system  Outcome: Progressing

## 2021-08-12 NOTE — OB LABOR/OXYTOCIN SAFETY PROGRESS
Oxytocin Safety Progress Check Note - Era Jose 31 y o  female MRN: [id number]    Unit/Bed#: -01 Encounter: [id number]    Dose (stephanie-units/min) Oxytocin: 6 stephanie-units/min (dr Zaida Matias aware)  Contraction Frequency (minutes): 3-4  Contraction Quality: Moderate  Tachysystole: No   Cervical Dilation: 6        Cervical Effacement: 90  Fetal Station: 0  Baseline Rate: 115 bpm  Fetal Heart Rate: 116 BPM  FHR Category: Category II  Oxytocin Safety Progress Check: Safety check completed            Vital Signs:   Vitals:    08/11/21 2000   BP: 109/56   Pulse: 76   Resp:    Temp: 98 7 °F (37 1 °C)   SpO2:            Notes/comments:    Due for check  SVE at this time 6/90/0  FHT baseline 130, moderate variability, category I tracing at this time  Variable, early decelerations noted however resolved at this time  Jaqui Q3 on toco  Will continue to monitor       Royce Archer MD 8/11/2021 8:33 PM

## 2021-08-12 NOTE — OB LABOR/OXYTOCIN SAFETY PROGRESS
Oxytocin Safety Progress Check Note - Gerald Gabriel 31 y o  female MRN: [id number]    Unit/Bed#: -01 Encounter: [id number]    Dose (stephanie-units/min) Oxytocin: 6 stephanie-units/min (dr Barnes Files aware)  Contraction Frequency (minutes): 2-4  Contraction Quality: Strong  Tachysystole: No   Cervical Dilation: 10  Dilation Complete Date: 08/12/21  Dilation Complete Time: 0255  Cervical Effacement: 100  Fetal Station: 1  Baseline Rate: 125 bpm  Fetal Heart Rate: 115 BPM  FHR Category: Category II  Oxytocin Safety Progress Check: Safety check completed            Vital Signs:   Vitals:    08/12/21 0541   BP: 113/56   Pulse: 99   Resp:    Temp:    SpO2:            Notes/comments:   Patient now on hour 3 of pushing  Pushing technique improved and more movement noted with pushes at this time  FHT baseline 120, moderate variability, decelerations with pushing that resolve spontaneously after termination of the contraction  Will continue pushing       Prince Marin MD 8/12/2021 6:05 AM

## 2021-08-12 NOTE — OB LABOR/OXYTOCIN SAFETY PROGRESS
Labor Progress Note - Thanh Rivero 31 y o  female MRN: [id number]    Unit/Bed#: LD  Encounter: [id number]    Dose (stephanie-units/min) Oxytocin: 0 stephanie-units/min (Per Dr Carlette Severin)  Contraction Frequency (minutes): 3  Contraction Quality: Strong  Tachysystole: No   Cervical Dilation: 10  Dilation Complete Date: 21  Dilation Complete Time: 0255  Cervical Effacement: 100  Fetal Station: 1  Baseline Rate: 120 bpm  Fetal Heart Rate: 118 BPM  FHR Category: Category II  Oxytocin Safety Progress Check: Safety check completed            Vital Signs:    Vitals:    21 0656   BP: 129/67   Pulse: (!) 126   Resp:    Temp:    SpO2:            Notes/comments:    Patient pushing with good effort  However, after 3 5 hours of pushing, the fetal station has not progressed beyond +1  We discussed the options at this time given that she is not low enough to assist with vacuum delivery  She feels she is giving her best pushes and therefore agrees that  has become necessary  We reviewed the risks of surgery including bleeding, transfusion, infection, injury to surrounding organs including bowel/bladder  All questions were answered, and the consent was reaffirmed  Anesthesia, nursing aware  Seble Sheldon MD 2021 7:01 AM

## 2021-08-12 NOTE — OB LABOR/OXYTOCIN SAFETY PROGRESS
Oxytocin Safety Progress Check Note - Logan Ha 31 y o  female MRN: [id number]    Unit/Bed#: -01 Encounter: [id number]    Dose (stephanie-units/min) Oxytocin: 6 stephanie-units/min (dr Adame Cure aware)  Contraction Frequency (minutes): 3  Contraction Quality: Strong  Tachysystole: No   Cervical Dilation: 10  Dilation Complete Date: 08/12/21  Dilation Complete Time: 0255  Cervical Effacement: 100  Fetal Station: 1  Baseline Rate: 120 bpm  Fetal Heart Rate: 116 BPM  FHR Category: Category I  Oxytocin Safety Progress Check: Safety check completed            Vital Signs:   Vitals:    08/12/21 0435   BP: 116/57   Pulse:    Resp:    Temp:    SpO2:            Notes/comments:   Now pushing for two hours  Patient pushing on side to attempt to aid in fetal rotation (baby believed to be OP)  FHT baseline 120, moderate variability, category I tracing at this time  Will continue to pushing       Giovana Sharpe MD 8/12/2021 5:01 AM

## 2021-08-12 NOTE — OP NOTE
Brief History  Gloria Gibbs is a 31 y o  Melvenia Finner at 36w3d admitted for PPROM  She was induced with PO cytotec and pitocin and penicillin was started due to unknown GBS status  She progressed to completely dilated and pushed for  3 5 hours but the fetal station did not progress past +1  Since the baby was too high for a vacuum assisted delivery, it was decided to proceed with a primary   Operative Indications:  Primary low transverse  section for arrest of descent    Attending Surgeon:   [id number]  [facility] [place]  Resident Surgeon: Dr Albert Robles    Operative Findings:  1  Viable male  at 43w3d with APGARs of 9 and 9 at 1 and 5 minutes  Fetus weighted 6lb 1 5oz   2  Clear amniotic fluid  3  Normal intact placenta with normally inserted 3VC  4  Normal uterus, bilateral tubes and ovaries  5  Adhesions absent    Umbilical Cord Venous Blood Gas:  Results from last 7 days   Lab Units 21  0741   PH COV  7 359   PCO2 COV mm HG 37 3   HCO3 COV mmol/L 20 6   BASE EXC COV mmol/L -4 3*   O2 CT CD VB mL/dL 16 6   O2 HGB, VENOUS CORD % 85 4     Umbilical Cord Arterial Blood Gas:  Results from last 7 days   Lab Units 21  0741   PH COA  7 301   PCO2 COA  48 7   PO2 COA mm HG 17 0   HCO3 COA mmol/L 23 5   BASE EXC COA mmol/L -3 3*   O2 CONTENT CORD ART ml/dl 7 8   O2 HGB, ARTERIAL CORD % 36 8       Operative Technique  The patient was taken to the operating room  Epidural anesthesia was adequately adjusted after being started during labor  Ancef and azithromycin were given for preoperative prophylaxis  The patient was then placed in the dorsal supine position with a left tilt of the hips  The patient was then prepped with betadine for vaginal prep and chloraprep for abdominal prep and draped in the usual sterile fashion for a Pfannenstiel skin incision  A time out was performed to confirm correct patient and correct procedure       An incision was made in the skin with a surgical scalpel and sharp dissection was carried out over subsequent layers of tissue including the fascia, followed by the Bovie electrocautery for hemostasis  The fascia was incised at the midline and the fascial incision was extended bilaterally using the curved Garcia scissors  The inferior edge of the fascial incision was grasped with Kocher clamps, tented up and the underlying rectus muscles were dissected off bluntly  The same was done superiorly  The rectus muscles were then divided at midline and the peritoneum was identified, tented up at its upper margin taking care to avoid the bladder, and then entered  The peritoneal incision was extended superiorly and inferiorly  The bladder blade was inserted and a transverse incision was made in the lower uterine segment using a new surgical blade  The uterine incision was extended cephalad and caudal using blunt dissection  The amniotic sac was entered  The surgeon's hand was placed into the uterine cavity  The fetal head was identified and elevated through the uterine incision with the assistance of fundal pressure  With gentle traction, the shoulder was delivered, followed by the rest of the fetal body  The umbilical cord was noted to be wrapped around the 's trunk  On delivery the cord was doubly clamped and cut after delayed cord clamping  The infant was then passed off the table to the awaiting  staff  The  was noted to cry spontaneously and moved all extremities  Venous and arterial blood gas, cord blood, and portion of cord was obtained for analysis and routine blood testing  The placenta delivery was then sent to pathology  Placenta was noted to be intact with a normally inserted three-vessel cord  Oxytocin was administered by IV infusion to enhance uterine contraction  The uterus was exteriorized and cleared of all clots and remaining products of conception        The uterine incision was re approximated using an 0 Vicryl in a running locked fashion  A second horizontal imbricating stitch with the same suture was applied  The uterine incision was examined and noted to be hemostatic  The posterior cul-de-sac was cleared of all clots and products of conception  The uterus was *replaced into the abdomen and the pericolic gutters were cleared of all clots  The uterine incision was once again reexamined and noted to be hemostatic  The fascia was re approximated using an 0 Vicryl in a running nonlocked fashion  Good hemostasis was noted with Bovie electrocautery  The subcutaneous tissue was reapproximated with 2-0 plain suture  The skin incision was closed using 3-0 monocryl on a Berry needle and covered with exofin  Good hemostasis was noted  Patient tolerated the procedure well  All needle, sponge, and instrument counts were noted to be correct x 2 at the end of the procedure  Patient was transferred to the recovery room in stable condition  Dr Corinne Hirschfeld was present and participated in the entire surgery

## 2021-08-12 NOTE — OB LABOR/OXYTOCIN SAFETY PROGRESS
Oxytocin Safety Progress Check Note - Alee [place] 31 y o  female MRN: [id number]    Unit/Bed#: -01 Encounter: [id number]    Dose (stephanie-units/min) Oxytocin: 6 stephanie-units/min (dr Florez Bloodgood aware)  Contraction Frequency (minutes): 2-4  Contraction Quality: Moderate  Tachysystole: No   Cervical Dilation: Lip/rim (Comment)        Cervical Effacement: 90  Fetal Station: 1  Baseline Rate: 115 bpm  Fetal Heart Rate: 118 BPM  FHR Category: Category I  Oxytocin Safety Progress Check: Safety check completed            Vital Signs:   Vitals:    08/12/21 0012   BP: 111/51   Pulse: 64   Resp:    Temp:    SpO2:            Notes/comments:    Due for check  SVE at this time 9 5/90/+1  With anterior lip  FHT baseline 120 with occasional late deceleration present  Category 2 tracing at this time  Will continue to monitor recheck in 30 minutes    Brianda Schwartz MD 8/12/2021 12:59 AM

## 2021-08-12 NOTE — OB LABOR/OXYTOCIN SAFETY PROGRESS
Oxytocin Safety Progress Check Note - Bing Jermain 31 y o  female MRN: [id number]    Unit/Bed#: -01 Encounter: [id number]    Dose (stephanie-units/min) Oxytocin: 6 stephanie-units/min (dr Toussaint Felt aware)  Contraction Frequency (minutes): 3  Contraction Quality: Moderate  Tachysystole: No   Cervical Dilation: 7-8        Cervical Effacement: 90  Fetal Station: 0  Baseline Rate: 115 bpm  Fetal Heart Rate: 115 BPM  FHR Category: Category I  Oxytocin Safety Progress Check: Safety check completed            Vital Signs:   Vitals:    08/11/21 2045   BP: 104/58   Pulse:    Resp:    Temp:    SpO2:            Notes/comments:    Due for check  SVE at this time 7 5/90/0  FHT baseline 115, moderate variability, category 1 tracing at this time  Jaqui Q3 on toco   Will plan for recheck in 2 hours or sooner if indicated      Albert Holcomb MD 8/11/2021 10:52 PM

## 2021-08-12 NOTE — OB LABOR/OXYTOCIN SAFETY PROGRESS
Oxytocin Safety Progress Check Note - Deysi Watts 31 y o  female MRN: [id number]    Unit/Bed#: LD  Encounter: [id number]    Dose (stephanie-units/min) Oxytocin: 6 stephanie-units/min (dr Senait Ramirezr aware)  Contraction Frequency (minutes): 2-4  Contraction Quality: Moderate  Tachysystole: No   Cervical Dilation: Lip/rim (Comment)        Cervical Effacement: 90  Fetal Station: 1  Baseline Rate: 120 bpm  Fetal Heart Rate: 120 BPM  FHR Category: Category I  Oxytocin Safety Progress Check: Safety check completed            Vital Signs:   Vitals:    21 0142   BP: 124/59   Pulse: 90   Resp:    Temp:    SpO2:            Notes/comments:     at this time still unchanged  Anterior lip still present  FHT baseline 130, moderate variability, category 1 tracing at this time  Jaqui Q2-4 on toco   Will plan for recheck in 30 minutes      Debbie Qureshi MD 2021 1:53 AM

## 2021-08-12 NOTE — ANESTHESIA POSTPROCEDURE EVALUATION
Post-Op Assessment Note    CV Status:  Stable  Pain Score: 0    Pain management: adequate     Mental Status:  Alert and awake   Hydration Status:  Euvolemic   PONV Controlled:  Controlled   Airway Patency:  Patent      Post Op Vitals Reviewed: Yes      Staff: CRNA     Post-op block assessment: adhesive bandage applied, no complications, site cleaned and catheter intact      No complications documented      /53 (08/12/21 0837)    Temp 99 7 °F (37 6 °C) (08/12/21 0837)    Pulse 103 (08/12/21 0837)   Resp 20 (08/12/21 0837)    SpO2 99 % (08/12/21 0837)

## 2021-08-12 NOTE — DISCHARGE SUMMARY
Discharge Summary - Thanh Rivero 31 y o  female MRN: [id number]    Unit/Bed#: LD OR  Encounter: [id number]    Admission Date: 2021     Discharge Date: 8/15/2021    Admitting Attending: Butch Patel  Delivering Attending: Carlette Severin  Discharge Attending: Scooter    Diagnosis:  Pregnancy at 36w3d  PPROM    Procedures: primary low transverse  section     Complications: none apparent     Thanh Rivero is a 31 y o  Lavjose daniel Mari with an LISBETH of Estimated Date of Delivery: 21 at 36w3d who was admitted for PPROM  She was induced with PO cytotec and pitocin and penicillin was started due to unknown GBS status  She progressed to completely dilated and pushed for  3 5 hours but the fetal station did not progress past +1  Since the baby was too high for a vacuum assisted delivery, it was decided to proceed with a primary   She underwent an uncomplicated  delivery  She delivered a viable male  at 36w3d on 21  Weight was 6lbs 1 5oz with APGARs of 9 and 9 at 1 and 5 minutes  Her preoperative Hb was 11 3  Her postoperative Hb was 8 7  Her postoperative course was uncomplicated but she did receive half a bag of Venofer for her post-op anemia  Condition at discharge: good     On day of discharge pain was well controlled, patient was tolerating PO, passing flatus, and had a bowel movement  She was discharged with standard post partum/ post operative instructions to follow up with her physician in 1 week for an incision check and in 3-6 weeks for a postpartum appointment  Discharge instructions/Information to patient and family:   -Do not place anything (no partner, tampons or douche) in your vagina for 6 weeks    -You may walk for exercise for the first 6 weeks then gradually return to your usual activities    -Please do not drive for 1 week if you have no stitches and for 2 weeks if you have stitches or underwent a  delivery     -You may take baths or shower per your preference    -Please look at your bust (breasts) in the mirror daily and call for redness or tenderness or increased warmth    -Please call us for temperature > 100 4*F or 38* C, worsening pain or a foul discharge  Discharge Medications:   Prenatal vitamin daily for 6 months or the duration of nursing whichever is longer  Motrin 600 mg orally every 6 hours as needed for pain  Tylenol (over the counter) per bottle directions as needed for pain: do NOT use with percocet  Hydrocortisone cream 1% (over the counter) applied 1-2x daily to hemorrhoids as needed  Percocet as needed    Provisions for Follow-Up Care: Follow up with your doctor in 1 week for incision site check       Planned Readmission: eliz Hart MD   OB/Gyn PGY-1   1:11 PM  08/16/21

## 2021-08-12 NOTE — OB LABOR/OXYTOCIN SAFETY PROGRESS
Oxytocin Safety Progress Check Note - Felicitas Caicedo 31 y o  female MRN: [id number]    Unit/Bed#: -01 Encounter: [id number]    Dose (stephanie-units/min) Oxytocin: 6 stephanie-units/min (dr Katlyn Gibson aware)  Contraction Frequency (minutes): 3  Contraction Quality: Moderate  Tachysystole: No   Cervical Dilation: 10  Dilation Complete Date: 08/12/21  Dilation Complete Time: 0255  Cervical Effacement: 100  Fetal Station: 1  Baseline Rate: 125 bpm  Fetal Heart Rate: 125 BPM  FHR Category: Category I  Oxytocin Safety Progress Check: Safety check completed            Vital Signs:   Vitals:    08/12/21 0241   BP: 102/54   Pulse: 79   Resp:    Temp: 97 9 °F (36 6 °C)   SpO2:            Notes/comments:    1 hour since last exam   SVE at this time 10/100/+1  FHT baseline 125, moderate variability, category 1 tracing at this time  Will prepare to begin pushing      Katherine Caballero MD 8/12/2021 2:57 AM

## 2021-08-13 LAB — GP B STREP DNA SPEC QL NAA+PROBE: NEGATIVE

## 2021-08-13 PROCEDURE — 99024 POSTOP FOLLOW-UP VISIT: CPT | Performed by: STUDENT IN AN ORGANIZED HEALTH CARE EDUCATION/TRAINING PROGRAM

## 2021-08-13 RX ORDER — IBUPROFEN 600 MG/1
600 TABLET ORAL EVERY 6 HOURS SCHEDULED
Status: DISCONTINUED | OUTPATIENT
Start: 2021-08-13 | End: 2021-08-15 | Stop reason: HOSPADM

## 2021-08-13 RX ORDER — IBUPROFEN 600 MG/1
600 TABLET ORAL EVERY 6 HOURS PRN
Status: DISCONTINUED | OUTPATIENT
Start: 2021-08-13 | End: 2021-08-13

## 2021-08-13 RX ADMIN — NALBUPHINE HYDROCHLORIDE 3 MG: 10 INJECTION, SOLUTION INTRAMUSCULAR; INTRAVENOUS; SUBCUTANEOUS at 00:44

## 2021-08-13 RX ADMIN — ACETAMINOPHEN 650 MG: 325 TABLET, FILM COATED ORAL at 21:31

## 2021-08-13 RX ADMIN — DOCUSATE SODIUM 100 MG: 100 CAPSULE ORAL at 09:24

## 2021-08-13 RX ADMIN — IBUPROFEN 600 MG: 600 TABLET ORAL at 15:54

## 2021-08-13 RX ADMIN — ENOXAPARIN SODIUM 40 MG: 40 INJECTION SUBCUTANEOUS at 09:25

## 2021-08-13 RX ADMIN — DOCUSATE SODIUM 100 MG: 100 CAPSULE ORAL at 18:10

## 2021-08-13 RX ADMIN — IBUPROFEN 600 MG: 600 TABLET ORAL at 21:32

## 2021-08-13 RX ADMIN — ACETAMINOPHEN 650 MG: 325 TABLET, FILM COATED ORAL at 00:44

## 2021-08-13 RX ADMIN — ACETAMINOPHEN 650 MG: 325 TABLET, FILM COATED ORAL at 15:53

## 2021-08-13 RX ADMIN — ACETAMINOPHEN 650 MG: 325 TABLET, FILM COATED ORAL at 09:23

## 2021-08-13 RX ADMIN — KETOROLAC TROMETHAMINE 30 MG: 30 INJECTION, SOLUTION INTRAMUSCULAR; INTRAVENOUS at 00:44

## 2021-08-13 RX ADMIN — IBUPROFEN 600 MG: 600 TABLET ORAL at 09:27

## 2021-08-13 RX ADMIN — SODIUM CITRATE AND CITRIC ACID MONOHYDRATE 30 ML: 500; 334 SOLUTION ORAL at 18:13

## 2021-08-13 NOTE — PLAN OF CARE
appropriate isolation precautions for identified infection/condition  Outcome: Progressing  Goal: Absence of fever/infection during neutropenic period  Description: INTERVENTIONS:  - Monitor WBC    Outcome: Progressing     Problem: SAFETY ADULT  Goal: Patient will remain free of falls  Description: INTERVENTIONS:  - Educate patient/family on patient safety including physical limitations  - Instruct patient to call for assistance with activity   - Consult OT/PT to assist with strengthening/mobility   - Keep Call bell within reach  - Keep bed low and locked with side rails adjusted as appropriate  - Keep care items and personal belongings within reach  - Initiate and maintain comfort rounds  - Make Fall Risk Sign visible to staff  - Offer Toileting every  Hours, in advance of need  - Initiate/Maintain alarm  - Obtain necessary fall risk management equipment:   - Apply yellow socks and bracelet for high fall risk patients  - Consider moving patient to room near nurses station  Outcome: Progressing  Goal: Maintain or return to baseline ADL function  Description: INTERVENTIONS:  -  Assess patient's ability to carry out ADLs; assess patient's baseline for ADL function and identify physical deficits which impact ability to perform ADLs (bathing, care of mouth/teeth, toileting, grooming, dressing, etc )  - Assess/evaluate cause of self-care deficits   - Assess range of motion  - Assess patient's mobility; develop plan if impaired  - Assess patient's need for assistive devices and provide as appropriate  - Encourage maximum independence but intervene and supervise when necessary  - Involve family in performance of ADLs  - Assess for home care needs following discharge   - Consider OT consult to assist with ADL evaluation and planning for discharge  - Provide patient education as appropriate  Outcome: Progressing  Goal: Maintains/Returns to pre admission functional level  Description: INTERVENTIONS:  - Perform BMAT or MOVE assessment daily    - Set and communicate daily mobility goal to care team and patient/family/caregiver  - Collaborate with rehabilitation services on mobility goals if consulted  - Perform Range of Motion times a day  - Reposition patient every  hours    - Dangle patient  times a day  - Stand patient  times a day  - Ambulate patient  times a day  - Out of bed to chair  times a day   - Out of bed for meals  times a day  - Out of bed for toileting  - Record patient progress and toleration of activity level   Outcome: Progressing     Problem: DISCHARGE PLANNING  Goal: Discharge to home or other facility with appropriate resources  Description: INTERVENTIONS:  - Identify barriers to discharge w/patient and caregiver  - Arrange for needed discharge resources and transportation as appropriate  - Identify discharge learning needs (meds, wound care, etc )  - Arrange for interpretive services to assist at discharge as needed  - Refer to Case Management Department for coordinating discharge planning if the patient needs post-hospital services based on physician/advanced practitioner order or complex needs related to functional status, cognitive ability, or social support system  Outcome: Progressing     Problem: POSTPARTUM  Goal: Experiences normal postpartum course  Description: INTERVENTIONS:  - Monitor maternal vital signs  - Assess uterine involution and lochia  Outcome: Progressing  Goal: Appropriate maternal -  bonding  Description: INTERVENTIONS:  - Identify family support  - Assess for appropriate maternal/infant bonding   -Encourage maternal/infant bonding opportunities  - Referral to  or  as needed  Outcome: Progressing  Goal: Establishment of infant feeding pattern  Description: INTERVENTIONS:  - Assess breast/bottle feeding  - Refer to lactation as needed  Outcome: Progressing  Goal: Incision(s), wounds(s) or drain site(s) healing without S/S of infection  Description: INTERVENTIONS  - Assess and document dressing, incision, wound bed, drain sites and surrounding tissue  - Provide patient and family education  - Perform skin care/dressing changes every   Outcome: Progressing

## 2021-08-13 NOTE — PROGRESS NOTES
Progress Note - OB/GYN   Bayron Briggs 31 y o  female MRN: [id number]  Unit/Bed#: -01 Encounter: [id number]    Assessment:  POD#1 s/p primary low transverse  section, stable    Plan:  1) Post-op care  Encourage ambulation   Encourage breastfeeding  Continue current meds   Hgb 11 3 --> 8 7 -> venofer ordered, half bag given prior to IV infiltration, patient asymptomatic  2) GBS unknown  3) DVT ppx: Lovenox 40 qhs ordered  4) Disposition: discharge home POD 2 vs 3    Subjective/Objective     Subjective:     Pain: no  Tolerating PO: yes  Voiding: yes  Flatus: yes  BM: no  Ambulating: yes  Breastfeeding: Breastfeeding  Chest pain: no  Shortness of breath: no  Leg pain: no    Objective:     Vitals:  Vitals:    21 1703 21 2000 21 0000 21 0405   BP: 101/51 102/50 96/53 102/55   BP Location: Left arm Left arm Left arm Left arm   Pulse: 77 81 82 69   Resp: 18 18 18 18   Temp: 99 °F (37 2 °C) 98 8 °F (37 1 °C) 98 1 °F (36 7 °C) 98 8 °F (37 1 °C)   TempSrc: Oral Oral Oral Oral   SpO2: 98% 98% 98% 98%   Weight:       Height:           Physical Exam:   GEN:appears well, alert and oriented x 3, pleasant and cooperative   CV: RRR, no m/r/g  Lungs: CTA b/l, no w/r/r  ABDOMEN: soft, moderate tenderness throughout abdomen,   Uterine fundus firm and non-tender, -1 cm below the umbilicus, Incision C/D/I  EXTREMITIES: non-tender, no edema       Lab Results   Component Value Date    WBC 17 76 (H) 2021    HGB 8 7 (L) 2021    HCT 27 2 (L) 2021    MCV 91 2021     2021           Andrés Rodriguez MD, PGY-1  Obstetrics & Gynecology  21  8:11 AM

## 2021-08-14 PROCEDURE — 99024 POSTOP FOLLOW-UP VISIT: CPT | Performed by: OBSTETRICS & GYNECOLOGY

## 2021-08-14 RX ADMIN — IBUPROFEN 600 MG: 600 TABLET ORAL at 09:30

## 2021-08-14 RX ADMIN — DOCUSATE SODIUM 100 MG: 100 CAPSULE ORAL at 17:37

## 2021-08-14 RX ADMIN — ENOXAPARIN SODIUM 40 MG: 40 INJECTION SUBCUTANEOUS at 09:30

## 2021-08-14 RX ADMIN — SODIUM CITRATE AND CITRIC ACID MONOHYDRATE 30 ML: 500; 334 SOLUTION ORAL at 10:16

## 2021-08-14 RX ADMIN — SODIUM CITRATE AND CITRIC ACID MONOHYDRATE 30 ML: 500; 334 SOLUTION ORAL at 22:41

## 2021-08-14 RX ADMIN — ACETAMINOPHEN 650 MG: 325 TABLET, FILM COATED ORAL at 21:01

## 2021-08-14 RX ADMIN — IBUPROFEN 600 MG: 600 TABLET ORAL at 03:31

## 2021-08-14 RX ADMIN — DOCUSATE SODIUM 100 MG: 100 CAPSULE ORAL at 09:29

## 2021-08-14 RX ADMIN — ACETAMINOPHEN 650 MG: 325 TABLET, FILM COATED ORAL at 03:31

## 2021-08-14 RX ADMIN — ACETAMINOPHEN 650 MG: 325 TABLET, FILM COATED ORAL at 15:18

## 2021-08-14 RX ADMIN — IBUPROFEN 600 MG: 600 TABLET ORAL at 21:01

## 2021-08-14 RX ADMIN — IBUPROFEN 600 MG: 600 TABLET ORAL at 15:20

## 2021-08-14 RX ADMIN — ACETAMINOPHEN 650 MG: 325 TABLET, FILM COATED ORAL at 09:29

## 2021-08-14 NOTE — PROGRESS NOTES
Progress Note - OB/GYN   David Dela Cruz 31 y o  female MRN: [id number]  Unit/Bed#: -01 Encounter: [id number]    Assessment:  POD#2 s/p primary low transverse  section, stable    Plan:  1) Post-op care  Encourage ambulation   Encourage breastfeeding  Continue current meds   Hgb 11 3 --> 8 7 -> venofer ordered, half bag given prior to IV infiltration, patient asymptomatic  2) GBS unknown  3) DVT ppx: Lovenox 40 qhs ordered  4) Disposition: discharge home later today if baby clear to go    Subjective/Objective     Subjective:     Pain: no  Tolerating PO: yes  Voiding: yes  Flatus: yes  BM: no  Ambulating: yes  Breastfeeding: Breastfeeding  Chest pain: no  Shortness of breath: no  Leg pain: no    Objective:     Vitals:  Vitals:    21 1227 21 1554 21 2000 21 0000   BP: 94/51 115/53 99/53 113/57   BP Location:  Left arm Left arm Right arm   Pulse: 88 82 89 87   Resp: 20 18 18 18   Temp: 98 2 °F (36 8 °C) 98 3 °F (36 8 °C) 98 3 °F (36 8 °C) 98 3 °F (36 8 °C)   TempSrc: Oral Oral Oral Oral   SpO2: 99% 100% 98% 98%   Weight:       Height:           Physical Exam:   GEN:appears well, alert and oriented x 3, pleasant and cooperative   CV: RRR, no m/r/g  Lungs: CTA b/l, no w/r/r  ABDOMEN: soft, moderate tenderness throughout abdomen,   Uterine fundus firm and non-tender, -1 cm below the umbilicus, Incision C/D/I  EXTREMITIES: non-tender, no edema       Lab Results   Component Value Date    WBC 17 76 (H) 2021    HGB 8 7 (L) 2021    HCT 27 2 (L) 2021    MCV 91 2021     2021           Donato Castro MD, PGY-1  Obstetrics & Gynecology  21  6:10 AM

## 2021-08-14 NOTE — PLAN OF CARE
Problem: POSTPARTUM  Goal: Experiences normal postpartum course  Description: INTERVENTIONS:  - Monitor maternal vital signs  - Assess uterine involution and lochia  Outcome: Progressing  Goal: Appropriate maternal -  bonding  Description: INTERVENTIONS:  - Identify family support  - Assess for appropriate maternal/infant bonding   -Encourage maternal/infant bonding opportunities  - Referral to  or  as needed  Outcome: Progressing  Goal: Establishment of infant feeding pattern  Description: INTERVENTIONS:  - Assess breast/bottle feeding  - Refer to lactation as needed  Outcome: Progressing  Goal: Incision(s), wounds(s) or drain site(s) healing without S/S of infection  Description: INTERVENTIONS  - Assess and document dressing, incision, wound bed, drain sites and surrounding tissue  - Provide patient and family education  - Perform skin care/dressing changes every   Outcome: Progressing     Problem: PAIN - ADULT  Goal: Verbalizes/displays adequate comfort level or baseline comfort level  Description: Interventions:  - Encourage patient to monitor pain and request assistance  - Assess pain using appropriate pain scale  - Administer analgesics based on type and severity of pain and evaluate response  - Implement non-pharmacological measures as appropriate and evaluate response  - Consider cultural and social influences on pain and pain management  - Notify physician/advanced practitioner if interventions unsuccessful or patient reports new pain  Outcome: Progressing     Problem: INFECTION - ADULT  Goal: Absence or prevention of progression during hospitalization  Description: INTERVENTIONS:  - Assess and monitor for signs and symptoms of infection  - Monitor lab/diagnostic results  - Monitor all insertion sites, i e  indwelling lines, tubes, and drains  - Monitor endotracheal if appropriate and nasal secretions for changes in amount and color  - [place] appropriate cooling/warming therapies per order  - Administer medications as ordered  - Instruct and encourage patient and family to use good hand hygiene technique  - Identify and instruct in appropriate isolation precautions for identified infection/condition  Outcome: Progressing  Goal: Absence of fever/infection during neutropenic period  Description: INTERVENTIONS:  - Monitor WBC    Outcome: Progressing     Problem: SAFETY ADULT  Goal: Patient will remain free of falls  Description: INTERVENTIONS:  - Educate patient/family on patient safety including physical limitations  - Instruct patient to call for assistance with activity   - Consult OT/PT to assist with strengthening/mobility   - Keep Call bell within reach  - Keep bed low and locked with side rails adjusted as appropriate  - Keep care items and personal belongings within reach  - Initiate and maintain comfort rounds  - Make Fall Risk Sign visible to staff  - Offer Toileting every  Hours, in advance of need  - Initiate/Maintain alarm  - Obtain necessary fall risk management equipment:   - Apply yellow socks and bracelet for high fall risk patients  - Consider moving patient to room near nurses station  Outcome: Progressing  Goal: Maintain or return to baseline ADL function  Description: INTERVENTIONS:  -  Assess patient's ability to carry out ADLs; assess patient's baseline for ADL function and identify physical deficits which impact ability to perform ADLs (bathing, care of mouth/teeth, toileting, grooming, dressing, etc )  - Assess/evaluate cause of self-care deficits   - Assess range of motion  - Assess patient's mobility; develop plan if impaired  - Assess patient's need for assistive devices and provide as appropriate  - Encourage maximum independence but intervene and supervise when necessary  - Involve family in performance of ADLs  - Assess for home care needs following discharge   - Consider OT consult to assist with ADL evaluation and planning for discharge  - Provide patient education as appropriate  Outcome: Progressing  Goal: Maintains/Returns to pre admission functional level  Description: INTERVENTIONS:  - Perform BMAT or MOVE assessment daily    - Set and communicate daily mobility goal to care team and patient/family/caregiver  - Collaborate with rehabilitation services on mobility goals if consulted  - Perform Range of Motion  times a day  - Reposition patient every  hours    - Dangle patient  times a day  - Stand patient  times a day  - Ambulate patient  times a day  - Out of bed to chair  times a day   - Out of bed for meals times a day  - Out of bed for toileting  - Record patient progress and toleration of activity level   Outcome: Progressing     Problem: DISCHARGE PLANNING  Goal: Discharge to home or other facility with appropriate resources  Description: INTERVENTIONS:  - Identify barriers to discharge w/patient and caregiver  - Arrange for needed discharge resources and transportation as appropriate  - Identify discharge learning needs (meds, wound care, etc )  - Arrange for interpretive services to assist at discharge as needed  - Refer to Case Management Department for coordinating discharge planning if the patient needs post-hospital services based on physician/advanced practitioner order or complex needs related to functional status, cognitive ability, or social support system  Outcome: Progressing

## 2021-08-14 NOTE — LACTATION NOTE
This note was copied from a baby's chart  Information  on breastfeeding a late  infant reviewed with parents  Encouraged feeding every 2-3 hours around the clock followed by hand expressing/pumping and supplementing with formula as ordered  Mom was able to place baby at breast with use of nipple shield with minimal assistance  Baby latched with the shield for about 5 minutes  Is sleepy due to just being circumcised this morning  She is supplementing baby with her pumped breast milk via finger feed/syringe and giving formula via paced bottle feeding  David Sparks was hoping to go home today  All discharge instructions were reviewed and questions were answered  Encouraged her to call with additional questions and concerns, or for additional assistance as needed

## 2021-08-14 NOTE — PLAN OF CARE
Problem: Labor & Delivery  Goal: Manages discomfort  Description: Assess and monitor for signs and symptoms of discomfort  Assess patient's pain level regularly and per hospital policy  Administer medications as ordered  Support use of nonpharmacological methods to help control pain such as distraction, imagery, relaxation, and application of heat and cold  Collaborate with interdisciplinary team and patient to determine appropriate pain management plan  1  Include patient in decisions related to comfort  2  Offer non-pharmacological pain management interventions  3  Report ineffective pain management to physician  2021 1049 by Amna Horn RN  Outcome: Completed  20219 by Amna Horn RN  Outcome: Progressing  Goal: Patient vital signs are stable  Description: 1  Assess vital signs - vaginal delivery    2021 104 by Amna Horn RN  Outcome: Completed  2021 by Amna Horn RN  Outcome: Progressing     Problem: POSTPARTUM  Goal: Experiences normal postpartum course  Description: INTERVENTIONS:  - Monitor maternal vital signs  - Assess uterine involution and lochia  2021 104 by Amna Horn RN  Outcome: Progressing  2021 by Amna Horn RN  Outcome: Progressing  Goal: Appropriate maternal -  bonding  Description: INTERVENTIONS:  - Identify family support  - Assess for appropriate maternal/infant bonding   -Encourage maternal/infant bonding opportunities  - Referral to  or  as needed  2021 by Amna Horn RN  Outcome: Progressing  2021 by Amna Horn RN  Outcome: Progressing  Goal: Establishment of infant feeding pattern  Description: INTERVENTIONS:  - Assess breast/bottle feeding  - Refer to lactation as needed  2021 104 by Amna Horn RN  Outcome: Progressing  2021 by Amna Horn RN  Outcome: Progressing  Goal: Incision(s), wounds(s) or drain site(s) healing without S/S of infection  Description: INTERVENTIONS  - Assess and document dressing, incision, wound bed, drain sites and surrounding tissue  - Provide patient and family education  - Perform skin care/dressing changes as needed  8/14/2021 1049 by Herbert Isaacs RN  Outcome: Progressing  8/14/2021 1049 by eHrbert Isaacs RN  Outcome: Progressing     Problem: PAIN - ADULT  Goal: Verbalizes/displays adequate comfort level or baseline comfort level  Description: Interventions:  - Encourage patient to monitor pain and request assistance  - Assess pain using appropriate pain scale  - Administer analgesics based on type and severity of pain and evaluate response  - Implement non-pharmacological measures as appropriate and evaluate response  - Consider cultural and social influences on pain and pain management  - Notify physician/advanced practitioner if interventions unsuccessful or patient reports new pain  8/14/2021 1049 by Herbert Isaacs RN  Outcome: Progressing  8/14/2021 1049 by Herbert Isaacs RN  Outcome: Progressing     Problem: INFECTION - ADULT  Goal: Absence or prevention of progression during hospitalization  Description: INTERVENTIONS:  - Assess and monitor for signs and symptoms of infection  - Monitor lab/diagnostic results  - Monitor all insertion sites, i e  indwelling lines, tubes, and drains  - Monitor endotracheal if appropriate and nasal secretions for changes in amount and color  - [place] appropriate cooling/warming therapies per order  - Administer medications as ordered  - Instruct and encourage patient and family to use good hand hygiene technique  - Identify and instruct in appropriate isolation precautions for identified infection/condition  8/14/2021 1049 by Herbert Isaacs RN  Outcome: Progressing  8/14/2021 1049 by Herbert Isaacs RN  Outcome: Progressing  Goal: Absence of fever/infection during neutropenic period  Description: INTERVENTIONS:  - Monitor WBC    8/14/2021 1049 by Herbert Isaacs RN  Outcome: Progressing  8/14/2021 1049 by Kristine Sanchez RN  Outcome: Progressing     Problem: SAFETY ADULT  Goal: Patient will remain free of falls  Description: INTERVENTIONS:  - Educate patient/family on patient safety including physical limitations  - Instruct patient to call for assistance with activity   - Consult OT/PT to assist with strengthening/mobility   - Keep Call bell within reach  - Keep bed low and locked with side rails adjusted as appropriate  - Keep care items and personal belongings within reach  - Initiate and maintain comfort rounds  - Make Fall Risk Sign visible to staff  - Offer Toileting as needed, in advance of need  - Initiate/Maintain alarm  - Obtain necessary fall risk management equipment:   - Apply yellow socks and bracelet for high fall risk patients  - Consider moving patient to room near nurses station  8/14/2021 1049 by Kristine Sanchez RN  Outcome: Progressing  8/14/2021 1049 by Kristine Sanchez RN  Outcome: Progressing  Goal: Maintain or return to baseline ADL function  Description: INTERVENTIONS:  -  Assess patient's ability to carry out ADLs; assess patient's baseline for ADL function and identify physical deficits which impact ability to perform ADLs (bathing, care of mouth/teeth, toileting, grooming, dressing, etc )  - Assess/evaluate cause of self-care deficits   - Assess range of motion  - Assess patient's mobility; develop plan if impaired  - Assess patient's need for assistive devices and provide as appropriate  - Encourage maximum independence but intervene and supervise when necessary  - Involve family in performance of ADLs  - Assess for home care needs following discharge   - Consider OT consult to assist with ADL evaluation and planning for discharge  - Provide patient education as appropriate  8/14/2021 1049 by Kristine Sanchez RN  Outcome: Progressing  8/14/2021 1049 by Kristine Sanchez RN  Outcome: Progressing  Goal: Maintains/Returns to pre admission functional level  Description: INTERVENTIONS:  - Perform BMAT or MOVE assessment daily    - Set and communicate daily mobility goal to care team and patient/family/caregiver     - Collaborate with rehabilitation services on mobility goals if consulted  - Perform Range of Motion as needed  - Reposition patient as needed  - Dangle patient as needed  - Stand patient as needed  - Ambulate patient as needed  - Out of bed to chair as needed  - Out of bed for meals as needed  - Out of bed for toileting  - Record patient progress and toleration of activity level   8/14/2021 1049 by Harshal Marquez RN  Outcome: Progressing  8/14/2021 1049 by Harshal Marquez RN  Outcome: Progressing     Problem: DISCHARGE PLANNING  Goal: Discharge to home or other facility with appropriate resources  Description: INTERVENTIONS:  - Identify barriers to discharge w/patient and caregiver  - Arrange for needed discharge resources and transportation as appropriate  - Identify discharge learning needs (meds, wound care, etc )  - Arrange for interpretive services to assist at discharge as needed  - Refer to Case Management Department for coordinating discharge planning if the patient needs post-hospital services based on physician/advanced practitioner order or complex needs related to functional status, cognitive ability, or social support system  8/14/2021 1049 by Harshal Marquez RN  Outcome: Progressing  8/14/2021 1049 by Harshal Marquez RN  Outcome: Progressing

## 2021-08-15 VITALS
OXYGEN SATURATION: 99 % | SYSTOLIC BLOOD PRESSURE: 116 MMHG | DIASTOLIC BLOOD PRESSURE: 82 MMHG | TEMPERATURE: 98.4 F | RESPIRATION RATE: 20 BRPM | HEART RATE: 79 BPM | HEIGHT: 62 IN | WEIGHT: 190 LBS | BODY MASS INDEX: 34.96 KG/M2

## 2021-08-15 PROCEDURE — 99024 POSTOP FOLLOW-UP VISIT: CPT | Performed by: OBSTETRICS & GYNECOLOGY

## 2021-08-15 RX ORDER — ACETAMINOPHEN AND CODEINE PHOSPHATE 120; 12 MG/5ML; MG/5ML
1 SOLUTION ORAL DAILY
Refills: 0
Start: 2021-08-15 | End: 2021-09-09 | Stop reason: SDUPTHER

## 2021-08-15 RX ORDER — IBUPROFEN 600 MG/1
600 TABLET ORAL EVERY 6 HOURS SCHEDULED
Qty: 30 TABLET | Refills: 0
Start: 2021-08-15 | End: 2021-09-09 | Stop reason: ALTCHOICE

## 2021-08-15 RX ORDER — ACETAMINOPHEN 325 MG/1
650 TABLET ORAL EVERY 6 HOURS SCHEDULED
Refills: 0
Start: 2021-08-15 | End: 2021-09-09 | Stop reason: ALTCHOICE

## 2021-08-15 RX ADMIN — ENOXAPARIN SODIUM 40 MG: 40 INJECTION SUBCUTANEOUS at 08:14

## 2021-08-15 RX ADMIN — IBUPROFEN 600 MG: 600 TABLET ORAL at 11:27

## 2021-08-15 RX ADMIN — DOCUSATE SODIUM 100 MG: 100 CAPSULE ORAL at 08:13

## 2021-08-15 RX ADMIN — ACETAMINOPHEN 650 MG: 325 TABLET, FILM COATED ORAL at 03:45

## 2021-08-15 RX ADMIN — IBUPROFEN 600 MG: 600 TABLET ORAL at 03:45

## 2021-08-15 RX ADMIN — ACETAMINOPHEN 650 MG: 325 TABLET, FILM COATED ORAL at 11:26

## 2021-08-15 RX ADMIN — SODIUM CITRATE AND CITRIC ACID MONOHYDRATE 30 ML: 500; 334 SOLUTION ORAL at 08:14

## 2021-08-15 NOTE — PROGRESS NOTES
Progress Note - OB/GYN   Era Jose 31 y o  female MRN: [id number]  Unit/Bed#: -01 Encounter: [id number]    Assessment:  POD#3 s/p primary low transverse  section, stable    Plan:  1) Post-op care  Encourage ambulation   Encourage breastfeeding  Continue current meds   Hgb 11 3 --> 8 7 -> venofer ordered, half bag given prior to IV infiltration, patient asymptomatic  2) GBS negative  3) DVT ppx: Lovenox 40 qhs ordered  4) Disposition: discharge home today    Subjective/Objective     Subjective:     Pain: no  Tolerating PO: yes  Voiding: yes  Flatus: yes  BM: no  Ambulating: yes  Breastfeeding: Breastfeeding  Chest pain: no  Shortness of breath: no  Leg pain: no    Objective:     Vitals:  Vitals:    21 1247 21 1559 21 1950 08/15/21 0001   BP: 118/64 116/66 122/61 118/58   BP Location: Right arm Right arm Right arm Right arm   Pulse: 80 86 83 80   Resp: 18 18 18 18   Temp: 98 7 °F (37 1 °C) 98 2 °F (36 8 °C) 99 2 °F (37 3 °C) 98 6 °F (37 °C)   TempSrc: Oral Oral Oral Oral   SpO2:    98%   Weight:       Height:           Physical Exam:   GEN:appears well, alert and oriented x 3, pleasant and cooperative   CV: RRR, no m/r/g  Lungs: CTA b/l, no w/r/r  ABDOMEN: soft, moderate tenderness throughout abdomen,   Uterine fundus firm and non-tender, -1 cm below the umbilicus, Incision C/D/I  EXTREMITIES: non-tender, no edema       Lab Results   Component Value Date    WBC 17 76 (H) 2021    HGB 8 7 (L) 2021    HCT 27 2 (L) 2021    MCV 91 2021     2021           Milvia Wilson MD, PGY-1  Obstetrics & Gynecology  08/15/21  7:25 AM

## 2021-08-15 NOTE — PLAN OF CARE
Problem: POSTPARTUM  Goal: Experiences normal postpartum course  Description: INTERVENTIONS:  - Monitor maternal vital signs  - Assess uterine involution and lochia  Outcome: Progressing  Goal: Appropriate maternal -  bonding  Description: INTERVENTIONS:  - Identify family support  - Assess for appropriate maternal/infant bonding   -Encourage maternal/infant bonding opportunities  - Referral to  or  as needed  Outcome: Progressing  Goal: Establishment of infant feeding pattern  Description: INTERVENTIONS:  - Assess breast/bottle feeding  - Refer to lactation as needed  Outcome: Progressing  Goal: Incision(s), wounds(s) or drain site(s) healing without S/S of infection  Description: INTERVENTIONS  - Assess and document dressing, incision, wound bed, drain sites and surrounding tissue  - Provide patient and family education  - Perform skin care/dressing changes every   Outcome: Progressing     Problem: PAIN - ADULT  Goal: Verbalizes/displays adequate comfort level or baseline comfort level  Description: Interventions:  - Encourage patient to monitor pain and request assistance  - Assess pain using appropriate pain scale  - Administer analgesics based on type and severity of pain and evaluate response  - Implement non-pharmacological measures as appropriate and evaluate response  - Consider cultural and social influences on pain and pain management  - Notify physician/advanced practitioner if interventions unsuccessful or patient reports new pain  Outcome: Progressing     Problem: INFECTION - ADULT  Goal: Absence or prevention of progression during hospitalization  Description: INTERVENTIONS:  - Assess and monitor for signs and symptoms of infection  - Monitor lab/diagnostic results  - Monitor all insertion sites, i e  indwelling lines, tubes, and drains  - Monitor endotracheal if appropriate and nasal secretions for changes in amount and color  - [place] appropriate cooling/warming therapies per order  - Administer medications as ordered  - Instruct and encourage patient and family to use good hand hygiene technique  - Identify and instruct in appropriate isolation precautions for identified infection/condition  Outcome: Progressing  Goal: Absence of fever/infection during neutropenic period  Description: INTERVENTIONS:  - Monitor WBC    Outcome: Progressing     Problem: SAFETY ADULT  Goal: Patient will remain free of falls  Description: INTERVENTIONS:  - Educate patient/family on patient safety including physical limitations  - Instruct patient to call for assistance with activity   - Consult OT/PT to assist with strengthening/mobility   - Keep Call bell within reach  - Keep bed low and locked with side rails adjusted as appropriate  - Keep care items and personal belongings within reach  - Initiate and maintain comfort rounds  - Make Fall Risk Sign visible to staff  - Offer Toileting every  Hours, in advance of need  - Initiate/Maintain alarm  - Obtain necessary fall risk management equipment:   - Apply yellow socks and bracelet for high fall risk patients  - Consider moving patient to room near nurses station  Outcome: Progressing  Goal: Maintain or return to baseline ADL function  Description: INTERVENTIONS:  -  Assess patient's ability to carry out ADLs; assess patient's baseline for ADL function and identify physical deficits which impact ability to perform ADLs (bathing, care of mouth/teeth, toileting, grooming, dressing, etc )  - Assess/evaluate cause of self-care deficits   - Assess range of motion  - Assess patient's mobility; develop plan if impaired  - Assess patient's need for assistive devices and provide as appropriate  - Encourage maximum independence but intervene and supervise when necessary  - Involve family in performance of ADLs  - Assess for home care needs following discharge   - Consider OT consult to assist with ADL evaluation and planning for discharge  - Provide patient education as appropriate  Outcome: Progressing  Goal: Maintains/Returns to pre admission functional level  Description: INTERVENTIONS:  - Perform BMAT or MOVE assessment daily    - Set and communicate daily mobility goal to care team and patient/family/caregiver  - Collaborate with rehabilitation services on mobility goals if consulted  - Perform Range of Motion  times a day  - Reposition patient every  hours    - Dangle patient  times a day  - Stand patient  times a day  - Ambulate patient  times a day  - Out of bed to chair  times a day   - Out of bed for meals times a day  - Out of bed for toileting  - Record patient progress and toleration of activity level   Outcome: Progressing     Problem: DISCHARGE PLANNING  Goal: Discharge to home or other facility with appropriate resources  Description: INTERVENTIONS:  - Identify barriers to discharge w/patient and caregiver  - Arrange for needed discharge resources and transportation as appropriate  - Identify discharge learning needs (meds, wound care, etc )  - Arrange for interpretive services to assist at discharge as needed  - Refer to Case Management Department for coordinating discharge planning if the patient needs post-hospital services based on physician/advanced practitioner order or complex needs related to functional status, cognitive ability, or social support system  Outcome: Progressing

## 2021-08-17 ENCOUNTER — TRANSITIONAL CARE MANAGEMENT (OUTPATIENT)
Dept: FAMILY MEDICINE CLINIC | Facility: CLINIC | Age: 31
End: 2021-08-17

## 2021-08-17 NOTE — UTILIZATION REVIEW
Camille Hart MD   Resident   OB/GYN   Discharge Summary      Attested   Date of Service:  2021  8:52 AM               Attested        Attestation signed by Chad Moreno MD at 2021 9:37 AM   I have reviewed the notes, assessments, and/or procedures performed by Dr Anna Beckwith, I concur with her/his documentation of Bing Aly  I have examined the patient myself                  Discharge Summary - Bing Aly 31 y o  female MRN: [id number]     Unit/Bed#: LD OR  Encounter: [id number]     Admission Date: 2021      Discharge Date: 8/15/2021     Admitting AttendinKirsten Ye  Delivering Attendin  [facility]  Discharge Attending: Scooter     Diagnosis:  Pregnancy at 36w3d  PPROM     Procedures: primary low transverse  section      Complications: none apparent      Bing Aly is a 31 y o  Young [place] with an LISBETH of Estimated Date of Delivery: 21 at 36w3d who was admitted for PPROM  She was induced with PO cytotec and pitocin and penicillin was started due to unknown GBS status  She progressed to completely dilated and pushed for  3 5 hours but the fetal station did not progress past +1  Since the baby was too high for a vacuum assisted delivery, it was decided to proceed with a primary   She underwent an uncomplicated  delivery  She delivered a viable male  at 36w3d on 21  Weight was 6lbs 1 5oz with APGARs of 9 and 9 at 1 and 5 minutes  Her preoperative Hb was 11 3  Her postoperative Hb was 8 7  Her postoperative course was uncomplicated but she did receive half a bag of Venofer for her post-op anemia       Condition at discharge: good      On day of discharge pain was well controlled, patient was tolerating PO, passing flatus, and had a bowel movement   She was discharged with standard post partum/ post operative instructions to follow up with her physician in 1 week for an incision check and in 3-6 weeks for a postpartum appointment       Discharge instructions/Information to patient and family:   -Do not place anything (no partner, tampons or douche) in your vagina for 6 weeks  -You may walk for exercise for the first 6 weeks then gradually return to your usual activities    -Please do not drive for 1 week if you have no stitches and for 2 weeks if you have stitches or underwent a  delivery     -You may take baths or shower per your preference    -Please look at your bust (breasts) in the mirror daily and call for redness or tenderness or increased warmth    -Please call us for temperature > 100 4*F or 38* C, worsening pain or a foul discharge        Discharge Medications:   Prenatal vitamin daily for 6 months or the duration of nursing whichever is longer  Motrin 600 mg orally every 6 hours as needed for pain  Tylenol (over the counter) per bottle directions as needed for pain: do NOT use with percocet  Hydrocortisone cream 1% (over the counter) applied 1-2x daily to hemorrhoids as needed  Percocet as needed     Provisions for Follow-Up Care:   Follow up with your doctor in 1 week for incision site check       Planned Readmission: eliz Hart MD   OB/Gyn PGY-1   1:11 PM  21                           Cosigned by: Chad Moreno MD at 2021  9:37 AM   Revision History

## 2021-08-20 ENCOUNTER — TELEPHONE (OUTPATIENT)
Dept: POSTPARTUM | Facility: CLINIC | Age: 31
End: 2021-08-20

## 2021-08-20 NOTE — TELEPHONE ENCOUNTER
Girma Sainz Deems 8 days is not latching well - moms nipples are damaged - Girma Ma is also engorged & just wants to check on what else to do       Appt is set up for 8/26

## 2021-08-20 NOTE — TELEPHONE ENCOUNTER
Alma Linder has been struggling to latch since birth  He has been feeding with a nipple shield since then and seems to be feeding well with the shield but Enriqueta's nipples are sore  They are cracked and bleeding  She also has a firm area in her left breast for the last 3 days on the lateral aspect but near the nipple  Both breasts feel engorged, even after feeding  She has pumped for comfort a few times since discharge  We discussed that Alma Linder' latch is likely still not optimal , even with the shield  This is likely causing her pain and damage and poor breast emptying  I suggested some pumping to help improve engorgement and to protect supply until effective breastfeeding without the shield is established  We discussed effective pumping technique, the importance of GENTLE massage or, preferably vibration while pumping , to avoid additional breast trauma  I encouraged her to continue to use moist heat prior to feeding or pumping and moist heat or cool compresses for comfort afterwards  I gave her instructions for moist wound care  I suggested pumping and feeding expressed milk if feeding at the breast becomes too painful  I encouraged her to spend lots of time skin to skin with Alma Linder and to start tummy time  We will follow up as scheduled next week

## 2021-08-26 ENCOUNTER — OFFICE VISIT (OUTPATIENT)
Dept: POSTPARTUM | Facility: CLINIC | Age: 31
End: 2021-08-26
Payer: COMMERCIAL

## 2021-08-26 VITALS — DIASTOLIC BLOOD PRESSURE: 72 MMHG | SYSTOLIC BLOOD PRESSURE: 106 MMHG

## 2021-08-26 DIAGNOSIS — O92.79 PAIN AGGRAVATED BY BREAST FEEDING: ICD-10-CM

## 2021-08-26 DIAGNOSIS — Z71.89 ENCOUNTER FOR BREAST FEEDING COUNSELING: Primary | ICD-10-CM

## 2021-08-26 DIAGNOSIS — R52 PAIN AGGRAVATED BY BREAST FEEDING: ICD-10-CM

## 2021-08-26 PROCEDURE — 99404 PREV MED CNSL INDIV APPRX 60: CPT | Performed by: PEDIATRICS

## 2021-08-26 NOTE — PATIENT INSTRUCTIONS
Continue to feed Kay Langley on demand  When offering the breast, pay close attention to positioning for a deeper latch, even when using the nipple shield  Feed expressed milk as needed/desired  Paced bottle feeding technique is less stressful for your baby, prevents overfeeding and protects the breastfeeding relationship  You can find a video about paced bottle feeding at www lacted  org  Continue pumping as needed to protect supply and prevent engorgement when not feeding at the breast or if your breasts still feel full after feeding  When pumping, cycle your pump through stimulation and expression mode several times in a session to stimulate several let downs  Use hands on pumping and hand expression to increase your output  Maintain your pump as recommended  Use flange that fits comfortably and allows the breast to empty effectively  Spend as much time as you can skin to skin with Margantonella Langley  Follow up with Dr Jermaine Alford as scheduled  Please call with any questions or concerns

## 2021-08-26 NOTE — PROGRESS NOTES
INITIAL BREAST FEEDING EVALUATION    Informant/Relationship: Nino Quinones and Le Smiley    Discussion of General Lactation Issues: Gordon Olivo had trouble latching after birth  Nino Quinones was given a nipple shield which she used for her entire stay  She was also pumping and feeding expressed colostrum and formula  Since discharge, she is still using the nipple shield  Feedings are painful and Nino Quinones has nipple damage  She took a break from latching and exclusively pumped for a few days  She desires to feed directly at the breast without a nipple shield  Infant is 15days old today   History:  Fertility Problem:no  Breast changes:yes - breasts were a little larger, leaking after 28 weeks  : No   due to FTP   Induced due to PROM  Full term:36 3/7 weeks   labor:just prior to delivery  First nursing/attempt < 1 hour after birth:yes - baby latched in the recovery room  Skin to skin following delivery:yes - in the recovery room  Breast changes after delivery:yes - breasts were full and milk was in by day 3  Rooming in (infant in room with mother with exception of procedures, eg  Circumcision: yes - only left twice for about an hour  Blood sugar issues:no  NICU stay:no  Jaundice:mild  Phototherapy:no  Supplement given: (list supplement and method used as well as reason(s):yes - expressed colostrum via syringe and neosure via bottle due to latch issues    Past Medical History:   Diagnosis Date    Degenerative lumbar disc 2015    Overactive bladder     Urinary tract infection          Current Outpatient Medications:     acetaminophen (TYLENOL) 325 mg tablet, Take 2 tablets (650 mg total) by mouth every 6 (six) hours, Disp: , Rfl: 0    doxylamine (UNISON) 25 MG tablet, Take 1 tablet (25 mg total) by mouth daily at bedtime as needed for sleep or nausea, Disp: 30 tablet, Rfl: 2    Famotidine (PEPCID PO), Take by mouth (Patient not taking: Reported on 2021), Disp: , Rfl:     ibuprofen (MOTRIN) 600 mg tablet, Take 1 tablet (600 mg total) by mouth every 6 (six) hours, Disp: 30 tablet, Rfl: 0    norethindrone (MICRONOR) 0 35 MG tablet, Take 1 tablet (0 35 mg total) by mouth daily, Disp: , Rfl: 0    omeprazole (PriLOSEC) 20 mg delayed release capsule, Take 1 capsule (20 mg total) by mouth daily, Disp: 90 capsule, Rfl: 3    ondansetron (ZOFRAN-ODT) 4 mg disintegrating tablet, Take 1 tablet (4 mg total) by mouth every 6 (six) hours as needed for nausea or vomiting, Disp: 30 tablet, Rfl: 1    Prenatal Vit-Fe Fumarate-FA (PRENATAL PO), Take by mouth, Disp: , Rfl:     Allergies   Allergen Reactions    Sulfa Antibiotics Fever and Fatigue    Sulfamethoxazole-Trimethoprim Fever and Fatigue       Social History     Substance and Sexual Activity   Drug Use No       Social History Never a smoker    Interval Breastfeeding History:    Frequency of breast feeding: Every 3-5  hours on demand  For the last few days only nursing once a day  Does mother feel breastfeeding is effective: Yes, but only with a nipple shield  Does infant appear satisfied after nursing:Yes  Stooling pattern normal: Yes  Urinating frequently:Yes  Using shield or shells: Yes     Alternative/Artificial Feedings:   Bottle: Yes, currently for most feedings  Cup: No  Syringe/Finger: No           Formula Type: none                     Amount: n/a            Breast Milk:                      Amount:  3 ounces            Frequency Q 3-5 Hr between feedings  Elimination Problems: No      Equipment:  Nipple Shield             Type: Medela Contact             Size: 20mm             Frequency of Use: every feeding  Pump            Type: Spectra S2            Frequency of Use: Every 3 hours  Expresses about 4-5 ounces per session  Shells            Type: none            Frequency of use: n/a    Equipment Problems: no    Mom:  Breast: Large symmetrical breasts  Rounded shape  Closely spaced    Tender on palpation of the lateral aspect of the left breast near the axilla and laterally on the right breast   Both breasts are currently very full  Nipple Assessment in General: Small everted nipples bilaterally  Mother's Awareness of Feeding Cues                 Recognizes: Yes                  Verbalizes: Yes  Support System: FOB, extended family  History of Breastfeeding: none  Changes/Stressors/Violence: Radha Morocho is concerned that Nitza Mar does not get enough milk from the breast  Concerns/Goals: Radha Morocho desires to feed directly at the breast without pain and without a nipple shield  Problems with Mom: sore nipples    Physical Exam  Constitutional:       Appearance: Normal appearance  HENT:      Head: Normocephalic and atraumatic  Cardiovascular:      Rate and Rhythm: Normal rate and regular rhythm  Pulses: Normal pulses  Heart sounds: Normal heart sounds  Pulmonary:      Effort: Pulmonary effort is normal       Breath sounds: Normal breath sounds  Musculoskeletal:         General: No swelling  Normal range of motion  Cervical back: Normal range of motion and neck supple  Neurological:      Mental Status: She is alert and oriented to person, place, and time  Skin:     General: Skin is warm and dry  Psychiatric:         Mood and Affect: Mood normal          Behavior: Behavior normal          Thought Content: Thought content normal          Judgment: Judgment normal          Infant:  Behaviors: Alert  Color: Pink  Birth weight: 2765gram  Current weight: 3140gram    Problems with infant: LPTI, shallow latch, does not latch without a nipple shield      General Appearance:  Alert, active, no distress                             Head:  Normocephalic, AFOF, sutures opposed                             Eyes:  Conjunctiva clear, no drainage                              Ears:  Normally placed, no anomolies                             Nose:  no drainage or erythema                           Mouth:  No lesions   Tongue does not extend, lateralize or lift well  Moderate cupping of my finger noted  Some peristalsis but primarily the back of the tongue bunched up  Lingual frenulum is short and attached posterior to the tip of the tongue  Passive lift of the tongue is limited and causes blanching of the lower alveolar ridge  Neck:  Supple, symmetrical, trachea midline                 Respiratory:  No grunting, flaring, retractions, breath sounds clear and equal            Cardiovascular:  Regular rate and rhythm  No murmur  Adequate perfusion/capillary refill  Femoral pulse present                    Abdomen:   Soft, non-tender, no masses, bowel sounds present, no HSM             Genitourinary:  Normal male, testes descended, no discharge, swelling, or pain, anus patent                          Spine:   No abnormalities noted        Musculoskeletal:  Full range of motion          Skin/Hair/Nails:   Skin warm, dry, and intact, no rashes or abnormal dyspigmentation or lesions                Neurologic:   No abnormal movement, tone appropriate for gestational age     Latch: unable to latch without a shield  Efficiency:               Lips Flanged: Yes              Depth of latch: shallow, just on the nipple of the shield  Audible Swallow: Yes              Visible Milk: Yes              Wide Open/ Asymmetrical: No              Suck Swallow Cycle: Breathing: unlabored, Coordinated: yes  Nipple Assessment after latch: Normal: elongated/eraser, no discoloration and no damage noted but [facility] reported that it felt tender  Latch Problems: Janice Holley was unable to latch without a shield on either breast after several attempts  Latch with the shield was shallow, just on the nipple  The nipple telescoped in and out of his mouth with every suck    Position:  Infant's Ergonomics/Body               Body Alignment: Yes               Head Supported: Yes               Close to Mom's body/ Lifted/ Supported:  Yes Mom's Ergonomics/Body: Yes                           Supported: Yes                           Sitting Back: Yes                           Brings Baby to her breast: No  Positioning Problems: Dariela Romero did fairly well  I did suggest that she lean back in her chair and bring Diego Alcala onto her breast rather than leaning over him to push her nipple into his mouth  Handouts:   Nipple shields, Paced bottle feeding, Hands on pumping, Hand expression and Latch Check List    Education:  Reviewed Latch: Demonstrated how to gently compress the breast and align the baby so that his nose is just above the nipple with his lower lip and chin touching the breast to encourage the deepest, widest, off-center latch  Reviewed Positioning for Dyad: Demonstrated how to position baby belly to belly with mom  Reviewed Frequency/Supply & Demand: Discussed how milk supply is established and maintained  Reviewed Alternative/Artificial Feedings: Discussed and demonstrated paced bottle feeding  Reviewed Mom/Breast care: Discussed moist wound care for Enriqueta's sore nipples  Reviewed Equipment: Discussed and demonstrated the use and features of the Spectra S2 and the elements of hands on pumping  Plan:   Reassurance and support given  I encouraged Dariela Romero to continue to offer the breast as often as she is comfortable doing so  We worked on positioning for a more effective latch and she was taught how to watch for milk transfer  I reviewed the importance of good positioning even when using a nipple shield  We discussed that Diego Alcala has some physical limitations currently that are likely causing his challenges with latch and an appointment was scheduled with Dr Leonora Rincon for more evaluation  She will feed expressed milk as needed  We reviewed paced bottle feeding technique  Dariela Romero will continue pumping to protect her milk supply  She was taught how to use the cycles of her pump and hands on technique    I suggested a smaller flange size  I have spent 90 minutes with Patient and family today in which greater than 50% of this time was spent in counseling/coordination of care regarding Patient and family education

## 2021-08-29 NOTE — PROGRESS NOTES
I have reviewed the notes, assessments, and/or procedures performed by Riya Duran RN, IBCLC, I concur with her/his documentation of Binta Farr MD 08/29/21

## 2021-09-01 ENCOUNTER — OFFICE VISIT (OUTPATIENT)
Dept: POSTPARTUM | Facility: CLINIC | Age: 31
End: 2021-09-01

## 2021-09-01 DIAGNOSIS — O92.79 NURSING DIFFICULTY: ICD-10-CM

## 2021-09-01 NOTE — PATIENT INSTRUCTIONS
Apply ointment of your choice to your nipples after nursing or pumping and cover with wax or parchment paper or Telfa until your nipples are no longer sore  Pump as often as you have been or about as often as Kurtis Ghotra gets a bottle  You may also pump as needed for comfort  You may want to try feeding Kurtis Ghotra skin to skin (with paced bottle feeding) and spending some time skin to skin when you know he will be ready to eat soon  The first can help both of you become less anxious about latching and the second allows you both to respond to the others feeding cues and may allow for a no-hassle latch  Keep your nipples warm: wear layers; apply olive or coconut oil to your nipples after pumping-first warm between your fingers  You may also use hand warmers or a heating pad on the lowest setting over breast pads and the bra  You can also make or purchase woolen or make fleece breast pads  The woolen ones are marketed by [employer]  These are available at [employer] or [address] [facility], [facility] [address]

## 2021-09-01 NOTE — PROGRESS NOTES
BREAST FEEDING FOLLOW UP VISIT    Informant/Relationship: Alexys Rivera and Daniel/mom and dad    Discussion of General Lactation Issues: Alexys Rivera says things are going well overall, but Juana Mayorga is still not latching  She tried again this morning without the shield  Without the shield, the latch was painful and he doesn't do good suck and swallow type nursing well  When Alexys Rivera uses the shield nursing is still painful, especially on the left, but he does suck better  However, Alexys Rivera just doesn't think he is transferring milk well  Mostly she is pumping and given expressed breast milk  Alexys Rivera has occasional pain in the upper left lateral quadrant  She felt as if she had a blocked duct there that has since resolved, but still has some pain after pumping  Infant is 35 weeks old today  Interval Breastfeeding History:    Frequency of breast feeding: typically once/day  Does mother feel breastfeeding is effective:  If no, explain: he is not effective or doesn't latch  Does infant appear satisfied after nursing:If no, explain: Juana Mayorga is not latching or is not effective  Stooling pattern normal:Yes  Urinating frequently:Yes  Using shield or shells:Yes, but still painful     Alternative/Artificial Feedings:   Bottle: Yes, using paced bottle feeding  Cup: N/A  Syringe/Finger: N/A           Formula Type: none since home                     Amount: n/a            Breast Milk:                      Amount: 3 oz            Frequency Q 3 Hr between feedings  Elimination Problems: No      Equipment:  Nipple Shield             Type: Medela              Size: 20 mm             Frequency of Use: each time the breast is offered  Pump            Type: Spectra S2            Frequency of Use: every about every 4-5 hours  Shells            Type: n/a            Frequency of use: n/a    Equipment Problems: no      Mom:  Breast: Normal, Alexys Rivera states she has minor tenderness to palpation in the upper lateral quadrant of the left breast, but there is no mass, no redness, no fullness  Nipple Assessment in General: Normal: elongated/eraser, no discoloration and no damage noted  Mother's Awareness of Feeding Cues                 Recognizes: Yes                  Verbalizes: Yes  Support System: FOB  History of Breastfeeding:   Changes/Stressors/Violence: none  Concerns/Goals: Mary Bertrand would like to provide her milk for Cherene Heaps, preferably directly at the breast, without pain    Problems with Mom: pain with latch    Physical Exam  Constitutional:       Appearance: Normal appearance  She is well-developed and normal weight  HENT:      Head: Normocephalic and atraumatic  Eyes:      Extraocular Movements: Extraocular movements intact  Neck:      Thyroid: No thyromegaly  Cardiovascular:      Rate and Rhythm: Normal rate and regular rhythm  Pulses: Normal pulses  Heart sounds: Normal heart sounds  No murmur heard  Pulmonary:      Effort: Pulmonary effort is normal       Breath sounds: Normal breath sounds  Musculoskeletal:         General: No swelling or tenderness  Normal range of motion  Cervical back: Normal range of motion and neck supple  Right lower leg: No edema  Left lower leg: No edema  Lymphadenopathy:      Cervical: No cervical adenopathy  Upper Body:      Right upper body: No pectoral adenopathy  Left upper body: No pectoral adenopathy  Neurological:      General: No focal deficit present  Mental Status: She is alert and oriented to person, place, and time  Psychiatric:         Mood and Affect: Mood normal          Behavior: Behavior normal          Thought Content: Thought content normal          Judgment: Judgment normal    Vitals and nursing note reviewed           Infant:  Behaviors: Alert  Color: Healthy  Birth weight: 2 765 kg  Current weight: 3 42 kg    Problems with infant: Restricted tongue movement      General Appearance:  Alert, active, no distress                             Head: Normocephalic, AFOF, sutures opposed                             Eyes:  Conjunctiva clear, no drainage                              Ears:  Normally placed, no anomolies                             Nose:  Septum intact, no drainage or erythema                           Mouth:  No lesions; poor extension and lift; tongue twists with lateralization; good cupping of examiner's finger but peristalsis is limited and most of his effective sucking is created posteriorly leading to compression of the tip of examiner's finger against the edge of the hard palate; frenulum has a high and broad attachment to the lower alveolar ridge and is thin and tight attaching within 4 mm of the tip of the tongue limiting oral gape and passive lift of the tongue                    Neck:  Supple, symmetrical, trachea midline, no adenopathy; thyroid: no enlargement, symmetric, no tenderness/mass/nodules                 Respiratory:  No grunting, flaring, retractions, breath sounds clear and equal            Cardiovascular:  Regular rate and rhythm  No murmur  Adequate perfusion/capillary refill   Femoral pulse present                    Abdomen:   Soft, non-tender, no masses, bowel sounds present, no HSM             Genitourinary:  Normal male, testes descended, no discharge, swelling, or pain, anus patent                          Spine:   No abnormalities noted        Musculoskeletal:  Full range of motion          Skin/Hair/Nails:   Skin warm, dry, and intact, no rashes or abnormal dyspigmentation or lesions                Neurologic:   No abnormal movement, tone appropriate for gestational age    Alvarado Latch:  Efficiency:               Lips Flanged: Yes, after frenotomy, but limited sucking attempts at the breast              Depth of latch: Good, following the frenotomy, but limited sucking attempts at the breast              Audible Swallow: Yes, after the frenotomy on the bottle, Darrell  makes only a few suck attempts at the breast giving up when there is not ready flow              Visible Milk: Yes              Wide Open/ Asymmetrical: Yes, after frenotomy, but limited sucking attempts at the breast              Suck Swallow Cycle: Breathing: Unlabored, Coordinated: Yes  Nipple Assessment after latch: Normal: elongated/eraser, no discoloration and no damage noted  Latch Problems: After the frenotomy, Chastity Marion is able to assist Lethaniel Halie to attain a wide, asymmetrical, and deep latch at the breast  He will take a few sucks at the breast but never develops a good S/S/B pattern as he is easily frustrated by the lack of immediate feedback of milk flow  Position:  Infant's Ergonomics/Body               Body Alignment: Yes               Head Supported: Yes               Close to Mom's body/ Lifted/ Supported: Yes               Mom's Ergonomics/Body: Yes                           Supported: Yes                           Sitting Back: Yes                           Brings Baby to her breast: Yes  Positioning Problems: None      Education:  Reviewed Latch: Reviewed how to gently compress the breast as if offering a sandwich to facilitate a deeper latch  Reviewed Positioning for Dyad: Reviewed how to bring baby to the breast so that his lower lip and chin touch the breast with his nose just above the nipple to encourage a wider, more asymmetric latch  Reviewed Frequency/Supply & Demand: Recommended feeding on demand: when the baby gives hunger cues, when the breasts feel full, every 3 hours during the day and every 5 hours at night counting from the beginning of one feeding to the beginning of the next; whichever comes first    Reviewed Alternative/Artificial Feedings: Paced bottle feeding  Reviewed Mom/Breast care: Apply ointment to the nipples after nursing or pumping and cover with a non-stick product  Pump whenever Lethaniel Halie is given a bottle and as needed for comfort        Plan:  Discussed history and physical exams with Chastity Marion   Support given for her commitment to providing breast milk for her baby  Discussed the findings on the baby's exam consistent with tongue tie and reviewed how this may be the cause of nipple trauma, nipple pain, nipple damage, poor milk transfer, blocked ducts, mastitis, and loss of milk production  Discussed the science that supports performing the frenotomy to improve latch  Discussed the use of holding the baby skin to skin while giving him the bottle to decrease the stress both she and the baby feel associated with feeding near the beast  Encouraged snuggling skin to skin as expected feeding times are approaching to allow for both baby and mom to respond to the each others cues  Recommended keeping the nipples warm to decrease the pian associated with vasospasm     I have spent 50 minutes with Patient and family today in which greater than 50% of this time was spent in counseling/coordination of care regarding Prognosis, Risks and benefits of tx options, Intructions for management, Patient and family education and Impressions

## 2021-09-09 ENCOUNTER — POSTPARTUM VISIT (OUTPATIENT)
Dept: OBGYN CLINIC | Facility: CLINIC | Age: 31
End: 2021-09-09

## 2021-09-09 VITALS — WEIGHT: 170 LBS | BODY MASS INDEX: 31.09 KG/M2 | SYSTOLIC BLOOD PRESSURE: 116 MMHG | DIASTOLIC BLOOD PRESSURE: 62 MMHG

## 2021-09-09 DIAGNOSIS — G89.18 POSTOPERATIVE PAIN: ICD-10-CM

## 2021-09-09 PROCEDURE — 99024 POSTOP FOLLOW-UP VISIT: CPT | Performed by: OBSTETRICS & GYNECOLOGY

## 2021-09-09 RX ORDER — ACETAMINOPHEN AND CODEINE PHOSPHATE 120; 12 MG/5ML; MG/5ML
1 SOLUTION ORAL DAILY
Qty: 28 TABLET | Refills: 5 | Status: SHIPPED | OUTPATIENT
Start: 2021-09-09 | End: 2021-11-09 | Stop reason: SINTOL

## 2021-09-09 NOTE — PROGRESS NOTES
Mami Mora  1990    S:  31 y o  female here for postpartum visit  She is s/p  (arrest of descent) on 2021  Gender: male   Apgars:   Weight: 6 lbs 1 5 oz  Her lochia has resolved  She is Using breast pump without problems  Her pregnancy was complicated by: nausea/vomiting, PPROM at 36 weeks  She denies postpartum blues/depression  [place] score was 1  We discussed contraceptive options in detail including birth control pills, patches, NuvaRing, DepoProvera, Mirena/Kyleena IUD, Nexplanon in detail  At this point she would like micronor  She is aware she should wait one full year prior to trying to conceive  O:   She appears well and in no distress  Abdomen is soft and nontender  Pfannenstiel clean/dry/intact    A/P:  Postpartum visit  She will return in 2 months for her yearly exam, sooner PRN      - Rx Micronor sent

## 2021-09-28 ENCOUNTER — OFFICE VISIT (OUTPATIENT)
Dept: DERMATOLOGY | Facility: CLINIC | Age: 31
End: 2021-09-28
Payer: COMMERCIAL

## 2021-09-28 VITALS — BODY MASS INDEX: 31.83 KG/M2 | TEMPERATURE: 97.4 F | HEIGHT: 62 IN | WEIGHT: 173 LBS

## 2021-09-28 DIAGNOSIS — B07.9 VERRUCA VULGARIS: Primary | ICD-10-CM

## 2021-09-28 PROCEDURE — 17110 DESTRUCTION B9 LES UP TO 14: CPT | Performed by: DERMATOLOGY

## 2021-09-29 ENCOUNTER — TELEPHONE (OUTPATIENT)
Dept: OBGYN CLINIC | Facility: CLINIC | Age: 31
End: 2021-09-29

## 2021-10-28 ENCOUNTER — TELEPHONE (OUTPATIENT)
Dept: OBGYN CLINIC | Facility: CLINIC | Age: 31
End: 2021-10-28

## 2021-11-03 ENCOUNTER — NURSE TRIAGE (OUTPATIENT)
Dept: OTHER | Facility: OTHER | Age: 31
End: 2021-11-03

## 2021-11-03 DIAGNOSIS — N61.0 MASTITIS: Primary | ICD-10-CM

## 2021-11-09 ENCOUNTER — ANNUAL EXAM (OUTPATIENT)
Dept: OBGYN CLINIC | Facility: CLINIC | Age: 31
End: 2021-11-09
Payer: COMMERCIAL

## 2021-11-09 VITALS
HEIGHT: 62 IN | WEIGHT: 172 LBS | DIASTOLIC BLOOD PRESSURE: 62 MMHG | SYSTOLIC BLOOD PRESSURE: 100 MMHG | BODY MASS INDEX: 31.65 KG/M2

## 2021-11-09 DIAGNOSIS — Z01.419 ENCNTR FOR GYN EXAM (GENERAL) (ROUTINE) W/O ABN FINDINGS: Primary | ICD-10-CM

## 2021-11-09 DIAGNOSIS — Z12.31 ENCOUNTER FOR SCREENING MAMMOGRAM FOR MALIGNANT NEOPLASM OF BREAST: ICD-10-CM

## 2021-11-09 PROCEDURE — 99395 PREV VISIT EST AGE 18-39: CPT | Performed by: OBSTETRICS & GYNECOLOGY

## 2021-11-09 PROCEDURE — G0145 SCR C/V CYTO,THINLAYER,RESCR: HCPCS | Performed by: OBSTETRICS & GYNECOLOGY

## 2021-11-15 LAB
LAB AP GYN PRIMARY INTERPRETATION: NORMAL
Lab: NORMAL

## 2021-12-04 ENCOUNTER — NURSE TRIAGE (OUTPATIENT)
Dept: OTHER | Facility: OTHER | Age: 31
End: 2021-12-04

## 2021-12-04 DIAGNOSIS — Z20.828 SARS-ASSOCIATED CORONAVIRUS EXPOSURE: Primary | ICD-10-CM

## 2021-12-08 PROCEDURE — U0003 INFECTIOUS AGENT DETECTION BY NUCLEIC ACID (DNA OR RNA); SEVERE ACUTE RESPIRATORY SYNDROME CORONAVIRUS 2 (SARS-COV-2) (CORONAVIRUS DISEASE [COVID-19]), AMPLIFIED PROBE TECHNIQUE, MAKING USE OF HIGH THROUGHPUT TECHNOLOGIES AS DESCRIBED BY CMS-2020-01-R: HCPCS | Performed by: FAMILY MEDICINE

## 2021-12-08 PROCEDURE — U0005 INFEC AGEN DETEC AMPLI PROBE: HCPCS | Performed by: FAMILY MEDICINE

## 2021-12-21 ENCOUNTER — APPOINTMENT (OUTPATIENT)
Dept: RADIOLOGY | Facility: AMBULARY SURGERY CENTER | Age: 31
End: 2021-12-21
Payer: COMMERCIAL

## 2021-12-21 ENCOUNTER — OFFICE VISIT (OUTPATIENT)
Dept: OBGYN CLINIC | Facility: CLINIC | Age: 31
End: 2021-12-21
Payer: COMMERCIAL

## 2021-12-21 VITALS — HEIGHT: 62 IN | BODY MASS INDEX: 31.65 KG/M2 | WEIGHT: 172 LBS

## 2021-12-21 DIAGNOSIS — M79.671 PAIN IN RIGHT FOOT: ICD-10-CM

## 2021-12-21 DIAGNOSIS — M84.374A STRESS REACTION OF RIGHT FOOT, INITIAL ENCOUNTER: Primary | ICD-10-CM

## 2021-12-21 PROCEDURE — 73630 X-RAY EXAM OF FOOT: CPT

## 2021-12-21 PROCEDURE — 99204 OFFICE O/P NEW MOD 45 MIN: CPT | Performed by: PHYSICAL MEDICINE & REHABILITATION

## 2021-12-22 NOTE — PROGRESS NOTES
31 y o   at 20w2d, here for return OB visit  Feeling well overall and without concerns  Pretty sure she can feel FM  Denies LOF, VB, contractions  Denies dysuria, hematuria  No problems with activity  No questions/concerns       Problem List Items Addressed This Visit        Other    Encounter for supervision of low-risk first pregnancy in second trimester     -precautions reviewed  -s/p COVID/flu vaccines  -prepregnancy BMI 27 with goal weight gain 15-25#: TWG = 19#              Other Visit Diagnoses     Encounter for supervision of other normal pregnancy in second trimester    -  Primary    Relevant Orders    POCT urine dip (Completed)
Pt is here for routine ob visit  No concerns at this time  Urine neg/neg  No VB,Cramping  +FM   UTD Flu   PN1 labs completed   Stork pump form given per pt request
MEDICATIONS  (STANDING):  atorvastatin 20 milliGRAM(s) Oral at bedtime  cefTRIAXone   IVPB 1000 milliGRAM(s) IV Intermittent every 24 hours  cholecalciferol Oral Tab/Cap - Peds 2000 Unit(s) Oral daily  cyanocobalamin 1000 MICROGram(s) Oral daily  methimazole 5 milliGRAM(s) Oral daily  metoprolol succinate ER 25 milliGRAM(s) Oral daily  sodium chloride 0.9%. 1000 milliLiter(s) (100 mL/Hr) IV Continuous <Continuous>  tamsulosin 0.4 milliGRAM(s) Oral at bedtime  valsartan 40 milliGRAM(s) Oral daily    MEDICATIONS  (PRN):  acetaminophen     Tablet .. 650 milliGRAM(s) Oral every 6 hours PRN Temp greater or equal to 38C (100.4F), Mild Pain (1 - 3)  aluminum hydroxide/magnesium hydroxide/simethicone Suspension 30 milliLiter(s) Oral every 4 hours PRN Dyspepsia  artificial  tears Solution 1 Drop(s) Both EYES every 4 hours PRN Dry Eyes  melatonin 3 milliGRAM(s) Oral at bedtime PRN Insomnia  ondansetron Injectable 4 milliGRAM(s) IV Push every 8 hours PRN Nausea and/or Vomiting

## 2021-12-28 ENCOUNTER — TELEMEDICINE (OUTPATIENT)
Dept: FAMILY MEDICINE CLINIC | Facility: CLINIC | Age: 31
End: 2021-12-28

## 2021-12-28 DIAGNOSIS — Z20.822 CLOSE EXPOSURE TO COVID-19 VIRUS: Primary | ICD-10-CM

## 2021-12-28 PROCEDURE — 99213 OFFICE O/P EST LOW 20 MIN: CPT | Performed by: CHIROPRACTOR

## 2021-12-28 PROCEDURE — U0005 INFEC AGEN DETEC AMPLI PROBE: HCPCS | Performed by: CHIROPRACTOR

## 2021-12-28 PROCEDURE — U0003 INFECTIOUS AGENT DETECTION BY NUCLEIC ACID (DNA OR RNA); SEVERE ACUTE RESPIRATORY SYNDROME CORONAVIRUS 2 (SARS-COV-2) (CORONAVIRUS DISEASE [COVID-19]), AMPLIFIED PROBE TECHNIQUE, MAKING USE OF HIGH THROUGHPUT TECHNOLOGIES AS DESCRIBED BY CMS-2020-01-R: HCPCS | Performed by: CHIROPRACTOR

## 2021-12-29 ENCOUNTER — TELEPHONE (OUTPATIENT)
Dept: FAMILY MEDICINE CLINIC | Facility: CLINIC | Age: 31
End: 2021-12-29

## 2022-01-26 ENCOUNTER — IMMUNIZATIONS (OUTPATIENT)
Dept: FAMILY MEDICINE CLINIC | Facility: HOSPITAL | Age: 32
End: 2022-01-26

## 2022-01-26 DIAGNOSIS — Z23 ENCOUNTER FOR IMMUNIZATION: Primary | ICD-10-CM

## 2022-01-26 PROCEDURE — 0064A COVID-19 MODERNA VACC 0.25 ML BOOSTER: CPT

## 2022-01-26 PROCEDURE — 91306 COVID-19 MODERNA VACC 0.25 ML BOOSTER: CPT

## 2022-01-27 ENCOUNTER — TELEMEDICINE (OUTPATIENT)
Dept: FAMILY MEDICINE CLINIC | Facility: CLINIC | Age: 32
End: 2022-01-27
Payer: COMMERCIAL

## 2022-01-27 DIAGNOSIS — J02.9 SORE THROAT: Primary | ICD-10-CM

## 2022-01-27 LAB
SARS-COV-2 AG UPPER RESP QL IA: NEGATIVE
VALID CONTROL: NORMAL

## 2022-01-27 PROCEDURE — 99214 OFFICE O/P EST MOD 30 MIN: CPT | Performed by: FAMILY MEDICINE

## 2022-01-27 NOTE — PROGRESS NOTES
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  I have spent 15 minutes directly with the patient  Greater than 50% of this time was spent in counseling/coordination of care regarding: instructions for management  Encounter provider Tawny Vargas MD    Provider located at [address] [place] [address]  [phone]    Recent Visits  No visits were found meeting these conditions  Showing recent visits within past 7 days and meeting all other requirements  Today's Visits  Date Type Provider Dept   01/27/22 Telemedicine Tawny Vargas MD [address] today's visits and meeting all other requirements  Future Appointments  No visits were found meeting these conditions  Showing future appointments within next 150 days and meeting all other requirements     This virtual check-in was done via [address] and patient was informed that this is a secure, HIPAA-compliant platform  She agrees to proceed  Patient agrees to participate in a virtual check in via telephone or video visit instead of presenting to the office to address urgent/immediate medical needs  Patient is aware this is a billable service  After connecting through [facility], the patient was identified by name and date of birth  Tabitha Pate was informed that this was a telemedicine visit and that the exam was being conducted confidentially over secure lines  My office door was closed  No one else was in the room  Tabitha Pate acknowledged consent and understanding of privacy and security of the telemedicine visit   I informed the patient that I have reviewed her record in Epic and presented the opportunity for her to ask any questions regarding the visit today  The patient agreed to participate  Verification of patient location:  Patient is located in the following state in which I hold an active license: [place]    Subjective:   Tash Bobby is a 36 y o  female who is concerned about COVID-19  Patient's symptoms include fatigue, sore throat and headache  Patient denies fever, chills, malaise, congestion, rhinorrhea, anosmia, loss of taste, cough, shortness of breath, chest tightness, abdominal pain, nausea, vomiting, diarrhea and myalgias  - Date of symptom onset: 2022      COVID-19 vaccination status: Fully vaccinated with booster    Exposure:   Contact with a person who is under investigation (PUI) for or who is positive for COVID-19 within the last 14 days?: Yes    Hospitalized recently for fever and/or lower respiratory symptoms?: No      Currently a healthcare worker that is involved in direct patient care?: No      Works in a special setting where the risk of COVID-19 transmission may be high? (this may include long-term care, correctional and FDC facilities; homeless shelters; assisted-living facilities and group homes ): No      Resident in a special setting where the risk of COVID-19 transmission may be high? (this may include long-term care, correctional and FDC facilities; homeless shelters; assisted-living facilities and group homes ): No      Booster yesterday, sore throat and fatigue today     positive yesterday on rapid test       Lab Results   Component Value Date    SARSCOV2 Negative 2021     Past Medical History:   Diagnosis Date    Degenerative lumbar disc 2015    Overactive bladder     Urinary tract infection     [place]      Past Surgical History:   Procedure Laterality Date    COLPOSCOPY      COLPOSCOPY W/ BIOPSY / CURETTAGE      Resolved 2017    GYNECOLOGIC CRYOSURGERY      MD  DELIVERY ONLY N/A 2021 Procedure:  SECTION (); Surgeon: Maynor Garcia MD;  Location: [facility] ;  Service: Obstetrics    WISDOM TOOTH EXTRACTION       Current Outpatient Medications   Medication Sig Dispense Refill    Prenatal Vit-Fe Fumarate-FA (PRENATAL PO) Take by mouth       No current facility-administered medications for this visit  Allergies   Allergen Reactions    Sulfa Antibiotics Fever and Fatigue    Sulfamethoxazole-Trimethoprim Fever and Fatigue       Review of Systems   Constitutional: Positive for fatigue  Negative for chills and fever  HENT: Positive for sore throat  Negative for congestion and rhinorrhea  Respiratory: Negative for cough, chest tightness and shortness of breath  Gastrointestinal: Negative for abdominal pain, diarrhea, nausea and vomiting  Musculoskeletal: Negative for myalgias  Neurological: Positive for headaches  Objective: There were no vitals filed for this visit  Physical Exam  Constitutional:       General: She is not in acute distress  Appearance: She is well-developed  She is not diaphoretic  HENT:      Head: Normocephalic and atraumatic  Eyes:      General: No scleral icterus  Right eye: No discharge  Left eye: No discharge  Conjunctiva/sclera: Conjunctivae normal       Pupils: Pupils are equal, round, and reactive to light  Pulmonary:      Effort: Pulmonary effort is normal  No respiratory distress  Neurological:      Mental Status: She is alert and oriented to person, place, and time  Psychiatric:         Behavior: Behavior normal          Thought Content: Thought content normal          Judgment: Judgment normal          VIRTUAL VISIT DISCLAIMER    Tabitha Pate verbally agrees to participate in [employer]   Pt is aware that [employer] could be limited without vital signs or the ability to perform a full hands-on physical exam  Enriqueta Stevens understands she or the provider may request at any time to terminate the video visit and request the patient to seek care or treatment in person

## 2022-01-31 ENCOUNTER — TELEPHONE (OUTPATIENT)
Dept: FAMILY MEDICINE CLINIC | Facility: CLINIC | Age: 32
End: 2022-01-31

## 2022-01-31 PROCEDURE — 87636 SARSCOV2 & INF A&B AMP PRB: CPT | Performed by: FAMILY MEDICINE

## 2022-01-31 NOTE — TELEPHONE ENCOUNTER
Patient requesting your recommendation for when she should return to work  Patient has been doing at home tests since her exposure and testing negative but had chills, body aches, headaches and fevers  She had a fever last night  Please advise  Thank you

## 2022-01-31 NOTE — TELEPHONE ENCOUNTER
I spoke with Tony Cardoza - we discussed getting a PCR to confirm that she has COVID, since she is breastfeeding, has a sore spot on her breast, and would like to differentiate viral illness from mastitis  I ordered Flu/COVID swab for her to get today  She will remain out of work until meeting criteria to return (afebrile for 24h and symptomatically improving)

## 2022-02-18 DIAGNOSIS — M84.374A STRESS REACTION OF RIGHT FOOT, INITIAL ENCOUNTER: Primary | ICD-10-CM

## 2022-03-01 ENCOUNTER — TELEPHONE (OUTPATIENT)
Dept: OBGYN CLINIC | Facility: HOSPITAL | Age: 32
End: 2022-03-01

## 2022-03-03 ENCOUNTER — OFFICE VISIT (OUTPATIENT)
Dept: DERMATOLOGY | Facility: CLINIC | Age: 32
End: 2022-03-03
Payer: COMMERCIAL

## 2022-03-03 VITALS — BODY MASS INDEX: 30.18 KG/M2 | WEIGHT: 164 LBS | TEMPERATURE: 96.8 F | HEIGHT: 62 IN

## 2022-03-03 DIAGNOSIS — B07.9 VERRUCA VULGARIS: Primary | ICD-10-CM

## 2022-03-03 PROCEDURE — 11900 INJECT SKIN LESIONS </W 7: CPT | Performed by: DERMATOLOGY

## 2022-03-03 RX ORDER — CANDIDA ALBICANS 1000 [PNU]/ML
0.1 INJECTION, SOLUTION INTRADERMAL ONCE
Status: COMPLETED | OUTPATIENT
Start: 2022-03-03 | End: 2022-03-03

## 2022-03-03 RX ADMIN — CANDIDA ALBICANS 0.1 ML: 1000 INJECTION, SOLUTION INTRADERMAL at 15:51

## 2022-03-03 NOTE — PROGRESS NOTES
[facility] Follow Up Note    Patient Name: Pato Martinez  Encounter Date: 03/03/22    Today's Chief Concerns:  Nacho Pert Concern #1:  Follow up to warts        Concern #2: small growth on upper chest    Current Medications:    Current Outpatient Medications:     Prenatal Vit-Fe Fumarate-FA (PRENATAL PO), Take by mouth, Disp: , Rfl:     CONSTITUTIONAL:   Vitals:    03/03/22 1116   Temp: (!) 96 8 °F (36 °C)   TempSrc: Temporal   Weight: 74 4 kg (164 lb)   Height: 5' 2" (1 575 m)     Specific Alerts:    Have you been seen by a [facility]atologist in the last 3 years? YES    Are you pregnant or planning to become pregnant? No    Are you currently or planning to be nursing or breast feeding? YES    Allergies   Allergen Reactions    Sulfa Antibiotics Fever and Fatigue    Sulfamethoxazole-Trimethoprim Fever and Fatigue       May we call your Preferred Phone number to discuss your specific medical information? YES    May we leave a detailed message that includes your specific medical information? YES    Have you traveled outside of the [facility] in the past 3 months? No    Do you currently have a pacemaker or defibrillator? No    Do you have any artificial heart valves, joints, plates, screws, rods, stents, pins, etc? No   - If Yes, were any placed within the last 2 years? Do you require any medications prior to a surgical procedure? No     Are you taking any medications that cause you to bleed more easily ("blood thinners") No    Have you ever experienced a rapid heartbeat with epinephrine? No    Have you ever been treated with "gold" (gold sodium thiomalate) therapy? No    [facility] can help with wrinkles, "laugh lines," facial volume loss, "double chin," "love handles," age spots, and more  Are you interested in learning today about some of the skin enhancement procedures that we offer?  (If Yes, please provide more detail) No    Review of Systems:  Have you recently had or currently have any of the following? · Fever or chills: No  · Night Sweats: No  · Headaches: No  · Weight Gain: YES  · Weight Loss: YES  · Blurry Vision: No  · Nausea: No  · Vomiting: No  · Diarrhea: No  · Blood in Stool: No  · Abdominal Pain: No  · Itchy Skin: No  · Painful Joints: No  · Swollen Joints: No  · Muscle Pain: No  · Irregular Mole: No  · Sun Burn: No  · Dry Skin: No  · Skin Color Changes: YES  · Scar or Keloid: yes  · Cold Sores/Fever Blisters: YES  · Bacterial Infections/MRSA: No  · Anxiety: No  · Depression: No  · Suicidal or Homicidal Thoughts: No      PSYCH: Normal mood and affect  EYES: Normal conjunctiva  ENT: Normal lips and oral mucosa  CARDIOVASCULAR: No edema  RESPIRATORY: Normal respirations  HEME/LYMPH/IMMUNO:  No regional lymphadenopathy except as noted below in ASSESSMENT AND PLAN BY DIAGNOSIS    FOCUSED ORGAN SYSTEM SKIN EXAM (SKIN)  Right /Hand/Fingers Normal except as noted below in Assessment   Left /Hand/Fingers Normal except as noted below in Assessment       VERRUCA VULGARIS ("Common Warts")    Physical Exam:   Anatomic Location Affected:  Right hand   Morphological Description:  Verrucous papules   Pertinent Positives:   Pertinent Negatives: Additional History of Present Condition:  Patient had cryo in past    Assessment and Plan:  Based on a thorough discussion of this condition and the management approach to it (including a comprehensive discussion of the known risks, side effects and potential benefits of treatment), the patient (family) agrees to implement the following specific plan:   Injection of Candida today  · start over the counter 40% salicylic  · Follow up in 1 month    Verruca Vulgaris  A verruca is a common growth of the skin caused by infection by human papilloma virus (HPV)  There are many strains of the virus that cause different types of warts on the body   The virus infects the most superficial layers of the skin, causing increased production of skin cells and thickening  Warts can be spread through direct contact with infected skin and may spread to other parts of the body if scratched or picked  A verruca is more commonly called a "wart " Warts are particularly common in school-aged children but can arise at any age  Patients who have a history of eczema are especially prone due to impaired skin barrier  Those taking immunosuppressive drugs or with HIV infections may experience prolonged symptoms despite treatment  Warts generally have a rough surface with a tiny black dot sometimes observed in the middle of each scaly spot  They can range in size from a small bump to large scaly growths  Common warts are often found on the backs of fingers or toes, around the nails, and on the knees  Plantar warts can grow inwardly on the soles of the feet causing pain  There are many possible ways to treat warts and sometimes several different treatments are needed to get the warts to go away completely  There is no single perfect treatment for warts, and successful treatment can take many months  In-office treatments usually require multiple visits, and include:  1) Cryotherapy  a cold spray with liquid nitrogen will destroy the infected cells but may lead to discomfort and blistering  It may also leave a permanent white darling or scar  2) Electrosurgery (curettage and cautery) can be used for large resistant warts which involves shaving the growth down and burning the base  It is performed under local anesthesia and may leave a permanent scar    3) Candida (yeast) antigen injections  These are extracts of the common yeast (Candida) that cannot cause an infection  The medication is injected into/under the wart  It is thought to stimulate the immune system to recognize the wart virus and attack it  Multiple injections are often needed about one month apart      There are also several at-home wart treatments:    1) Soak the warts in warm water for 5 minutes every night followed by gentle filing with a nail file or pumice stone  2) Topical salicylic acid or similar compounds work by removing the dead surface skin cells  a  Apply the medicine directly to the wart, wait for it to dry completely, then cover with duct tape overnight   b  Repeat until the wart is gone, which can take 2-4 months  c  Do not use on the face or groin area   d  If the wart paint makes the skin sore, stop treatment until the discomfort has settled, then recommence as above   e  Take care to keep the chemical off normal skin  3) Podophyllin is a cytotoxic agent used in some products and must not be used in pregnancy or women considering pregnancy  4) Some prescription medications include   a  Topical retinoids (adapalene, tretinoin, tazarotene), 5-fluorouracil (Efudex) or imiquimod (Aldara) creams are sometimes used to treat flat warts or warts on the face and other sensitive anatomical areas  They are usually applied directly to the warts once a day for 2-4 months and can be irritating  These treatments should only be used as directed by your health care provider  b  Systemic treatment with oral cimetidine (Tagamet) may help boost the immune system against the wart virus in patients, some of the time  Initiation of cimetidine therapy should ONLY be done under the supervision of your health care provider, who can discuss possible side effects and drug-to-drug interactions of this specific treatment  PROCEDURE:  INTRALESIONAL JAYY ANTIGEN    Purpose: Candida antigen is used "off label" as an immunotherapy agent in the treatment of warts  This is widely used technique endorsed by many pediatric dermatologists because of its utility in treating multiple lesions with minimal pain and discomfort and resulting sequelae to the treated areas  Indications: It is used "off label" for the treatment of warts  Potential Side Effects:  The patient signifies understanding that Candida antigen injection can potentially cause early and/or delayed adverse effects such as:    Pain    Local immune response    Bleeding    Skin discoloration   Swelling    Flu-like illness with increased lymphnodes   Serious allergic reaction (anaphylaxis)    PROCEDURE NOTE:  After verbal and written consent were obtained, the to-be-treated area was wiped and cleaned with rubbing alcohol 70%  Then, a total of 0 4 mL of refrigerated Candida antigen (Lot# ; Expiration MAY [id number], NDC#: 34010-040-49) was injected intralesionally into a total of 2 lesion/s on the following anatomic areas:  Right hand using a 1-mL tuberculin syringe and a 30-gauge needle  There was less than 1 mL of blood loss and little to no discomfort  The area was bandaged with a Band-aid  The patient tolerated the procedure well and remained in the office for observation  With no signs of an adverse reaction, the patient was eventually discharged from clinic      Scribe Attestation    I,:  Lila Spence am acting as a scribe while in the presence of the attending physician :       I,:  Mark Uriostegui MD personally performed the services described in this documentation    as scribed in my presence :

## 2022-03-03 NOTE — PATIENT INSTRUCTIONS
VERRUCA VULGARIS ("Common Warts")    Assessment and Plan:  Based on a thorough discussion of this condition and the management approach to it (including a comprehensive discussion of the known risks, side effects and potential benefits of treatment), the patient (family) agrees to implement the following specific plan:   Injection of Candida today  · start over the counter 40% salicylic  · Follow up in 1 month      Verruca Vulgaris  A verruca is a common growth of the skin caused by infection by human papilloma virus (HPV)  There are many strains of the virus that cause different types of warts on the body  The virus infects the most superficial layers of the skin, causing increased production of skin cells and thickening  Warts can be spread through direct contact with infected skin and may spread to other parts of the body if scratched or picked  A verruca is more commonly called a "wart " Warts are particularly common in school-aged children but can arise at any age  Patients who have a history of eczema are especially prone due to impaired skin barrier  Those taking immunosuppressive drugs or with HIV infections may experience prolonged symptoms despite treatment  Warts generally have a rough surface with a tiny black dot sometimes observed in the middle of each scaly spot  They can range in size from a small bump to large scaly growths  Common warts are often found on the backs of fingers or toes, around the nails, and on the knees  Plantar warts can grow inwardly on the soles of the feet causing pain  There are many possible ways to treat warts and sometimes several different treatments are needed to get the warts to go away completely  There is no single perfect treatment for warts, and successful treatment can take many months  In-office treatments usually require multiple visits, and include:  1) Cryotherapy   a cold spray with liquid nitrogen will destroy the infected cells but may lead to discomfort and blistering  It may also leave a permanent white darling or scar  2) Electrosurgery (curettage and cautery) can be used for large resistant warts which involves shaving the growth down and burning the base  It is performed under local anesthesia and may leave a permanent scar    3) Candida (yeast) antigen injections  These are extracts of the common yeast (Candida) that cannot cause an infection  The medication is injected into/under the wart  It is thought to stimulate the immune system to recognize the wart virus and attack it  Multiple injections are often needed about one month apart  There are also several at-home wart treatments:    1) Soak the warts in warm water for 5 minutes every night followed by gentle filing with a nail file or pumice stone  2) Topical salicylic acid or similar compounds work by removing the dead surface skin cells  a  Apply the medicine directly to the wart, wait for it to dry completely, then cover with duct tape overnight   b  Repeat until the wart is gone, which can take 2-4 months  c  Do not use on the face or groin area   d  If the wart paint makes the skin sore, stop treatment until the discomfort has settled, then recommence as above   e  Take care to keep the chemical off normal skin  3) Podophyllin is a cytotoxic agent used in some products and must not be used in pregnancy or women considering pregnancy  4) Some prescription medications include   a  Topical retinoids (adapalene, tretinoin, tazarotene), 5-fluorouracil (Efudex) or imiquimod (Aldara) creams are sometimes used to treat flat warts or warts on the face and other sensitive anatomical areas  They are usually applied directly to the warts once a day for 2-4 months and can be irritating  These treatments should only be used as directed by your health care provider    b  Systemic treatment with oral cimetidine (Tagamet) may help boost the immune system against the wart virus in patients, some of the time  Initiation of cimetidine therapy should ONLY be done under the supervision of your health care provider, who can discuss possible side effects and drug-to-drug interactions of this specific treatment  PROCEDURE:  INTRALESIONAL JAYY ANTIGEN    Purpose: Candida antigen is used "off label" as an immunotherapy agent in the treatment of warts  This is widely used technique endorsed by many pediatric dermatologists because of its utility in treating multiple lesions with minimal pain and discomfort and resulting sequelae to the treated areas  Indications: It is used "off label" for the treatment of warts  Potential Side Effects:  The patient signifies understanding that Candida antigen injection can potentially cause early and/or delayed adverse effects such as:    Pain    Local immune response    Bleeding    Skin discoloration   Swelling    Flu-like illness with increased lymphnodes   Serious allergic reaction (anaphylaxis)

## 2022-03-04 ENCOUNTER — HOSPITAL ENCOUNTER (OUTPATIENT)
Dept: RADIOLOGY | Facility: HOSPITAL | Age: 32
Discharge: HOME | End: 2022-03-04
Attending: PHYSICAL MEDICINE & REHABILITATION
Payer: COMMERCIAL

## 2022-03-04 DIAGNOSIS — M84.374A STRESS REACTION OF RIGHT FOOT, INITIAL ENCOUNTER: ICD-10-CM

## 2022-03-04 PROCEDURE — G1004 CDSM NDSC: HCPCS

## 2022-03-04 PROCEDURE — 73718 MRI LOWER EXTREMITY W/O DYE: CPT

## 2022-04-13 ENCOUNTER — OFFICE VISIT (OUTPATIENT)
Dept: DERMATOLOGY | Facility: CLINIC | Age: 32
End: 2022-04-13
Payer: COMMERCIAL

## 2022-04-13 VITALS — BODY MASS INDEX: 30.22 KG/M2 | WEIGHT: 164.2 LBS | HEIGHT: 62 IN | TEMPERATURE: 97.5 F

## 2022-04-13 DIAGNOSIS — L82.1 SEBORRHEIC KERATOSIS: ICD-10-CM

## 2022-04-13 DIAGNOSIS — B07.9 VERRUCA VULGARIS: Primary | ICD-10-CM

## 2022-04-13 PROCEDURE — 17110 DESTRUCTION B9 LES UP TO 14: CPT | Performed by: DERMATOLOGY

## 2022-04-13 PROCEDURE — 99213 OFFICE O/P EST LOW 20 MIN: CPT | Performed by: DERMATOLOGY

## 2022-04-13 NOTE — PATIENT INSTRUCTIONS
FOLLOW UP: VERRUCA VULGARIS     Assessment and Plan:  Based on a thorough discussion of this condition and the management approach to it (including a comprehensive discussion of the known risks, side effects and potential benefits of treatment), the patient (family) agrees to implement the following specific plan:   Recommended Cryotherapy in office with signed consent   In about a week, start over the counter 71% salicylic acid pads should be applied at bedtime  In the morning remove pad and file each wart with a dedicated nail file or pumice stone  Do not use the same file or stone on any other family member or other unaffected location       TOTAL NUMBER of 6 lesions were treated today on the ANATOMIC LOCATION: right hand  The patient tolerated the procedure well, and after-care instructions were provided  SEBORRHEIC KERATOSIS; NON-INFLAMED    Assessment and Plan:  Based on a thorough discussion of this condition and the management approach to it (including a comprehensive discussion of the known risks, side effects and potential benefits of treatment), the patient (family) agrees to implement the following specific plan:   Discussed cosmetic procedure to remove with out of pocket charge of $20 for the one area    Seborrheic Keratosis  A seborrheic keratosis is a harmless warty spot that appears during adult life as a common sign of skin aging  Seborrheic keratoses can arise on any area of skin, covered or uncovered, with the usual exception of the palms and soles  They do not arise from mucous membranes  Seborrheic keratoses can have highly variable appearance  Seborrheic keratoses are extremely common  It has been estimated that over 90% of adults over the age of 61 years have one or more of them  They occur in males and females of all races, typically beginning to erupt in the 35s or 45s  They are uncommon under the age of 21 years  The precise cause of seborrhoeic keratoses is not known  Seborrhoeic keratoses are considered degenerative in nature  As time goes by, seborrheic keratoses tend to become more numerous  Some people inherit a tendency to develop a very large number of them; some people may have hundreds of them  The name "seborrheic keratosis" is misleading, because these lesions are not limited to a seborrhoeic distribution (scalp, mid-face, chest, upper back), nor are they formed from sebaceous glands, nor are they associated with sebum -- which is greasy  Seborrheic keratosis may also be called "SK," "Seb K," "basal cell papilloma," "senile wart," or "barnacle "      Researchers have noted:   Eruptive seborrhoeic keratoses can follow sunburn or dermatitis   Skin friction may be the reason they appear in body folds   Viral cause (e g , human papillomavirus) seems unlikely   Stable and clonal mutations or activation of FRFR3, PIK3CA, JEFF, AKT1 and EGFR genes are found in seborrhoeic keratoses   Seborrhoeic keratosis can arise from solar lentigo   FRFR3 mutations also arise in solar lentigines  These mutations are associated with increased age and location on the head and neck, suggesting a role of ultraviolet radiation in these lesions   Seborrheic keratoses do not harbour tumour suppressor gene mutations   Epidermal growth factor receptor inhibitors, which are used to treat some cancers, often result in an increase in verrucal (warty) keratoses  There is no easy way to remove multiple lesions on a single occasion  Unless a specific lesion is "inflamed" and is causing pain or stinging/burning or is bleeding, most insurance companies do not authorize treatment

## 2022-04-13 NOTE — PROGRESS NOTES
[facility] Follow Up Note  Patient Name: Colt Mishra  Encounter Date: 4/13/22    Today's Chief Concerns:  Rozinae Spruce Concern #1:  Follow up warts   Concern #2:  Skin tag    Current Medications:    Current Outpatient Medications:     Prenatal Vit-Fe Fumarate-FA (PRENATAL PO), Take by mouth, Disp: , Rfl:     CONSTITUTIONAL:   Vitals:    04/13/22 0918   Temp: 97 5 °F (36 4 °C)   TempSrc: Temporal   Weight: 74 5 kg (164 lb 3 2 oz)   Height: 5' 2" (1 575 m)         Specific Alerts:    Have you been seen by a [facility]atologist in the last 3 years? YES    Are you pregnant or planning to become pregnant? No    Are you currently or planning to be nursing or breast feeding? YES    Allergies   Allergen Reactions    Sulfa Antibiotics Fever and Fatigue    Sulfamethoxazole-Trimethoprim Fever and Fatigue       May we call your Preferred Phone number to discuss your specific medical information? YES    May we leave a detailed message that includes your specific medical information? YES    Have you traveled outside of the [facility] in the past 3 months? No    Do you currently have a pacemaker or defibrillator? No    Do you have any artificial heart valves, joints, plates, screws, rods, stents, pins, etc? No   - If Yes, were any placed within the last 2 years? Do you require any medications prior to a surgical procedure? No   - If Yes, for which procedure? - If Yes, what medications to you require? Are you taking any medications that cause you to bleed more easily ("blood thinners") No    Have you ever experienced a rapid heartbeat with epinephrine? No    Have you ever been treated with "gold" (gold sodium thiomalate) therapy? [address] [facility] can help with wrinkles, "laugh lines," facial volume loss, "double chin," "love handles," age spots, and more  Are you interested in learning today about some of the skin enhancement procedures that we offer?  (If Yes, please provide more detail)     Review of Systems:  Have you recently had or currently have any of the following? · Fever or chills: No  · Night Sweats: No  · Headaches: No  · Weight Gain: No  · Weight Loss: No  · Blurry Vision: No  · Nausea: No  · Vomiting: No  · Diarrhea: No  · Blood in Stool: No  · Abdominal Pain: No  · Itchy Skin: No  · Painful Joints: No  · Swollen Joints: No  · Muscle Pain: No  · Irregular Mole: No  · Sun Burn: No  · Dry Skin: No  · Skin Color Changes: No  · Scar or Keloid: No  · Cold Sores/Fever Blisters: No  · Bacterial Infections/MRSA: No  · Anxiety: No  · Depression: No  · Suicidal or Homicidal Thoughts: No    PSYCH: Normal mood and affect  EYES: Normal conjunctiva  ENT: Normal lips and oral mucosa  CARDIOVASCULAR: No edema  RESPIRATORY: Normal respirations  HEME/LYMPH/IMMUNO:  No regional lymphadenopathy except as noted below in ASSESSMENT AND PLAN BY DIAGNOSIS          FOLLOW UP: VERRUCA VULGARIS     Physical Exam:   Anatomic Location Affected:  Right hand    Morphological Description:  Verrucous papules    Pertinent Positives:   Pertinent Negatives: Additional History of Present Condition:     Previous Treatment: candida    Previous number of treated Verruca Vulgaris:  2   Did you experience any side effects of treatment: itchy    Are you happy with the improvement: no improvement       Assessment and Plan:  Based on a thorough discussion of this condition and the management approach to it (including a comprehensive discussion of the known risks, side effects and potential benefits of treatment), the patient (family) agrees to implement the following specific plan:   Recommended Cryotherapy in office with signed consent   Declined candida   In about a week, start over the counter 17% salicylic acid pads should be applied at bedtime  In the morning remove pad and file each wart with a dedicated nail file or pumice stone    Do not use the same file or stone on any other family member or other unaffected location    PROCEDURE:  DESTRUCTION OF BENIGN LESIONS  After a thorough discussion of treatment options and risk/benefits/alternatives (including but not limited to local pain, scarring, dyspigmentation, blistering, and possible superinfection), verbal and written consent were obtained and the aforementioned lesions were treated on with cryotherapy using liquid nitrogen x 1 cycle for 5-10 seconds   TOTAL NUMBER of 6 lesions were treated today on the ANATOMIC LOCATION: right hand  The patient tolerated the procedure well, and after-care instructions were provided  SEBORRHEIC KERATOSIS; NON-INFLAMED    Physical Exam:   Anatomic Location Affected:  Upper chest    Morphological Description:  Flat and raised, waxy, smooth to warty textured, yellow to brownish-grey to dark brown to blackish, discrete, "stuck-on" appearing papules   Pertinent Positives:   Pertinent Negatives: Additional History of Present Condition:  Patient reports new bumps on the skin  Denies itch, burn, pain, bleeding or ulceration  Present constantly; nothing seems to make it worse or better  No prior treatment  Assessment and Plan:  Based on a thorough discussion of this condition and the management approach to it (including a comprehensive discussion of the known risks, side effects and potential benefits of treatment), the patient (family) agrees to implement the following specific plan:   Discussed cosmetic procedure to remove with out of pocket charge of $20 for the one area    Seborrheic Keratosis  A seborrheic keratosis is a harmless warty spot that appears during adult life as a common sign of skin aging  Seborrheic keratoses can arise on any area of skin, covered or uncovered, with the usual exception of the palms and soles  They do not arise from mucous membranes  Seborrheic keratoses can have highly variable appearance  Seborrheic keratoses are extremely common   It has been estimated that over 90% of adults over the age of 61 years have one or more of them  They occur in males and females of all races, typically beginning to erupt in the 35s or 45s  They are uncommon under the age of 21 years  The precise cause of seborrhoeic keratoses is not known  Seborrhoeic keratoses are considered degenerative in nature  As time goes by, seborrheic keratoses tend to become more numerous  Some people inherit a tendency to develop a very large number of them; some people may have hundreds of them  The name "seborrheic keratosis" is misleading, because these lesions are not limited to a seborrhoeic distribution (scalp, mid-face, chest, upper back), nor are they formed from sebaceous glands, nor are they associated with sebum -- which is greasy  Seborrheic keratosis may also be called "SK," "Seb K," "basal cell papilloma," "senile wart," or "barnacle "      Researchers have noted:   Eruptive seborrhoeic keratoses can follow sunburn or dermatitis   Skin friction may be the reason they appear in body folds   Viral cause (e g , human papillomavirus) seems unlikely   Stable and clonal mutations or activation of FRFR3, PIK3CA, JEFF, AKT1 and EGFR genes are found in seborrhoeic keratoses   Seborrhoeic keratosis can arise from solar lentigo   FRFR3 mutations also arise in solar lentigines  These mutations are associated with increased age and location on the head and neck, suggesting a role of ultraviolet radiation in these lesions   Seborrheic keratoses do not harbour tumour suppressor gene mutations   Epidermal growth factor receptor inhibitors, which are used to treat some cancers, often result in an increase in verrucal (warty) keratoses  There is no easy way to remove multiple lesions on a single occasion  Unless a specific lesion is "inflamed" and is causing pain or stinging/burning or is bleeding, most insurance companies do not authorize treatment      Scribe Attestation    I,: Deena Easley am acting as a scribe while in the presence of the attending physician :       I,:  Lesley Haley MD personally performed the services described in this documentation    as scribed in my presence :

## 2022-05-11 ENCOUNTER — PROCEDURE VISIT (OUTPATIENT)
Dept: DERMATOLOGY | Facility: CLINIC | Age: 32
End: 2022-05-11
Payer: COMMERCIAL

## 2022-05-11 VITALS — TEMPERATURE: 97.2 F

## 2022-05-11 DIAGNOSIS — B07.9 VERRUCA VULGARIS: Primary | ICD-10-CM

## 2022-05-11 PROCEDURE — 17110 DESTRUCTION B9 LES UP TO 14: CPT | Performed by: DERMATOLOGY

## 2022-05-11 NOTE — PATIENT INSTRUCTIONS
PROCEDURE:  DESTRUCTION OF BENIGN LESIONS  After a thorough discussion of treatment options and risk/benefits/alternatives (including but not limited to local pain, scarring, dyspigmentation, blistering, and possible superinfection), verbal and written consent were obtained and the aforementioned lesions were treated on with cryotherapy using liquid nitrogen x 1 cycle for 5-10 seconds  TOTAL NUMBER of 7 lesions were treated today on the ANATOMIC LOCATION: Right hand  The patient tolerated the procedure well, and after-care instructions were provided

## 2022-05-11 NOTE — PROGRESS NOTES
PROCEDURE:  DESTRUCTION OF BENIGN LESIONS  After a thorough discussion of treatment options and risk/benefits/alternatives (including but not limited to local pain, scarring, dyspigmentation, blistering, and possible superinfection), verbal and written consent were obtained and the aforementioned lesions were treated on with cryotherapy using liquid nitrogen x 1 cycle for 5-10 seconds   TOTAL NUMBER of 7 lesions were treated today on the ANATOMIC LOCATION: Right hand  The patient tolerated the procedure well, and after-care instructions were provided          Scribe Attestation    I,:  Rangel Arambula am acting as a scribe while in the presence of the attending physician :       I,:  Verner Barb, MD personally performed the services described in this documentation    as scribed in my presence :

## 2022-06-06 ENCOUNTER — OFFICE VISIT (OUTPATIENT)
Dept: FAMILY MEDICINE CLINIC | Facility: CLINIC | Age: 32
End: 2022-06-06
Payer: COMMERCIAL

## 2022-06-06 VITALS
OXYGEN SATURATION: 98 % | SYSTOLIC BLOOD PRESSURE: 116 MMHG | DIASTOLIC BLOOD PRESSURE: 72 MMHG | TEMPERATURE: 98.1 F | HEIGHT: 62 IN | WEIGHT: 164.25 LBS | HEART RATE: 76 BPM | RESPIRATION RATE: 22 BRPM | BODY MASS INDEX: 30.22 KG/M2

## 2022-06-06 DIAGNOSIS — L65.9 HAIR LOSS: ICD-10-CM

## 2022-06-06 DIAGNOSIS — Z11.59 NEED FOR HEPATITIS C SCREENING TEST: Primary | ICD-10-CM

## 2022-06-06 DIAGNOSIS — E55.9 VITAMIN D INSUFFICIENCY: ICD-10-CM

## 2022-06-06 DIAGNOSIS — Z13.220 LIPID SCREENING: ICD-10-CM

## 2022-06-06 DIAGNOSIS — Z00.00 ANNUAL PHYSICAL EXAM: ICD-10-CM

## 2022-06-06 DIAGNOSIS — Z13.1 DIABETES MELLITUS SCREENING: ICD-10-CM

## 2022-06-06 PROBLEM — Z98.891 STATUS POST PRIMARY LOW TRANSVERSE CESAREAN SECTION: Status: RESOLVED | Noted: 2021-08-11 | Resolved: 2022-06-06

## 2022-06-06 PROBLEM — M79.671 PAIN IN RIGHT FOOT: Status: RESOLVED | Noted: 2021-12-21 | Resolved: 2022-06-06

## 2022-06-06 PROBLEM — M84.374A STRESS REACTION OF RIGHT FOOT: Status: RESOLVED | Noted: 2021-12-21 | Resolved: 2022-06-06

## 2022-06-06 PROBLEM — Z20.822 CLOSE EXPOSURE TO COVID-19 VIRUS: Status: RESOLVED | Noted: 2021-12-28 | Resolved: 2022-06-06

## 2022-06-06 PROBLEM — J02.9 SORE THROAT: Status: RESOLVED | Noted: 2022-01-27 | Resolved: 2022-06-06

## 2022-06-06 PROBLEM — O21.9 NAUSEA AND VOMITING IN PREGNANCY: Status: RESOLVED | Noted: 2021-01-13 | Resolved: 2022-06-06

## 2022-06-06 PROCEDURE — 99395 PREV VISIT EST AGE 18-39: CPT | Performed by: FAMILY MEDICINE

## 2022-06-06 NOTE — ASSESSMENT & PLAN NOTE
Doing well - Ella Kirkpatrick has been active and attentive to her nutrition since having baby Herminia Torres    We've agreed to lipid, diabetes, Hep C screening, along with recheck of previously-low Vitamin D

## 2022-06-06 NOTE — PROGRESS NOTES
Family Medicine Annual Physical Office Visit  Noé Reyna 36 y o  female   MRN: [id number] : 1990  ENCOUNTER: 2022 10:02 AM    Assessment and Plan   Annual physical exam  Doing well - Caroline Frost has been active and attentive to her nutrition since having baby Brennen Son  We've agreed to lipid, diabetes, Hep C screening, along with recheck of previously-low Vitamin D  Hair loss  In the setting of concomitant fatigue and difficulty with weight control, will check TSH  Vitamin D insufficiency  Recheck Vit D now  BMI Counseling: Body mass index is 30 04 kg/m²  The BMI is above normal  Nutrition recommendations include encouraging healthy choices of fruits and vegetables  Exercise recommendations include exercising 3-5 times per week  Rationale for BMI follow-up plan is due to patient being overweight or obese  Depression Screening and Follow-up Plan: Patient was screened for depression during today's encounter  They screened negative with a PHQ-2 score of 0  RTC 6mo, will call with results    Chief Complaint     Chief Complaint   Patient presents with    Physical Exam       History of Present Illness   Noé Reyna is a 36y o -year-old female who presents today for annual physical       Notes being more fatigued than ever - Mom was diagnosed with hypothyroidism and Jose Antonioherminio Frost wonders if it could be true for her as well  Caroline Frost is still losing hair, and has had trouble losing weight despite breast pumping and going for daily walks  Caroline Frost also notes intermittent waves of nausea  She has begun to wean from breast pumping  Review of Systems   Review of Systems   Constitutional: Positive for fatigue  Negative for activity change, chills and fever  HENT: Negative for congestion, sinus pressure, sinus pain and sore throat  Respiratory: Negative for cough, shortness of breath and wheezing  Cardiovascular: Negative for chest pain, palpitations and leg swelling  Gastrointestinal: Negative for abdominal pain, diarrhea, nausea and vomiting  Endocrine:        Hair loss   Genitourinary: Negative for decreased urine volume, dysuria, frequency and urgency  Musculoskeletal: Negative for arthralgias, myalgias, neck pain and neck stiffness  Skin: Negative for rash  Neurological: Negative for dizziness, light-headedness, numbness and headaches  Active Problem List     Patient Active Problem List   Diagnosis    Vitamin D insufficiency    Overactive bladder    Dyspareunia, female    Palpitation    Verruca vulgaris    Encounter for supervision of low-risk first pregnancy in third trimester    Annual physical exam    Hair loss       Past Medical History, Past Surgical History, Family History, and Social History were reviewed and updated today as appropriate  Objective   /72 (BP Location: Left arm, Patient Position: Sitting, Cuff Size: Large)   Pulse 76   Temp 98 1 °F (36 7 °C)   Resp 22   Ht 5' 2" (1 575 m)   Wt 74 5 kg (164 lb 4 oz)   SpO2 98%   BMI 30 04 kg/m²     Physical Exam  Constitutional:       General: She is not in acute distress  Appearance: She is well-developed  HENT:      Head: Normocephalic and atraumatic  Eyes:      Pupils: Pupils are equal, round, and reactive to light  Cardiovascular:      Rate and Rhythm: Normal rate and regular rhythm  Heart sounds: Normal heart sounds  No murmur heard  No friction rub  No gallop  Pulmonary:      Effort: Pulmonary effort is normal  No respiratory distress  Breath sounds: Normal breath sounds  No wheezing or rales  Abdominal:      General: There is no distension  Palpations: Abdomen is soft  Musculoskeletal:         General: Normal range of motion  Cervical back: Normal range of motion and neck supple  Neurological:      Mental Status: She is alert and oriented to person, place, and time     Psychiatric:         Behavior: Behavior normal          Thought Content: Thought content normal        Diabetic Foot Exam    Pertinent Laboratory/Diagnostic Studies:  Lab Results   Component Value Date    BUN 11 07/18/2020    CREATININE 0 92 07/18/2020    CALCIUM 8 9 07/18/2020    K 3 6 07/18/2020    CO2 29 07/18/2020     07/18/2020     Lab Results   Component Value Date    ALT 17 07/26/2021    AST 20 07/26/2021    ALKPHOS 54 07/18/2020       Lab Results   Component Value Date    WBC 17 76 (H) 08/12/2021    HGB 8 7 (L) 08/12/2021    HCT 27 2 (L) 08/12/2021    MCV 91 08/12/2021     08/12/2021       No results found for: TSH    No results found for: CHOL  Lab Results   Component Value Date    TRIG 57 07/18/2020     Lab Results   Component Value Date    HDL 66 07/18/2020     Lab Results   Component Value Date    LDLCALC 106 (H) 07/18/2020     Lab Results   Component Value Date    HGBA1C 4 8 08/28/2018       Results for orders placed or performed in visit on 01/31/22   Covid/Flu- [facility] or [facility] Now Collect    Specimen: Nose; Nares   Result Value Ref Range    SARS-CoV-2 Positive (A) Negative    INFLUENZA A PCR Negative Negative    INFLUENZA B PCR Negative Negative       Orders Placed This Encounter   Procedures    Hepatitis C Antibody ([facility])    Lipid Panel with Direct LDL reflex    Hemoglobin A1C    Comprehensive metabolic panel    Vitamin D 25 hydroxy    TSH, 3rd generation with Free T4 reflex         Current Medications     Current Outpatient Medications   Medication Sig Dispense Refill    Prenatal Vit-Fe Fumarate-FA (PRENATAL PO) Take by mouth       No current facility-administered medications for this visit         ALLERGIES:  Allergies   Allergen Reactions    Sulfa Antibiotics Fever and Fatigue    Sulfamethoxazole-Trimethoprim Fever and Fatigue       Health Maintenance     Health Maintenance   Topic Date Due    Hepatitis C Screening  Never done    Influenza Vaccine (Season Ended) 09/01/2022    Depression Screening  06/06/2023    BMI: Followup Plan  06/06/2023    BMI: Adult  06/06/2023    Annual Physical  06/06/2023    Cervical Cancer Screening  11/09/2024    DTaP,Tdap,and Td Vaccines (2 - Td or Tdap) 06/15/2031    HIV Screening  Completed    COVID-19 Vaccine  Completed    Pneumococcal Vaccine: Pediatrics (0 to 5 Years) and At-Risk Patients (6 to 59 Years)  Aged Out    HIB Vaccine  Aged Out    Hepatitis B Vaccine  Aged Out    IPV Vaccine  Aged Out    Hepatitis A Vaccine  Aged Out    Meningococcal ACWY Vaccine  Aged Out    HPV Vaccine  Aged [employer] History   Administered Date(s) Administered    COVID-19 MODERNA VACC 0 25 ML IM BOOSTER 01/26/2022    COVID-19 MODERNA VACC 0 5 ML IM 12/23/2020, 01/19/2021    Tdap 06/15/2021         Jeannine Cody MD   [address]  6/6/2022  10:02 AM    Parts of this note were dictated using [employer] dictation software and may have sounds-like errors due to variation in pronunciation

## 2022-06-08 ENCOUNTER — APPOINTMENT (OUTPATIENT)
Dept: LAB | Facility: CLINIC | Age: 32
End: 2022-06-08
Payer: COMMERCIAL

## 2022-06-08 DIAGNOSIS — E55.9 VITAMIN D INSUFFICIENCY: ICD-10-CM

## 2022-06-08 DIAGNOSIS — Z11.59 NEED FOR HEPATITIS C SCREENING TEST: ICD-10-CM

## 2022-06-08 DIAGNOSIS — L65.9 HAIR LOSS: ICD-10-CM

## 2022-06-08 DIAGNOSIS — Z13.220 LIPID SCREENING: ICD-10-CM

## 2022-06-08 DIAGNOSIS — Z13.1 DIABETES MELLITUS SCREENING: ICD-10-CM

## 2022-06-09 ENCOUNTER — APPOINTMENT (OUTPATIENT)
Dept: LAB | Facility: CLINIC | Age: 32
End: 2022-06-09
Payer: COMMERCIAL

## 2022-06-09 LAB
25(OH)D3 SERPL-MCNC: 23.5 NG/ML (ref 30–100)
ALBUMIN SERPL BCP-MCNC: 4.2 G/DL (ref 3.5–5)
ALP SERPL-CCNC: 78 U/L (ref 46–116)
ALT SERPL W P-5'-P-CCNC: 19 U/L (ref 12–78)
ANION GAP SERPL CALCULATED.3IONS-SCNC: 6 MMOL/L (ref 4–13)
AST SERPL W P-5'-P-CCNC: 15 U/L (ref 5–45)
BILIRUB SERPL-MCNC: 0.49 MG/DL (ref 0.2–1)
BUN SERPL-MCNC: 14 MG/DL (ref 5–25)
CALCIUM SERPL-MCNC: 9.1 MG/DL (ref 8.3–10.1)
CHLORIDE SERPL-SCNC: 106 MMOL/L (ref 100–108)
CHOLEST SERPL-MCNC: 182 MG/DL
CO2 SERPL-SCNC: 26 MMOL/L (ref 21–32)
CREAT SERPL-MCNC: 0.85 MG/DL (ref 0.6–1.3)
EST. AVERAGE GLUCOSE BLD GHB EST-MCNC: 100 MG/DL
GFR SERPL CREATININE-BSD FRML MDRD: 92 ML/MIN/1.73SQ M
GLUCOSE P FAST SERPL-MCNC: 87 MG/DL (ref 65–99)
HBA1C MFR BLD: 5.1 %
HCV AB SER QL: NORMAL
HDLC SERPL-MCNC: 64 MG/DL
LDLC SERPL CALC-MCNC: 104 MG/DL (ref 0–100)
POTASSIUM SERPL-SCNC: 4.2 MMOL/L (ref 3.5–5.3)
PROT SERPL-MCNC: 8.3 G/DL (ref 6.4–8.2)
SODIUM SERPL-SCNC: 138 MMOL/L (ref 136–145)
TRIGL SERPL-MCNC: 71 MG/DL
TSH SERPL DL<=0.05 MIU/L-ACNC: 2.09 UIU/ML (ref 0.45–4.5)

## 2022-06-09 PROCEDURE — 80061 LIPID PANEL: CPT

## 2022-06-09 PROCEDURE — 86803 HEPATITIS C AB TEST: CPT

## 2022-06-09 PROCEDURE — 36415 COLL VENOUS BLD VENIPUNCTURE: CPT

## 2022-06-09 PROCEDURE — 84443 ASSAY THYROID STIM HORMONE: CPT

## 2022-06-09 PROCEDURE — 82306 VITAMIN D 25 HYDROXY: CPT

## 2022-06-09 PROCEDURE — 80053 COMPREHEN METABOLIC PANEL: CPT

## 2022-06-09 PROCEDURE — 83036 HEMOGLOBIN GLYCOSYLATED A1C: CPT

## 2022-06-16 ENCOUNTER — OFFICE VISIT (OUTPATIENT)
Dept: OBGYN CLINIC | Facility: CLINIC | Age: 32
End: 2022-06-16
Payer: COMMERCIAL

## 2022-06-16 VITALS — BODY MASS INDEX: 30.36 KG/M2 | WEIGHT: 166 LBS | DIASTOLIC BLOOD PRESSURE: 72 MMHG | SYSTOLIC BLOOD PRESSURE: 118 MMHG

## 2022-06-16 DIAGNOSIS — L29.9 ITCHING WITH IRRITATION: ICD-10-CM

## 2022-06-16 DIAGNOSIS — N64.59 BREAST COMPLAINT: Primary | ICD-10-CM

## 2022-06-16 PROCEDURE — 99213 OFFICE O/P EST LOW 20 MIN: CPT | Performed by: OBSTETRICS & GYNECOLOGY

## 2022-06-16 NOTE — PROGRESS NOTES
Assessment/Plan:  Unsure what the initial process was but this is complicated by the patient's self treatment [popping]  Separate issue is the area of pruritus-recommend 1% hydrocortisone cream up to 4 times a day  Return to the office in 4 weeks for reassessment  Reassured that this is not inflammatory breast cancer       Diagnoses and all orders for this visit:    Breast complaint    Itching with irritation              Subjective:        Patient ID: Olivia Kaur is a 36 y o  female  Balinda Romi presents today with a 3 week history a small red dot on her inner left breast associated with itching medial to this  The area of pruritus is not part of the red dot  She thought the area might represent a pimple and try to pop it last night  Nothing was expressed although it did scab  She had done some research and was concerned about inflammatory breast cancer  There is no family history of breast cancer  She denies being outside or bitten by an insect  She had been breast pumping and stopped 3 days ago  She denies any fever or chills  The following portions of the patient's history were reviewed and updated as appropriate: She  has a past medical history of Degenerative lumbar disc (2015), Overactive bladder, Urinary tract infection, and Verruca  Patient Active Problem List    Diagnosis Date Noted    Breast complaint 2022    Annual physical exam 2022    Hair loss 2022    Encounter for supervision of low-risk first pregnancy in third trimester 2021    Verruca vulgaris 10/22/2020    Palpitation 2020    Dyspareunia, female 2018    Overactive bladder 2018    Vitamin D insufficiency 2015     She  has a past surgical history that includes [place] tooth extraction; Colposcopy w/ biopsy / curettage; Colposcopy; Gynecologic cryosurgery; and pr  delivery only (N/A, 2021)    Her family history includes ALS in her maternal grandfather and other; Alcohol abuse in her father; Arthritis in her other; Breast cancer in her other; COPD in her maternal grandmother; Endometrial cancer in her maternal grandmother; Endometriosis in her maternal grandmother; Fibroids in her mother; Hiatal hernia in her maternal grandmother; Hypertension in her other; Hypothyroidism in her mother; No Known Problems in her brother and brother; Osteoporosis in her maternal grandmother and other; Parkinsonism in her maternal grandfather and maternal grandmother; Rheum arthritis in her mother; Skin cancer in her maternal grandmother  She  reports that she has never smoked  She has never used smokeless tobacco  She reports current alcohol use  She reports that she does not use drugs  Current Outpatient Medications   Medication Sig Dispense Refill    Prenatal Vit-Fe Fumarate-FA (PRENATAL PO) Take by mouth       No current facility-administered medications for this visit  Current Outpatient Medications on File Prior to Visit   Medication Sig    Prenatal Vit-Fe Fumarate-FA (PRENATAL PO) Take by mouth     No current facility-administered medications on file prior to visit  She is allergic to sulfa antibiotics and sulfamethoxazole-trimethoprim       Review of Systems   Constitutional: Negative  Respiratory: Negative for chest tightness and shortness of breath  Cardiovascular: Negative for chest pain and palpitations  Skin: Positive for color change and rash  Objective:    Vitals:    06/16/22 0957   BP: 118/72   Weight: 75 3 kg (166 lb)            Physical Exam  Vitals and nursing note reviewed  Constitutional:       Appearance: Normal appearance  Chest:   Breasts:      Right: Normal       Left: Skin change present  No tenderness  Comments: There is a 2 mm area of erythema with a scab on the top  There is no underlying induration  There seems to be an underlying vessel  This is a skin process and does not involve breast tissue    Medial to this there is a 3 cm area of slightly darkened skin which is the area that itches  It feels drier than the rest   Neurological:      Mental Status: She is alert and oriented to person, place, and time  Psychiatric:         Mood and Affect: Mood normal          Behavior: Behavior normal          Thought Content:  Thought content normal          Judgment: Judgment normal

## 2022-10-15 ENCOUNTER — NURSE TRIAGE (OUTPATIENT)
Dept: OTHER | Facility: OTHER | Age: 32
End: 2022-10-15

## 2022-10-15 NOTE — TELEPHONE ENCOUNTER
Reason for Disposition  • MODERATE pain (e g , interferes with normal activities or awakens from sleep)    Answer Assessment - Initial Assessment Questions  1  LOCATION: "Where does it hurt?" (e g , left, right)      Right flank   2  ONSET: "When did the pain start?"      Today morning   3  SEVERITY: "How bad is the pain?" (e g , Scale 1-10; mild, moderate, or severe)    - MILD (1-3): doesn't interfere with normal activities     - MODERATE (4-7): interferes with normal activities or awakens from sleep     - SEVERE (8-10): excruciating pain and patient unable to do normal activities (stays in bed)         Moderate 4 out of 10   4  PATTERN: "Does the pain come and go, or is it constant?"       Constant   5  CAUSE: "What do you think is causing the pain?"      Possible UTI   6  OTHER SYMPTOMS:  "Do you have any other symptoms?" (e g , fever, abdominal pain, vomiting, leg weakness, burning with urination, blood in urine)       Urgency, frequent urination   7  PREGNANCY:  "Is there any chance you are pregnant?" "When was your last menstrual period?"       Unsure, LMP 2 weeks ago  Protocols used:  FLANK PAIN-ADULT-

## 2022-10-15 NOTE — TELEPHONE ENCOUNTER
Regarding: flank pain  ----- Message from Jim Mayen sent at 10/15/2022 11:57 AM EDT -----  "I have sudden right flank pain  I have a history of UTIs   I'd like to get treated "

## 2022-10-16 ENCOUNTER — HOSPITAL ENCOUNTER (EMERGENCY)
Facility: HOSPITAL | Age: 32
Discharge: HOME | End: 2022-10-16
Attending: EMERGENCY MEDICINE
Payer: COMMERCIAL

## 2022-10-16 ENCOUNTER — APPOINTMENT (EMERGENCY)
Dept: CT IMAGING | Facility: HOSPITAL | Age: 32
End: 2022-10-16
Attending: EMERGENCY MEDICINE
Payer: COMMERCIAL

## 2022-10-16 ENCOUNTER — OFFICE VISIT (OUTPATIENT)
Dept: URGENT CARE | Facility: CLINIC | Age: 32
End: 2022-10-16
Payer: COMMERCIAL

## 2022-10-16 VITALS
HEIGHT: 62 IN | BODY MASS INDEX: 30.55 KG/M2 | DIASTOLIC BLOOD PRESSURE: 66 MMHG | OXYGEN SATURATION: 96 % | SYSTOLIC BLOOD PRESSURE: 109 MMHG | WEIGHT: 166 LBS | RESPIRATION RATE: 18 BRPM | HEART RATE: 80 BPM | TEMPERATURE: 97.9 F

## 2022-10-16 VITALS
TEMPERATURE: 98.4 F | RESPIRATION RATE: 16 BRPM | HEART RATE: 91 BPM | DIASTOLIC BLOOD PRESSURE: 68 MMHG | SYSTOLIC BLOOD PRESSURE: 120 MMHG | OXYGEN SATURATION: 99 %

## 2022-10-16 DIAGNOSIS — R35.0 URINARY FREQUENCY: ICD-10-CM

## 2022-10-16 DIAGNOSIS — R10.9 FLANK PAIN: Primary | ICD-10-CM

## 2022-10-16 LAB
ALBUMIN SERPL BCP-MCNC: 4.8 G/DL (ref 3.5–5)
ALP SERPL-CCNC: 69 U/L (ref 34–104)
ALT SERPL W P-5'-P-CCNC: 10 U/L (ref 7–52)
ANION GAP SERPL CALCULATED.3IONS-SCNC: 8 MMOL/L (ref 4–13)
AST SERPL W P-5'-P-CCNC: 16 U/L (ref 13–39)
BASOPHILS # BLD AUTO: 0.06 THOUSANDS/ΜL (ref 0–0.1)
BASOPHILS NFR BLD AUTO: 1 % (ref 0–1)
BILIRUB SERPL-MCNC: 0.52 MG/DL (ref 0.2–1)
BILIRUB UR QL STRIP: NEGATIVE
BUN SERPL-MCNC: 12 MG/DL (ref 5–25)
CALCIUM SERPL-MCNC: 9.9 MG/DL (ref 8.4–10.2)
CHLORIDE SERPL-SCNC: 103 MMOL/L (ref 96–108)
CLARITY UR: CLEAR
CO2 SERPL-SCNC: 28 MMOL/L (ref 21–32)
COLOR UR: COLORLESS
CREAT SERPL-MCNC: 0.84 MG/DL (ref 0.6–1.3)
EOSINOPHIL # BLD AUTO: 0.16 THOUSAND/ΜL (ref 0–0.61)
EOSINOPHIL NFR BLD AUTO: 2 % (ref 0–6)
ERYTHROCYTE [DISTWIDTH] IN BLOOD BY AUTOMATED COUNT: 12.6 % (ref 11.6–15.1)
EXT PREG TEST URINE: NEGATIVE
EXT. CONTROL ED NAV: NORMAL
GFR SERPL CREATININE-BSD FRML MDRD: 94 ML/MIN/1.73SQ M
GLUCOSE SERPL-MCNC: 82 MG/DL (ref 65–140)
GLUCOSE UR STRIP-MCNC: NEGATIVE MG/DL
HCT VFR BLD AUTO: 43.8 % (ref 34.8–46.1)
HGB BLD-MCNC: 14.2 G/DL (ref 11.5–15.4)
HGB UR QL STRIP.AUTO: NEGATIVE
IMM GRANULOCYTES # BLD AUTO: 0.03 THOUSAND/UL (ref 0–0.2)
IMM GRANULOCYTES NFR BLD AUTO: 0 % (ref 0–2)
KETONES UR STRIP-MCNC: NEGATIVE MG/DL
LEUKOCYTE ESTERASE UR QL STRIP: NEGATIVE
LIPASE SERPL-CCNC: 38 U/L (ref 11–82)
LYMPHOCYTES # BLD AUTO: 2.07 THOUSANDS/ΜL (ref 0.6–4.47)
LYMPHOCYTES NFR BLD AUTO: 29 % (ref 14–44)
MCH RBC QN AUTO: 29.3 PG (ref 26.8–34.3)
MCHC RBC AUTO-ENTMCNC: 32.4 G/DL (ref 31.4–37.4)
MCV RBC AUTO: 90 FL (ref 82–98)
MONOCYTES # BLD AUTO: 0.45 THOUSAND/ΜL (ref 0.17–1.22)
MONOCYTES NFR BLD AUTO: 6 % (ref 4–12)
NEUTROPHILS # BLD AUTO: 4.36 THOUSANDS/ΜL (ref 1.85–7.62)
NEUTS SEG NFR BLD AUTO: 62 % (ref 43–75)
NITRITE UR QL STRIP: NEGATIVE
NRBC BLD AUTO-RTO: 0 /100 WBCS
PH UR STRIP.AUTO: 7 [PH]
PLATELET # BLD AUTO: 336 THOUSANDS/UL (ref 149–390)
PMV BLD AUTO: 9.9 FL (ref 8.9–12.7)
POTASSIUM SERPL-SCNC: 3.7 MMOL/L (ref 3.5–5.3)
PROT SERPL-MCNC: 8.2 G/DL (ref 6.4–8.4)
PROT UR STRIP-MCNC: NEGATIVE MG/DL
RBC # BLD AUTO: 4.85 MILLION/UL (ref 3.81–5.12)
SL AMB  POCT GLUCOSE, UA: NORMAL
SL AMB LEUKOCYTE ESTERASE,UA: NORMAL
SL AMB POCT BILIRUBIN,UA: NORMAL
SL AMB POCT BLOOD,UA: NORMAL
SL AMB POCT CLARITY,UA: CLEAR
SL AMB POCT COLOR,UA: NORMAL
SL AMB POCT KETONES,UA: NORMAL
SL AMB POCT NITRITE,UA: NORMAL
SL AMB POCT PH,UA: 7.5
SL AMB POCT SPECIFIC GRAVITY,UA: 1
SL AMB POCT URINE HCG: NORMAL
SL AMB POCT URINE PROTEIN: NORMAL
SL AMB POCT UROBILINOGEN: 0.2
SODIUM SERPL-SCNC: 139 MMOL/L (ref 135–147)
SP GR UR STRIP.AUTO: 1.01 (ref 1–1.03)
UROBILINOGEN UR STRIP-ACNC: <2 MG/DL
WBC # BLD AUTO: 7.13 THOUSAND/UL (ref 4.31–10.16)

## 2022-10-16 PROCEDURE — 83690 ASSAY OF LIPASE: CPT | Performed by: EMERGENCY MEDICINE

## 2022-10-16 PROCEDURE — 81025 URINE PREGNANCY TEST: CPT | Performed by: EMERGENCY MEDICINE

## 2022-10-16 PROCEDURE — 81002 URINALYSIS NONAUTO W/O SCOPE: CPT | Performed by: PHYSICIAN ASSISTANT

## 2022-10-16 PROCEDURE — 36415 COLL VENOUS BLD VENIPUNCTURE: CPT | Performed by: EMERGENCY MEDICINE

## 2022-10-16 PROCEDURE — 99213 OFFICE O/P EST LOW 20 MIN: CPT | Performed by: PHYSICIAN ASSISTANT

## 2022-10-16 PROCEDURE — 74177 CT ABD & PELVIS W/CONTRAST: CPT

## 2022-10-16 PROCEDURE — 85025 COMPLETE CBC W/AUTO DIFF WBC: CPT | Performed by: EMERGENCY MEDICINE

## 2022-10-16 PROCEDURE — 81025 URINE PREGNANCY TEST: CPT | Performed by: PHYSICIAN ASSISTANT

## 2022-10-16 PROCEDURE — 99284 EMERGENCY DEPT VISIT MOD MDM: CPT | Performed by: EMERGENCY MEDICINE

## 2022-10-16 PROCEDURE — 80053 COMPREHEN METABOLIC PANEL: CPT | Performed by: EMERGENCY MEDICINE

## 2022-10-16 PROCEDURE — G1004 CDSM NDSC: HCPCS

## 2022-10-16 PROCEDURE — 99284 EMERGENCY DEPT VISIT MOD MDM: CPT

## 2022-10-16 PROCEDURE — 81003 URINALYSIS AUTO W/O SCOPE: CPT | Performed by: EMERGENCY MEDICINE

## 2022-10-16 RX ADMIN — IOHEXOL 100 ML: 350 INJECTION, SOLUTION INTRAVENOUS at 13:26

## 2022-10-16 NOTE — ED PROVIDER NOTES
History  Chief Complaint   Patient presents with   • Flank Pain     Yesterday developed flank pain on the right side  Per PCP advised to go to care now  Patient went to care now today and with increasing right sided flank pain, was advised by care now to come to the ER for further evaluation  History provided by:  Patient   used: No    Flank Pain  Associated symptoms: no chest pain, no chills, no cough, no diarrhea, no dysuria, no fever, no nausea, no shortness of breath, no sore throat and no vomiting      Patient is a 72-year-old female wanting to emergency department with right-sided flank pain lower back pain  Was seen at urgent care and sent to the ER for evaluation for kidney stone  No chest pain or shortness of breath  No fevers or chills  No abdominal pain  No nausea vomiting  No diarrhea  Has had increased frequency urination but states is not new  No history of kidney stones  Flank pain was worse yesterday  Still there but improved today  MDM will check abdominal labs, urine, CT, patient does not want any pain medication at this time      Prior to Admission Medications   Prescriptions Last Dose Informant Patient Reported? Taking? Prenatal Vit-Fe Fumarate-FA (PRENATAL PO)  Self Yes No   Sig: Take by mouth   Patient not taking: Reported on 10/16/2022      Facility-Administered Medications: None       Past Medical History:   Diagnosis Date   • Degenerative lumbar disc 2015   • Overactive bladder    • Urinary tract infection    • Verruca        Past Surgical History:   Procedure Laterality Date   • COLPOSCOPY     • COLPOSCOPY W/ BIOPSY / CURETTAGE      Resolved 2017   • GYNECOLOGIC CRYOSURGERY     • [place]  DELIVERY ONLY N/A 2021    Procedure:  SECTION ();   Surgeon: Zofia Haro MD;  Location: [facility] ;  Service: Obstetrics   • WISDOM TOOTH EXTRACTION         Family History   Problem Relation Age of Onset   • Hypothyroidism Mother    • Rheum arthritis Mother    • Fibroids Mother    • Arthritis Other    • Hypertension Other         Benign Essential   • Breast cancer Other    • ALS Other    • Osteoporosis Other    • ALS Maternal Grandfather    • Parkinsonism Maternal Grandfather    • Hiatal hernia Maternal Grandmother    • Osteoporosis Maternal Grandmother    • Endometrial cancer Maternal Grandmother    • Endometriosis Maternal Grandmother    • COPD Maternal Grandmother    • Parkinsonism Maternal Grandmother    • Skin cancer Maternal Grandmother    • Alcohol abuse Father    • No Known Problems Brother    • No Known Problems Brother      I have reviewed and agree with the history as documented  E-Cigarette/Vaping   • E-Cigarette Use Never User      E-Cigarette/Vaping Substances   • Nicotine No    • THC No    • CBD No    • Flavoring No    • Other No    • Unknown No      Social History     Tobacco Use   • Smoking status: Never Smoker   • Smokeless tobacco: Never Used   Vaping Use   • Vaping Use: Never used   Substance Use Topics   • Alcohol use: Yes   • Drug use: No       Review of Systems   Constitutional: Negative for chills, diaphoresis and fever  HENT: Negative for congestion and sore throat  Respiratory: Negative for cough, shortness of breath, wheezing and stridor  Cardiovascular: Negative for chest pain, palpitations and leg swelling  Gastrointestinal: Negative for abdominal pain, blood in stool, diarrhea, nausea and vomiting  Genitourinary: Positive for flank pain  Negative for dysuria, frequency and urgency  Musculoskeletal: Positive for back pain  Negative for neck pain and neck stiffness  Skin: Negative for pallor and rash  Neurological: Negative for dizziness, syncope, weakness, light-headedness and headaches  All other systems reviewed and are negative  Physical Exam  Physical Exam  Vitals reviewed  Constitutional:       Appearance: Normal appearance  She is well-developed     HENT:      Head: Normocephalic and atraumatic  Eyes:      Extraocular Movements: Extraocular movements intact  Pupils: Pupils are equal, round, and reactive to light  Cardiovascular:      Rate and Rhythm: Normal rate and regular rhythm  Heart sounds: Normal heart sounds  Pulmonary:      Effort: Pulmonary effort is normal  No respiratory distress  Breath sounds: Normal breath sounds  Abdominal:      General: Bowel sounds are normal       Palpations: Abdomen is soft  Tenderness: There is no abdominal tenderness  Comments: CVA tenderness on the right   Musculoskeletal:         General: No swelling or tenderness  Normal range of motion  Cervical back: Normal range of motion and neck supple  Skin:     General: Skin is warm and dry  Capillary Refill: Capillary refill takes less than 2 seconds  Neurological:      General: No focal deficit present  Mental Status: She is alert and oriented to person, place, and time           Vital Signs  ED Triage Vitals [10/16/22 1105]   Temperature Pulse Respirations Blood Pressure SpO2   98 4 °F (36 9 °C) 91 16 120/68 99 %      Temp Source Heart Rate Source Patient Position - Orthostatic VS BP Location FiO2 (%)   Oral Monitor Sitting Right arm --      Pain Score       8           Vitals:    10/16/22 1105   BP: 120/68   Pulse: 91   Patient Position - Orthostatic VS: Sitting         Visual Acuity      ED Medications  Medications   iohexol (OMNIPAQUE) 350 MG/ML injection (SINGLE-DOSE) 100 mL (100 mL Intravenous Given 10/16/22 1326)       Diagnostic Studies  Results Reviewed     Procedure Component Value Units Date/Time    Lipase [[id number]]  (Normal) Collected: 10/16/22 1135    Lab Status: Final result Specimen: Blood from Arm, Left Updated: 10/16/22 1206     Lipase 38 u/L     Comprehensive metabolic panel [[id number]] Collected: 10/16/22 1135    Lab Status: Final result Specimen: Blood from Arm, Left Updated: 10/16/22 1206     Sodium 139 mmol/L Potassium 3 7 mmol/L      Chloride 103 mmol/L      CO2 28 mmol/L      ANION GAP 8 mmol/L      BUN 12 mg/dL      Creatinine 0 84 mg/dL      Glucose 82 mg/dL      Calcium 9 9 mg/dL      AST 16 U/L      ALT 10 U/L      Alkaline Phosphatase 69 U/L      Total Protein 8 2 g/dL      Albumin 4 8 g/dL      Total Bilirubin 0 52 mg/dL      eGFR 94 ml/min/1 73sq m     Narrative:      National Kidney Disease Foundation guidelines for Chronic Kidney Disease (CKD):   •  Stage 1 with normal or high GFR (GFR > 90 mL/min/1 73 square meters)  •  Stage 2 Mild CKD (GFR = 60-89 mL/min/1 73 square meters)  •  Stage 3A Moderate CKD (GFR = 45-59 mL/min/1 73 square meters)  •  Stage 3B Moderate CKD (GFR = 30-44 mL/min/1 73 square meters)  •  Stage 4 Severe CKD (GFR = 15-29 mL/min/1 73 square meters)  •  Stage 5 End Stage CKD (GFR <15 mL/min/1 73 square meters)  Note: GFR calculation is accurate only with a steady state creatinine    CBC and differential [[id number]] Collected: 10/16/22 1135    Lab Status: Final result Specimen: Blood from Arm, Left Updated: 10/16/22 1156     WBC 7 13 Thousand/uL      RBC 4 85 Million/uL      Hemoglobin 14 2 g/dL      Hematocrit 43 8 %      MCV 90 fL      MCH 29 3 pg      MCHC 32 4 g/dL      RDW 12 6 %      MPV 9 9 fL      Platelets 653 Thousands/uL      nRBC 0 /100 WBCs      Neutrophils Relative 62 %      Immat GRANS % 0 %      Lymphocytes Relative 29 %      Monocytes Relative 6 %      Eosinophils Relative 2 %      Basophils Relative 1 %      Neutrophils Absolute 4 36 Thousands/µL      Immature Grans Absolute 0 03 Thousand/uL      Lymphocytes Absolute 2 07 Thousands/µL      Monocytes Absolute 0 45 Thousand/µL      Eosinophils Absolute 0 16 Thousand/µL      Basophils Absolute 0 06 Thousands/µL     UA w Reflex to Microscopic w Reflex to Culture [[id number]] Collected: 10/16/22 1136    Lab Status: Final result Specimen: Urine, Clean Catch Updated: 10/16/22 0308     Color, UA Colorless     Clarity, UA Clear Specific [place], UA 1 010     pH, UA 7 0     Leukocytes, UA Negative     Nitrite, UA Negative     Protein, UA Negative mg/dl      Glucose, UA Negative mg/dl      Ketones, UA Negative mg/dl      Urobilinogen, UA <2 0 mg/dl      Bilirubin, UA Negative     Occult Blood, UA Negative    POCT pregnancy, urine [[id number]]  (Normal) Resulted: 10/16/22 1134    Lab Status: Final result Updated: 10/16/22 1134     EXT PREG TEST UR (Ref: Negative) negative     Control valid                 CT abdomen pelvis with contrast   Final Result by Glendy Conroy MD (10/16 1438)      No acute abnormality identified  Exam within normal limits  Workstation performed: [id number]                    Procedures  Procedures         ED Course                               SBIRT 22yo+    Flowsheet Row Most Recent Value   SBIRT (23 yo +)    In order to provide better care to our patients, we are screening all of our patients for alcohol and drug use  Would it be okay to ask you these screening questions? Yes Filed at: 10/16/2022 1111   Initial Alcohol Screen: US AUDIT-C     1  How often do you have a drink containing alcohol? 0 Filed at: 10/16/2022 1111   2  How many drinks containing alcohol do you have on a typical day you are drinking? 0 Filed at: 10/16/2022 1111   3a  Male UNDER 65: How often do you have five or more drinks on one occasion? 0 Filed at: 10/16/2022 1111   3b  FEMALE Any Age, or MALE 65+: How often do you have 4 or more drinks on one occassion? 0 Filed at: 10/16/2022 1111   Audit-C Score 0 Filed at: 10/16/2022 1111   JULIO: How many times in the past year have you    Used an illegal drug or used a prescription medication for non-medical reasons?  Never Filed at: 10/16/2022 1111                    MDM    Disposition  Final diagnoses:   Flank pain     Time reflects when diagnosis was documented in both MDM as applicable and the Disposition within this note     Time User Action Codes Description Comment    10/16/2022  2:41 PM Laura Menendez Add [R10 9] Flank pain       ED Disposition     ED Disposition   Discharge    Condition   Stable    Date/Time   Sun Oct 16, 2022 Man Matthews 229 discharge to home/self care  Follow-up Information     Follow up With Specialties Details Why Contact Info Additional [address] MD Family Medicine, Obstetrics and Gynecology, Obstetrics, Gynecology In 3 days Re-evaluation [address] [place] [address]  [address] [facility] Emergency Department Emergency Medicine  As needed, If symptoms worsen [address] Emergency Department, [address], [place], [address] [facility], [address]          Discharge Medication List as of 10/16/2022  2:42 PM      CONTINUE these medications which have NOT CHANGED    Details   Prenatal Vit-Fe Fumarate-FA (PRENATAL PO) Take by mouth, Historical Med             No discharge procedures on file      PDMP Review     None          ED Provider  Electronically Signed by           Nelida Godoy MD  10/16/22 3394

## 2022-10-16 NOTE — PROGRESS NOTES
[facility]        NAME: Tash Bobby is a 36 y o  female  : 1990    MRN: [id number]  DATE: 2022  TIME: 9:47 AM    Assessment and Plan   Flank pain [R10 9]  1  Flank pain  POCT urine dip    POCT urine HCG    Transfer to other facility   2  Urinary frequency  Transfer to other facility     Urine pregnancy negative  Urine dip-straw colored and clear, negative nitrites, small leukocytes    Right flank pain, urinary frequency, some urinary incontinence due to urinary urgency  States she has never had this right flank pain with her previous UTIs  Denies any injury or trauma to the area  Denies nausea or vomiting  Right CVA tenderness on exam   States she has been trying to get pregnant- urine preg negative in the office    Patient Instructions     Patient would like to go via private vehicle to the [facility] Emergency Department for further evaluation, workup and treatment- hospital address verified with the patient  Patient agreed to go immediately to the ED  Chief Complaint     Chief Complaint   Patient presents with   • Possible UTI     Rt flank pain on side, urination frequency and incontinence started yesterday  History of Present Illness       82-year-old female presents for evaluation of right-sided flank pain since yesterday  States this started suddenly  States sometimes it is sharp sometimes it is dull and achy  She denies any injury or trauma to the area but states this started out of nowhere  States she is also having some urinary frequency and urinary urgency  States she is having some occasional urinary incontinence due to the urinary urgency and not being able to get to the bathroom in time  She has not tried anything for it  States she does have a history of UTI but states he has never had the kidney pain and flank pain like this in the past   She denies any history of kidney stones or kidney infections    She denies any fevers, chills, nausea, vomiting, pelvic/vaginal pain or bleeding, or other complaints  States she is unsure if she is pregnant as she just ovulated this week and she is trying to get pregnant  Review of Systems   Review of Systems   Constitutional: Negative for activity change, appetite change, chills, fatigue and fever  Respiratory: Negative for shortness of breath  Cardiovascular: Negative for chest pain  Gastrointestinal: Positive for abdominal pain  Negative for diarrhea, nausea and vomiting  Genitourinary: Positive for flank pain, frequency and urgency  Negative for decreased urine volume, dysuria, genital sores, hematuria, pelvic pain, vaginal bleeding, vaginal discharge and vaginal pain  Musculoskeletal: Negative for back pain and myalgias  Neurological: Negative for dizziness, weakness and light-headedness  All other systems reviewed and are negative  Current Medications       Current Outpatient Medications:   •  Prenatal Vit-Fe Fumarate-FA (PRENATAL PO), Take by mouth (Patient not taking: Reported on 10/16/2022), Disp: , Rfl:     Current Allergies     Allergies as of 10/16/2022 - Reviewed 10/16/2022   Allergen Reaction Noted   • Sulfa antibiotics Fever and Fatigue 2015   • Sulfamethoxazole-trimethoprim Fever and Fatigue 2015            The following portions of the patient's history were reviewed and updated as appropriate: allergies, current medications, past family history, past medical history, past social history, past surgical history and problem list      Past Medical History:   Diagnosis Date   • Degenerative lumbar disc 2015   • Overactive bladder    • Urinary tract infection    • Verruca        Past Surgical History:   Procedure Laterality Date   • COLPOSCOPY     • COLPOSCOPY W/ BIOPSY / CURETTAGE      Resolved 2017   • GYNECOLOGIC CRYOSURGERY     • [place]  DELIVERY ONLY N/A 2021    Procedure:  SECTION ();   Surgeon: Stefan Welch MD;  Location: [facility] ;  Service: Obstetrics   • WISDOM TOOTH EXTRACTION         Family History   Problem Relation Age of Onset   • Hypothyroidism Mother    • Rheum arthritis Mother    • Fibroids Mother    • Arthritis Other    • Hypertension Other         Benign Essential   • Breast cancer Other    • ALS Other    • Osteoporosis Other    • ALS Maternal Grandfather    • Parkinsonism Maternal Grandfather    • Hiatal hernia Maternal Grandmother    • Osteoporosis Maternal Grandmother    • Endometrial cancer Maternal Grandmother    • Endometriosis Maternal Grandmother    • COPD Maternal Grandmother    • Parkinsonism Maternal Grandmother    • Skin cancer Maternal Grandmother    • Alcohol abuse Father    • No Known Problems Brother    • No Known Problems Brother          Medications have been verified  Objective   /66   Pulse 80   Temp 97 9 °F (36 6 °C) (Temporal)   Resp 18   Ht 5' 2" (1 575 m)   Wt 75 3 kg (166 lb)   LMP 09/26/2022   SpO2 96%   BMI 30 36 kg/m²        Physical Exam     Physical Exam  Vitals and nursing note reviewed  Constitutional:       General: She is not in acute distress  Appearance: She is well-developed  She is not ill-appearing  Cardiovascular:      Rate and Rhythm: Normal rate and regular rhythm  Heart sounds: Normal heart sounds  Pulmonary:      Effort: Pulmonary effort is normal       Breath sounds: Normal breath sounds  No wheezing  Abdominal:      General: Bowel sounds are normal       Palpations: Abdomen is soft  Tenderness: There is abdominal tenderness (right flank)  There is right CVA tenderness  There is no rebound  Skin:     Capillary Refill: Capillary refill takes less than 2 seconds  Neurological:      Mental Status: She is alert and oriented to person, place, and time     Psychiatric:         Behavior: Behavior normal

## 2022-10-16 NOTE — PATIENT INSTRUCTIONS
Patient would like to go via private vehicle to the [facility] for further evaluation, workup and treatment- hospital address verified with the patient  Patient agreed to go immediately to the ED

## 2022-10-17 NOTE — TELEPHONE ENCOUNTER
Called patient to follow up on Urgent Care/ED visit  Patient stated she is feeling better and will call if UTI symptoms worsen

## 2022-11-16 DIAGNOSIS — Z01.419 ENCNTR FOR GYN EXAM (GENERAL) (ROUTINE) W/O ABN FINDINGS: Primary | ICD-10-CM

## 2022-11-16 DIAGNOSIS — Z11.51 SCREENING FOR HUMAN PAPILLOMAVIRUS (HPV): ICD-10-CM

## 2022-11-17 ENCOUNTER — ANNUAL EXAM (OUTPATIENT)
Dept: OBGYN CLINIC | Facility: CLINIC | Age: 32
End: 2022-11-17

## 2022-11-17 VITALS
BODY MASS INDEX: 29.77 KG/M2 | SYSTOLIC BLOOD PRESSURE: 116 MMHG | WEIGHT: 168 LBS | HEIGHT: 63 IN | DIASTOLIC BLOOD PRESSURE: 70 MMHG

## 2022-11-17 DIAGNOSIS — Z01.419 ENCOUNTER FOR GYNECOLOGICAL EXAMINATION WITHOUT ABNORMAL FINDING: Primary | ICD-10-CM

## 2022-11-17 PROBLEM — Z34.03 ENCOUNTER FOR SUPERVISION OF LOW-RISK FIRST PREGNANCY IN THIRD TRIMESTER: Status: RESOLVED | Noted: 2021-02-24 | Resolved: 2022-11-17

## 2022-11-17 PROBLEM — N32.81 OVERACTIVE BLADDER: Status: RESOLVED | Noted: 2018-06-06 | Resolved: 2022-11-17

## 2022-11-17 PROBLEM — N94.10 DYSPAREUNIA, FEMALE: Status: RESOLVED | Noted: 2018-12-19 | Resolved: 2022-11-17

## 2022-11-17 PROBLEM — B07.9 VERRUCA VULGARIS: Status: RESOLVED | Noted: 2020-10-22 | Resolved: 2022-11-17

## 2022-11-17 PROBLEM — Z00.00 ANNUAL PHYSICAL EXAM: Status: RESOLVED | Noted: 2022-06-06 | Resolved: 2022-11-17

## 2022-11-17 NOTE — PROGRESS NOTES
Kelly Benoit  1990      CC:  Yearly exam    S:  29 y o  female here for yearly exam  Her cycles were regular, not heavy or crampy  She missed her last period so she took a pregnancy test twice (last two days) which were positive  They are excited  Discussed expectations and what medications are safe in pregnancy  Sexual activity: She is sexually active without pain, bleeding or dryness  Contraception: She uses nothing for contraception  Last Pap 11/9/2021 - negative  Last Mammo - never    We reviewed [facility] guidelines for Pap testing today  Current Outpatient Medications:   •  Prenatal Vit-Fe Fumarate-FA (PRENATAL PO), Take by mouth, Disp: , Rfl:   Social History     Socioeconomic History   • Marital status: Single     Spouse name: Not on file   • Number of children: Not on file   • Years of education: Not on file   • Highest education level: Not on file   Occupational History     Employer: [employer]   Tobacco Use   • Smoking status: Never   • Smokeless tobacco: Never   Vaping Use   • Vaping Use: Never used   Substance and Sexual Activity   • Alcohol use:  Yes     Alcohol/week: 2 0 standard drinks     Types: 1 Glasses of wine, 1 Standard drinks or equivalent per week   • Drug use: No   • Sexual activity: Yes     Partners: Male     Birth control/protection: Condom Male     Comment: Joe   Other Topics Concern   • Not on file   Social History Narrative    Caffeine Use    Exercising Regularly     always seatbelt in care     employed      Social Determinants of Health     Financial Resource Strain: Not on file   Food Insecurity: Not on file   Transportation Needs: Not on file   Physical Activity: Not on file   Stress: Not on file   Social Connections: Not on file   Intimate Partner Violence: Not on file   Housing Stability: Not on file     Family History   Problem Relation Age of Onset   • Hypothyroidism Mother    • Rheum arthritis Mother    • Fibroids Mother    • Thyroid disease Mother    • Arthritis Other    • Hypertension Other         Benign Essential   • Breast cancer Other    • ALS Other    • Osteoporosis Other    • ALS Maternal Grandfather    • Parkinsonism Maternal Grandfather    • Hiatal hernia Maternal Grandmother    • Osteoporosis Maternal Grandmother    • Endometrial cancer Maternal Grandmother    • Endometriosis Maternal Grandmother    • COPD Maternal Grandmother    • Parkinsonism Maternal Grandmother    • Skin cancer Maternal Grandmother    • Alcohol abuse Father    • No Known Problems Brother    • No Known Problems Brother       Past Medical History:   Diagnosis Date   • Degenerative lumbar disc 08/28/2015   • Overactive bladder    • Urinary tract infection    • Verruca         Review of Systems   Respiratory: Negative  Cardiovascular: Negative  Gastrointestinal: Negative for constipation and diarrhea  Genitourinary: Negative for difficulty urinating, pelvic pain, vaginal bleeding, vaginal discharge, itching or odor  O:  Blood pressure 116/70, height 5' 2 5" (1 588 m), weight 76 2 kg (168 lb), last menstrual period 10/22/2022, currently breastfeeding  Patient appears well and is not in distress  Neck is supple without masses  Breasts are symmetrical without mass, tenderness, nipple discharge, skin changes or adenopathy  Abdomen is soft and nontender without masses  External genitals are normal without lesions or rashes  Urethral meatus and urethra are normal  Bladder is normal to palpation  Vagina is normal without discharge or bleeding  Cervix is normal without discharge or lesion  Uterus is normal, mobile, nontender without palpable mass  Adnexa are normal, nontender, without palpable mass  A:   Yearly exam      P:   Pap with HPV due 2024   Return for ultrasound in 4 weeks    RTO one year for yearly exam or sooner as needed

## 2022-12-05 ENCOUNTER — TELEPHONE (OUTPATIENT)
Dept: OBGYN CLINIC | Facility: CLINIC | Age: 32
End: 2022-12-05

## 2022-12-05 DIAGNOSIS — O21.9 NAUSEA AND VOMITING IN PREGNANCY: Primary | ICD-10-CM

## 2022-12-05 RX ORDER — ONDANSETRON 4 MG/1
4 TABLET, ORALLY DISINTEGRATING ORAL EVERY 6 HOURS PRN
Qty: 20 TABLET | Refills: 0 | Status: SHIPPED | OUTPATIENT
Start: 2022-12-05

## 2022-12-05 NOTE — TELEPHONE ENCOUNTER
Spoke to pt and she is having a lot of n/v- soup broth, crackers is about all that stay down at times  LMP 10/22     Stated with last preg- same thing happened  She tried the B6 and unisom then the zofran - she was supposed to have hydration therapy but did not go  Advised with water- some Gatorade and Pedialyte too if able and I will ask oncall if ok to enter for some zofran  Pharmacy confirmed

## 2022-12-05 NOTE — TELEPHONE ENCOUNTER
Pt lm on OB line that she is approx 6wk- n/v and trouble keeping PNV down too      Stated was on OV for first preg and same issue- asking for zofran rx

## 2022-12-05 NOTE — TELEPHONE ENCOUNTER
Would recommend B6 and unisom then reglan next based on GA but zofran sent per request  If she wants to try others let me know and I will send

## 2022-12-05 NOTE — TELEPHONE ENCOUNTER
Per comm consent lm to pt from [facility]  rx sent  What she recommends to try due to [facility]

## 2022-12-19 ENCOUNTER — ULTRASOUND (OUTPATIENT)
Dept: OBGYN CLINIC | Facility: CLINIC | Age: 32
End: 2022-12-19

## 2022-12-19 VITALS — SYSTOLIC BLOOD PRESSURE: 104 MMHG | BODY MASS INDEX: 30.85 KG/M2 | WEIGHT: 171.4 LBS | DIASTOLIC BLOOD PRESSURE: 70 MMHG

## 2022-12-19 DIAGNOSIS — O21.9 NAUSEA AND VOMITING IN PREGNANCY: ICD-10-CM

## 2022-12-19 DIAGNOSIS — Z36.89 ESTABLISH GESTATIONAL AGE, ULTRASOUND: ICD-10-CM

## 2022-12-19 DIAGNOSIS — Z3A.08 8 WEEKS GESTATION OF PREGNANCY: ICD-10-CM

## 2022-12-19 DIAGNOSIS — N91.2 AMENORRHEA: Primary | ICD-10-CM

## 2022-12-19 PROBLEM — O34.219 PREVIOUS CESAREAN DELIVERY, ANTEPARTUM: Status: ACTIVE | Noted: 2022-12-19

## 2022-12-19 RX ORDER — ONDANSETRON 8 MG/1
8 TABLET, ORALLY DISINTEGRATING ORAL EVERY 8 HOURS PRN
Qty: 20 TABLET | Refills: 0 | Status: SHIPPED | OUTPATIENT
Start: 2022-12-19

## 2022-12-19 NOTE — PROGRESS NOTES
Subjective  Patient ID: Rebecca Alamo is a 29 y o  female here for Pregnancy Ultrasound (Pt here for early ultrasound /C/O nausea and vomiting some cramping Larwance Yitis LOF,VB/LMP 10/22/22/GA 8w 2d )    She is C/O amenorrhea  Signs and symptoms of pregnancy:    Breast tenderness yes   Fatigue yes   Cramping or Pelvic Pain no   Spotting or Vaginal Bleeding no   Nausea or vomiting yes- taking unisom and Zofran PRN    Patient's last menstrual period was 10/22/2022 (exact date)  giving her an LISBETH /of 23 and a gestational age of  7w1d (based on LMP)    Menstrual cycle: regular, cycle length:  26 days  Pregnancy was planned  She has started taking a prenatal vitamin    OB History    Para Term  AB Living   2 1 0 1 0 1   SAB IAB Ectopic Multiple Live Births   0 0 0 0 1      # Outcome Date GA Lbr Maged/2nd Weight Sex Delivery Anes PTL Lv   2 Current            1  21 36w3d / 04:52 2765 g (6 lb 1 5 oz) M CS-LTranv EPI Y LEIGH      Complications: Failed Induction        The following portions of the patient's history were reviewed and updated as appropriate: allergies, current medications, past family history, past medical history, past social history, past surgical history, and problem list   Perinent hx that may affect pregnancy    The following portions of the patient's history were reviewed and updated as appropriate: allergies, current medications, past family history, past medical history, past social history, past surgical history, and problem list     Review of Systems    See HPI for pertinent positives  /70 (BP Location: Left arm, Patient Position: Sitting, Cuff Size: Standard)   Wt 77 7 kg (171 lb 6 4 oz)   LMP 10/22/2022 (Exact Date)   BMI 30 85 kg/m²   OBGyn Exam      FIRST TRIMESTER OBSTETRIC ULTRASOUND     Patient's last menstrual period was 10/22/2022 (exact date)      INDICATION: establish GA      FINDINGS:  See imaging report for details Additional Findings: Simple left ovarian cyst, 1 70 cm x 1 69 cm x 1 59 cm     FHR: 145  IMPRESSION:    Single intrauterine pregnancy of 8 weeks 3days gestational age  Fetal cardiac activity detected  No adnexal masses seen  EDC by LMP: 23  EDC by this Ultrasound: 23    Assigning a Final LISBETH  Please choose how you are assigning the LISBTEH: The gestational age by LMP is </= 8w 6d and demonstrates 5 or fewer days difference from the gestational age by [facility], therefore the final LISBETH will be based on the LMP    Final LISBETH: 23 by LMP  RYAN Cook  [facility] [address] [address]  Dept: [phone]  Dept Fax: [phone]    Ultrasound Probe Disinfection    A transvaginal ultrasound was performed  Prior to use, disinfection was performed with High Level Disinfection Process (ClearSt[employer])  Probe serial number F: [id number] was used  Assessment/Plan:             Problem List Items Addressed This Visit     8 weeks gestation of pregnancy     She is a  with Patient's last menstrual period was 10/22/2022 (exact date)  US today is showing a viable IUP that is c/w GA by LMP  No change in [facility] 23  F/U for ob intake, followed by initial prenatal exam in  2 wks            Relevant Orders    Ambulatory Referral to Maternal Fetal Medicine   Other Visit Diagnoses     Amenorrhea    -  Primary    Relevant Orders    AMB US OB < 14 weeks single or first gestation level 1 (Completed)    Establish gestational age, ultrasound        Relevant Orders    AMB US OB < 14 weeks single or first gestation level 1 (Completed)    Nausea and vomiting in pregnancy        Relevant Medications    ondansetron (ZOFRAN-ODT) 8 mg disintegrating tablet          Orders Placed This Encounter   Procedures   • Ambulatory Referral to Maternal Fetal Medicine   • AMB US OB < 14 weeks single or first gestation level 1

## 2022-12-19 NOTE — ASSESSMENT & PLAN NOTE
She is a  with Patient's last menstrual period was 10/22/2022 (exact date)  US today is showing a viable IUP that is c/w GA by LMP  No change in [facility] 23  F/U for ob intake, followed by initial prenatal exam in  2 wks

## 2022-12-19 NOTE — PATIENT INSTRUCTIONS
Again, congratulations on your pregnancy! NEXT STEPS  Get Prenatal bloodwork  Call MFM to schedule next ultrasound (done 12-13 wks)   Contact information for [facility]aternal [facility]etal [facility])- Main Number [phone]   Return to our office in 1-2 weeks for an OB intake and initial prenatal Exam(or sooner if any problems/concerns arise- see packet for things to report)  Check out Functional Neuromodulation website to read the "Pregnancy Essentials Guide" -this has lots of great early pregnancy information     It can be found by going to [employer]  org-->select services-->select women's health-->select Obstetrics  [email or web address]    Below is a variety of information that is useful in early pregnancy   Genetic screening can be very confusing for most people   We do recommend genetic screening universally for all pregnant women  Our goal is to have the most healthy uncomplicated pregnancy possible and this is one way to identify possible complications early  Common disorders we can screen for include: Down Syndrome, Neural tube defects ( like spina bifida or gastroschisis) and trisomy 15, even possibly more  There are several options we will talk more about  Below is some information to get you started thinking about this  We will discuss this more at the next appt  GUIDE TO GENETIC TESTING    Aneuploidy Testing  Trisomy 21 (Down Syndrome), Trisomy 18, and Open Neural Tube Defects (Spina Bifida)  You may choose one of the following options: See below for CPT/Diagnostic codes  NIPT (Non-Invasive Prenatal Testing or Cell Free- Fetal DNA): This simple and accurate non-invasive prenatal screening blood test offers results for early risk assessment of Down syndrome (Trisomy 21), or Trisomy 25, trisomy 15 and other aneuploidy conditions  The test also offers an optional analysis for fetal sex    The test analyzes the relative amount of 21, 18, 13; X and Y chromosome material in circulating cell-free DNA from a maternal blood sample  This test can be performed at any time after 10 weeks gestation  If you elect this test, you will also have an AFP (alpha-fetoprotein) blood test to test for open neural tube defects  Recommended follow up to a positive result is genetic counseling and prenatal diagnosis  Sequential Screening with Nuchal Translucency: This is a two-step test to detect whether a fetus is at increased risk for trisomy 24, trisomy 25, trisomy 15 and open neural tube defects  The test has a narrow window for testing (the first step must be performed between 10 and 13 weeks gestation)  It includes two blood draws and an ultrasound  The ultrasound measures the amount of fluid behind the baby’s neck called the nuchal translucency (NT)  The blood tests measures three different maternal hormone levels, -- pregnancy associated plasma protein (PAWEL-A), human chorionic gonadotrophin (hCG), and dimeric inhibin A (KHRIS)  These measurements in combination with some maternal information such as height and weight are used to calculate whether the baby is at increased risk for Down Syndrome or Trisomy 18  An alpha-fetoprotein test (AFP) is also included to test for open neural tube defects  Recommended follow up to a positive result is additional testing that is more definitive, and referral for genetic counseling and prenatal diagnosis  Quad Screen: This test is also known as the quadruple marker test   It is a test that measures levels of four substances in a pregnant woman’s blood--Alpha-fetoprotein (AFP), a protein made by the developing baby; Human chorionic gonadotropin (hCG), a hormone made by the placenta; Estriol, a hormone made by the placenta and the baby’s liver; and Inhibin A, another hormone made by the placenta    Typically, the quad screen is done between weeks 15 and 20 of pregnancy--the second trimester and the results indicate whether the baby is at higher risk for Down Syndrome (Trisomy 21), Trisomy 18, and open neural tube defects  This is a screening test   Recommended follow up to a positive result is additional testing that is more definitive, as well as referral for genetic counseling and prenatal diagnosis  Trisomy 21 Trisomy 18 Trisomy 13   NIPT  (FPR 0 1%) <99% <98% 99%   Sequential Screening (FPR 3 5%) 92% 90% N/A   Quad Screen   (FPR 5%) 83% 80% N/A   (FPR is False Positive Rate)       Additional Screening Tests Offered  Cystic Fibrosis: Cystic Fibrosis is the most common inherited disease of children and young adults  The carrier frequency is 1 in 24, to 1 in 97  Both parents need to be carriers for a child to be affected (25% chance)  One in 200, children born are affected  Cystic fibrosis is a disorder of mucus production and produces abnormally thick mucus leading to life threatening lung infections, digestion problems, poor growth, infertility, and more  Symptoms range from mild to severe, but individuals with severe disease may die in childhood  With treatments today, people with Cystic Fibrosis can live into their 30’s  CF does not affect intelligence  Recommended follow up to a positive result is testing of the baby’s father  Spinal Muscular Atrophy (SMA): SMA is the most common inherited cause of early childhood death  The carrier frequency is 1 in 52 to 1 in 67 in the [place] and both parents need to be carriers for a child to be affected (25% chance)  1 in 11,000 children are affected  SMA is a progressive degeneration of lower motor neurons  Muscle weakness is the most common type with respiratory failure by the age of 3years old  Muscles responsible for crawling, walking, swallowing, and head and neck control are most severely affected  Recommended follow up to a positive result is testing of the baby’s father        Fragile X Syndrome (the most common inherited cause of developmental delays): Fragile X syndrome is an “X-linked” genetic disease, which means it is only carried by the mom  Unfortunately, 1 in 250 females are carriers and a child has a 50% chance of being affected if this is the case  1 in 4000 boys is affected with Fragile X and 1 in 8000 girls is affected  Approximately 1/3 of all children born with Fragile X also have autism and hyperactivity  Recommended follow up to a positive result is genetic counseling and prenatal diagnosis  Guide To Insurance Coverage For Genetic Screening  Due to the complexity of coverage guidelines, we are unable to quote benefits or guarantee insurance coverage for any of these tests  Insurance benefits are plan-specific and offer vastly different coverage based on your policy  The handout attached explains the benefits to each test, and also provides the billing (CPT) codes for each test   Even if the testing is covered, it could be applied to any unmet deductibles, and copays may apply, resulting in a bill  You are encouraged to contact your insurance company to obtain your benefits based on your age and risk factors, so that there are no surprises  Test Insurance Procedure (CPT) code   NIPT-cell free fetal DNA Most accurate non-invasive test- not always covered w/o risk factors 10239   Sequential Screen w/ NT US Current standard test-should be covered Part 1: (if lab is [facility]) 31346,22111   (If lab is [facility]) 29254  Part 2: (if lab is [facility])52866,17233,75035, 20286  (If lab is [facility]) 34159   Quad screen Old standard-use if past 1st trimester 41580, [address] [facility], [address], [address]53   CF- Cystic Fibrosis  42991   SMA- Spinal Musc   Atrophy  07265   Fragile X  [id number]       Diagnosis code used Varies based on history- But will be one of these:  Encounter for  screening for other genetic defect Z36 8  Advanced Maternal Age (over 28) O12 46  Family History of Genetic Disease Carrier Z84 81    Please contact your insurance company with the appropriate CPT code from the attached list, and diagnosis code applicable to your situation  We ask that you review this information and decide what testing you would like to have performed  Please note that the Sequential Screening with Nuchal Translucency has a smaller window of time to be performed during pregnancy (Prior to 14 wks)  [place] [address], [address]  [address], [address]    Warning Signs During Pregnancy  The list below includes warning signs your providers would like you to be aware of  If you experience any of these at any time during your pregnancy, please call us as soon as possible  Vaginal bleeding   Sharp abdominal pain that does not go away   Fever (more than 100  4? F and is not relieved with Tylenol)   Persistent vomiting lasting greater than 24 hours   Chest pain   Pain or burning when you urinate   Severe headache that doesn’t resolve with Tylenol   Blurred vision or seeing spots in your vision   Sudden swelling of your face or hands   Redness, swelling or pain in a leg   A sudden weight gain in just a few days   Decrease in your baby’s movements (after 28 weeks or the 6th month of pregnancy)   A loss of watery fluid from your vagina - can be a gush, a trickle or  continuous wetness   After 20 weeks of pregnancy, rhythmic cramping (greater than 4 per hour)  or menstrual like low/pelvic pain    Call the OFFICE [phone] for any questions/emergencies  At night or on the weekend, please indicate it is an emergency and the DOCTOR on call will be paged      Discomforts of Early Pregnancy    Tips for coping with nausea and vomiting during pregnancy   Eat meals and snacks slowly   Eat every 1-2 hours to avoid a full stomach   Don’t skip meals, avoid empty stomach   Eat a snack prior to getting out of bed   Avoid food and beverages with a strong aroma   Avoid dehydration - drink enough fluid to keep the urine pale yellow   Drink fluids before a meal to minimize the effect of a full stomach   Limit the amount of coffee and beverages that contain caffeine   Eliminate spicy, odorous, high fat (fried foods), acidic (tomato products) and sweet foods   Fluids that contain lemon (lemonade), mint (tea) or orange can usually be well tolerated   Snacks and meals that contain low-fat protein (lean meats, fish, poultry and eggs) along with eating easily digestible carbs (fruit, rice, toast, crackers and dry cereal) may be tolerated better   Foods with ginger may be well tolerated  May use ginger root powder, capsules or extract (up to 1000 mg per day)   Drink liquids in small amounts    If symptoms persist, please contact your provider  Tips for coping with constipation during pregnancy   Increase fiber and fluids   - Drink 8-10 cups of liquid, like water or low-sugar juice daily  - Keep urine pale with fluids (water, milk), fruit and vegetables   Eat a well-balanced diet that contains high fiber food (fruits, vegetables, whole grain breads and cereals, bran and dried beans)   Take a 30-minute walk daily   You may take a mild stool softener such as Colace®    If symptoms persist, please contact your provider  For any emergencies, PLEASE CALL THE OFFICE at [phone]  If the office is closed, the doctor on call will be paged by the answering service  Medications and Pregnancy- see Pregnancy Essentials guide online- page 9    Expected Weight Gain During Pregnancy  If you have a healthy BMI (18-25) prior to pregnancy:  The recommended weight gain is between 25-35 pounds  Approximate weight gain  in the first trimester is 1-4 5 pounds  An expected weight gain during the second and  third trimester is approximately one pound per week  If you have a BMI of less than 18 prior to pregnancy,  you are considered underweight:  The recommended weight gain is between 28-40 pounds  Approximate weight gain  in the first trimester is 1-4 5 pounds   An expected weight gain during the second and  third trimester is just over one pound per week  If your BMI is 25 to 29 9: you are considered overweight:  You should gain 1/2 to 2/3 pound during the second and third trimester, for a total  weight gain of 15 to 25 pounds  If you have a BMI of greater than 30 prior to pregnancy,  you are considered overweight:  The recommended weight gain is between 15-25 pounds  Approximate weight gain  in the first trimester is 1-4 5 pounds  An expected weight gain during the second  and third trimester is approximately 0 5 pound per week  Foods to avoid during pregnancy:   Unpasteurized milk, juice and cheese  - Soft cheeses like feta or brie (if made with UNPASTEURIZED milk)   Unheated deli meats like lunchmeat and hotdogs   Undercooked poultry, beef, pork, seafood including raw sushi    What fish is safe to eat during pregnancy? Eat 8 to 12 ounces of fish a week  Pick from this group frequently, especially if you follow  the American Heart Association’s recommendation to eat fish at least 2 times a week  AVOID HIGH MERCURY FISH  A single meal of the following fish can put  you over the Environmental Protection  Agency’s safe limit for the month  High mercury fish:  Shark  Swordfish  [employer]  Tile Fish    Caffeine and Pregnancy    The [employer] and [employer] of Obstetrics and Gynecologists (ACOG) urge pregnant  women to limit their caffeine consumption to no more than 200 milligrams (mg) per day  This is  comparable to having one 12-ounce cup of coffee a day  This level has been shown not to increase risk  of miscarriage, growth or  labor complications  Effects of higher levels are not known  Exercise During Pregnancy  A daily exercise program that consists of 30 minutes a day is recommended     Low impact exercises like walking and swimming are great exercises throughout  all of pregnancy   If you’re an avid strength  avoid lifting very heavy weights - nothing more  than 30 pounds    Drink plenty of fluids while exercising to stay hydrated  Be careful to avoid overheating  ACTIVITIES TO AVOID   Exercises that can make you lose your balance  Activities that can put your baby at risk i e  horseback riding, scuba diving, skiing  or snowboarding  Any other sport that puts you at risk for getting hit in the  abdominal area  Do not use saunas, steam rooms or hot tubs (that have a higher temperature  than 100F)   After the first trimester, avoid exercises that require you to lay flat on your back  Avoid exceeding a heart rate greater than 140 beats per minute   As long as you are  able to hold a conversation while exercising your heart rate is likely acceptable

## 2023-01-04 ENCOUNTER — INITIAL PRENATAL (OUTPATIENT)
Dept: OBGYN CLINIC | Facility: CLINIC | Age: 33
End: 2023-01-04

## 2023-01-04 VITALS — HEIGHT: 63 IN | WEIGHT: 171 LBS | BODY MASS INDEX: 30.3 KG/M2

## 2023-01-04 DIAGNOSIS — Z34.81 PRENATAL CARE, SUBSEQUENT PREGNANCY, FIRST TRIMESTER: Primary | ICD-10-CM

## 2023-01-04 DIAGNOSIS — O34.219 PREVIOUS CESAREAN DELIVERY, ANTEPARTUM: ICD-10-CM

## 2023-01-04 NOTE — PATIENT INSTRUCTIONS
Congratulations!! Please review our Pregnancy Essential Guide and [employer] L&D Virtual tour from our [employer]  [facility] Pregnancy Essentials Guide  [facility] ([address] [facility])     [address] [facility] ([place])

## 2023-01-04 NOTE — PROGRESS NOTES
OB INTAKE INTERVIEW  Patient is 32 y o y o  who presents for OB intake at 10wks  She is accompanied by: herself  The father of her baby Okoboji Garrett) is involved in the pregnancy and is 29years old    Last Menstrual Period: 10/22/2022  Ultrasound: Measured 8 weeks 3 days on 2022 by [place] [facility]  Estimated Date of Delivery: 2023 confirmed by 8 week US    Signs/Symptoms of Pregnancy  Current pregnancy symptoms: morning sickness- using zofran  Constipation YES- using colace- not drinking enough water  Headaches no  Cramping/spotting no  PICA cravings no    Diabetes-  Body mass index is 30 78 kg/m²  If patient has 1 or more, please order early 1 hour GTT  History of GDM no  BMI >35 no  History of PCOS or current metformin use no  History of LGA/macrosomic infant (4000g/9lbs) no    If patient has 2 or more, please order early 1 hour GTT  BMI>30 no  AMA no  First degree relative with type 2 diabetes no  History of chronic HTN, hyperlipidemia, elevated A1C no  High risk race (, , ,  or ) no    Hypertension- if you answer yes, please order preeclampsia labs (cbc, comprehensive metabolic panel, urine protein creatinine ratio, uric acid)  History of of chronic HTN no  History of gestational HTN no  History of preeclampsia, eclampsia, or HELLP syndrome no  History of diabetes no  History of lupus, autoimmune disease, kidney disease no    Thyroid- if yes order TSH with reflex T4  History of thyroid disease no    Bleeding Disorder or Hx of DVT-patient or first degree relative with history of  Order the following if not done previously     (Factor V, antithrombin III, prothrombin gene mutation, protein C and S Ag, lupus anticoagulant, anticardiolipin, beta-2 glycoprotein)   no    OB/GYN-  History of abnormal pap smear no  History of HPV no  History of Herpes/HSV no  History of other STI (gonorrhea, chlamydia, trich) no  History of prior  no  History of prior  YESx1-PPROM  History of  delivery prior to 36 weeks 6 days YESx1  History of blood transfusion no  Ok for blood transfusion YES    Substance screening- if yes outside of tobacco for her or anyone in her home-order urine drug screen  History of tobacco use no  Currently using tobacco no  Currently using alcohol no  Presently using drugs no  Past drug use  no  IV drug use-If yes add Hep C antibody to labs no  Partner drug use no  Parent/Family drug use no    MRSA Screening-   Does the pt have a hx of MRSA? no  If yes- please follow MRSA protocol and obtain a nasal swab for MRSA culture    Immunizations:  Influenza vaccine given this season YES  Discussed Tdap vaccine YES  Discussed COVID Vaccine YES    Genetic/MFM-  Do you or your partner have a history of any of the following in yourselves or first degree relatives? Cystic fibrosis no  Spinal muscular atrophy no  Hemoglobinopathy/Sickle Cell/Thalassemia no  Fragile X Intellectual Disability no    If yes, discuss carrier screening and recommend consultation with [facility]/genetic counseling  If no, discuss option for carrier screening and/or genetic testing with Nuchal Ultrasound  Patient interested YES  Appointment at [facility] made YES- NT  and level 2 is 3/17    Interview education  [facility] Pregnancy Essentials Book reviewed and discussed YES    Nurse/Family Partnership- patient may qualify NO; referral placed NO    Prenatal lab work scripts YES  Extra labs ordered:  none    The patient has a history now or in prior pregnancy notable for:   labor, hx colpox2 and cryo      Details that I feel the provider should be aware of: Federico Villar and Courtney Goode are expecting baby #2 with SLOGA  Her son came a lil early and delivered via csection  She said she was starting to get signs of cholestasis shortly before that  shes had few cervical surgeries 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

## 2023-01-11 DIAGNOSIS — O21.9 NAUSEA AND VOMITING IN PREGNANCY: ICD-10-CM

## 2023-01-11 RX ORDER — ONDANSETRON 8 MG/1
TABLET, ORALLY DISINTEGRATING ORAL
Qty: 20 TABLET | Refills: 0 | Status: SHIPPED | OUTPATIENT
Start: 2023-01-11

## 2023-01-12 ENCOUNTER — APPOINTMENT (OUTPATIENT)
Dept: LAB | Facility: CLINIC | Age: 33
End: 2023-01-12

## 2023-01-12 DIAGNOSIS — Z34.81 PRENATAL CARE, SUBSEQUENT PREGNANCY, FIRST TRIMESTER: Primary | ICD-10-CM

## 2023-01-12 LAB
ABO GROUP BLD: NORMAL
BACTERIA UR QL AUTO: ABNORMAL /HPF
BASOPHILS # BLD AUTO: 0.04 THOUSANDS/ÂΜL (ref 0–0.1)
BASOPHILS NFR BLD AUTO: 1 % (ref 0–1)
BILIRUB UR QL STRIP: NEGATIVE
BLD GP AB SCN SERPL QL: NEGATIVE
CLARITY UR: CLEAR
COLOR UR: ABNORMAL
EOSINOPHIL # BLD AUTO: 0.11 THOUSAND/ÂΜL (ref 0–0.61)
EOSINOPHIL NFR BLD AUTO: 1 % (ref 0–6)
ERYTHROCYTE [DISTWIDTH] IN BLOOD BY AUTOMATED COUNT: 12.5 % (ref 11.6–15.1)
GLUCOSE UR STRIP-MCNC: NEGATIVE MG/DL
HBV SURFACE AG SER QL: NORMAL
HCT VFR BLD AUTO: 42.2 % (ref 34.8–46.1)
HCV AB SER QL: NORMAL
HGB BLD-MCNC: 14 G/DL (ref 11.5–15.4)
HGB UR QL STRIP.AUTO: NEGATIVE
IMM GRANULOCYTES # BLD AUTO: 0.02 THOUSAND/UL (ref 0–0.2)
IMM GRANULOCYTES NFR BLD AUTO: 0 % (ref 0–2)
KETONES UR STRIP-MCNC: ABNORMAL MG/DL
LEUKOCYTE ESTERASE UR QL STRIP: ABNORMAL
LYMPHOCYTES # BLD AUTO: 2.16 THOUSANDS/ÂΜL (ref 0.6–4.47)
LYMPHOCYTES NFR BLD AUTO: 27 % (ref 14–44)
MCH RBC QN AUTO: 29.5 PG (ref 26.8–34.3)
MCHC RBC AUTO-ENTMCNC: 33.2 G/DL (ref 31.4–37.4)
MCV RBC AUTO: 89 FL (ref 82–98)
MONOCYTES # BLD AUTO: 0.41 THOUSAND/ÂΜL (ref 0.17–1.22)
MONOCYTES NFR BLD AUTO: 5 % (ref 4–12)
NEUTROPHILS # BLD AUTO: 5.26 THOUSANDS/ÂΜL (ref 1.85–7.62)
NEUTS SEG NFR BLD AUTO: 66 % (ref 43–75)
NITRITE UR QL STRIP: NEGATIVE
NON-SQ EPI CELLS URNS QL MICRO: ABNORMAL /HPF
NRBC BLD AUTO-RTO: 0 /100 WBCS
PH UR STRIP.AUTO: 6 [PH]
PLATELET # BLD AUTO: 314 THOUSANDS/UL (ref 149–390)
PMV BLD AUTO: 10 FL (ref 8.9–12.7)
PROT UR STRIP-MCNC: NEGATIVE MG/DL
RBC # BLD AUTO: 4.74 MILLION/UL (ref 3.81–5.12)
RBC #/AREA URNS AUTO: ABNORMAL /HPF
RH BLD: POSITIVE
RUBV IGG SERPL IA-ACNC: 98.5 IU/ML
SP GR UR STRIP.AUTO: 1.01 (ref 1–1.03)
SPECIMEN EXPIRATION DATE: NORMAL
UROBILINOGEN UR STRIP-ACNC: <2 MG/DL
WBC # BLD AUTO: 8 THOUSAND/UL (ref 4.31–10.16)
WBC #/AREA URNS AUTO: ABNORMAL /HPF

## 2023-01-13 PROBLEM — L65.9 HAIR LOSS: Status: RESOLVED | Noted: 2022-06-06 | Resolved: 2023-01-13

## 2023-01-13 PROBLEM — O09.219 PREVIOUS PRETERM DELIVERY, ANTEPARTUM: Status: ACTIVE | Noted: 2023-01-13

## 2023-01-13 PROBLEM — N64.59 BREAST COMPLAINT: Status: RESOLVED | Noted: 2022-06-16 | Resolved: 2023-01-13

## 2023-01-13 PROBLEM — Z3A.12 12 WEEKS GESTATION OF PREGNANCY: Status: ACTIVE | Noted: 2022-12-19

## 2023-01-13 PROBLEM — O21.9 NAUSEA/VOMITING IN PREGNANCY: Status: ACTIVE | Noted: 2023-01-13

## 2023-01-13 LAB
BACTERIA UR CULT: NORMAL
HIV 1+2 AB+HIV1 P24 AG SERPL QL IA: NORMAL
HIV 2 AB SERPL QL IA: NORMAL
HIV1 AB SERPL QL IA: NORMAL
HIV1 P24 AG SERPL QL IA: NORMAL
RPR SER QL: NORMAL

## 2023-01-16 ENCOUNTER — INITIAL PRENATAL (OUTPATIENT)
Dept: OBGYN CLINIC | Facility: CLINIC | Age: 33
End: 2023-01-16

## 2023-01-16 VITALS
WEIGHT: 170 LBS | SYSTOLIC BLOOD PRESSURE: 102 MMHG | HEIGHT: 61 IN | BODY MASS INDEX: 32.1 KG/M2 | DIASTOLIC BLOOD PRESSURE: 60 MMHG

## 2023-01-16 DIAGNOSIS — O09.219 PREVIOUS PRETERM DELIVERY, ANTEPARTUM: ICD-10-CM

## 2023-01-16 DIAGNOSIS — O34.219 PREVIOUS CESAREAN DELIVERY, ANTEPARTUM: Primary | ICD-10-CM

## 2023-01-16 DIAGNOSIS — Z11.3 SCREEN FOR STD (SEXUALLY TRANSMITTED DISEASE): ICD-10-CM

## 2023-01-16 DIAGNOSIS — O21.9 NAUSEA/VOMITING IN PREGNANCY: ICD-10-CM

## 2023-01-16 DIAGNOSIS — R12 HEARTBURN: ICD-10-CM

## 2023-01-16 DIAGNOSIS — Z3A.12 12 WEEKS GESTATION OF PREGNANCY: ICD-10-CM

## 2023-01-16 LAB
SL AMB  POCT GLUCOSE, UA: ABNORMAL
SL AMB POCT URINE PROTEIN: ABNORMAL

## 2023-01-16 RX ORDER — FAMOTIDINE 20 MG/1
20 TABLET, FILM COATED ORAL 2 TIMES DAILY
Qty: 30 TABLET | Refills: 3 | Status: SHIPPED | OUTPATIENT
Start: 2023-01-16

## 2023-01-16 RX ORDER — PROMETHAZINE HYDROCHLORIDE 25 MG/1
25 TABLET ORAL EVERY 6 HOURS PRN
Qty: 30 TABLET | Refills: 0 | Status: SHIPPED | OUTPATIENT
Start: 2023-01-16

## 2023-01-16 NOTE — ASSESSMENT & PLAN NOTE
PPROM at 39 + wks- IOL failed  Arrest of descent  Considering ANA LILIA if spontaneous labor  Reviewed TOLAC vs Repeat C/S    Reviewed increased risk of uterine rupture (approx 1%) with TOLAC which could impact maternal and  outcomes including infection, excess bleeding, hysterectomy, and maternal or fetal death       Successful  is associated with lower morbidity than repeat C/S and Recovery is shorter, while Rpt C/S is  associated with lower rates of maternal morbidity compared to  Unscussful     Overall success rate of  is 60-80% but is largely dependent upon the circumstances surrounding the time of delivery

## 2023-01-16 NOTE — ASSESSMENT & PLAN NOTE
She is a  at [facility] previously established  + FHR today  New OB Exam completed -and wnl  Pap is not indicated and gonorrhea/chlamydia cultures collected  See other A&P for risk factors/identified    I reviewed the expected course of the pregnancy with visits, labs and additional testing  The patient has obtained her prenatal labs  Genetic screening/testing options again reviewed and she elects for average risk NIPT     I have reviewed the maternal as well as infant benefits of breastfeeding with the patient  The patient currently plans to breastfeed  I have reviewed proper nutrition in pregnancy as well as exercise and safe medications that can be used for various common symptoms in pregnancy  I reviewed the reasons to call in the first trimester - namely vaginal bleeding, severe nausea and vomiting, severe pelvic pain, fevers or pain/burning with urination  She was already previously referred to [facility] for NT US and will scheduled for level 2 G&A for 20-21 wks after that appt  I recommended that the patient receive the covid vaccine if not already done and to receive the flu vaccine during flu season  All questions were answered to her satisfaction

## 2023-01-16 NOTE — PROGRESS NOTES
PNV 12w2d    Patient denies any lof, vb or ctx  Patient   GC/CT- collected today   Patient urine is ++protein/negative glucose  Blue folder given  Prenatal labs complete   Want to talk about medication for heartburn  Pt stated she does not drink a lot of fluids or eat much

## 2023-01-16 NOTE — PROGRESS NOTES
Prenatal Visit  Subjective:   Emili Diallo is a 29 y o  female  She is a  here for initial PN visit/New OB exam    She is reporting the following pregnancy related complaints:  • +N/V- on zofran- sometimes helpful  But still having moderately effective  • Heartburn  • Denies Cramping or vaginal bleeding  • Denies Abnormal d/c or urinary sxs    The following portions of the patient's history were reviewed and updated as appropriate: allergies, current medications, past family history, past medical history, past social history, past surgical history, and problem list   Genetic Family hx: unremarkable  Diabetes risk Factors: none     Hypertension Risk Factors:   Pertinent positive: BMI 30      Objective:  Patient's last menstrual period was 10/22/2022 (exact date)  /60 (BP Location: Left arm, Patient Position: Sitting, Cuff Size: Standard)   Ht 5' 1" (1 549 m)   Wt 77 1 kg (170 lb)   LMP 10/22/2022 (Exact Date)   BMI 32 12 kg/m²   Pregravid Weight/BMI: 77 6 kg (171 lb) (BMI 32 33)      OBGyn Exam- see prenatal physical form    Assessment & Plan:    1  Previous  delivery, antepartum  Assessment & Plan:  PPROM at 36 + wks- IOL failed  Arrest of descent  Considering ANA LILIA if spontaneous labor  Reviewed TOLAC vs Repeat C/S    Reviewed increased risk of uterine rupture (approx 1%) with TOLAC which could impact maternal and  outcomes including infection, excess bleeding, hysterectomy, and maternal or fetal death       Successful  is associated with lower morbidity than repeat C/S and Recovery is shorter, while Rpt C/S is  associated with lower rates of maternal morbidity compared to  Unscussful     Overall success rate of  is 60-80% but is largely dependent upon the circumstances surrounding the time of delivery        2  12 weeks gestation of pregnancy  Assessment & Plan:    She is a  at 12w2d  [employer] previously established  + FHR today  New OB Exam completed -and wnl  Pap is not indicated and gonorrhea/chlamydia cultures collected  See other A&P for risk factors/identified    I reviewed the expected course of the pregnancy with visits, labs and additional testing  The patient has obtained her prenatal labs  Genetic screening/testing options again reviewed and she elects for average risk NIPT     I have reviewed the maternal as well as infant benefits of breastfeeding with the patient  The patient currently plans to breastfeed  I have reviewed proper nutrition in pregnancy as well as exercise and safe medications that can be used for various common symptoms in pregnancy  I reviewed the reasons to call in the first trimester - namely vaginal bleeding, severe nausea and vomiting, severe pelvic pain, fevers or pain/burning with urination  She was already previously referred to [facility] for NT US and will scheduled for level 2 G&A for 20-21 wks after that appt  I recommended that the patient receive the covid vaccine if not already done and to receive the flu vaccine during flu season  All questions were answered to her satisfaction  Orders:  -     POCT urine dip    3  Nausea/vomiting in pregnancy  Assessment & Plan:  Pt still having moderate N/V  Sometimes zofran helps, but sometimes not  Lost 1 #  Will trial phenergan    Orders:  -     promethazine (PHENERGAN) 25 mg tablet; Take 1 tablet (25 mg total) by mouth every 6 (six) hours as needed for nausea or vomiting    4  Previous  delivery, antepartum  Assessment & Plan:  See A&P for C/S- await [facility] input at [facility]      5  Screen for STD (sexually transmitted disease)  -     Chlamydia/GC amplified DNA by PCR    6  Heartburn  -     famotidine (PEPCID) 20 mg tablet;  Take 1 tablet (20 mg total) by mouth 2 (two) times a day        RYAN Crowley  2023

## 2023-01-16 NOTE — ASSESSMENT & PLAN NOTE
Pt still having moderate N/V  Sometimes zofran helps, but sometimes not  Lost 1 #   Will trial phenergan

## 2023-01-17 LAB
C TRACH DNA SPEC QL NAA+PROBE: ABNORMAL
N GONORRHOEA DNA SPEC QL NAA+PROBE: ABNORMAL

## 2023-01-18 NOTE — PROGRESS NOTES
Please refer to the [facility] ultrasound report in Ob Procedures for additional information regarding today's visit

## 2023-01-19 ENCOUNTER — ROUTINE PRENATAL (OUTPATIENT)
Facility: HOSPITAL | Age: 33
End: 2023-01-19

## 2023-01-19 ENCOUNTER — TELEPHONE (OUTPATIENT)
Dept: OBGYN CLINIC | Facility: CLINIC | Age: 33
End: 2023-01-19

## 2023-01-19 VITALS
DIASTOLIC BLOOD PRESSURE: 66 MMHG | WEIGHT: 174.6 LBS | BODY MASS INDEX: 32.13 KG/M2 | SYSTOLIC BLOOD PRESSURE: 112 MMHG | HEIGHT: 62 IN | HEART RATE: 91 BPM

## 2023-01-19 DIAGNOSIS — O09.891 SHORT INTERVAL BETWEEN PREGNANCIES COMPLICATING PREGNANCY, ANTEPARTUM, FIRST TRIMESTER: ICD-10-CM

## 2023-01-19 DIAGNOSIS — O99.211 MATERNAL OBESITY, ANTEPARTUM, FIRST TRIMESTER: ICD-10-CM

## 2023-01-19 DIAGNOSIS — Z36.82 ENCOUNTER FOR ANTENATAL SCREENING FOR NUCHAL TRANSLUCENCY: ICD-10-CM

## 2023-01-19 DIAGNOSIS — O09.891 HISTORY OF PRETERM DELIVERY, CURRENTLY PREGNANT, FIRST TRIMESTER: Primary | ICD-10-CM

## 2023-01-19 DIAGNOSIS — Z3A.12 12 WEEKS GESTATION OF PREGNANCY: ICD-10-CM

## 2023-01-19 NOTE — TELEPHONE ENCOUNTER
----- Message from Brian Hardwick, [address] sent at 1/19/2023  9:45 AM EST -----  Gonorrhea and Chlamydia cultures are invalid with some type of interfering substance  Will be collected next OB appointment via urine    Appointment note added  Please let her know

## 2023-01-19 NOTE — LETTER
January 21, 2023     Nixon Neumann, [address] [address]    Patient: Idris Patel   YOB: 1990   Date of Visit: 1/19/2023       Dear Dr Hanh Holt: Thank you for referring Idris Patel to me for evaluation  Below are my notes for this consultation  If you have questions, please do not hesitate to call me  I look forward to following your patient along with you           Sincerely,        Fabian Olmos MD        CC: No Recipients  Fabian Olmos MD  1/18/2023  6:58 PM  Sign when Signing Visit  Please refer to the [facility] ultrasound report in Ob Procedures for additional information regarding today's visit

## 2023-01-19 NOTE — RESULT ENCOUNTER NOTE
Gonorrhea and Chlamydia cultures are invalid with some type of interfering substance  Will be collected next OB appointment via urine    Appointment note added  Please let her know

## 2023-01-19 NOTE — PROGRESS NOTES
Patient chose to have [facility] Non-invasive Prenatal Screen with fetal sex  Patient given brochure and is aware [facility] will contact their insurance and coordinate coverage  Patient made aware she will need to respond to text message or e-mail from [employer] within 2 business days or testing will be run through insurance  Patient informed text message will come from area code  "415"  Provided [employer] # [phone] and web site : [email or web address]    "[place] your test online" card with barcode and test tube ID provided to patient  Reviewed [facility]'s web site states 5-7 business days for results via their portal    [employer] message will be sent to patient when MFM receives results /provider reviews  2 vials of blood drawn from LEFT  arm by SHREE Martin RN  Patient tolerated blood draw without difficulty  Specimens labeled with patient identifiers (name, date of birth, specimen collection date), order and specimen were verified with patient, packed and sent via [employer]  Copy of lab order scanned to Epic media  Maternal Fetal Medicine will have results in approximately 7-10 business days and will call patient or notify via [address]  Patient aware viewing lab result online will reveal fetal sex if ordered  Patient verbalized understanding of all instructions and no questions at this time

## 2023-01-21 PROBLEM — O09.891 SHORT INTERVAL BETWEEN PREGNANCIES COMPLICATING PREGNANCY, ANTEPARTUM, FIRST TRIMESTER: Status: ACTIVE | Noted: 2023-01-21

## 2023-01-21 PROBLEM — O09.891 HISTORY OF PRETERM DELIVERY, CURRENTLY PREGNANT, FIRST TRIMESTER: Status: ACTIVE | Noted: 2023-01-21

## 2023-01-21 PROBLEM — O99.211 MATERNAL OBESITY, ANTEPARTUM, FIRST TRIMESTER: Status: ACTIVE | Noted: 2023-01-21

## 2023-01-21 RX ORDER — ASPIRIN 81 MG/1
162 TABLET, CHEWABLE ORAL DAILY
Qty: 180 TABLET | Refills: 1 | Status: SHIPPED | OUTPATIENT
Start: 2023-01-21 | End: 2023-04-21

## 2023-01-24 ENCOUNTER — TELEPHONE (OUTPATIENT)
Dept: OBGYN CLINIC | Facility: CLINIC | Age: 33
End: 2023-01-24

## 2023-01-24 NOTE — TELEPHONE ENCOUNTER
would ensure she is getting enough hydration- can try gatorade, pedialyte, etc, small frequent meals, don’t change positions quickly   instructions given to pt per ct

## 2023-01-24 NOTE — TELEPHONE ENCOUNTER
Pt is 13w 3d o pos   began yesterday with intermittent lightheadedness, dizzy, and blurred vision  Was seen by mfm  for  scan due to  labor at last preg    Only change was rx baby asa  States she was on zofran for n&v and changed to reglan  by luanne which has helped  tt to ct

## 2023-01-24 NOTE — TELEPHONE ENCOUNTER
Pt 13 wks pregnant, experiencing a lot of dizziness today and almost felt as though she was going to faint    BP taken at work 113/80     Please advise

## 2023-02-13 DIAGNOSIS — O09.891 SHORT INTERVAL BETWEEN PREGNANCIES COMPLICATING PREGNANCY, ANTEPARTUM, FIRST TRIMESTER: ICD-10-CM

## 2023-02-13 DIAGNOSIS — Z3A.16 16 WEEKS GESTATION OF PREGNANCY: ICD-10-CM

## 2023-02-13 DIAGNOSIS — O09.891 HISTORY OF PRETERM DELIVERY, CURRENTLY PREGNANT, FIRST TRIMESTER: Primary | ICD-10-CM

## 2023-02-15 ENCOUNTER — TELEPHONE (OUTPATIENT)
Dept: OBGYN CLINIC | Facility: CLINIC | Age: 33
End: 2023-02-15

## 2023-02-15 NOTE — TELEPHONE ENCOUNTER
Pt called with c/o feeling flushed, feels lightheaded  ,states had an episode last week where she almost fainted  States her pulse at rest is   Feels heart is beating too fast   Drinks at least 6-8 glasses/day  Eats sm frequent meals throughout the day  PN claritzadwpetr wnl  States she did have a cardiac consult years ago & everything was normal   TT sent to [facility] on-call, DR Rob Falcon  Per DR Rob Falcon, pt to hydrate, compression stockings, rest  - to call back if symptoms continue or worsen  Pt to discuss at next OB visit, & possible repeat cardiology evaluation  Called pt & informed, pt verbalized understanding; has a f/u appt this Tuesday, 2/21/23

## 2023-02-16 DIAGNOSIS — O21.9 NAUSEA/VOMITING IN PREGNANCY: ICD-10-CM

## 2023-02-16 RX ORDER — PROMETHAZINE HYDROCHLORIDE 25 MG/1
25 TABLET ORAL EVERY 6 HOURS PRN
Qty: 30 TABLET | Refills: 0 | Status: SHIPPED | OUTPATIENT
Start: 2023-02-16

## 2023-02-21 ENCOUNTER — ROUTINE PRENATAL (OUTPATIENT)
Dept: OBGYN CLINIC | Facility: CLINIC | Age: 33
End: 2023-02-21

## 2023-02-21 VITALS — SYSTOLIC BLOOD PRESSURE: 104 MMHG | WEIGHT: 180 LBS | DIASTOLIC BLOOD PRESSURE: 60 MMHG | BODY MASS INDEX: 32.92 KG/M2

## 2023-02-21 DIAGNOSIS — Z11.3 SCREEN FOR STD (SEXUALLY TRANSMITTED DISEASE): Primary | ICD-10-CM

## 2023-02-21 DIAGNOSIS — Z34.82 PRENATAL CARE, SUBSEQUENT PREGNANCY IN SECOND TRIMESTER: ICD-10-CM

## 2023-02-21 DIAGNOSIS — O09.891 HISTORY OF PRETERM DELIVERY, CURRENTLY PREGNANT, FIRST TRIMESTER: ICD-10-CM

## 2023-02-21 DIAGNOSIS — R00.2 PALPITATION: ICD-10-CM

## 2023-02-21 DIAGNOSIS — O99.211 MATERNAL OBESITY, ANTEPARTUM, FIRST TRIMESTER: ICD-10-CM

## 2023-02-21 DIAGNOSIS — Z3A.18 18 WEEKS GESTATION OF PREGNANCY: ICD-10-CM

## 2023-02-21 LAB
SL AMB  POCT GLUCOSE, UA: NEGATIVE
SL AMB POCT URINE PROTEIN: NEGATIVE

## 2023-02-21 NOTE — PROGRESS NOTES
Prenatal visit at 16 3/7wks  Denies ctxs, VB, LOF  No FM yet  Patient started with a stomach bug last night so she is dehydrated today  Otherwise, reports normal aches/pains of pregnancy  Also has had episodes of palpitations  Problem List Items Addressed This Visit        Other    Palpitation     Has had episodes of palpitations during pregnancy  Also has this history from prior to pregnancy but seem to be happening more often  Encouraged her to take her pulse and ensure it is regular and not >120 when these episodes occur  She is aware to call or go to the ED if this occurs  Would then pursue additional cardiac work-up  18 weeks gestation of pregnancy     PN labs normal   Gc/chlamydia initially collected was invalid - sent from urine today  NIPT low risk  Level 2 scheduled  MSAFP explained and slip given  History of  delivery, currently pregnant, first trimester     Has not picked up progesterone yet as there were some problems with coverage and then figuring out which compounding pharmacy would make it since it was not covered  Will  soon            Maternal obesity, antepartum, first trimester     TWG 9#         Other Visit Diagnoses     Screen for STD (sexually transmitted disease)    -  Primary    Relevant Orders    Chlamydia/GC amplified DNA by PCR (Completed)    Prenatal care, subsequent pregnancy in second trimester        Relevant Orders    Chlamydia/GC amplified DNA by PCR (Completed)    POCT urine dip (Completed)    Alpha fetoprotein, maternal

## 2023-02-22 LAB
C TRACH DNA SPEC QL NAA+PROBE: NEGATIVE
N GONORRHOEA DNA SPEC QL NAA+PROBE: NEGATIVE

## 2023-02-25 PROBLEM — Z3A.18 18 WEEKS GESTATION OF PREGNANCY: Status: ACTIVE | Noted: 2022-12-19

## 2023-02-25 NOTE — ASSESSMENT & PLAN NOTE
Nausea/vomiting had improved until yesterday when she became ill with a likely viral gastroenteritis that her  had earlier in the week  Encouraged slow frequent sips of fluids and more aggressive hydration when able  She contemplated going to the ED last night but feels that the worst has passed at this point

## 2023-02-25 NOTE — ASSESSMENT & PLAN NOTE
Has had episodes of palpitations during pregnancy  Also has this history from prior to pregnancy but seem to be happening more often  Encouraged her to take her pulse and ensure it is regular and not >120 when these episodes occur  She is aware to call or go to the ED if this occurs  Would then pursue additional cardiac work-up

## 2023-02-25 NOTE — ASSESSMENT & PLAN NOTE
Has not picked up progesterone yet as there were some problems with coverage and then figuring out which compounding pharmacy would make it since it was not covered  Will  soon

## 2023-02-25 NOTE — ASSESSMENT & PLAN NOTE
PN labs normal   Gc/chlamydia initially collected was invalid - sent from urine today  NIPT low risk  Level 2 scheduled  MSAFP explained and slip given

## 2023-03-16 ENCOUNTER — APPOINTMENT (OUTPATIENT)
Dept: LAB | Age: 33
End: 2023-03-16

## 2023-03-16 DIAGNOSIS — Z34.82 PRENATAL CARE, SUBSEQUENT PREGNANCY IN SECOND TRIMESTER: ICD-10-CM

## 2023-03-16 NOTE — PROGRESS NOTES
Please refer to the [facility] ultrasound report in Ob Procedures for additional information regarding today's visit

## 2023-03-16 NOTE — PROGRESS NOTES
AFP IN PROCESS  Level 2 today  HX: of Palpitations  Less frequent  BP has been good  GC/CH = neg/neg  Denies Leaking of Fluid, vaginal bleeding, contractions  +FM  Having a GIRL!

## 2023-03-17 ENCOUNTER — ROUTINE PRENATAL (OUTPATIENT)
Facility: HOSPITAL | Age: 33
End: 2023-03-17

## 2023-03-17 ENCOUNTER — ROUTINE PRENATAL (OUTPATIENT)
Dept: OBGYN CLINIC | Facility: CLINIC | Age: 33
End: 2023-03-17

## 2023-03-17 VITALS — SYSTOLIC BLOOD PRESSURE: 118 MMHG | WEIGHT: 187.4 LBS | BODY MASS INDEX: 34.28 KG/M2 | DIASTOLIC BLOOD PRESSURE: 72 MMHG

## 2023-03-17 VITALS
HEIGHT: 62 IN | HEART RATE: 99 BPM | SYSTOLIC BLOOD PRESSURE: 118 MMHG | WEIGHT: 187 LBS | DIASTOLIC BLOOD PRESSURE: 72 MMHG | BODY MASS INDEX: 34.41 KG/M2

## 2023-03-17 DIAGNOSIS — O09.892 SHORT INTERVAL BETWEEN PREGNANCIES COMPLICATING PREGNANCY, ANTEPARTUM, SECOND TRIMESTER: ICD-10-CM

## 2023-03-17 DIAGNOSIS — O09.892 HISTORY OF PREMATURE DELIVERY, CURRENTLY PREGNANT, SECOND TRIMESTER: Primary | ICD-10-CM

## 2023-03-17 DIAGNOSIS — O34.219 PREVIOUS CESAREAN DELIVERY, ANTEPARTUM: ICD-10-CM

## 2023-03-17 DIAGNOSIS — R00.2 PALPITATION: ICD-10-CM

## 2023-03-17 DIAGNOSIS — O99.211 MATERNAL OBESITY, ANTEPARTUM, FIRST TRIMESTER: ICD-10-CM

## 2023-03-17 DIAGNOSIS — O21.9 NAUSEA/VOMITING IN PREGNANCY: ICD-10-CM

## 2023-03-17 DIAGNOSIS — Z3A.20 20 WEEKS GESTATION OF PREGNANCY: ICD-10-CM

## 2023-03-17 DIAGNOSIS — Z34.82 PRENATAL CARE, SUBSEQUENT PREGNANCY IN SECOND TRIMESTER: Primary | ICD-10-CM

## 2023-03-17 DIAGNOSIS — O99.212 MATERNAL OBESITY, ANTEPARTUM, SECOND TRIMESTER: ICD-10-CM

## 2023-03-17 DIAGNOSIS — O09.891 HISTORY OF PRETERM DELIVERY, CURRENTLY PREGNANT, FIRST TRIMESTER: ICD-10-CM

## 2023-03-17 LAB
SL AMB  POCT GLUCOSE, UA: NORMAL
SL AMB POCT URINE PROTEIN: NORMAL

## 2023-03-17 NOTE — LETTER
2023     Nixon Neumann [address]  [address] [address]    Patient: Idris Patel   YOB: 1990   Date of Visit: 3/17/2023       Dear Dr Hanh Holt: Thank you for referring Idris Patel to me for evaluation  Below are my notes for this consultation  If you have questions, please do not hesitate to call me  I look forward to following your patient along with you           Sincerely,         [facility] OPAL        CC: No Recipients  Fabian Olmos MD  3/16/2023  4:54 PM  Sign when Signing Visit  Please refer to the [facility] ultrasound report in Ob Procedures for additional information regarding today's visit

## 2023-03-17 NOTE — ASSESSMENT & PLAN NOTE
Hx of delivery at 36w3d  Started progesterone vaginal suppositories   Tries to use daily but sometimes uses every other day due to increased discharge

## 2023-03-17 NOTE — ASSESSMENT & PLAN NOTE
Palpitations still present but not as frequent  Had near syncopal episode at work in early pregnancy  Palpitations are now less frequent but are now accompanied by some chest discomfort and a stabbing pain  She has been monitoring pulse  Aware to call or seek ED care if pulse persistently >120   Also reviewed must report to ED if any syncopal episode  Check TSH and will refer to cardio for a work up

## 2023-03-17 NOTE — PROGRESS NOTES
20 weeks gestation of pregnancy  Jaron Chou  is a 29 y o  [id number] @20w7d who presents for routine prenatal visit  Denies OB complaints  NIPT- low risk, female  MASFP- in process  Level II scheduled for this afternoon    Good fetal movement  Denies contractions, cramping, leakage of fluid or vaginal bleeding  Reviewed PTL precautions and reasons to call  History of  delivery, currently pregnant, first trimester  Hx of delivery at 36w3d  Started progesterone vaginal suppositories  Tries to use daily but sometimes uses every other day due to increased discharge    Palpitation  Palpitations still present but not as frequent  Had near syncopal episode at work in early pregnancy  Palpitations are now less frequent but are now accompanied by some chest discomfort and a stabbing pain  She has been monitoring pulse  Aware to call or seek ED care if pulse persistently >120  Also reviewed must report to ED if any syncopal episode  Check TSH and will refer to cardio for a work up    Previous  delivery, antepartum  Likely desires repeat but if natural labor occurs will elect for TOLAC    Nausea/vomiting in pregnancy  Improved   No longer needs phenergan    Maternal obesity, antepartum, first trimester  Taking ASA  Level 2 today

## 2023-03-17 NOTE — PROGRESS NOTES
Ultrasound Probe Disinfection    A transvaginal ultrasound was performed  Prior to use, disinfection was performed with High Level Disinfection Process ([employer])  Probe serial number A2: [id number] was used        Karin Sanchez  03/17/23  1:49 PM

## 2023-03-17 NOTE — ASSESSMENT & PLAN NOTE
Javier Zuñiga  is a 29 y o  [id number] @20w6d who presents for routine prenatal visit  Denies OB complaints  NIPT- low risk, female  MASFP- in process  Level II scheduled for this afternoon    Good fetal movement  Denies contractions, cramping, leakage of fluid or vaginal bleeding  Reviewed PTL precautions and reasons to call

## 2023-03-22 LAB
2ND TRIMESTER 4 SCREEN SERPL-IMP: NORMAL
AFP ADJ MOM SERPL: 1.03
AFP INTERP AMN-IMP: NORMAL
AFP INTERP SERPL-IMP: NORMAL
AFP INTERP SERPL-IMP: NORMAL
AFP SERPL-MCNC: 58.9 NG/ML
AGE AT DELIVERY: 30.7 YR
GA METHOD: NORMAL
GA: 20.7 WEEKS
IDDM PATIENT QL: NO
MULTIPLE PREGNANCY: NO
NEURAL TUBE DEFECT RISK FETUS: NORMAL %

## 2023-03-31 ENCOUNTER — ULTRASOUND (OUTPATIENT)
Facility: HOSPITAL | Age: 33
End: 2023-03-31

## 2023-03-31 VITALS
DIASTOLIC BLOOD PRESSURE: 60 MMHG | WEIGHT: 187.61 LBS | SYSTOLIC BLOOD PRESSURE: 112 MMHG | BODY MASS INDEX: 34.52 KG/M2 | HEIGHT: 62 IN | HEART RATE: 83 BPM

## 2023-03-31 DIAGNOSIS — Z3A.22 22 WEEKS GESTATION OF PREGNANCY: Primary | ICD-10-CM

## 2023-03-31 DIAGNOSIS — Z36.2 ENCOUNTER FOR FOLLOW-UP ULTRASOUND OF FETAL ANATOMY: ICD-10-CM

## 2023-03-31 DIAGNOSIS — O99.212 OBESITY AFFECTING PREGNANCY IN SECOND TRIMESTER: ICD-10-CM

## 2023-03-31 DIAGNOSIS — O09.892 SHORT INTERVAL BETWEEN PREGNANCIES COMPLICATING PREGNANCY, ANTEPARTUM, SECOND TRIMESTER: ICD-10-CM

## 2023-03-31 DIAGNOSIS — Z36.86 ENCOUNTER FOR ANTENATAL SCREENING FOR CERVICAL LENGTH: ICD-10-CM

## 2023-03-31 DIAGNOSIS — O09.892 HISTORY OF PREMATURE DELIVERY, CURRENTLY PREGNANT, SECOND TRIMESTER: ICD-10-CM

## 2023-04-12 PROBLEM — Z3A.24 24 WEEKS GESTATION OF PREGNANCY: Status: ACTIVE | Noted: 2022-12-19

## 2023-05-01 ENCOUNTER — CONSULT (OUTPATIENT)
Dept: CARDIOLOGY CLINIC | Facility: CLINIC | Age: 33
End: 2023-05-01

## 2023-05-01 VITALS
SYSTOLIC BLOOD PRESSURE: 102 MMHG | HEART RATE: 114 BPM | OXYGEN SATURATION: 98 % | HEIGHT: 62 IN | DIASTOLIC BLOOD PRESSURE: 64 MMHG | WEIGHT: 195 LBS | BODY MASS INDEX: 35.88 KG/M2

## 2023-05-01 DIAGNOSIS — R00.2 PALPITATION: Primary | ICD-10-CM

## 2023-05-01 DIAGNOSIS — R07.2 PRECORDIAL PAIN: ICD-10-CM

## 2023-05-01 DIAGNOSIS — R42 DIZZINESS: ICD-10-CM

## 2023-05-01 NOTE — PROGRESS NOTES
Consultation - Cardiology Office  [facility]  Alexandre Squires 29 y o  female MRN: [id number]  : 1990  Unit/Bed#:  Encounter: [id number]      ASSESSMENT:  Chest  Tightness, dizziness and palpitations    27 Weeks pregnant    TTE: 2020:  EF 60%    48-hour Holter monitor, 2020  Sinus rhythm with normal diurnal variation  No PVCs  Rare PACs  No arrhythmias  Symptoms of chest tightness corresponded to sinus rhythm  Fast heartbeat corresponded to sinus rhythm at 65/min    Obesity, BMI 35 67    RECOMMENDATIONS:  Echocardiogram  48-hour Holter monitor  We will consider exercise stress test in a few months after childbirth  Advised to remain adequately hydrated      Thank you for your consultation  If you have any question please call me at [phone]      Primary Care Physician Requesting Consult: Johnathan Canas MD      Reason for Consult / Principal Problem: Palpitations        HPI :     Alexandre Squires is a 29y o  year old female who was referred by primary care doctor and OB for cardiac evaluation  Patient is 27 weeks pregnant and has recently been having some chest tightness, occasional dizziness and palpitations/rapid heartbeat  She was told by her OB that they are probably pregnancy related symptoms but she should get cardiac evaluation also  Review of Systems   Respiratory: Positive for chest tightness  Cardiovascular: Positive for palpitations  Neurological: Positive for dizziness  All other systems reviewed and are negative        Historical Information   Past Medical History:   Diagnosis Date    Degenerative lumbar disc 2015    Overactive bladder     Urinary tract infection     Verruca      Past Surgical History:   Procedure Laterality Date     SECTION  21    COLPOSCOPY      COLPOSCOPY W/ BIOPSY / CURETTAGE      Resolved 2017    GYNECOLOGIC CRYOSURGERY      SC  DELIVERY ONLY N/A 2021    Procedure:  SECTION ();   Surgeon: Earl Red MD;  Location: [facility] ;  Service: Obstetrics    WISDOM TOOTH EXTRACTION       Social History     Substance and Sexual Activity   Alcohol Use Not Currently    Alcohol/week: 2 0 standard drinks    Types: 1 Glasses of wine, 1 Standard drinks or equivalent per week     Social History     Substance and Sexual Activity   Drug Use No     Social History     Tobacco Use   Smoking Status Never   Smokeless Tobacco Never     Family History:   Family History   Problem Relation Age of Onset    Hypothyroidism Mother     Rheum arthritis Mother     Fibroids Mother     Thyroid disease Mother     Alcohol abuse Father     No Known Problems Brother     No Known Problems Brother     No Known Problems Son     Hiatal hernia Maternal Grandmother     Osteoporosis Maternal Grandmother     Endometrial cancer Maternal Grandmother     Endometriosis Maternal Grandmother     COPD Maternal Grandmother     Parkinsonism Maternal Grandmother     Skin cancer Maternal Grandmother     ALS Maternal Grandfather     Parkinsonism Maternal Grandfather     Arthritis Other     Hypertension Other         Benign Essential    Breast cancer Other     ALS Other     Osteoporosis Other        Meds/Allergies     Allergies   Allergen Reactions    Sulfa Antibiotics Fever and Fatigue    Sulfamethoxazole-Trimethoprim Fever and Fatigue       Current Outpatient Medications:     famotidine (PEPCID) 20 mg tablet, Take 1 tablet (20 mg total) by mouth 2 (two) times a day, Disp: 30 tablet, Rfl: 3    Prenatal Vit-Fe Fumarate-FA (PRENATAL PO), Take by mouth, Disp: , Rfl:     progesterone 200 mg vaginal suppository, Insert 1 suppository (200 mg total) into the vagina daily at bedtime, Disp: 30 suppository, Rfl: 4    promethazine (PHENERGAN) 25 mg tablet, Take 1 tablet (25 mg total) by mouth every 6 (six) hours as needed for nausea or vomiting, Disp: 30 tablet, Rfl: 0    aspirin 81 mg chewable "tablet, Chew 2 tablets (162 mg total) daily, Disp: 180 tablet, Rfl: 1    Vitals: Blood pressure 102/64, pulse (!) 114, height 5' 2\" (1 575 m), weight 88 5 kg (195 lb), last menstrual period 10/22/2022, SpO2 98 %, not currently breastfeeding  ?  Body mass index is 35 67 kg/m²  Vitals:    23 0929   Weight: 88 5 kg (195 lb)     BP Readings from Last 3 Encounters:   23 102/64   23 110/72   23 112/60       PHYSICAL EXAMINATION:  Neurologic:  Alert & oriented x 3, no new focal deficits, Not in any acute distress,  Constitutional: Obese and pregnant  Eyes:  Pupil equal and reacting to light, conjunctiva normal, No JVP, No LNP   HENT:  Atraumatic, oropharynx moist, Neck- normal range of motion, no tenderness,  Neck supple   Respiratory:  Bilateral air entry, mostly clear to auscultation  Cardiovascular: S1-S2 regular rhythm with a I/VI systolic murmur   GI:  Soft, nondistended, normal bowel sounds, nontender, no hepatosplenomegaly appreciated  Musculoskeletal: no tenderness, no deformities     Skin:  Well hydrated, no rash   Lymphatic:  No lymphadenopathy noted   Extremities:  No edema and distal pulses are present    Diagnostic Studies Review Cardio:      EKG: Normal sinus rhythm, heart rate 87/min    Cardiac testing:   Results for orders placed during the hospital encounter of 20    Echo complete with contrast if indicated    Narrative  [address]  [address] [address]  [phone]    Transthoracic Echocardiogram  2D, M-mode, Doppler, and Color Doppler    Study date:  2020    Patient: Grupo Brandon  MR number: [id number]  Account number: [de-identified]  : 1990  Age: 34 years  Gender: Female  Status: Outpatient  Location: Echo lab  Height: 62 in  Weight: 155 8 lb  BP: 110/ 61 mmHg    Indications: palpitation    Diagnoses: R00 2 - Palpitations    Sonographer:  RYAN Rayo  Primary Physician:  Chely Sumner MD  Referring " Physician:  Mattehw Simpson MD  Group:  [id number]  Interpreting Physician:  Joon Fraser MD    SUMMARY    LEFT VENTRICLE:  Definity given for improved LV visualization  Systolic function was normal  Ejection fraction was estimated in the range of 55 % to 60 % to be 60 %  There were no regional wall motion abnormalities  HISTORY: PRIOR HISTORY: Vitamin D deficiency,overactive bladder,dyspareunia,precordial pain  PROCEDURE: The procedure was performed in the echo lab  This was a routine study  The transthoracic approach was used  The study included complete 2D imaging, M-mode, complete spectral Doppler, and color Doppler  The heart rate was 54 bpm,  at the start of the study  Images were obtained from the parasternal, apical, subcostal, and suprasternal notch acoustic windows  Intravenous contrast ( 0 3ml in Definity) was administered to opacify the left ventricle  This was a  technically difficult study  LEFT VENTRICLE: Definity given for improved LV visualization Size was normal  Systolic function was normal  Ejection fraction was estimated in the range of 55 % to 60 % to be 60 %  There were no regional wall motion abnormalities  Wall  thickness was normal  No evidence of apical thrombus  DOPPLER: Left ventricular diastolic function parameters were normal     RIGHT VENTRICLE: The size was normal  Systolic function was normal  Wall thickness was normal     LEFT ATRIUM: Size was normal     RIGHT ATRIUM: Size was normal     MITRAL VALVE: Valve structure was normal  There was normal leaflet separation  DOPPLER: The transmitral velocity was within the normal range  There was no evidence for stenosis  There was no significant regurgitation  AORTIC VALVE: The valve was probably trileaflet  Leaflets exhibited normal thickness and normal cuspal separation  DOPPLER: Transaortic velocity was within the normal range  There was no evidence for stenosis   There was no significant  regurgitation  TRICUSPID VALVE: The valve structure was normal  There was normal leaflet separation  DOPPLER: The transtricuspid velocity was within the normal range  There was no evidence for stenosis  There was no significant regurgitation  PULMONIC VALVE: Leaflets exhibited normal thickness, no calcification, and normal cuspal separation  DOPPLER: The transpulmonic velocity was within the normal range  There was no significant regurgitation  PERICARDIUM: There was no pericardial effusion  The pericardium was normal in appearance  AORTA: The root exhibited normal size  SYSTEMIC VEINS: IVC: The inferior vena cava was normal in size  SYSTEM MEASUREMENT TABLES    2D mode  AoR Diam 2D: 2 5 cm  LA Diam (2D): 3 1 cm  LA/Ao (2D): 1 24  FS (2D Teich): 27 7 %  IVSd (2D): 0 59 cm  LVDEV: 96 3 cmï¾³  LVESV: 44 5 cmï¾³  LVIDd(2D): 4 58 cm  LVISd (2D): 3 31 cm  LVOT Area 2D: 2 84 cmï¾²  LVPWd (2D): 0 7 cm  SV (Teich): 51 8 cmï¾³    Apical four chamber  LVEF A4C: 54 %    Unspecified Scan Mode  NOELLE Cont Eq (Peak Kwaku): 2 39 cmï¾²  LVOT Diam : 1 9 cm  LVOT Vmax: 1010 mm/s  LVOT Vmax; Mean: 1010 mm/s  Peak Grad ; Mean: 4 mm[Hg]  MV Peak A Kwaku: 559 mm/s  MV Peak E Kwaku  Mean: 1070 mm/s  MVA (PHT): 3 55 cmï¾²  PHT: 62 ms  RA Area: 15 2 cmï¾²  RA Volume: 38 4 cmï¾³  TAPSE: 2 5 cm    [employer] Accredited Echocardiography Laboratory    Prepared and electronically signed by    Ninoska Santiago MD  Signed 29-Jul-2020 12:18:44      Imaging:  Chest X-Ray:   No Chest XR results available for this patient  CT-scan of the chest:     No CTA results available for this patient    Lab Review   Lab Results   Component Value Date    WBC 8 00 01/12/2023    HGB 14 0 01/12/2023    HCT 42 2 01/12/2023    MCV 89 01/12/2023    RDW 12 5 01/12/2023     01/12/2023     BMP:  Lab Results   Component Value Date    SODIUM 139 10/16/2022    K 3 7 10/16/2022     10/16/2022    CO2 28 10/16/2022    BUN 12 "10/16/2022    CREATININE 0 84 10/16/2022    GLUC 82 10/16/2022    GLUF 87 06/09/2022    CALCIUM 9 9 10/16/2022    EGFR 94 10/16/2022     LFT:  Lab Results   Component Value Date    AST 16 10/16/2022    ALT 10 10/16/2022    ALKPHOS 69 10/16/2022    TP 8 2 10/16/2022    ALB 4 8 10/16/2022      Lab Results   Component Value Date    QEA3QOLHJSFF 2 090 06/09/2022     No components found for: [employer] [facility]  Lab Results   Component Value Date    HGBA1C 5 1 06/09/2022     Lipid Profile:   Lab Results   Component Value Date    CHOLESTEROL 182 06/09/2022    HDL 64 06/09/2022    LDLCALC 104 (H) 06/09/2022    TRIG 71 06/09/2022     Lab Results   Component Value Date    CHOLESTEROL 182 06/09/2022    CHOLESTEROL 183 07/18/2020     No results found for: CKTOTAL, CKMB, CKMBINDEX, TROPONINI  No results found for: NTBNP   Recent Results (from the past 672 hour(s))   POCT urine dip    Collection Time: 04/12/23  3:46 PM   Result Value Ref Range    POCT URINE PROTEIN neg     GLUCOSE, UA neg            Dr Delia Arora MD, [facility]      \"This note has been constructed using a voice recognition system  Therefore there may be syntax, spelling, and/or grammatical errors  Please call if you have any questions   \"  "

## 2023-05-08 ENCOUNTER — APPOINTMENT (OUTPATIENT)
Dept: LAB | Facility: CLINIC | Age: 33
End: 2023-05-08

## 2023-05-08 DIAGNOSIS — Z34.82 PRENATAL CARE, SUBSEQUENT PREGNANCY IN SECOND TRIMESTER: ICD-10-CM

## 2023-05-08 DIAGNOSIS — R00.2 PALPITATION: ICD-10-CM

## 2023-05-08 LAB
BASOPHILS # BLD AUTO: 0.02 THOUSANDS/ÂΜL (ref 0–0.1)
BASOPHILS NFR BLD AUTO: 0 % (ref 0–1)
EOSINOPHIL # BLD AUTO: 0.1 THOUSAND/ÂΜL (ref 0–0.61)
EOSINOPHIL NFR BLD AUTO: 1 % (ref 0–6)
ERYTHROCYTE [DISTWIDTH] IN BLOOD BY AUTOMATED COUNT: 13.2 % (ref 11.6–15.1)
GLUCOSE 1H P 50 G GLC PO SERPL-MCNC: 131 MG/DL (ref 40–134)
HCT VFR BLD AUTO: 39 % (ref 34.8–46.1)
HGB BLD-MCNC: 12 G/DL (ref 11.5–15.4)
IMM GRANULOCYTES # BLD AUTO: 0.08 THOUSAND/UL (ref 0–0.2)
IMM GRANULOCYTES NFR BLD AUTO: 1 % (ref 0–2)
LYMPHOCYTES # BLD AUTO: 1.68 THOUSANDS/ÂΜL (ref 0.6–4.47)
LYMPHOCYTES NFR BLD AUTO: 22 % (ref 14–44)
MCH RBC QN AUTO: 29 PG (ref 26.8–34.3)
MCHC RBC AUTO-ENTMCNC: 30.8 G/DL (ref 31.4–37.4)
MCV RBC AUTO: 94 FL (ref 82–98)
MONOCYTES # BLD AUTO: 0.31 THOUSAND/ÂΜL (ref 0.17–1.22)
MONOCYTES NFR BLD AUTO: 4 % (ref 4–12)
NEUTROPHILS # BLD AUTO: 5.33 THOUSANDS/ÂΜL (ref 1.85–7.62)
NEUTS SEG NFR BLD AUTO: 72 % (ref 43–75)
NRBC BLD AUTO-RTO: 0 /100 WBCS
PLATELET # BLD AUTO: 271 THOUSANDS/UL (ref 149–390)
PMV BLD AUTO: 10.8 FL (ref 8.9–12.7)
RBC # BLD AUTO: 4.14 MILLION/UL (ref 3.81–5.12)
TREPONEMA PALLIDUM IGG+IGM AB [PRESENCE] IN SERUM OR PLASMA BY IMMUNOASSAY: NORMAL
TSH SERPL DL<=0.05 MIU/L-ACNC: 1.77 UIU/ML (ref 0.45–4.5)
WBC # BLD AUTO: 7.52 THOUSAND/UL (ref 4.31–10.16)

## 2023-05-10 ENCOUNTER — ROUTINE PRENATAL (OUTPATIENT)
Dept: OBGYN CLINIC | Facility: CLINIC | Age: 33
End: 2023-05-10

## 2023-05-10 VITALS
DIASTOLIC BLOOD PRESSURE: 62 MMHG | SYSTOLIC BLOOD PRESSURE: 104 MMHG | BODY MASS INDEX: 35.78 KG/M2 | HEART RATE: 104 BPM | WEIGHT: 195.6 LBS

## 2023-05-10 DIAGNOSIS — Z23 NEED FOR TDAP VACCINATION: ICD-10-CM

## 2023-05-10 DIAGNOSIS — O34.219 PREVIOUS CESAREAN DELIVERY, ANTEPARTUM: ICD-10-CM

## 2023-05-10 DIAGNOSIS — O09.892 SHORT INTERVAL BETWEEN PREGNANCIES COMPLICATING PREGNANCY, ANTEPARTUM, SECOND TRIMESTER: ICD-10-CM

## 2023-05-10 DIAGNOSIS — Z34.83 PRENATAL CARE, SUBSEQUENT PREGNANCY, THIRD TRIMESTER: Primary | ICD-10-CM

## 2023-05-10 DIAGNOSIS — O09.893 HISTORY OF PRETERM DELIVERY, CURRENTLY PREGNANT IN THIRD TRIMESTER: ICD-10-CM

## 2023-05-10 DIAGNOSIS — R00.2 PALPITATION: ICD-10-CM

## 2023-05-10 DIAGNOSIS — O99.211 MATERNAL OBESITY, ANTEPARTUM, FIRST TRIMESTER: ICD-10-CM

## 2023-05-10 DIAGNOSIS — Z3A.28 28 WEEKS GESTATION OF PREGNANCY: ICD-10-CM

## 2023-05-10 NOTE — ASSESSMENT & PLAN NOTE
Alina Cosby  is a 29 y o  [id number] @28w4d who presents for routine prenatal visit  28 wk labs - normal   Growth scan @ 32 weeks   TDAP - declined today, counseling given  Plans to breastfeed  Pump - ordered   Ped - has     Reports good fetal movement  Denies LOF, vaginal bleeding, regular uterine contractions, cramping, headaches or visual changes  Reviewed PTL precautions and FKC

## 2023-05-10 NOTE — PROGRESS NOTES
Problem List Items Addressed This Visit        Other    Palpitation     Seen by cardiology 23  Has order for Holter monitor and echo  Previous  delivery, antepartum    28 weeks gestation of pregnancy     Mayuri Dao  is a 29 y o   @27w2d who presents for routine prenatal visit  28 wk labs - normal   Growth scan @ 32 weeks   TDAP - declined today, counseling given  Plans to breastfeed  Pump - ordered   Ped - has     Reports good fetal movement  Denies LOF, vaginal bleeding, regular uterine contractions, cramping, headaches or visual changes  Reviewed PTL precautions and FKC  History of  delivery, currently pregnant in third trimester    Maternal obesity, antepartum, first trimester     TWG 24 lbs  Pregravid BMI 31 , goal 11-20  Normal 1 hr @ 28 weeks           Short interval between pregnancies complicating pregnancy, antepartum, second trimester     F/u growth at 32 weeks          Prenatal care, subsequent pregnancy, third trimester - Primary    Relevant Orders    POCT urine dip    Breast pump   Other Visit Diagnoses     Need for Tdap vaccination        Relevant Orders    TDAP VACCINE GREATER THAN OR EQUAL TO 8YO IM

## 2023-05-10 NOTE — PROGRESS NOTES
Patient here for prenatal visit  She states when she walks she will have cramping; denies spotting or LOF  Good fetal movement  She plans on breastfeeding; pump order placed today  US scheduled 6/2/23  28 week labs completed  Tdap offered today; she declines  Patient did not provide urine sample

## 2023-05-11 LAB
DME PARACHUTE DELIVERY DATE ACTUAL: NORMAL
DME PARACHUTE DELIVERY DATE REQUESTED: NORMAL
DME PARACHUTE ITEM DESCRIPTION: NORMAL
DME PARACHUTE ORDER STATUS: NORMAL
DME PARACHUTE SUPPLIER NAME: NORMAL
DME PARACHUTE SUPPLIER PHONE: NORMAL

## 2023-05-25 ENCOUNTER — TELEPHONE (OUTPATIENT)
Dept: OBGYN CLINIC | Facility: CLINIC | Age: 33
End: 2023-05-25

## 2023-05-25 ENCOUNTER — ROUTINE PRENATAL (OUTPATIENT)
Dept: OBGYN CLINIC | Facility: CLINIC | Age: 33
End: 2023-05-25

## 2023-05-25 VITALS
SYSTOLIC BLOOD PRESSURE: 112 MMHG | WEIGHT: 196.4 LBS | BODY MASS INDEX: 35.92 KG/M2 | HEART RATE: 92 BPM | DIASTOLIC BLOOD PRESSURE: 68 MMHG

## 2023-05-25 DIAGNOSIS — O09.893 HISTORY OF PRETERM DELIVERY, CURRENTLY PREGNANT IN THIRD TRIMESTER: ICD-10-CM

## 2023-05-25 DIAGNOSIS — Z34.83 PRENATAL CARE, SUBSEQUENT PREGNANCY, THIRD TRIMESTER: Primary | ICD-10-CM

## 2023-05-25 DIAGNOSIS — Z23 NEED FOR TDAP VACCINATION: ICD-10-CM

## 2023-05-25 DIAGNOSIS — O99.211 MATERNAL OBESITY, ANTEPARTUM, FIRST TRIMESTER: ICD-10-CM

## 2023-05-25 DIAGNOSIS — R00.2 PALPITATION: ICD-10-CM

## 2023-05-25 DIAGNOSIS — O34.219 PREVIOUS CESAREAN DELIVERY, ANTEPARTUM: ICD-10-CM

## 2023-05-25 LAB
SL AMB  POCT GLUCOSE, UA: NEGATIVE
SL AMB POCT URINE PROTEIN: NEGATIVE

## 2023-05-25 NOTE — TELEPHONE ENCOUNTER
----- Message from Philippe Saucedo MD sent at 2023  2:59 PM EDT -----  Regarding:  scheduling  Procedure to be scheduled (IOL or CS):     LISBETH: Estimated Date of Delivery: 23     Indication for delivery: repeat     Requested date (s) of delivery: 2023   If requested date is unavailable, is there a date by which the pt must be delivered?     Physician preference: Barbara Manley     If CS, with or without tubal: without (may change mind)

## 2023-05-25 NOTE — PROGRESS NOTES
Prenatal visit at 30w5d  Denies ctxs, VB, LOF  Good FM  Problem List Items Addressed This Visit        Other    Palpitation     Had normal EKG  Saw cariology who recommended Holter monitor and echo  She did not do them because she has been having fewer episodes  Encouraged her to complete given the risks of delivery  Previous  delivery, antepartum     Would consider TOLAC if she goes into labor  She will plan to have a repeat  with me at 39+ weeks if no labor  History of  delivery, currently pregnant in third trimester     Hx PPROM at 42 weeks  Patient reports she has essentially discontinued her progesterone vaginal suppositories as she was forgetting to use them and did not like the discharge  Understands risks involved  Advised her to also inform MFM  Strict  labor precautions reviewed           Maternal obesity, antepartum, first trimester     TWG 25#         Prenatal care, subsequent pregnancy, third trimester - Primary    Relevant Orders    POCT urine dip (Completed)   Other Visit Diagnoses     Need for Tdap vaccination        Relevant Orders    TDAP VACCINE GREATER THAN OR EQUAL TO 8YO IM (Completed)

## 2023-05-25 NOTE — TELEPHONE ENCOUNTER
I called triage to schedule repeat c section on 7/28 at 10 AM  I spoke with Genaro Agee   So far, no tubal  Dr CHARLES notified

## 2023-05-26 PROBLEM — Z3A.30 30 WEEKS GESTATION OF PREGNANCY: Status: ACTIVE | Noted: 2022-12-19

## 2023-05-26 NOTE — ASSESSMENT & PLAN NOTE
Hx PPROM at 42 weeks  Patient reports she has essentially discontinued her progesterone vaginal suppositories as she was forgetting to use them and did not like the discharge  Understands risks involved  Advised her to also inform MFM  Strict  labor precautions reviewed

## 2023-05-26 NOTE — ASSESSMENT & PLAN NOTE
Would consider TOLAC if she goes into labor  She will plan to have a repeat  with me at 39+ weeks if no labor

## 2023-05-26 NOTE — ASSESSMENT & PLAN NOTE
Yellow folder given last visit  Received her breast pump  Tdap given today  Various routes of delivery reviewed including risks/benefits of each  All questions answered, and consent signed

## 2023-05-26 NOTE — ASSESSMENT & PLAN NOTE
Had normal EKG  Saw cariology who recommended Holter monitor and echo  She did not do them because she has been having fewer episodes  Encouraged her to complete given the risks of delivery

## 2023-06-01 ENCOUNTER — TELEPHONE (OUTPATIENT)
Dept: OBGYN CLINIC | Facility: CLINIC | Age: 33
End: 2023-06-01

## 2023-06-02 ENCOUNTER — ULTRASOUND (OUTPATIENT)
Facility: HOSPITAL | Age: 33
End: 2023-06-02

## 2023-06-02 VITALS
HEIGHT: 62 IN | SYSTOLIC BLOOD PRESSURE: 112 MMHG | WEIGHT: 196.8 LBS | HEART RATE: 94 BPM | DIASTOLIC BLOOD PRESSURE: 68 MMHG | BODY MASS INDEX: 36.22 KG/M2

## 2023-06-02 DIAGNOSIS — Z3A.31 31 WEEKS GESTATION OF PREGNANCY: ICD-10-CM

## 2023-06-02 DIAGNOSIS — Z36.89 ENCOUNTER FOR ULTRASOUND TO CHECK FETAL GROWTH: ICD-10-CM

## 2023-06-02 DIAGNOSIS — O09.892 SHORT INTERVAL BETWEEN PREGNANCIES COMPLICATING PREGNANCY, ANTEPARTUM, SECOND TRIMESTER: Primary | ICD-10-CM

## 2023-06-02 DIAGNOSIS — O99.213 OBESITY AFFECTING PREGNANCY IN THIRD TRIMESTER: ICD-10-CM

## 2023-06-02 DIAGNOSIS — O09.893 HISTORY OF PRETERM DELIVERY, CURRENTLY PREGNANT IN THIRD TRIMESTER: ICD-10-CM

## 2023-06-02 NOTE — PROGRESS NOTES
"[address]: Melvin Cowden was seen today for fetal growth and followup missed anatomy ultrasound  See ultrasound report under \"OB Procedures\" tab  The time spent on this established patient on the encounter date included 4 minutes previsit service time reviewing records and precharting, 4 minutes face-to-face service time counseling regarding results and coordinating care, and  3 minutes charting, totalling 12 minutes    Please don't hesitate to contact our office with any concerns or questions   -Maria Aguilera MD      "

## 2023-06-02 NOTE — PATIENT INSTRUCTIONS
Thank you for choosing us for your  care today  If you have any questions about your ultrasound or care, please do not hesitate to contact us or your primary obstetrician  Some general instructions for your pregnancy are:    Protect against coronavirus: get vaccinated - pregnant women are increased risk of severe COVID  Notify your primary care doctor if you have any symptoms  Exercise: Aim for 22 minutes per day (150 minutes per week) of regular exercise  Walking is great! Nutrition: aim for calcium-rich and iron-rich foods as well as healthy sources of protein  Learn about Preeclampsia: preeclampsia is a common, serious high blood pressure complication in pregnancy  A blood pressure of 834SZJL (systolic or top number) or 21VKRP (diastolic or bottom number) is not normal and needs evaluation by your doctor  Aspirin is sometimes prescribed in early pregnancy to prevent preeclampsia in women with risk factors - ask your obstetrician if you should be on this medication  If you smoke, try to reduce how many cigarettes you smoke or try to quit completely  Do not vape  Other warning signs to watch out for in pregnancy or postpartum: chest pain, obstructed breathing or shortness of breath, seizures, thoughts of hurting yourself or your baby, bleeding, a painful or swollen leg, fever, or headache (see [employer] POST-BIRTH Warning Signs campaign)  If these happen call 911  Itching is also not normal in pregnancy and if you experience this, especially over your hands and feet, potentially worse at night, notify your doctors

## 2023-06-07 ENCOUNTER — ROUTINE PRENATAL (OUTPATIENT)
Dept: OBGYN CLINIC | Facility: CLINIC | Age: 33
End: 2023-06-07
Payer: COMMERCIAL

## 2023-06-07 VITALS
HEART RATE: 94 BPM | WEIGHT: 196.4 LBS | OXYGEN SATURATION: 99 % | DIASTOLIC BLOOD PRESSURE: 58 MMHG | BODY MASS INDEX: 35.92 KG/M2 | SYSTOLIC BLOOD PRESSURE: 118 MMHG

## 2023-06-07 DIAGNOSIS — Z3A.32 32 WEEKS GESTATION OF PREGNANCY: ICD-10-CM

## 2023-06-07 DIAGNOSIS — O09.893 HISTORY OF PRETERM DELIVERY, CURRENTLY PREGNANT IN THIRD TRIMESTER: ICD-10-CM

## 2023-06-07 DIAGNOSIS — O34.219 PREVIOUS CESAREAN DELIVERY, ANTEPARTUM: ICD-10-CM

## 2023-06-07 DIAGNOSIS — Z34.83 PRENATAL CARE, SUBSEQUENT PREGNANCY, THIRD TRIMESTER: Primary | ICD-10-CM

## 2023-06-07 LAB
SL AMB  POCT GLUCOSE, UA: NORMAL
SL AMB POCT URINE PROTEIN: NORMAL

## 2023-06-07 PROCEDURE — 81002 URINALYSIS NONAUTO W/O SCOPE: CPT | Performed by: OBSTETRICS & GYNECOLOGY

## 2023-06-07 PROCEDURE — PNV: Performed by: OBSTETRICS & GYNECOLOGY

## 2023-06-07 NOTE — PROGRESS NOTES
C/S scheduled 7/28/23  Delivery Consents previously signed,   Has a breast pump  UTD Tdap  Growth scan completed   6/2  patient previously reported discontinuing her progesterone vaginal suppositories   Denies Leaking of Fluid, vaginal bleeding, contractions  +Fetal Movement Patient is calling about her Fibroscan that was done. Will inform JAMI Dominguez.

## 2023-06-07 NOTE — PROGRESS NOTES
Problem List Items Addressed This Visit        Other    Previous  delivery, antepartum     Repeat is scheduled with ED  32 weeks gestation of pregnancy     Deann Aden is doing well  No bleeding, LOF  Some occasional BH contractions/mild cramps  Baby remains active  Had growth scan - 85%, follow up as clinically indicated  History of  delivery, currently pregnant in third trimester     Hx PPROM 36wks  No longer using progesterone suppositories            Prenatal care, subsequent pregnancy, third trimester - Primary    Relevant Orders    POCT urine dip (Completed)

## 2023-06-07 NOTE — ASSESSMENT & PLAN NOTE
Jose C Mccauley is doing well  No bleeding, LOF  Some occasional BH contractions/mild cramps  Baby remains active  Had growth scan - 85%, follow up as clinically indicated

## 2023-06-08 ENCOUNTER — HOSPITAL ENCOUNTER (OUTPATIENT)
Dept: NON INVASIVE DIAGNOSTICS | Facility: HOSPITAL | Age: 33
Discharge: HOME | End: 2023-06-08
Attending: INTERNAL MEDICINE
Payer: COMMERCIAL

## 2023-06-08 VITALS
HEIGHT: 62 IN | BODY MASS INDEX: 36.07 KG/M2 | WEIGHT: 196 LBS | DIASTOLIC BLOOD PRESSURE: 74 MMHG | SYSTOLIC BLOOD PRESSURE: 112 MMHG | HEART RATE: 73 BPM

## 2023-06-08 DIAGNOSIS — R00.2 PALPITATION: ICD-10-CM

## 2023-06-08 DIAGNOSIS — R07.2 PRECORDIAL PAIN: ICD-10-CM

## 2023-06-08 DIAGNOSIS — R42 DIZZINESS: ICD-10-CM

## 2023-06-08 LAB
AORTIC ROOT: 3 CM
APICAL FOUR CHAMBER EJECTION FRACTION: 41 %
AV LVOT PEAK GRADIENT: 4 MMHG
AV PEAK GRADIENT: 7 MMHG
DOP CALC LVOT AREA: 2.83 CM2
DOP CALC LVOT DIAMETER: 1.9 CM
E WAVE DECELERATION TIME: 158 MS
FRACTIONAL SHORTENING: 33 % (ref 28–44)
INTERVENTRICULAR SEPTUM IN DIASTOLE (PARASTERNAL SHORT AXIS VIEW): 0.9 CM
INTERVENTRICULAR SEPTUM: 0.9 CM (ref 0.6–1.1)
LAAS-AP2: 20.2 CM2
LAAS-AP4: 10.1 CM2
LEFT ATRIUM AREA SYSTOLE SINGLE PLANE A4C: 8.8 CM2
LEFT ATRIUM SIZE: 3 CM
LEFT INTERNAL DIMENSION IN SYSTOLE: 2.9 CM (ref 2.1–4)
LEFT VENTRICLE DIASTOLIC VOLUME (MOD BIPLANE): 117 ML
LEFT VENTRICLE SYSTOLIC VOLUME (MOD BIPLANE): 69 ML
LEFT VENTRICULAR INTERNAL DIMENSION IN DIASTOLE: 4.3 CM (ref 3.5–6)
LEFT VENTRICULAR POSTERIOR WALL IN END DIASTOLE: 0.9 CM
LEFT VENTRICULAR STROKE VOLUME: 51 ML
LV EF: 41 %
LVSV (TEICH): 51 ML
MV E'TISSUE VEL-LAT: 13 CM/S
MV E'TISSUE VEL-SEP: 14 CM/S
MV PEAK A VEL: 0.68 M/S
MV PEAK E VEL: 92 CM/S
MV STENOSIS PRESSURE HALF TIME: 46 MS
MV VALVE AREA P 1/2 METHOD: 4.78 CM2
PV PEAK GRADIENT: 3 MMHG
RIGHT ATRIUM AREA SYSTOLE A4C: 13.9 CM2
RIGHT VENTRICLE ID DIMENSION: 3 CM
SL CV LEFT ATRIUM LENGTH A2C: 5.1 CM
SL CV LV EF: 55
SL CV PED ECHO LEFT VENTRICLE DIASTOLIC VOLUME (MOD BIPLANE) 2D: 82 ML
SL CV PED ECHO LEFT VENTRICLE SYSTOLIC VOLUME (MOD BIPLANE) 2D: 32 ML
TRICUSPID ANNULAR PLANE SYSTOLIC EXCURSION: 2.8 CM

## 2023-06-08 PROCEDURE — 93225 XTRNL ECG REC<48 HRS REC: CPT

## 2023-06-08 PROCEDURE — 93306 TTE W/DOPPLER COMPLETE: CPT

## 2023-06-08 PROCEDURE — 93306 TTE W/DOPPLER COMPLETE: CPT | Performed by: INTERNAL MEDICINE

## 2023-06-08 PROCEDURE — 93226 XTRNL ECG REC<48 HR SCAN A/R: CPT

## 2023-06-09 ENCOUNTER — TELEPHONE (OUTPATIENT)
Dept: CARDIOLOGY CLINIC | Facility: CLINIC | Age: 33
End: 2023-06-09

## 2023-06-09 NOTE — TELEPHONE ENCOUNTER
----- Message from Znae Noguera MD sent at 6/9/2023 10:19 AM EDT -----  Please call and inform the patient that the Echocardiogram showed normal pumping function of the heart  No significant valve abnormality was seen

## 2023-06-22 ENCOUNTER — ROUTINE PRENATAL (OUTPATIENT)
Dept: OBGYN CLINIC | Facility: CLINIC | Age: 33
End: 2023-06-22
Payer: COMMERCIAL

## 2023-06-22 VITALS — BODY MASS INDEX: 36.29 KG/M2 | SYSTOLIC BLOOD PRESSURE: 120 MMHG | WEIGHT: 198.4 LBS | DIASTOLIC BLOOD PRESSURE: 68 MMHG

## 2023-06-22 DIAGNOSIS — O09.219 PREVIOUS PRETERM DELIVERY, ANTEPARTUM: ICD-10-CM

## 2023-06-22 DIAGNOSIS — O34.219 PREVIOUS CESAREAN DELIVERY, ANTEPARTUM: ICD-10-CM

## 2023-06-22 DIAGNOSIS — O09.892 SHORT INTERVAL BETWEEN PREGNANCIES COMPLICATING PREGNANCY, ANTEPARTUM, SECOND TRIMESTER: ICD-10-CM

## 2023-06-22 DIAGNOSIS — Z3A.34 34 WEEKS GESTATION OF PREGNANCY: ICD-10-CM

## 2023-06-22 DIAGNOSIS — Z34.82 PRENATAL CARE, SUBSEQUENT PREGNANCY, SECOND TRIMESTER: Primary | ICD-10-CM

## 2023-06-22 DIAGNOSIS — O99.211 MATERNAL OBESITY, ANTEPARTUM, FIRST TRIMESTER: ICD-10-CM

## 2023-06-22 DIAGNOSIS — O09.893 HISTORY OF PRETERM DELIVERY, CURRENTLY PREGNANT IN THIRD TRIMESTER: ICD-10-CM

## 2023-06-22 DIAGNOSIS — O21.9 NAUSEA/VOMITING IN PREGNANCY: ICD-10-CM

## 2023-06-22 LAB
SL AMB  POCT GLUCOSE, UA: NORMAL
SL AMB POCT URINE PROTEIN: NORMAL

## 2023-06-22 PROCEDURE — PNV: Performed by: STUDENT IN AN ORGANIZED HEALTH CARE EDUCATION/TRAINING PROGRAM

## 2023-06-22 PROCEDURE — 81002 URINALYSIS NONAUTO W/O SCOPE: CPT | Performed by: STUDENT IN AN ORGANIZED HEALTH CARE EDUCATION/TRAINING PROGRAM

## 2023-06-22 NOTE — PROGRESS NOTES
Previous  delivery, antepartum  PPROM arrest of descent  Repeat CS without tubal scheduled  Would consider laboring if occurred spontaneously  34 weeks gestation of pregnancy  28 yo  at 34+5 here for routine ob visit  Feeling a more uncomfortable but no regular contractions  No leaking or bleeding  Good fetal movement  Return in 2 wks      History of  delivery, currently pregnant in third trimester  PPROM at 42 wks, not using progesterone     Maternal obesity, antepartum, first trimester  TWG 27#, goal 11-20#    Short interval between pregnancies complicating pregnancy, antepartum, second trimester  Does not desire tubal  Undecided on PP contraception

## 2023-06-22 NOTE — ASSESSMENT & PLAN NOTE
PPROM arrest of descent  Repeat CS without tubal scheduled  Would consider laboring if occurred spontaneously

## 2023-06-22 NOTE — PROGRESS NOTES
Pt is here for a Routine Prenatal Visit  34 Weeks, 5 Days  C/S scheduled 7/28/23  Delivery Consents  signed,   Has a breast pump     UTD Tdap  Denies Leaking of Fluid, vaginal bleeding, contractions  Reports: Good Fetal Movement  Urine:

## 2023-06-22 NOTE — ASSESSMENT & PLAN NOTE
28 yo  at 34+5 here for routine ob visit  Feeling a more uncomfortable but no regular contractions  No leaking or bleeding  Good fetal movement  Return in 2 wks

## 2023-07-05 ENCOUNTER — NURSE TRIAGE (OUTPATIENT)
Dept: OTHER | Facility: OTHER | Age: 33
End: 2023-07-05

## 2023-07-05 ENCOUNTER — HOSPITAL ENCOUNTER (OUTPATIENT)
Facility: HOSPITAL | Age: 33
Discharge: HOME | End: 2023-07-06
Attending: OBSTETRICS & GYNECOLOGY | Admitting: OBSTETRICS & GYNECOLOGY
Payer: COMMERCIAL

## 2023-07-06 ENCOUNTER — ROUTINE PRENATAL (OUTPATIENT)
Dept: OBGYN CLINIC | Facility: CLINIC | Age: 33
End: 2023-07-06

## 2023-07-06 VITALS — WEIGHT: 201.4 LBS | BODY MASS INDEX: 36.84 KG/M2 | SYSTOLIC BLOOD PRESSURE: 120 MMHG | DIASTOLIC BLOOD PRESSURE: 72 MMHG

## 2023-07-06 DIAGNOSIS — Z3A.36 36 WEEKS GESTATION OF PREGNANCY: Primary | ICD-10-CM

## 2023-07-06 DIAGNOSIS — Z34.83 ENCOUNTER FOR SUPERVISION OF OTHER NORMAL PREGNANCY, THIRD TRIMESTER: ICD-10-CM

## 2023-07-06 DIAGNOSIS — O34.219 PREVIOUS CESAREAN DELIVERY, ANTEPARTUM: ICD-10-CM

## 2023-07-06 DIAGNOSIS — Z34.83 PRENATAL CARE, SUBSEQUENT PREGNANCY, THIRD TRIMESTER: ICD-10-CM

## 2023-07-06 DIAGNOSIS — O99.211 MATERNAL OBESITY, ANTEPARTUM, FIRST TRIMESTER: ICD-10-CM

## 2023-07-06 DIAGNOSIS — O09.893 HISTORY OF PRETERM DELIVERY, CURRENTLY PREGNANT IN THIRD TRIMESTER: ICD-10-CM

## 2023-07-06 DIAGNOSIS — O09.892 SHORT INTERVAL BETWEEN PREGNANCIES COMPLICATING PREGNANCY, ANTEPARTUM, SECOND TRIMESTER: ICD-10-CM

## 2023-07-06 PROBLEM — O47.9 BRAXTON HICK'S CONTRACTION: Status: ACTIVE | Noted: 2023-07-06

## 2023-07-06 LAB
SL AMB  POCT GLUCOSE, UA: NORMAL
SL AMB POCT URINE PROTEIN: NORMAL

## 2023-07-06 PROCEDURE — 87150 DNA/RNA AMPLIFIED PROBE: CPT | Performed by: PHYSICIAN ASSISTANT

## 2023-07-06 PROCEDURE — 99214 OFFICE O/P EST MOD 30 MIN: CPT

## 2023-07-06 PROCEDURE — 99213 OFFICE O/P EST LOW 20 MIN: CPT | Performed by: OBSTETRICS & GYNECOLOGY

## 2023-07-06 NOTE — TELEPHONE ENCOUNTER
Reason for Disposition  • Contractions < 10 minutes apart for 1 hour (i.e., 6 or more contractions an hour)    Answer Assessment - Initial Assessment Questions  1. ONSET: "When did the symptoms begin?"         Began about 35-45 minutes ago while pumping colostrum  2. CONTRACTIONS: "Describe the contractions that you are having." (e.g., duration, frequency, regularity, severity)      Worse than becki bear occurs every 4-6 minutes lasting 1-1.5 minutes for the last hour. Able to talk and breath through them  3. LISBETH: "What date are you expecting to deliver?"      2023  4. PARITY: "Have you had a baby before?" If Yes, ask: "How long did the labor last?"      Yes- spontaneous water break,  at 36 weeks 2 days  5. FETAL MOVEMENT: "Has the baby's movement decreased or changed significantly from normal?"      About the same  6.  OTHER SYMPTOMS: "Do you have any other symptoms?" (e.g., leaking fluid from vagina, fever, hand/facial swelling)     Mild swelling in hands and feet improved with elevation    Protocols used: PREGNANCY - LABOR - -ADULT-

## 2023-07-06 NOTE — ASSESSMENT & PLAN NOTE
PPROM with arrest of descent. Repeat scheduled 7/28/23 with ED. Would like to labor if occurred spontaneously. Reviewed shower instructions, however left w/o surgical rinse and booklet. Will plan to give at next visit. Reviewed grooming recommendations.

## 2023-07-06 NOTE — PROGRESS NOTES
Problem List Items Addressed This Visit        Other    Previous  delivery, antepartum     PPROM with arrest of descent. Repeat scheduled 23 with ED. Would like to labor if occurred spontaneously. Reviewed shower instructions, however left w/o surgical rinse and booklet. Will plan to give at next visit. Reviewed grooming recommendations. 36 weeks gestation of pregnancy - Primary     Suzette Sanchez  is a 29 y.o. [id number] @36w5d who presents for routine prenatal visit. Seen in triage last night for BHCtx after using manual pump and having IC. Now resolved. Cervix unchanged @ 1.5/50/-3. TDAP - declined   GBS - collected today   Plans to breastfeed. Pump - has  Bags packed. Car seat in. Perineal massage - encouraged     Reports good fetal movement. Denies LOF, vaginal bleeding, regular uterine contractions, cramping, headaches or visual changes. Reviewed labor precautions and FKC. History of  delivery, currently pregnant in third trimester    Maternal obesity, antepartum, first trimester     TWG 30 lbs. Goal 11-20.           Short interval between pregnancies complicating pregnancy, antepartum, second trimester    Prenatal care, subsequent pregnancy, third trimester   Other Visit Diagnoses     Encounter for supervision of other normal pregnancy, third trimester        Relevant Orders    POCT urine dip (Completed)    Strep B DNA probe, amplification

## 2023-07-06 NOTE — PROGRESS NOTES
L&D Triage Note - OB/GYN  Manjeet Coleman 29 y.o. female MRN: [id number]  Unit/Bed#: LD TRIAGE 4 Encounter: [id number]        Patient is seen by [facility]    ASSESSMENT/PLAN  Manjeet Coleman is a 29 y.o.  at 36w5d who presents with contractions, likely secondary to intercourse and pumping. Discussed staying for two hour recheck vs going home. Patient wants to go home and monitor contractions and come back if they become more frequent. 1) [place] Contractions  SVE:  Cervical Dilation: 1  Cervical Effacement: 50  Cervical Consistency: Medium  Fetal Station: -3  Method: Manual  OB Examiner: DeFruscio    FHT:  Baseline Rate: 120 bpm  Variability: Moderate 6-25 bpm  Accelerations: 15 x 15 or greater, At variable times  Decelerations: None    TOCO:   Contraction Frequency (minutes): irregular contractions noted on strip  Contraction Duration (seconds): 40-60  Contraction Quality: Mild   Patient very comfortable, not reacting to irritability     2)  Discharge instructions  - Patient instructed to call if experiencing worsening contractions, vaginal bleeding, loss of fluid or decreased fetal movement. - Will follow up with OBGYN in office    D/w Dr. Lizzie Wills  ______________    SUBJECTIVE    LISBETH: Estimated Date of Delivery: 23    HPI:  29 y.o. [id number] 36w5d presents with complaint of contractions that began around 10:00 pm this evening. Patient had intercourse this evening, after which she noticed she was leaking colostrum. She used her manual pump at home to collect it. Shortly after, she started noticing the contractions. She timed them at home to be 3-5 minutes apart. Contractions: yes  Leakage of fluid: no  Vaginal Bleeding: no  Fetal movement: present    Her obstetrical history is significant for previous  delivery at 36w, which was a 1LTCS for arrest of decent.      ROS:  Constitutional: Negative  Respiratory: Negative  Cardiovascular: Negative Gastrointestinal: Negative    Physical Exam  GEN: Well appearing, no apparent distress   ABD: Gravid, soft  SVE: Cervical Dilation: 1  Cervical Effacement: 50  Cervical Consistency: Medium  Fetal Station: -3  Method: Manual  OB Examiner: DeFruscio    OBJECTIVE:  LMP 10/22/2022 (Exact Date)   There is no height or weight on file to calculate BMI. Labs: No results found for this or any previous visit (from the past 24 hour(s)). Beba Paige MD  OB/GYN PGY-1  7/6/2023  8:08 AM      Portions of the record may have been created with voice recognition software. Occasional wrong word or "sound a like" substitutions may have occurred due to the inherent limitations of voice recognition software.   Read the chart carefully and recognize, using context, where substitutions have occurred

## 2023-07-06 NOTE — TELEPHONE ENCOUNTER
Regarding: Possible Labor  ----- Message from [facility] sent at 7/5/2023 10:39 PM EDT -----  "I am 36wk and my contractions are 5 mins lasting 60-90 secs. This started about last than 1 hr ago.  Should I go in?"

## 2023-07-06 NOTE — ASSESSMENT & PLAN NOTE
Sera Bell  is a 29 y.o. [id number] @36w5d who presents for routine prenatal visit. Seen in triage last night for BHCtx after using manual pump and having IC. Now resolved. Cervix unchanged @ 1.5/50/-3. TDAP - declined   GBS - collected today   Plans to breastfeed. Pump - has  Bags packed. Car seat in. Perineal massage - encouraged     Reports good fetal movement. Denies LOF, vaginal bleeding, regular uterine contractions, cramping, headaches or visual changes. Reviewed labor precautions and FKC.

## 2023-07-06 NOTE — PROGRESS NOTES
Patient here for prenatal visit. Patient states she is having cramping; denies LOF or spotting. She was in L&D triage yesterday evening until this morning for contractions; her contractions have now stopped. Urine neg for protein and glucose. GBS done today. She has her breast pump.   CS scheduled for 7/28/23

## 2023-07-08 ENCOUNTER — NURSE TRIAGE (OUTPATIENT)
Dept: OTHER | Facility: OTHER | Age: 33
End: 2023-07-08

## 2023-07-08 ENCOUNTER — ANESTHESIA (INPATIENT)
Dept: LABOR AND DELIVERY | Facility: HOSPITAL | Age: 33
End: 2023-07-08
Payer: COMMERCIAL

## 2023-07-08 ENCOUNTER — HOSPITAL ENCOUNTER (INPATIENT)
Facility: HOSPITAL | Age: 33
LOS: 3 days | Discharge: HOME | End: 2023-07-11
Attending: STUDENT IN AN ORGANIZED HEALTH CARE EDUCATION/TRAINING PROGRAM | Admitting: STUDENT IN AN ORGANIZED HEALTH CARE EDUCATION/TRAINING PROGRAM
Payer: COMMERCIAL

## 2023-07-08 ENCOUNTER — ANESTHESIA EVENT (INPATIENT)
Dept: LABOR AND DELIVERY | Facility: HOSPITAL | Age: 33
End: 2023-07-08
Payer: COMMERCIAL

## 2023-07-08 DIAGNOSIS — Z3A.37 37 WEEKS GESTATION OF PREGNANCY: ICD-10-CM

## 2023-07-08 LAB
ABO GROUP BLD: NORMAL
BLD GP AB SCN SERPL QL: NEGATIVE
ERYTHROCYTE [DISTWIDTH] IN BLOOD BY AUTOMATED COUNT: 13.7 % (ref 11.6–15.1)
HCT VFR BLD AUTO: 34.2 % (ref 34.8–46.1)
HGB BLD-MCNC: 10.7 G/DL (ref 11.5–15.4)
MCH RBC QN AUTO: 26.6 PG (ref 26.8–34.3)
MCHC RBC AUTO-ENTMCNC: 31.3 G/DL (ref 31.4–37.4)
MCV RBC AUTO: 85 FL (ref 82–98)
PLATELET # BLD AUTO: 219 THOUSANDS/UL (ref 149–390)
PMV BLD AUTO: 10.9 FL (ref 8.9–12.7)
RBC # BLD AUTO: 4.02 MILLION/UL (ref 3.81–5.12)
RH BLD: POSITIVE
SPECIMEN EXPIRATION DATE: NORMAL
WBC # BLD AUTO: 12.85 THOUSAND/UL (ref 4.31–10.16)

## 2023-07-08 PROCEDURE — 86780 TREPONEMA PALLIDUM: CPT

## 2023-07-08 PROCEDURE — 85027 COMPLETE CBC AUTOMATED: CPT

## 2023-07-08 PROCEDURE — 86900 BLOOD TYPING SEROLOGIC ABO: CPT

## 2023-07-08 PROCEDURE — 86850 RBC ANTIBODY SCREEN: CPT

## 2023-07-08 PROCEDURE — NC001 PR NO CHARGE: Performed by: STUDENT IN AN ORGANIZED HEALTH CARE EDUCATION/TRAINING PROGRAM

## 2023-07-08 PROCEDURE — 99214 OFFICE O/P EST MOD 30 MIN: CPT

## 2023-07-08 PROCEDURE — 86901 BLOOD TYPING SEROLOGIC RH(D): CPT

## 2023-07-08 RX ORDER — SODIUM CHLORIDE, SODIUM LACTATE, POTASSIUM CHLORIDE, CALCIUM CHLORIDE 600; 310; 30; 20 MG/100ML; MG/100ML; MG/100ML; MG/100ML
125 INJECTION, SOLUTION INTRAVENOUS CONTINUOUS
Status: DISCONTINUED | OUTPATIENT
Start: 2023-07-08 | End: 2023-07-09

## 2023-07-08 RX ORDER — BUPIVACAINE HYDROCHLORIDE 2.5 MG/ML
30 INJECTION, SOLUTION EPIDURAL; INFILTRATION; INTRACAUDAL ONCE AS NEEDED
Status: DISCONTINUED | OUTPATIENT
Start: 2023-07-08 | End: 2023-07-09

## 2023-07-08 RX ORDER — CARBOPROST TROMETHAMINE 250 UG/ML
INJECTION, SOLUTION INTRAMUSCULAR
Status: DISPENSED
Start: 2023-07-08 | End: 2023-07-09

## 2023-07-08 RX ORDER — CARBOPROST TROMETHAMINE 250 UG/ML
INJECTION, SOLUTION INTRAMUSCULAR
Status: DISCONTINUED
Start: 2023-07-08 | End: 2023-07-08 | Stop reason: WASHOUT

## 2023-07-08 RX ORDER — FAMOTIDINE 20 MG/1
20 TABLET, FILM COATED ORAL 2 TIMES DAILY
Status: DISCONTINUED | OUTPATIENT
Start: 2023-07-08 | End: 2023-07-09

## 2023-07-08 RX ADMIN — SODIUM CHLORIDE, SODIUM LACTATE, POTASSIUM CHLORIDE, AND CALCIUM CHLORIDE 125 ML/HR: .6; .31; .03; .02 INJECTION, SOLUTION INTRAVENOUS at 22:49

## 2023-07-08 NOTE — TELEPHONE ENCOUNTER
Reason for Disposition  • [1] First baby (primipara) AND [2] contractions > 5 minutes apart, or for < 2 hours    Answer Assessment - Initial Assessment Questions  1. ONSET: "When did the symptoms begin?"         Woke up "crampy" this morning and has progressed since then. 2. CONTRACTIONS: "Describe the contractions that you are having." (e.g., duration, frequency, regularity, severity)      Some are 6 minutes apart but sometimes 20-40 minutes apart. Lasting about 50 seconds on average but some lasting more or less than that. For the last hour, have been 5 minutes apart. 2-3/10 pain. 3. LISBETH: "What date are you expecting to deliver?"      7/29/23  4. PARITY: "Have you had a baby before?" If Yes, ask: "How long did the labor last?"      Has not had a baby before  5. FETAL MOVEMENT: "Has the baby's movement decreased or changed significantly from normal?"      Decreased fetal movement but still moving > 10 times in 2 hours. 6. OTHER SYMPTOMS: "Do you have any other symptoms?" (e.g., leaking fluid from vagina, vaginal bleeding, fever, hand/facial swelling)      Clear vaginal mucus, no fluid or bleeding.     Protocols used: PREGNANCY - LABOR-ADULT-

## 2023-07-08 NOTE — TELEPHONE ENCOUNTER
Regardin Weeks pregnant having contraction  ----- Message from Zoë Session sent at 2023  4:39 PM EDT -----  '' I'm 37 weeks pregnant having contraction looking for medical advice. ''

## 2023-07-08 NOTE — TELEPHONE ENCOUNTER
Reason for Disposition  • [1] History of prior delivery (multipara) AND [2] contractions < 10 minutes apart AND [3] present 1 hour    Answer Assessment - Initial Assessment Questions  1. ONSET: "When did the symptoms begin?"         Cramping began this morning; progressively uncomfortable throughtout day  2. CONTRACTIONS: "Describe the contractions that you are having." (e.g., duration, frequency, regularity, severity)    Between 3-4 minutes lasting 51 seconds  3. LISBETH: "What date are you expecting to deliver?"     23  4. PARITY: "Have you had a baby before?" If Yes, ask: "How long did the labor last?"        5. FETAL MOVEMENT: "Has the baby's movement decreased or changed significantly from normal?"      Slightly less  6.  OTHER SYMPTOMS: "Do you have any other symptoms?" (e.g., leaking fluid from vagina, vaginal bleeding, fever, hand/facial swelling)      Denies    Protocols used: PREGNANCY - LABOR-ADULT-

## 2023-07-09 LAB
BASE EXCESS BLDCOA CALC-SCNC: -11.2 MMOL/L (ref 3–11)
BASE EXCESS BLDCOV CALC-SCNC: -7.6 MMOL/L (ref 1–9)
GP B STREP DNA SPEC QL NAA+PROBE: NEGATIVE
HCO3 BLDCOA-SCNC: 18.3 MMOL/L (ref 17.3–27.3)
HCO3 BLDCOV-SCNC: 17.7 MMOL/L (ref 12.2–28.6)
HOLD SPECIMEN: NORMAL
O2 CT VFR BLDCOA CALC: 9.8 ML/DL
OXYHGB MFR BLDCOA: 45.3 %
OXYHGB MFR BLDCOV: 65.5 %
PCO2 BLDCOA: 55.2 MM[HG] (ref 30–60)
PCO2 BLDCOV: 35.6 MM HG (ref 27–43)
PH BLDCOA: 7.14 [PH] (ref 7.23–7.43)
PH BLDCOV: 7.31 [PH] (ref 7.19–7.49)
PO2 BLDCOA: 26.3 MM HG (ref 5–25)
PO2 BLDCOV: 27.7 MM HG (ref 15–45)
SAO2 % BLDCOV: 12.8 ML/DL
TREPONEMA PALLIDUM IGG+IGM AB [PRESENCE] IN SERUM OR PLASMA BY IMMUNOASSAY: NORMAL

## 2023-07-09 PROCEDURE — 10907ZC DRAINAGE OF AMNIOTIC FLUID, THERAPEUTIC FROM PRODUCTS OF CONCEPTION, VIA NATURAL OR ARTIFICIAL OPENING: ICD-10-PCS | Performed by: OBSTETRICS & GYNECOLOGY

## 2023-07-09 PROCEDURE — 82805 BLOOD GASES W/O2 SATURATION: CPT | Performed by: OBSTETRICS & GYNECOLOGY

## 2023-07-09 PROCEDURE — 0HQ9XZZ REPAIR PERINEUM SKIN, EXTERNAL APPROACH: ICD-10-PCS | Performed by: OBSTETRICS & GYNECOLOGY

## 2023-07-09 RX ORDER — LIDOCAINE HYDROCHLORIDE AND EPINEPHRINE 15; 5 MG/ML; UG/ML
INJECTION, SOLUTION EPIDURAL AS NEEDED
Status: DISCONTINUED | OUTPATIENT
Start: 2023-07-09 | End: 2023-08-14 | Stop reason: HOSPADM

## 2023-07-09 RX ORDER — OXYTOCIN/RINGER'S LACTATE 30/500 ML
PLASTIC BAG, INJECTION (ML) INTRAVENOUS
Status: COMPLETED
Start: 2023-07-09 | End: 2023-07-09

## 2023-07-09 RX ORDER — OXYTOCIN/RINGER'S LACTATE 30/500 ML
250 PLASTIC BAG, INJECTION (ML) INTRAVENOUS ONCE
Status: COMPLETED | OUTPATIENT
Start: 2023-07-09 | End: 2023-07-09

## 2023-07-09 RX ORDER — ACETAMINOPHEN 325 MG/1
975 TABLET ORAL EVERY 8 HOURS PRN
Status: DISCONTINUED | OUTPATIENT
Start: 2023-07-09 | End: 2023-07-11 | Stop reason: HOSPADM

## 2023-07-09 RX ORDER — CALCIUM CARBONATE 500 MG/1
500 TABLET, CHEWABLE ORAL DAILY PRN
Status: DISCONTINUED | OUTPATIENT
Start: 2023-07-09 | End: 2023-07-09

## 2023-07-09 RX ORDER — IBUPROFEN 600 MG/1
600 TABLET ORAL EVERY 6 HOURS PRN
Status: DISCONTINUED | OUTPATIENT
Start: 2023-07-09 | End: 2023-07-11 | Stop reason: HOSPADM

## 2023-07-09 RX ORDER — SODIUM CHLORIDE, SODIUM LACTATE, POTASSIUM CHLORIDE, CALCIUM CHLORIDE 600; 310; 30; 20 MG/100ML; MG/100ML; MG/100ML; MG/100ML
75 INJECTION, SOLUTION INTRAVENOUS CONTINUOUS
Status: DISCONTINUED | OUTPATIENT
Start: 2023-07-09 | End: 2023-07-09

## 2023-07-09 RX ORDER — ONDANSETRON 2 MG/ML
4 INJECTION INTRAMUSCULAR; INTRAVENOUS EVERY 4 HOURS PRN
Status: DISCONTINUED | OUTPATIENT
Start: 2023-07-09 | End: 2023-07-09

## 2023-07-09 RX ORDER — SODIUM CHLORIDE, SODIUM LACTATE, POTASSIUM CHLORIDE, CALCIUM CHLORIDE 600; 310; 30; 20 MG/100ML; MG/100ML; MG/100ML; MG/100ML
300 INJECTION, SOLUTION INTRAVENOUS ONCE
Status: COMPLETED | OUTPATIENT
Start: 2023-07-09 | End: 2023-07-09

## 2023-07-09 RX ORDER — BUPIVACAINE HYDROCHLORIDE 2.5 MG/ML
INJECTION, SOLUTION EPIDURAL; INFILTRATION; INTRACAUDAL AS NEEDED
Status: DISCONTINUED | OUTPATIENT
Start: 2023-07-09 | End: 2023-08-14 | Stop reason: HOSPADM

## 2023-07-09 RX ORDER — OXYTOCIN/RINGER'S LACTATE 30/500 ML
1-30 PLASTIC BAG, INJECTION (ML) INTRAVENOUS
Status: DISCONTINUED | OUTPATIENT
Start: 2023-07-09 | End: 2023-07-09

## 2023-07-09 RX ORDER — CALCIUM CARBONATE 500 MG/1
1000 TABLET, CHEWABLE ORAL DAILY PRN
Status: DISCONTINUED | OUTPATIENT
Start: 2023-07-09 | End: 2023-07-11 | Stop reason: HOSPADM

## 2023-07-09 RX ORDER — DIAPER,BRIEF,INFANT-TODD,DISP
1 EACH MISCELLANEOUS DAILY PRN
Status: DISCONTINUED | OUTPATIENT
Start: 2023-07-09 | End: 2023-07-11 | Stop reason: HOSPADM

## 2023-07-09 RX ORDER — ONDANSETRON 2 MG/ML
4 INJECTION INTRAMUSCULAR; INTRAVENOUS EVERY 8 HOURS PRN
Status: DISCONTINUED | OUTPATIENT
Start: 2023-07-09 | End: 2023-07-11 | Stop reason: HOSPADM

## 2023-07-09 RX ADMIN — ROPIVACAINE HYDROCHLORIDE: 2 INJECTION, SOLUTION EPIDURAL; INFILTRATION at 13:14

## 2023-07-09 RX ADMIN — ROPIVACAINE HYDROCHLORIDE: 2 INJECTION, SOLUTION EPIDURAL; INFILTRATION at 00:33

## 2023-07-09 RX ADMIN — CALCIUM CARBONATE (ANTACID) CHEW TAB 500 MG 500 MG: 500 CHEW TAB at 15:29

## 2023-07-09 RX ADMIN — SODIUM CHLORIDE 5 MILLION UNITS: 0.9 INJECTION, SOLUTION INTRAVENOUS at 00:38

## 2023-07-09 RX ADMIN — SODIUM CHLORIDE 2.5 MILLION UNITS: 9 INJECTION, SOLUTION INTRAVENOUS at 08:12

## 2023-07-09 RX ADMIN — SODIUM CHLORIDE 2.5 MILLION UNITS: 9 INJECTION, SOLUTION INTRAVENOUS at 04:14

## 2023-07-09 RX ADMIN — Medication 250 MILLI-UNITS/MIN: at 19:16

## 2023-07-09 RX ADMIN — HYDROCORTISONE 1 APPLICATION: 1 CREAM TOPICAL at 20:14

## 2023-07-09 RX ADMIN — Medication 2 MILLI-UNITS/MIN: at 12:57

## 2023-07-09 RX ADMIN — LIDOCAINE HYDROCHLORIDE AND EPINEPHRINE 2 ML: 15; 5 INJECTION, SOLUTION EPIDURAL at 00:22

## 2023-07-09 RX ADMIN — Medication 1 APPLICATION: at 20:14

## 2023-07-09 RX ADMIN — SODIUM CHLORIDE 2.5 MILLION UNITS: 9 INJECTION, SOLUTION INTRAVENOUS at 17:02

## 2023-07-09 RX ADMIN — ROPIVACAINE HYDROCHLORIDE: 2 INJECTION, SOLUTION EPIDURAL; INFILTRATION at 18:20

## 2023-07-09 RX ADMIN — IBUPROFEN 600 MG: 600 TABLET, FILM COATED ORAL at 20:13

## 2023-07-09 RX ADMIN — SODIUM CHLORIDE 2.5 MILLION UNITS: 9 INJECTION, SOLUTION INTRAVENOUS at 12:56

## 2023-07-09 RX ADMIN — SODIUM CHLORIDE, SODIUM LACTATE, POTASSIUM CHLORIDE, AND CALCIUM CHLORIDE 300 ML: .6; .31; .03; .02 INJECTION, SOLUTION INTRAVENOUS at 15:33

## 2023-07-09 RX ADMIN — ACETAMINOPHEN 975 MG: 325 TABLET, FILM COATED ORAL at 21:48

## 2023-07-09 RX ADMIN — ONDANSETRON 4 MG: 2 INJECTION INTRAMUSCULAR; INTRAVENOUS at 08:10

## 2023-07-09 RX ADMIN — ROPIVACAINE HYDROCHLORIDE: 2 INJECTION, SOLUTION EPIDURAL; INFILTRATION at 07:12

## 2023-07-09 RX ADMIN — BUPIVACAINE HYDROCHLORIDE 5 ML: 2.5 INJECTION, SOLUTION EPIDURAL; INFILTRATION; INTRACAUDAL; PERINEURAL at 14:00

## 2023-07-09 RX ADMIN — LIDOCAINE HYDROCHLORIDE AND EPINEPHRINE 3 ML: 15; 5 INJECTION, SOLUTION EPIDURAL at 00:21

## 2023-07-09 RX ADMIN — SODIUM CHLORIDE, SODIUM LACTATE, POTASSIUM CHLORIDE, AND CALCIUM CHLORIDE 125 ML/HR: .6; .31; .03; .02 INJECTION, SOLUTION INTRAVENOUS at 07:25

## 2023-07-09 RX ADMIN — SODIUM CHLORIDE, SODIUM LACTATE, POTASSIUM CHLORIDE, AND CALCIUM CHLORIDE 125 ML/HR: .6; .31; .03; .02 INJECTION, SOLUTION INTRAVENOUS at 00:32

## 2023-07-09 NOTE — OB LABOR/OXYTOCIN SAFETY PROGRESS
Oxytocin Safety Progress Check Note - Patrice Los 29 y.o. female MRN: [id number]    Unit/Bed#: -01 Encounter: [id number]    Dose (stephanie-units/min) Oxytocin: 4 stephanie-units/min  Contraction Frequency (minutes): 2-3  Contraction Quality: Moderate  Tachysystole: No   Cervical Dilation: Lip/rim (Comment)        Cervical Effacement: 90  Fetal Station: 1  Baseline Rate: 155 bpm  Fetal Heart Rate: 156 BPM  FHR Category: Category I               Vital Signs:   Vitals:    07/09/23 1522   BP: 119/58   Pulse: 77   Resp:    Temp:        Notes/comments: Patient reassessed. She is comfortable. Cervical exam 9.5/90/+1. FHT is a Category II tracing with moderate variability and variables are present. Starting an amnioinfusion and planning on pushing soon. Discussed with Dr. Leobardo Arevalo MD 7/9/2023 3:23 PM

## 2023-07-09 NOTE — ASSESSMENT & PLAN NOTE
Lochia WNL   Recovering well   Appropriate bowel and bladder function   Pain well controlled   Tolerating diet   Breastfeeding: yes  Ambulating without issues   No lower extremity tenderness  GBS neg, PCN given due to GBS still pending at the time of delivery   Rh O+, antibody neg

## 2023-07-09 NOTE — OB LABOR/OXYTOCIN SAFETY PROGRESS
Labor Progress Note - Manpreet Serrano 29 y.o. female MRN: [id number]    Unit/Bed#:  202-01 Encounter: [id number]       Contraction Frequency (minutes): 2-4  Contraction Quality: Moderate  Tachysystole: No   Cervical Dilation: 8        Cervical Effacement: 90  Fetal Station: -1  Baseline Rate: 130 bpm  Fetal Heart Rate: 130 BPM  FHR Category: Category I               Vital Signs:   Vitals:    07/09/23 0937   BP: 113/57   Pulse: 72   Resp:    Temp:        Notes/comments: Patient reassessed. She is comfortable with her epidural. SVE 8/90/-1. FHT Cat I. Jaqui q2-4 min. Reassess in 2 hours or sooner if clinically indicated.           Gabe Phillips MD 7/9/2023 10:08 AM

## 2023-07-09 NOTE — OB LABOR/OXYTOCIN SAFETY PROGRESS
Oxytocin Safety Progress Check Note - Manpreet Valeraix 29 y.o. female MRN: [id number]    Unit/Bed#: -01 Encounter: [id number]       Contraction Frequency (minutes): 2.5  Contraction Quality: Moderate  Tachysystole: No   Cervical Dilation: 8        Cervical Effacement: 90  Fetal Station: -1  Baseline Rate: 140 bpm  Fetal Heart Rate: 145 BPM  FHR Category: Category I               Vital Signs:   Vitals:    07/09/23 1225   BP: 115/63   Pulse:    Resp:    Temp:        Notes/comments: MVUs inadequate ~150 MVUs. Category I tracing. Plan to start pitocin titration. Discussed with patient, augmentation consent signed.          Thu Beltran MD 7/9/2023 12:56 PM

## 2023-07-09 NOTE — DISCHARGE SUMMARY
Discharge Summary - OB/GYN  Manjeet Coleman 29 y.o. female MRN: [id number]  Unit/Bed#: -01 Encounter: [id number]    Admission Date: 2023     Discharge Date: 23    Admitting Attending: Shani Phelps MD    Delivering Attending: Kathy Olvera MD    Discharging Attending: Dr. Elvin Simpson    Principal Diagnosis: Pregnancy at 37w1d    Secondary Diagnosis:   1. Previous  delivery  2. Maternal obesity  3. Short interval between pregnancies     Procedures: vaginal birth after  ()    Anesthesia: epidural    Hospital course: Ms. Manjeet Coleman is a 29 y.o.  at 43w4d. She presented to labor and delivery for spontaneous onset of labor. Her pregnancy was uncomplicated. On exam in triage she was noted to be 4/80/-2. She was admitted for labor. She received an epidural for analgesia. Amniotomy was performed for clear fluid. Patient progressed slowly and eventually an IUPC was placed for better contraction monitoring and pitocin was initiated late in her labor course. FHT became category II with some variable decelerations present. Patient repositioning and amnioinfusion effective at returning [facility] to category 1. Then, patient had a 2-3 minute deceleration with tachysystole in the setting of maternal vomiting. Patient was repositioned. SVE at this time revealed she was complete and +2 station. Her pitocin was discontinued and she began to push. She delivered a viable female  on 2023 at 1837. Weight 7lbs 2oz via normal spontaneous vaginal delivery. This was a successful vaginal birth after . She sustained a 1 degree laceration during delivery which was adequately repaired. Apgars were 9 (1 min) and 9 (5 min).  was transferred to  nursery. Patient tolerated the procedure well. Her post-delivery course was not complicated. Her postpartum pain was well controlled with oral analgesics.     On day of discharge, she was ambulating and able to reasonably perform all ADLs. She was voiding and had appropriate bowel function. Pain was well controlled. She was discharged home on postpartum day #2 without complications. Patient was instructed to follow up with her OB as an outpatient and was given appropriate warnings to call provider if she develops signs of infection or uncontrolled pain. Complications: none apparent    Condition at discharge: good     Discharge instructions/Information to patient and family:   See after visit summary for information provided to patient and family. Provisions for Follow-Up Care:  See after visit summary for information related to follow-up care and any pertinent home health orders. Disposition: See After Visit Summary for discharge disposition information. Planned Readmission: No    Discharge medications and instructions:   Please see AVS for full list of medications upon discharge.

## 2023-07-09 NOTE — H&P
H&P - Obstetrics   Limmie Bence 29 y.o. female MRN: [id number]  Unit/Bed#:   Encounter: [id number]    Assessment and Plan:  29 y.o.  at 37w0d who is being admitted for TOLAC. By issue:    * 37 weeks gestation of pregnancy  Assessment & Plan  Admit for TOLAC  T&S, CBC, RPR  CLD  IV fluids  GBS pending, previously GBS+, PCN ordered  Induction with Pitocin titration        Plan of care discussed with Dr. Burke Hull. This patient will be an INPATIENT and I certify the anticipated length of stay is >2 Midnights. History of Present Illness     Chief Complaint: "Contractions"    History of Present Illness:  Limmie Bence is a 29 y.o. [place] with an LISBETH of 2023, by Last Menstrual Period at 37w0d weeks gestation who presents in labor. Contractions: yes  Loss of fluid: No  Vaginal bleeding: no  Fetal movement: yes    ROS otherwise negative. Obstetrician: Dr. Fernandez Smith    Pregnancy complications:   1. Obesity, Short interval pregnancy, hx of c/s due to arrest of descent    OB History    Para Term  AB Living   2 1 0 1 0 1   SAB IAB Ectopic Multiple Live Births   0 0 0 0 1      # Outcome Date GA Lbr Maged/2nd Weight Sex Delivery Anes PTL Lv   2 Current            1  21 36w3d / 04:52 2765 g (6 lb 1.5 oz) M CS-LTranv EPI Y LEIGH      Complications: Failed Induction       Baby complications/comments: none    Review of Systems  12-point ROS negative unless stated in HPI. Historical Information   Past Medical History:   Diagnosis Date   • Degenerative lumbar disc 2015   • Overactive bladder    • Urinary tract infection    • Verruca      Past Surgical History:   Procedure Laterality Date   •  SECTION  21   • COLPOSCOPY     • COLPOSCOPY W/ BIOPSY / CURETTAGE      Resolved 2017   • GYNECOLOGIC CRYOSURGERY     • MA  DELIVERY ONLY N/A 2021    Procedure:  SECTION ();   Surgeon: Louie Lr MD;  Location: [facility] ; Service: Obstetrics   • WISDOM TOOTH EXTRACTION       Social History   Social History     Substance and Sexual Activity   Alcohol Use Not Currently   • Alcohol/week: 2.0 standard drinks of alcohol   • Types: 1 Glasses of wine, 1 Standard drinks or equivalent per week     Social History     Substance and Sexual Activity   Drug Use No     Social History     Tobacco Use   Smoking Status Never   Smokeless Tobacco Never     Family History: non-contributory    Meds/Allergies      Medications Prior to Admission   Medication   • famotidine (PEPCID) 20 mg tablet   • Prenatal Vit-Fe Fumarate-FA (PRENATAL PO)        Allergies   Allergen Reactions   • Sulfa Antibiotics Fever and Fatigue   • Sulfamethoxazole-Trimethoprim Fever and Fatigue       Objective:  Vitals: Blood pressure 120/62, pulse 82, temperature 97.8 °F (36.6 °C), temperature source Oral, resp. rate 16, height 5' 2" (1.575 m), weight 91.2 kg (201 lb), last menstrual period 10/22/2022, not currently breastfeeding. Body mass index is 36.76 kg/m². Physical Exam  GEN: alert and oriented x 3, no apparent distress, appears well  CARDIAC: RRR  PULMONARY: Normal respiratory effort  ABDOMEN: gravid, soft, no tenderness  GENITOURINARY: SVE:  4/80/-2  EXTREMITIES: nontender, no edema  FETAL ASSESSMENT:  Fetal heart rate: 135/moderate variability/15x15 accelerations/no decelerations; Category I  [place]: uterine irritability    Prenatal Labs: I have personally reviewed pertinent reports.     Blood type: O+  Antibody screen: positive  HIV: non-reactive  Hepatitis B surface antigen: negative  Hepatitis C antibody: negative  Syphilis screening: negative  Gonorrhea/Chlamydia: negative  Rubella: Immune  Varicella: Unknown  Urine culture: negative  1 hour GTT: 129 normal  Group B strep: pending  Last Hemoglobin: 10.7  Last Platelet count: 587        Sara easton MD  PGY-I, OB/GYN  7/8/2023, 11:57 PM

## 2023-07-09 NOTE — OB LABOR/OXYTOCIN SAFETY PROGRESS
Labor Progress Note - Nabil Garcia 29 y.o. female MRN: [id number]    Unit/Bed#: -01 Encounter: [id number]       Contraction Frequency (minutes): 2-3  Contraction Quality: Moderate  Tachysystole: No   Cervical Dilation: 5        Cervical Effacement: 80  Fetal Station: -1  Baseline Rate: 135 bpm  Fetal Heart Rate: 134 BPM  FHR Category: Category I               Vital Signs:   Vitals:    07/09/23 0313   BP:    Pulse:    Resp:    Temp: 98.3 °F (36.8 °C)       Notes/comments: SVE as above. Fetal head has descended. Category I tracing. Recheck cervix in 2 hours or earlier if needed. Continue to monitor.          Disha Yates MD 7/9/2023 4:13 AM

## 2023-07-09 NOTE — OB LABOR/OXYTOCIN SAFETY PROGRESS
Labor Progress Note - Segun Liner 29 y.o. female MRN: [id number]    Unit/Bed#: -01 Encounter: [id number]       Contraction Frequency (minutes): 2-3  Contraction Quality: Moderate  Tachysystole: No   Cervical Dilation: 8        Cervical Effacement: 90  Fetal Station: -1  Baseline Rate: 135 bpm  Fetal Heart Rate: 145 BPM  FHR Category: Category I               Vital Signs:   Vitals:    07/09/23 1206   BP: 115/63   Pulse: 84   Resp:    Temp:        Notes/comments: SVE unchanged. IUPC placed. Will assess MVUs, if inadequate plan to start pitocin titration. D/w patient & Dr. Shaquille Chavira. Category I tracing.          Jewels Cam MD 7/9/2023 12:35 PM

## 2023-07-09 NOTE — OB LABOR/OXYTOCIN SAFETY PROGRESS
Oxytocin Safety Progress Check Note - Jose Enrique Rodriguez 29 y.o. female MRN: [id number]    Unit/Bed#: -01 Encounter: [id number]    Dose (stephanie-units/min) Oxytocin: 2 stephanie-units/min  Contraction Frequency (minutes): 1.5-3  Contraction Quality: Moderate  Tachysystole: No   Cervical Dilation: 8        Cervical Effacement: 90  Fetal Station: -1  Baseline Rate: 135 bpm  Fetal Heart Rate: 170 BPM  FHR Category: Category I               Vital Signs:   Vitals:    07/09/23 1306   BP: 116/56   Pulse: 78   Resp:    Temp: 99.1 °F (37.3 °C)       Notes/comments: Patient feeling more pressure/shaking, SVE unchanged.  Having more discomfort vaginally and in right leg unrelieved by epidural. Will have anesthesia assess epidural. Category I tracing        Korin Kincaid MD 7/9/2023 1:46 PM

## 2023-07-09 NOTE — ANESTHESIA PROCEDURE NOTES
Epidural Block    Patient location during procedure: OB  Start time: 7/9/2023 12:19 AM  Reason for block: procedure for pain and at surgeon's request  Staffing  Performed by: Amina Parham DO  Authorized by: Amina Parham DO    Preanesthetic Checklist  Completed: patient identified, IV checked, site marked, risks and benefits discussed, surgical consent, monitors and equipment checked, pre-op evaluation and timeout performed  Epidural  Patient position: sitting  Prep: ChloraPrep  Patient monitoring: frequent blood pressure checks, continuous pulse ox and heart rate  Approach: midline  Location: lumbar  Injection technique: TARAS saline  Needle  Needle type: Tuohy   Needle gauge: 17 G  Catheter type: side hole  Catheter size: 19 G  Catheter at skin depth: 11 cm  Catheter securement method: stabilization device  Test dose: negative  Assessment  Number of attempts: 1negative aspiration for CSF, negative aspiration for heme and no paresthesia on injection  patient tolerated the procedure well with no immediate complications

## 2023-07-09 NOTE — PLAN OF CARE
Problem: PAIN - ADULT  Goal: Verbalizes/displays adequate comfort level or baseline comfort level  Description: Interventions:  - Encourage patient to monitor pain and request assistance  - Assess pain using appropriate pain scale  - Administer analgesics based on type and severity of pain and evaluate response  - Implement non-pharmacological measures as appropriate and evaluate response  - Consider cultural and social influences on pain and pain management  - Notify physician/advanced practitioner if interventions unsuccessful or patient reports new pain  Outcome: Progressing     Problem: INFECTION - ADULT  Goal: Absence or prevention of progression during hospitalization  Description: INTERVENTIONS:  - Assess and monitor for signs and symptoms of infection  - Monitor lab/diagnostic results  - Monitor all insertion sites, i.e. indwelling lines, tubes, and drains  - Monitor endotracheal if appropriate and nasal secretions for changes in amount and color  - [place] appropriate cooling/warming therapies per order  - Administer medications as ordered  - Instruct and encourage patient and family to use good hand hygiene technique  - Identify and instruct in appropriate isolation precautions for identified infection/condition  Outcome: Progressing  Goal: Absence of fever/infection during neutropenic period  Description: INTERVENTIONS:  - Monitor WBC    Outcome: Progressing     Problem: SAFETY ADULT  Goal: Patient will remain free of falls  Description: INTERVENTIONS:  - Educate patient/family on patient safety including physical limitations  - Instruct patient to call for assistance with activity   - Consult OT/PT to assist with strengthening/mobility   - Keep Call bell within reach  - Keep bed low and locked with side rails adjusted as appropriate  - Keep care items and personal belongings within reach  - Initiate and maintain comfort rounds  - Make Fall Risk Sign visible to staff  - Offer Toileting every  Hours, in advance of need  - Initiate/Maintain alarm  - Obtain necessary fall risk management equipment:   - Apply yellow socks and bracelet for high fall risk patients  - Consider moving patient to room near nurses station  Outcome: Progressing  Goal: Maintain or return to baseline ADL function  Description: INTERVENTIONS:  -  Assess patient's ability to carry out ADLs; assess patient's baseline for ADL function and identify physical deficits which impact ability to perform ADLs (bathing, care of mouth/teeth, toileting, grooming, dressing, etc.)  - Assess/evaluate cause of self-care deficits   - Assess range of motion  - Assess patient's mobility; develop plan if impaired  - Assess patient's need for assistive devices and provide as appropriate  - Encourage maximum independence but intervene and supervise when necessary  - Involve family in performance of ADLs  - Assess for home care needs following discharge   - Consider OT consult to assist with ADL evaluation and planning for discharge  - Provide patient education as appropriate  Outcome: Progressing  Goal: Maintains/Returns to pre admission functional level  Description: INTERVENTIONS:  - Perform BMAT or MOVE assessment daily.   - Set and communicate daily mobility goal to care team and patient/family/caregiver. - Collaborate with rehabilitation services on mobility goals if consulted  - Perform Range of Motion  times a day. - Reposition patient every  hours.   - Dangle patient  times a day  - Stand patient  times a day  - Ambulate patient  times a day  - Out of bed to chair  times a day   - Out of bed for meals times a day  - Out of bed for toileting  - Record patient progress and toleration of activity level   Outcome: Progressing     Problem: DISCHARGE PLANNING  Goal: Discharge to home or other facility with appropriate resources  Description: INTERVENTIONS:  - Identify barriers to discharge w/patient and caregiver  - Arrange for needed discharge resources and transportation as appropriate  - Identify discharge learning needs (meds, wound care, etc.)  - Arrange for interpretive services to assist at discharge as needed  - Refer to Case Management Department for coordinating discharge planning if the patient needs post-hospital services based on physician/advanced practitioner order or complex needs related to functional status, cognitive ability, or social support system  Outcome: Progressing     Problem: Knowledge Deficit  Goal: Patient/family/caregiver demonstrates understanding of disease process, treatment plan, medications, and discharge instructions  Description: Complete learning assessment and assess knowledge base.   Interventions:  - Provide teaching at level of understanding  - Provide teaching via preferred learning methods  Outcome: Progressing     Problem: BIRTH - VAGINAL/ SECTION  Goal: Fetal and maternal status remain reassuring during the birth process  Description: INTERVENTIONS:  - Monitor vital signs  - Monitor fetal heart rate  - Monitor uterine activity  - Monitor labor progression (vaginal delivery)  - DVT prophylaxis  - Antibiotic prophylaxis  Outcome: Progressing  Goal: Emotionally satisfying birthing experience for mother/fetus  Description: Interventions:  - Assess, plan, implement and evaluate the nursing care given to the patient in labor  - Advocate the philosophy that each childbirth experience is a unique experience and support the family's chosen level of involvement and control during the labor process   - Actively participate in both the patient's and family's teaching of the birth process  - Consider cultural, Moravian and age-specific factors and plan care for the patient in labor  Outcome: Progressing

## 2023-07-09 NOTE — OB LABOR/OXYTOCIN SAFETY PROGRESS
Labor Progress Note - Yadi Mixon 29 y.o. female MRN: [id number]    Unit/Bed#: -01 Encounter: [id number]       Contraction Frequency (minutes): 2-3  Contraction Quality: Moderate  Tachysystole: No   Cervical Dilation: 7        Cervical Effacement: 80  Fetal Station: -1  Baseline Rate: 135 bpm  Fetal Heart Rate: 150 BPM  FHR Category: Category I               Vital Signs:   Vitals:    07/09/23 0807   BP: 116/61   Pulse: 76   Resp:    Temp:        Notes/comments: Patient reassessed. Cervical exam 7/80/-1. FHT Category I. Patient is comfortable. Reassess in 2 hours or sooner if clinically indicated. Will discuss with Dr. Juanita Prince.         Bautista Evans MD 7/9/2023 8:39 AM

## 2023-07-09 NOTE — L&D DELIVERY NOTE
Vaginal Delivery Summary - OB/GYN   Khris Valencia 29 y.o. female MRN: [id number]  Unit/Bed#: - Encounter: [id number]      Pre-delivery Diagnosis:   1. Pregnancy at 37w1d  2. Hx of  delivery @ 36w3d  3. Hx of  for arrest of descent     Post-delivery Diagnosis: same, delivered    Procedure: Spontaneous Vaginal Delivery, Vaginal Birth after Caesarian Section     Attending: Elly Martinez MD    Assistant(s): Yeison Bullock MD    Anesthesia: Epidural    QBL: 297 mL    Complications: none apparent    Specimens:   1. Arterial and venous cord gases  2. Cord blood  3. Segment of umbilical cord  4. Placenta to storage     Findings:  1. Viable female on 2023 at 1837, with APGARS of 9 and 9 at 1 and 5 minutes respectively  2. Spontaneous delivery of intact placenta at 1843  3. 1 degree perineal laceration repaired with 3-0 Vicryl   4. Blood gases:   Arterial pH: 7.139   Arterial base excess: -11.2   Venous pH: 7.314   Venous base excess: -7.6    Disposition:  Patient tolerated the procedure well and was recovering in labor and delivery room       Brief history and labor course:  Ms. Khris Valencia is a 29 y.o.  at 37w1d. She presented to labor and delivery for spontaneous onset of labor. Her pregnancy was uncomplicated. On exam in triage she was noted to be 4/80/-2. She was admitted for labor. She received an epidural for analgesia. Amniotomy was performed for clear fluid. Patient progressed slowly and eventually an IUPC was placed for better contraction monitoring and pitocin was initiated late in her labor course. FHT became category II with some variable decelerations present. Patient repositioning and amnioinfusion effective at returning [facility] to category 1. Then, patient had a 2-3 minute deceleration with tachysystole in the setting of maternal vomiting. Patient was repositioned. SVE at this time revealed she was complete and +2 station.  Her pitocin was discontinued and she began to push.     Description of procedure:  After pushing for 157 minutes, at 1837 patient delivered a viable female , wt pending, apgars of 9 (1 min) and 9 (5 min). The fetal vertex delivered spontaneously. There was a tight nuchal cord x1 through which the baby was devliered. The left anterior shoulder delivered atraumatically with maternal expulsive forces and the assistance of gentle downward traction. The right posterior shoulder delivered with maternal expulsive forces and the assistance of gentle upward traction. The remainder of the fetus delivered spontaneously. Upon delivery, the infant was placed on the mothers abdomen and the cord was clamped and cut after delayed cord clamping. Terminal meconium noted. The infant was noted to cry spontaneously and was moving all extremities appropriately. There was no evidence for injury. Awaiting nurse resuscitators evaluated the . Arterial and venous cord blood gases and cord blood was collected for analysis. These were promptly sent to the lab. In the immediate post-partum, 30 units of IV pitocin was administered, and the uterus was noted to contract down well with massage and pitocin. The placenta delivered spontaneously at 1843 and was noted to have a marginally inserted 3 vessel cord. The vagina, cervix, perineum, and rectum were inspected and there was noted to be a 1 degree perineal laceration. Laceration Repair  Patient was comfortable with epidural at that time. A 1 degree perineal laceration was identified and required repair. Laceration was repaired with 3-0 Vicryl in a running fashion to reapproximate the laceration. Good hemostasis was confirmed at the conclusion of this procedure. Bimanual exam revealed minimal clots and good uterine tone. The fundus was firm and at the level of the umbilicus. At the conclusion of the procedure, all needle, sponge, and instrument counts were noted to be correct.  Patient tolerated the procedure well and was allowed to recover in labor and delivery room with family and  before being transferred to the post-partum floor. Dr. Myranda Johnston was present and participated in all key portions of the case.       Azul Crum MD  2023  7:34 PM

## 2023-07-09 NOTE — OB LABOR/OXYTOCIN SAFETY PROGRESS
Labor Progress Note - Fátima Avalos 29 y.o. female MRN: [id number]    Unit/Bed#: -01 Encounter: [id number]       Contraction Frequency (minutes): 2-4  Contraction Quality: Mild  Tachysystole: No   Cervical Dilation: 5        Cervical Effacement: 80  Fetal Station: -2  Baseline Rate: 130 bpm  Fetal Heart Rate: 165 BPM  FHR Category: Category I               Vital Signs:   Vitals:    07/08/23 2216   BP:    Pulse:    Resp:    Temp: 97.8 °F (36.6 °C)       Notes/comments: SVE as above. Cat I tracing. Continue to monitor. Fill get epidural before rupturing membranes. Discussed with Dr. Alejandra Clark.         Michelle Farfan MD 7/8/2023 11:27 PM

## 2023-07-09 NOTE — OB LABOR/OXYTOCIN SAFETY PROGRESS
Labor Progress Note - Keyur Renteria 29 y.o. female MRN: [id number]    Unit/Bed#: -01 Encounter: [id number]       Contraction Frequency (minutes): 2-2.5  Contraction Quality: Moderate  Tachysystole: No   Cervical Dilation: 5        Cervical Effacement: 80  Fetal Station: -2  Baseline Rate: 140 bpm  Fetal Heart Rate: 151 BPM  FHR Category: Category I           Vital Signs:   Vitals:    07/08/23 2216   BP:    Pulse:    Resp:    Temp: 97.8 °F (36.6 °C)       Notes/comments: SVE as above, exam unchanged. AROM at  for clear fluid. Cat I tracing. Continue to monitor. D/w Dr. Landis Councilman.         Darleen Larios MD 7/9/2023 1:55 AM

## 2023-07-09 NOTE — ANESTHESIA PREPROCEDURE EVALUATION
Procedure:  LABOR ANALGESIA    Relevant Problems   ANESTHESIA (within normal limits)      GYN   (+) 37 weeks gestation of pregnancy   (+) History of  delivery, currently pregnant in third trimester   (+) Short interval between pregnancies complicating pregnancy, antepartum, second trimester        TTE 2023  •  Left Ventricle: Left ventricular cavity size is normal. Wall thickness is normal. The left ventricular ejection fraction is 55 to 60 % by visual estimation. Systolic function is normal. Wall motion is normal. Diastolic function is normal for age. •  Limited study as patient is pregnant. Normal LV systolic function but no significant valvular disease noted. Physical Exam    Airway    Mallampati score: III  TM Distance: >3 FB  Neck ROM: full     Dental   No notable dental hx     Cardiovascular      Pulmonary      Other Findings        Anesthesia Plan  ASA Score- 2     Anesthesia Type- epidural with ASA Monitors. Additional Monitors:   Airway Plan:           Plan Factors-Exercise tolerance (METS): >4 METS. Chart reviewed. EKG reviewed. Existing labs reviewed. Patient summary reviewed. Patient is not a current smoker. Induction-     Postoperative Plan-     Informed Consent- Anesthetic plan and risks discussed with patient. I personally reviewed this patient with the CRNA. Discussed and agreed on the Anesthesia Plan with the CRNA. Jeb Murray

## 2023-07-09 NOTE — OB LABOR/OXYTOCIN SAFETY PROGRESS
Oxytocin Safety Progress Check Note - Joslyn Kumar 29 y.o. female MRN: [id number]    Unit/Bed#: -01 Encounter: [id number]    Dose (stephanie-units/min) Oxytocin: 4 stephanie-units/min  Contraction Frequency (minutes): 2-3  Contraction Quality: Moderate  Tachysystole: No   Cervical Dilation: 10        Cervical Effacement: 100  Fetal Station: 2  Baseline Rate: 155 bpm  Fetal Heart Rate: 155 BPM  FHR Category: Category I               Vital Signs:   Vitals:    07/09/23 1536   BP: 121/57   Pulse:    Resp:    Temp:        Notes/comments: FHT notable for 2-3 minute decel with tachysystole and maternal vomiting. Patient was repositioned to maternal left. SVE complete and +2. Spontaneous recovery to baseline following interventions. Pit turned off. FHT notable for moderate variability. Pit discontinued. Dr. Baldemar Marcano notified.      Vik Vazquez MD 7/9/2023 3:50 PM

## 2023-07-10 PROBLEM — O09.892 SHORT INTERVAL BETWEEN PREGNANCIES COMPLICATING PREGNANCY, ANTEPARTUM, SECOND TRIMESTER: Status: RESOLVED | Noted: 2023-03-17 | Resolved: 2023-07-10

## 2023-07-10 PROBLEM — O99.211 MATERNAL OBESITY, ANTEPARTUM, FIRST TRIMESTER: Status: RESOLVED | Noted: 2023-01-21 | Resolved: 2023-07-10

## 2023-07-10 PROBLEM — O34.219 PREVIOUS CESAREAN DELIVERY, ANTEPARTUM: Status: RESOLVED | Noted: 2022-12-19 | Resolved: 2023-07-10

## 2023-07-10 PROCEDURE — 99024 POSTOP FOLLOW-UP VISIT: CPT | Performed by: STUDENT IN AN ORGANIZED HEALTH CARE EDUCATION/TRAINING PROGRAM

## 2023-07-10 RX ORDER — DOCUSATE SODIUM 100 MG/1
100 CAPSULE, LIQUID FILLED ORAL 2 TIMES DAILY
Status: DISCONTINUED | OUTPATIENT
Start: 2023-07-10 | End: 2023-07-11 | Stop reason: HOSPADM

## 2023-07-10 RX ADMIN — DOCUSATE SODIUM 100 MG: 100 CAPSULE, LIQUID FILLED ORAL at 09:35

## 2023-07-10 RX ADMIN — IBUPROFEN 600 MG: 600 TABLET, FILM COATED ORAL at 16:32

## 2023-07-10 RX ADMIN — ACETAMINOPHEN 975 MG: 325 TABLET, FILM COATED ORAL at 08:26

## 2023-07-10 RX ADMIN — IBUPROFEN 600 MG: 600 TABLET, FILM COATED ORAL at 02:07

## 2023-07-10 RX ADMIN — ACETAMINOPHEN 975 MG: 325 TABLET, FILM COATED ORAL at 20:04

## 2023-07-10 RX ADMIN — DOCUSATE SODIUM 100 MG: 100 CAPSULE, LIQUID FILLED ORAL at 16:32

## 2023-07-10 NOTE — PROGRESS NOTES
Progress Note - OB/GYN  Nabil Garcia 29 y.o. female MRN: [id number]  Unit/Bed#:  311-01 Encounter: [id number]    Assessment and Plan     Nabil Garcia is a patient of: Sorin Veras. She is PPD# 1 s/p  spontaneous vaginal delivery and vaginal birth after  ()  Recovering well and is stable       *  (spontaneous vaginal delivery)  Assessment & Plan  Lochia WNL   Recovering well   Appropriate bowel and bladder function   Pain well controlled   Tolerating diet   Breastfeeding: yes  Ambulating without issues   No lower extremity tenderness  GBS neg, PCN given due to GBS still pending at the time of delivery   Rh O+, antibody neg      Previous  delivery, antepartum  Assessment & Plan  Previous C/S Due to arrest of descent of  labor 2/2 PROM      Disposition    - Anticipate discharge home on PPD# 1-2      Subjective/Objective     Chief Complaint: Postpartum State     Subjective:    Nabil Garcia is PPD#1 s/p  spontaneous vaginal delivery and vaginal birth after  (). She has no current complaints. Pain is well controlled. Patient is currently voiding. She is ambulating. Patient is currently passing flatus and has had no bowel movement. She is tolerating PO, and denies nausea or vomitting. Patient denies fever, chills, chest pain, shortness of breath, or calf tenderness. Lochia is minimal. She is  Breastfeeding. She is recovering well and is stable.        Vitals:   /56 (BP Location: Right arm)   Pulse 67   Temp 97.7 °F (36.5 °C) (Oral)   Resp 18   Ht 5' 2" (1.575 m)   Wt 91.2 kg (201 lb)   LMP 10/22/2022 (Exact Date)   SpO2 97%   Breastfeeding Yes   BMI 36.76 kg/m²       Intake/Output Summary (Last 24 hours) at 7/10/2023 0626  Last data filed at 2023 2301  Gross per 24 hour   Intake 900 ml   Output 1640 ml   Net -740 ml       Invasive Devices     Peripheral Intravenous Line  Duration           Peripheral IV 23 Left;Ventral (anterior) Hand 1 day                Physical Exam:   GEN: Limmie Bence appears well, alert and oriented x 3, pleasant and cooperative   CARDIO: RRR, no murmurs or rubs  RESP:  CTAB, no wheezes or rales  ABDOMEN: soft, no tenderness, no distention, fundus @ 0  EXTREMITIES: SCDs on, non tender, no erythema, b/l Sharon's sign negative      Labs:     Hemoglobin   Date Value Ref Range Status   07/08/2023 10.7 (L) 11.5 - 15.4 g/dL Final   05/08/2023 12.0 11.5 - 15.4 g/dL Final     WBC   Date Value Ref Range Status   07/08/2023 12.85 (H) 4.31 - 10.16 Thousand/uL Final   05/08/2023 7.52 4.31 - 10.16 Thousand/uL Final     Platelets   Date Value Ref Range Status   07/08/2023 219 149 - 390 Thousands/uL Final   05/08/2023 271 149 - 390 Thousands/uL Final     Creatinine   Date Value Ref Range Status   10/16/2022 0.84 0.60 - 1.30 mg/dL Final     Comment:     Standardized to IDMS reference method   06/09/2022 0.85 0.60 - 1.30 mg/dL Final     Comment:     Standardized to IDMS reference method     AST   Date Value Ref Range Status   10/16/2022 16 13 - 39 U/L Final     Comment:     Specimen collection should occur prior to Sulfasalazine administration due to the potential for falsely depressed results. 06/09/2022 15 5 - 45 U/L Final     Comment:     Specimen collection should occur prior to Sulfasalazine administration due to the potential for falsely depressed results. ALT   Date Value Ref Range Status   10/16/2022 10 7 - 52 U/L Final     Comment:     Specimen collection should occur prior to Sulfasalazine administration due to the potential for falsely depressed results. 06/09/2022 19 12 - 78 U/L Final     Comment:     Specimen collection should occur prior to Sulfasalazine and/or Sulfapyridine administration due to the potential for falsely depressed results.            Mireille Taveras MD  7/10/2023  6:26 AM

## 2023-07-10 NOTE — ANESTHESIA POSTPROCEDURE EVALUATION
Post-Op Assessment Note    CV Status:  Stable  Pain Score: 0    Pain management: adequate     Mental Status:  Alert and awake   Hydration Status:  Euvolemic   PONV Controlled:  Controlled   Airway Patency:  Patent      Post Op Vitals Reviewed: Yes      Staff: CRNA     Post-op block assessment: no complications and catheter intact      No notable events documented.     BP   117/56   Temp      Pulse     Resp      SpO2

## 2023-07-10 NOTE — LACTATION NOTE
This note was copied from a baby's chart. CONSULT - LACTATION  Baby Girl Sera [facility] [address] 1 days female MRN: [id number]    [address] [facility] NURSERY Room / Bed: (N)/(N) Encounter: [id number]    Maternal Information     MOTHER:  Enriqueta Pierre  Maternal Age: 29 y.o.   OB History: # 1 - Date: 21, Sex: Male, Weight: 2765 g (6 lb 1.5 oz), GA: 36w3d, Delivery: , Low Transverse, Apgar1: 9, Apgar5: 9, Living: Living, Birth Comments: None    # 2 - Date: 23, Sex: Female, Weight: 3232 g (7 lb 2 oz), GA: 37w1d, Delivery: Vaginal, Spontaneous, Apgar1: 9, Apgar5: 9, Living: Living, Birth Comments: None   Previouse breast reduction surgery? No    Lactation history:   Has patient previously breast fed: How long had patient previously breast fed:     Previous breast feeding complications:       Past Surgical History:   Procedure Laterality Date   •  SECTION  21   • COLPOSCOPY     • COLPOSCOPY W/ BIOPSY / CURETTAGE      Resolved 2017   • GYNECOLOGIC CRYOSURGERY     • [place]  DELIVERY ONLY N/A 2021    Procedure:  SECTION (); Surgeon: Julia Champagne MD;  Location: [facility] ;  Service: Obstetrics   • WISDOM TOOTH EXTRACTION          Birth information:  YOB: 2021   Time of birth: 6:37 PM   Sex: female   Delivery type: Vaginal, Spontaneous   Birth Weight: 3232 g (7 lb 2 oz)   Percent of Weight Change: 0%     Gestational Age: 43w4d   [unfilled]    Assessment     Breast and nipple assessment: symmetrical, pendulous breasts with dark areolas and everted nipples with the left nipple pointed to outer section of the body.  blisters on bilateral nipple face    [place] Assessment: no clinical assessment    Feeding assessment: shallow latches; with education, deeper latch with ed. mom denies any pain with latch  LATCH:  Latch: Grasps breast, tongue down, lips flanged, rhythmic sucking   Audible Swallowing: A few with stimulation   Type of Nipple: Everted (After stimulation)   Comfort (Breast/Nipple): Soft/non-tender   Hold (Positioning): Partial assist, teach one side, mother does other, staff holds   [facility] Score: 8          Feeding recommendations:  breast feed on demand. Ed. On alignment and how to achieve a deep latch. Brought baby up to the right breast in football hold. U shape hold of the breast. Chin to breast with wide mouth. Ed. On alignment and how to achieve active, coordinated sucking. Audible swallows noted. After 15 min., baby unlatched. Mom practiced burping skills and brought baby to the left breast. Ed. On alignment to nipple that faces toward outer section of the body. Deep latch with active, coordinated sucking. Ed. On timing of feeds, signs of satiation, cluster feeding, and positioning. Mom states she has some expressed colostrum at home that her mother is bringing to the room. Ed. On giving the colostrum to the nursery to place in the nursery fridge for the next 24 hrs. Enc. To warm up and feed between the breasts. Ed. On wet wound care. Ed. On use of hand pump if baby's 24 hr. Blood work comes back with high bili levels. Enc. To offer both breasts. Mom has a spectra pump with this birth. RSB/DC reviewed    Enc. To call lactation    To help your nipples heal, in addition to paying close attention to latch and positioning, apply protective ointment after feeding or pumping and cover with an occlusive dressing. Education on positioning and alignment.  Mom is encouraged to:     - Bring baby up to the breast (use of pillows to elevate so baby's torso is against mom's breasts)   - Skin to skin for feedings with top hand exposed to show signs of satiation   - Chin deep into breast tissue (make baby look up to the nipple)   - nose aligned to the nipple   -Wait for wide gape, drag chin on the breast so nipple is aimed at the upper, back palate  - Cheek should be touching breast   - Deep, firm hold of baby with ear, shoulder, hip alignment    Assistance was provided in bringing the baby to the breast with the nipple aligned to the infant's nose. The baby was encouraged to myriam the nipple to his/her chin to achieve a wide, open mouth. Discussed 2nd night syndrome and ways to calm infant. Hand out given. Information on hand expression given. Discussed benefits of knowing how to manually express breast including stimulating milk supply, softening nipple for latch and evacuating breast in the event of engorgement. Information on hand expression given. Discussed benefits of knowing how to manually express breast including stimulating milk supply, softening nipple for latch and evacuating breast in the event of engorgement. Mom is encouraged to place baby skin to skin for feedings. Skin to skin education provided for baby placement on mother's chest, baby only in diaper, blankets below shoulders on baby's back. Skin to skin is encouraged to continue at home for feedings and between feedings. Worked on positioning infant up at chest level and starting to feed infant with nose arriving at the nipple. Then, using areolar compression to achieve a deep latch that is comfortable and exchanges optimum amounts of milk. - Start feedings on breast that last feeding ended   - allow no more than 3 hours between breast feeding sessions   - time between feedings is counted from the beginning of the first feed to the beginning of the next feeding session    Reviewed early signs of hunger, including tensing of hands and shoulders - no need to wait for open eyes. Crying is a late hunger sign. If baby is crying, soothe baby first and then attempt to latch. Reviewed normal sucking patterns: transition from stimulation to nutritive to release or non-nutritive. The goal is to see and hear lots of swallowing.     Reviewed normal nursing pattern: infant could latch on one breast up to 30 minutes or until releases on own. Signs of satiation is open hand with fingers that do not grab your finger. Discussed difference in sensation of non-nutritive v nutritive sucking    Met with mother. Provided mother with Ready, Set, Baby booklet. Discussed Skin to Skin contact an benefits to mom and baby. Talked about the delay of the first bath until baby has adjusted. Spoke about the benefits of rooming in. Feeding on cue and what that means for recognizing infant's hunger. Avoidance of pacifiers for the first month discussed. Talked about exclusive breastfeeding for the first 6 months. Positioning and latch reviewed as well as showing images of other feeding positions. Discussed the properties of a good latch in any position. Reviewed hand/manual expression. Discussed s/s that baby is getting enough milk and some s/s that breastfeeding dyad may need further help. Gave information on common concerns, what to expect the first few weeks after delivery, preparing for other caregivers, and how partners can help. Resources for support also provided. Encouraged parents to call for assistance, questions, and concerns about breastfeeding. Extension provided. Provided education on growth spurts, when to introduce bottles; paced bottle feeding, and non-nutritive suck at the breast. Provided education on Signs of satiation. Encouraged to call lactation to observe a latch prior to discharge for reassurance. Encouraged to call baby and me with any questions and closely monitor output.       Heather Patel 7/10/2023 2:29 PM

## 2023-07-10 NOTE — PLAN OF CARE
Problem: PAIN - ADULT  Goal: Verbalizes/displays adequate comfort level or baseline comfort level  Description: Interventions:  - Encourage patient to monitor pain and request assistance  - Assess pain using appropriate pain scale  - Administer analgesics based on type and severity of pain and evaluate response  - Implement non-pharmacological measures as appropriate and evaluate response  - Consider cultural and social influences on pain and pain management  - Notify physician/advanced practitioner if interventions unsuccessful or patient reports new pain  Outcome: Progressing     Problem: INFECTION - ADULT  Goal: Absence or prevention of progression during hospitalization  Description: INTERVENTIONS:  - Assess and monitor for signs and symptoms of infection  - Monitor lab/diagnostic results  - Monitor all insertion sites, i.e. indwelling lines, tubes, and drains  - Monitor endotracheal if appropriate and nasal secretions for changes in amount and color  - [place] appropriate cooling/warming therapies per order  - Administer medications as ordered  - Instruct and encourage patient and family to use good hand hygiene technique  - Identify and instruct in appropriate isolation precautions for identified infection/condition  Outcome: Progressing  Goal: Absence of fever/infection during neutropenic period  Description: INTERVENTIONS:  - Monitor WBC    Outcome: Progressing     Problem: SAFETY ADULT  Goal: Patient will remain free of falls  Description: INTERVENTIONS:  - Educate patient/family on patient safety including physical limitations  - Instruct patient to call for assistance with activity   - Consult OT/PT to assist with strengthening/mobility   - Keep Call bell within reach  - Keep bed low and locked with side rails adjusted as appropriate  - Keep care items and personal belongings within reach  - Initiate and maintain comfort rounds  - Make Fall Risk Sign visible to staff  - Offer Toileting every  Hours, in advance of need  - Initiate/Maintain alarm  - Obtain necessary fall risk management equipment:   - Apply yellow socks and bracelet for high fall risk patients  - Consider moving patient to room near nurses station  Outcome: Progressing  Goal: Maintain or return to baseline ADL function  Description: INTERVENTIONS:  -  Assess patient's ability to carry out ADLs; assess patient's baseline for ADL function and identify physical deficits which impact ability to perform ADLs (bathing, care of mouth/teeth, toileting, grooming, dressing, etc.)  - Assess/evaluate cause of self-care deficits   - Assess range of motion  - Assess patient's mobility; develop plan if impaired  - Assess patient's need for assistive devices and provide as appropriate  - Encourage maximum independence but intervene and supervise when necessary  - Involve family in performance of ADLs  - Assess for home care needs following discharge   - Consider OT consult to assist with ADL evaluation and planning for discharge  - Provide patient education as appropriate  Outcome: Progressing  Goal: Maintains/Returns to pre admission functional level  Description: INTERVENTIONS:  - Perform BMAT or MOVE assessment daily.   - Set and communicate daily mobility goal to care team and patient/family/caregiver. - Collaborate with rehabilitation services on mobility goals if consulted  - Perform Range of Motion  times a day. - Reposition patient every  hours.   - Dangle patient  times a day  - Stand patient  times a day  - Ambulate patient  times a day  - Out of bed to chair  times a day   - Out of bed for meals times a day  - Out of bed for toileting  - Record patient progress and toleration of activity level   Outcome: Progressing     Problem: DISCHARGE PLANNING  Goal: Discharge to home or other facility with appropriate resources  Description: INTERVENTIONS:  - Identify barriers to discharge w/patient and caregiver  - Arrange for needed discharge resources and transportation as appropriate  - Identify discharge learning needs (meds, wound care, etc.)  - Arrange for interpretive services to assist at discharge as needed  - Refer to Case Management Department for coordinating discharge planning if the patient needs post-hospital services based on physician/advanced practitioner order or complex needs related to functional status, cognitive ability, or social support system  Outcome: Progressing     Problem: Knowledge Deficit  Goal: Patient/family/caregiver demonstrates understanding of disease process, treatment plan, medications, and discharge instructions  Description: Complete learning assessment and assess knowledge base.   Interventions:  - Provide teaching at level of understanding  - Provide teaching via preferred learning methods  Outcome: Progressing

## 2023-07-11 VITALS
SYSTOLIC BLOOD PRESSURE: 100 MMHG | RESPIRATION RATE: 17 BRPM | DIASTOLIC BLOOD PRESSURE: 53 MMHG | HEIGHT: 62 IN | TEMPERATURE: 98 F | BODY MASS INDEX: 36.99 KG/M2 | WEIGHT: 201 LBS | HEART RATE: 82 BPM | OXYGEN SATURATION: 99 %

## 2023-07-11 PROCEDURE — 99024 POSTOP FOLLOW-UP VISIT: CPT | Performed by: OBSTETRICS & GYNECOLOGY

## 2023-07-11 RX ORDER — DOCUSATE SODIUM 100 MG/1
100 CAPSULE, LIQUID FILLED ORAL 2 TIMES DAILY
Refills: 0
Start: 2023-07-11

## 2023-07-11 RX ORDER — DIAPER,BRIEF,INFANT-TODD,DISP
1 EACH MISCELLANEOUS DAILY PRN
Qty: 30 G | Refills: 0
Start: 2023-07-11

## 2023-07-11 RX ORDER — ACETAMINOPHEN 325 MG/1
975 TABLET ORAL EVERY 8 HOURS PRN
Refills: 0
Start: 2023-07-11

## 2023-07-11 RX ORDER — IBUPROFEN 600 MG/1
600 TABLET ORAL EVERY 6 HOURS PRN
Qty: 30 TABLET | Refills: 0
Start: 2023-07-11

## 2023-07-11 RX ADMIN — IBUPROFEN 600 MG: 600 TABLET, FILM COATED ORAL at 04:58

## 2023-07-11 RX ADMIN — IBUPROFEN 600 MG: 600 TABLET, FILM COATED ORAL at 14:01

## 2023-07-11 RX ADMIN — ACETAMINOPHEN 975 MG: 325 TABLET, FILM COATED ORAL at 09:46

## 2023-07-11 RX ADMIN — DOCUSATE SODIUM 100 MG: 100 CAPSULE, LIQUID FILLED ORAL at 09:46

## 2023-07-11 NOTE — PROGRESS NOTES
Progress Note - OB/GYN  Khris Valencia 29 y.o. female MRN: [id number]  Unit/Bed#: -01 Encounter: [id number]    Assessment and Plan     Khris Valencia is a patient of: [address] [place] [address]. She is PPD# 2 s/p . Recovering well and is stable       *  (spontaneous vaginal delivery)  Assessment & Plan    Lochia WNL   Recovering well   Appropriate bowel and bladder function   Pain well controlled   Tolerating diet   Breastfeeding: yes  Ambulating without issues   No lower extremity tenderness  GBS neg, PCN given due to GBS still pending at the time of delivery   Rh O+, antibody neg      Maternal obesity, antepartum, first trimester  Assessment & Plan  BMI 36    Previous  delivery, antepartum  Assessment & Plan  Previous C/S Due to arrest of descent of  labor 2/2 PROM      Disposition    - Anticipate discharge home on PPD# 2      Subjective/Objective     Chief Complaint: Postpartum State     Subjective:    Khris Valencia is PPD/POD#2 s/p . She has no current complaints. Pain is well controlled. Patient is currently voiding. She is ambulating. Patient is currently passing flatus and has had bowel movement. She is tolerating PO, and denies nausea or vomitting. Patient denies fever, chills, chest pain, shortness of breath, or calf tenderness. Lochia is normal. She is  Breastfeeding. She is recovering well and is stable.        Vitals:   /53 (BP Location: Right arm)   Pulse 82   Temp 98 °F (36.7 °C) (Oral)   Resp 17   Ht 5' 2" (1.575 m)   Wt 91.2 kg (201 lb)   LMP 10/22/2022 (Exact Date)   SpO2 99%   Breastfeeding Yes   BMI 36.76 kg/m²     No intake or output data in the 24 hours ending 23 0812    Invasive Devices     None                 Physical Exam:   GEN: Khris Valencia appears well, alert and oriented x 3, pleasant and cooperative   CARDIO: RRR, no murmurs or rubs  RESP:  CTAB, no wheezes or rales  ABDOMEN: soft, no tenderness, no distention, fundus @ 2cm below u EXTREMITIES: SCDs on, non tender, no erythema      Labs:     Hemoglobin   Date Value Ref Range Status   07/08/2023 10.7 (L) 11.5 - 15.4 g/dL Final   05/08/2023 12.0 11.5 - 15.4 g/dL Final     WBC   Date Value Ref Range Status   07/08/2023 12.85 (H) 4.31 - 10.16 Thousand/uL Final   05/08/2023 7.52 4.31 - 10.16 Thousand/uL Final     Platelets   Date Value Ref Range Status   07/08/2023 219 149 - 390 Thousands/uL Final   05/08/2023 271 149 - 390 Thousands/uL Final     Creatinine   Date Value Ref Range Status   10/16/2022 0.84 0.60 - 1.30 mg/dL Final     Comment:     Standardized to IDMS reference method   06/09/2022 0.85 0.60 - 1.30 mg/dL Final     Comment:     Standardized to IDMS reference method     AST   Date Value Ref Range Status   10/16/2022 16 13 - 39 U/L Final     Comment:     Specimen collection should occur prior to Sulfasalazine administration due to the potential for falsely depressed results. 06/09/2022 15 5 - 45 U/L Final     Comment:     Specimen collection should occur prior to Sulfasalazine administration due to the potential for falsely depressed results. ALT   Date Value Ref Range Status   10/16/2022 10 7 - 52 U/L Final     Comment:     Specimen collection should occur prior to Sulfasalazine administration due to the potential for falsely depressed results. 06/09/2022 19 12 - 78 U/L Final     Comment:     Specimen collection should occur prior to Sulfasalazine and/or Sulfapyridine administration due to the potential for falsely depressed results.            Lacey Louis MD  7/11/2023  8:12 AM

## 2023-07-11 NOTE — PLAN OF CARE
Problem: PAIN - ADULT  Goal: Verbalizes/displays adequate comfort level or baseline comfort level  Description: Interventions:  - Encourage patient to monitor pain and request assistance  - Assess pain using appropriate pain scale  - Administer analgesics based on type and severity of pain and evaluate response  - Implement non-pharmacological measures as appropriate and evaluate response  - Consider cultural and social influences on pain and pain management  - Notify physician/advanced practitioner if interventions unsuccessful or patient reports new pain  Outcome: Progressing     Problem: PAIN - ADULT  Goal: Verbalizes/displays adequate comfort level or baseline comfort level  Description: Interventions:  - Encourage patient to monitor pain and request assistance  - Assess pain using appropriate pain scale  - Administer analgesics based on type and severity of pain and evaluate response  - Implement non-pharmacological measures as appropriate and evaluate response  - Consider cultural and social influences on pain and pain management  - Notify physician/advanced practitioner if interventions unsuccessful or patient reports new pain  Outcome: Progressing     Problem: INFECTION - ADULT  Goal: Absence or prevention of progression during hospitalization  Description: INTERVENTIONS:  - Assess and monitor for signs and symptoms of infection  - Monitor lab/diagnostic results  - Monitor all insertion sites, i.e. indwelling lines, tubes, and drains  - Monitor endotracheal if appropriate and nasal secretions for changes in amount and color  - [place] appropriate cooling/warming therapies per order  - Administer medications as ordered  - Instruct and encourage patient and family to use good hand hygiene technique  - Identify and instruct in appropriate isolation precautions for identified infection/condition  Outcome: Progressing  Goal: Absence of fever/infection during neutropenic period  Description: INTERVENTIONS:  - Monitor WBC    Outcome: Progressing     Problem: SAFETY ADULT  Goal: Patient will remain free of falls  Description: INTERVENTIONS:  - Educate patient/family on patient safety including physical limitations  - Instruct patient to call for assistance with activity   - Consult OT/PT to assist with strengthening/mobility   - Keep Call bell within reach  - Keep bed low and locked with side rails adjusted as appropriate  - Keep care items and personal belongings within reach  - Initiate and maintain comfort rounds  - Make Fall Risk Sign visible to staff  - Offer Toileting every  Hours, in advance of need  - Initiate/Maintain alarm  - Obtain necessary fall risk management equipment:   - Apply yellow socks and bracelet for high fall risk patients  - Consider moving patient to room near nurses station  Outcome: Progressing  Goal: Maintain or return to baseline ADL function  Description: INTERVENTIONS:  -  Assess patient's ability to carry out ADLs; assess patient's baseline for ADL function and identify physical deficits which impact ability to perform ADLs (bathing, care of mouth/teeth, toileting, grooming, dressing, etc.)  - Assess/evaluate cause of self-care deficits   - Assess range of motion  - Assess patient's mobility; develop plan if impaired  - Assess patient's need for assistive devices and provide as appropriate  - Encourage maximum independence but intervene and supervise when necessary  - Involve family in performance of ADLs  - Assess for home care needs following discharge   - Consider OT consult to assist with ADL evaluation and planning for discharge  - Provide patient education as appropriate  Outcome: Progressing  Goal: Maintains/Returns to pre admission functional level  Description: INTERVENTIONS:  - Perform BMAT or MOVE assessment daily.   - Set and communicate daily mobility goal to care team and patient/family/caregiver.    - Collaborate with rehabilitation services on mobility goals if consulted  - Perform Range of Motion  times a day. - Reposition patient every  hours. - Dangle patient times a day  - Stand patient  times a day  - Ambulate patient  times a day  - Out of bed to chair  times a day   - Out of bed for meals  times a day  - Out of bed for toileting  - Record patient progress and toleration of activity level   Outcome: Progressing     Problem: DISCHARGE PLANNING  Goal: Discharge to home or other facility with appropriate resources  Description: INTERVENTIONS:  - Identify barriers to discharge w/patient and caregiver  - Arrange for needed discharge resources and transportation as appropriate  - Identify discharge learning needs (meds, wound care, etc.)  - Arrange for interpretive services to assist at discharge as needed  - Refer to Case Management Department for coordinating discharge planning if the patient needs post-hospital services based on physician/advanced practitioner order or complex needs related to functional status, cognitive ability, or social support system  Outcome: Progressing     Problem: Knowledge Deficit  Goal: Patient/family/caregiver demonstrates understanding of disease process, treatment plan, medications, and discharge instructions  Description: Complete learning assessment and assess knowledge base.   Interventions:  - Provide teaching at level of understanding  - Provide teaching via preferred learning methods  Outcome: Progressing

## 2023-07-11 NOTE — LACTATION NOTE
This note was copied from a baby's chart. Discharge Feeding plan:  Mom states latch is painful. Baby's bili is high and mom states baby is cluster feeding for over 2 hrs. Upon Oral assessment: tongue elevates to mid-anterior; bilateral laterization, extension over lower alveolar ridge for a few seconds, then tongue snaps back into posterior section of the mouth. Upon digital assessment, round, anterior palate; baby gives a suck sequence, then tongue snaps-back. Upon finger sweep under the tongue, frenulum is thin, mid anterior and anchored at base of mouth at lower alveolar ridge. Bilateral compression stripe on nipple faces, bleeding and blistered. Mom is using silverettes; reviewed wet wound care. Called baby and me for follow up - left     Discharge feeding plan    1. Meet early feeding cues  2. Use massage, warmth, hand expression to stimulate breasts  3. Bring baby to breast skin to skin  4. Align nipple to nose, place chin to breast, (move baby not breast) and wait until mouth is wide and deep latch is achieved. (Scan QR code for [facility] - positions). Use finger between breast and chin to tug down lower lip. 5. Use breast compressions to stimulate suck  6. Once baby does not suck with stimulation, becomes fussy, or un-latches feed expressed milk or formula via syringe or paced bottle feeding method. Review [employer] on youtube or scan QR code for [employer] video  7. Bring baby back to opposite breast for non-nutritive suck and skin to skin  8. Pump after each feed to stimulate breasts and have expressed milk for next feed       [facility] - positions    Education on positioning and alignment.  Mom is encouraged to:     - Bring baby up to the breast (use of pillows to elevate so baby's torso is against mom's breasts)   - Skin to skin for feedings with top hand exposed to show signs of satiation   - Chin deep into breast tissue (make baby look up to the nipple)   - nose aligned to the nipple   -Wait for wide gape, drag chin on the breast so nipple is aimed at the upper, back palate  - Cheek should be touching breast   - Deep, firm hold of baby with ear, shoulder, hip alignment    To help your nipples heal, in addition to paying close attention to latch and positioning, apply protective ointment after feeding or pumping and cover with an occlusive dressing. May also use silverettes.        Baby and me called and left vm

## 2023-07-12 NOTE — UTILIZATION REVIEW
NOTIFICATION OF INPATIENT ADMISSION   MATERNITY/DELIVERY AUTHORIZATION REQUEST   SERVICING FACILITY:   [address] [place] [address] - L&D, , NICU  [facility]. [address]  Tax ID: [id number]  NPI: [id number]   ATTENDING PROVIDER:  Attending Name and NPI#: Titus Fregoso Md [[id number]]  Address: [address] [place] [address]  Phone: [phone]   ADMISSION INFORMATION:  Place of Service: Inpatient [address] [facility]  Place of Service Code: 21  Inpatient Admission Date/Time: 23  9:14 PM  Discharge Date/Time: 2023  3:30 PM  Admitting Diagnosis Code/Description:  S/P repeat low transverse  [Z98.891]  39 weeks gestation of pregnancy [Z3A.39]  Uterine contractions [O47.9]  37 weeks gestation of pregnancy [Z3A.37]  Encounter for full-term uncomplicated delivery [H54]     Mother: Jose Enrique Rodriguez 1990 Estimated Date of Delivery: 23  Delivering clinician: Titus Fregoso    OB History        2    Para   2    Term   1       1    AB   0    Living   2       SAB   0    IAB   0    Ectopic   0    Multiple   0    Live Births   2                Name & MRN:   Information for the patient's :  Savita Leon Girl Charisse Gaona) [[id number]]      Delivery Information:  Sex: female  Delivered 2023 6:37 PM by Vaginal, Spontaneous; Gestational Age: 43w4d     Measurements:  Weight: 7 lb 2 oz (3232 g); Height: 19"    APGAR 1 minute 5 minutes 10 minutes   Totals: 9 9       Birth Information: 29 y.o. female MRN: [id number] Unit/Bed#: -01   Birthweight: No birth weight on file. Gestational Age: <None> Delivery Type:    APGARS Totals:        UTILIZATION REVIEW CONTACT:  Stefani Miranda Utilization   Network Utilization Review Department  Phone: [phone]  Fax [phone]  Email: [email or web address]  Contact for approvals/pending authorizations, clinical reviews, and discharge. PHYSICIAN ADVISORY SERVICES:  Medical Necessity Denial & Jlwq-yc-Edmn Review  Phone: [phone]  Fax: [phone]  Email: [email or web address]

## 2023-07-13 ENCOUNTER — TELEPHONE (OUTPATIENT)
Dept: INTERNAL MEDICINE CLINIC | Facility: CLINIC | Age: 33
End: 2023-07-13

## 2023-07-14 ENCOUNTER — TELEPHONE (OUTPATIENT)
Dept: OBGYN CLINIC | Facility: CLINIC | Age: 33
End: 2023-07-14

## 2023-07-14 DIAGNOSIS — N61.0 MASTITIS, ACUTE: Primary | ICD-10-CM

## 2023-07-14 NOTE — TELEPHONE ENCOUNTER
Rx sent for dicloxacillin given the weekend and limited appointment availability. If symptoms fail to improve with abx needs visit

## 2023-07-14 NOTE — TELEPHONE ENCOUNTER
Pt had c section 7/9 and she is pumping . Said she  has one large lump on r breast - very red and sore and 2 small lumps on l breast - also red and sore. Feels feverish and achy. Has had mastitis before with previous child.  Allergic to sulfa.   [employer] [facility].  Doing all the proper things - soaking, massaging, etc.

## 2023-07-16 LAB — PLACENTA IN STORAGE: NORMAL

## 2023-07-17 ENCOUNTER — TRANSITIONAL CARE MANAGEMENT (OUTPATIENT)
Dept: FAMILY MEDICINE CLINIC | Facility: CLINIC | Age: 33
End: 2023-07-17

## 2023-07-17 ENCOUNTER — TELEPHONE (OUTPATIENT)
Dept: FAMILY MEDICINE CLINIC | Facility: CLINIC | Age: 33
End: 2023-07-17

## 2023-07-18 ENCOUNTER — TELEPHONE (OUTPATIENT)
Dept: OBGYN CLINIC | Facility: CLINIC | Age: 33
End: 2023-07-18

## 2023-07-31 ENCOUNTER — POSTPARTUM VISIT (OUTPATIENT)
Dept: OBGYN CLINIC | Facility: CLINIC | Age: 33
End: 2023-07-31

## 2023-07-31 VITALS — BODY MASS INDEX: 32.56 KG/M2 | SYSTOLIC BLOOD PRESSURE: 104 MMHG | WEIGHT: 178 LBS | DIASTOLIC BLOOD PRESSURE: 70 MMHG

## 2023-07-31 DIAGNOSIS — O34.219 VAGINAL BIRTH AFTER CESAREAN: ICD-10-CM

## 2023-07-31 DIAGNOSIS — N81.89 PELVIC FLOOR WEAKNESS: ICD-10-CM

## 2023-07-31 PROCEDURE — 99024 POSTOP FOLLOW-UP VISIT: CPT | Performed by: PHYSICIAN ASSISTANT

## 2023-07-31 NOTE — PROGRESS NOTES
Patient is here for postpartum visit.   Vaginal delivery 23  Apgar score: 9 & 9  Laceration: 1st degree perineal laceration   incision: N/A  Bleeding: spotting  Baby's weight 7lbs/2oz  Gender: female- Iraida  Breast feed: pumping  Birth control: none;  may be having vasectomy  PPDS: 4  Concerns: none

## 2023-07-31 NOTE — PROGRESS NOTES
Fátima Avalos  1990      Assessment:  Diagnoses and all orders for this visit:    Encounter for postpartum visit    Vaginal birth after   -     Ambulatory referral to Physical Therapy; Future    Mother currently breast-feeding    Pelvic floor weakness  -     Ambulatory referral to Physical Therapy; Future        Plan:    Contraception: condoms until  gets vasectomy. Reviewed recommended interval between pregnancies and reasoning for this. Referral to pelvic floor PT provided. Reviewed postpartum perineal care and guidelines on resuming sexual activity. [facility] recommended for lactation consultation, mental health support, and additional resources. Advised to call with any issues, all concerns & questions addressed. She will return in 3 months for her yearly exam, sooner PRN. __________________________________________________________________        S:  29 y.o. female here for postpartum visit. She is 3 weeks s/p  with 1st degree laceration on 23 @ 37w1d. Pregnancy complicated by prior LTCS, short interval pregnancy, history of  delivery, palpitations, and maternal obesity. PP course uncomplicated thus far. She reports some vaginal pressure and ALISON. Interested in referral to pelvic floor PT. Previously attended for dyspareunia and back pain. Gender: female Russell Sweeney)   Apgars: 9 and 9  Weight: 7lbs 2 oz   Her bleeding has stopped   She is exclusively pumping without problems. Attempted to latch but unable to tolerate nipple rash/soreness. Happier with pumping - noting she exclusively pumped for a full year with her son. She denies postpartum blues/depression and anxiety. EPDS 4. Contraception: planning condoms until  gets a vasectomy. Last Pap: 2021 NILM    Review of Systems   Respiratory: Negative. Cardiovascular: Negative. Gastrointestinal: Negative for constipation and diarrhea.    Genitourinary: Negative for difficulty urinating, pelvic pain, vaginal bleeding, vaginal discharge, itching or odor.       Past Medical History:   Diagnosis Date   • Degenerative lumbar disc 08/28/2015   • Overactive bladder    • Urinary tract infection    • Verruca      Family History   Problem Relation Age of Onset   • Hypothyroidism Mother    • Rheum arthritis Mother    • Fibroids Mother    • Thyroid disease Mother    • Alcohol abuse Father    • No Known Problems Brother    • No Known Problems Brother    • No Known Problems Son    • Hiatal hernia Maternal Grandmother    • Osteoporosis Maternal Grandmother    • Endometrial cancer Maternal Grandmother    • Endometriosis Maternal Grandmother    • COPD Maternal Grandmother    • Parkinsonism Maternal Grandmother    • Skin cancer Maternal Grandmother    • ALS Maternal Grandfather    • Parkinsonism Maternal Grandfather    • Arthritis Other    • Hypertension Other         Benign Essential   • Breast cancer Other    • ALS Other    • Osteoporosis Other      Social History     Socioeconomic History   • Marital status: Single     Spouse name: None   • Number of children: None   • Years of education: None   • Highest education level: None   Occupational History     Employer: [employer]   Tobacco Use   • Smoking status: Never   • Smokeless tobacco: Never   Vaping Use   • Vaping Use: Never used   Substance and Sexual Activity   • Alcohol use: Not Currently     Alcohol/week: 2.0 standard drinks of alcohol     Types: 1 Glasses of wine, 1 Standard drinks or equivalent per week   • Drug use: No   • Sexual activity: Not Currently     Partners: Male   Other Topics Concern   • None   Social History Narrative    Caffeine Use    Exercising Regularly     always seatbelt in care     employed      Social Determinants of Health     Financial Resource Strain: Low Risk  (3/22/2021)    Overall Financial Resource Strain (CARDIA)    • Difficulty of Paying Living Expenses: Not hard at all   Food Insecurity: No Food Insecurity (3/22/2021)    Hunger Vital Sign    • Worried About Running Out of Food in the Last Year: Never true    • Ran Out of Food in the Last Year: Never true   Transportation Needs: No Transportation Needs (3/22/2021)    PRAPARE - Transportation    • Lack of Transportation (Medical): No    • Lack of Transportation (Non-Medical): No   Physical Activity: Sufficiently Active (7/16/2020)    Exercise Vital Sign    • Days of Exercise per Week: 5 days    • Minutes of Exercise per Session: 40 min   Stress: No Stress Concern Present (7/16/2020)    [facility]    • Feeling of Stress : Not at all   Social Connections: Unknown (7/16/2020)    Social Connection and Isolation Panel [NHANES]    • Frequency of Communication with Friends and Family: More than three times a week    • Frequency of Social Gatherings with Friends and Family: More than three times a week    • Attends [employer] Services: Not on file    • Active Member of Clubs or Organizations: Not on file    • Attends Club or Organization Meetings: Not on file    • Marital Status: Never    Intimate Partner Violence: Not At Risk (7/16/2020)    Humiliation, Afraid, Rape, and Kick questionnaire    • Fear of Current or Ex-Partner: No    • Emotionally Abused: No    • Physically Abused: No    • Sexually Abused: No   Housing Stability: Not on file       O:   /70 (BP Location: Left arm, Patient Position: Sitting, Cuff Size: Standard)   Wt 80.7 kg (178 lb)   LMP 10/22/2022 (Exact Date)   Breastfeeding Yes Comment: pumping  BMI 32.56 kg/m²     She appears well and is in no distress  Normocephalic, atraumatic. Abdomen is soft and nontender  External genitals are normal without lesions or rashes  Vagina is without discharge or bleeding. Appears well healed. No focal neurological deficits. Normal mood, affect, and behavior.

## 2023-09-05 ENCOUNTER — EVALUATION (OUTPATIENT)
Dept: PHYSICAL THERAPY | Facility: REHABILITATION | Age: 33
End: 2023-09-05
Payer: COMMERCIAL

## 2023-09-05 DIAGNOSIS — N81.89 PELVIC FLOOR WEAKNESS: ICD-10-CM

## 2023-09-05 DIAGNOSIS — O34.219 VAGINAL BIRTH AFTER CESAREAN: Primary | ICD-10-CM

## 2023-09-05 PROCEDURE — 97161 PT EVAL LOW COMPLEX 20 MIN: CPT | Performed by: PHYSICAL THERAPIST

## 2023-09-05 PROCEDURE — 97530 THERAPEUTIC ACTIVITIES: CPT | Performed by: PHYSICAL THERAPIST

## 2023-09-19 ENCOUNTER — OFFICE VISIT (OUTPATIENT)
Dept: PHYSICAL THERAPY | Facility: REHABILITATION | Age: 33
End: 2023-09-19
Payer: COMMERCIAL

## 2023-09-19 DIAGNOSIS — N81.89 PELVIC FLOOR WEAKNESS: ICD-10-CM

## 2023-09-19 DIAGNOSIS — O34.219 VAGINAL BIRTH AFTER CESAREAN: Primary | ICD-10-CM

## 2023-09-19 PROCEDURE — 97530 THERAPEUTIC ACTIVITIES: CPT | Performed by: PHYSICAL THERAPIST

## 2023-09-19 PROCEDURE — 97112 NEUROMUSCULAR REEDUCATION: CPT | Performed by: PHYSICAL THERAPIST

## 2023-09-19 PROCEDURE — 97110 THERAPEUTIC EXERCISES: CPT | Performed by: PHYSICAL THERAPIST

## 2023-09-19 NOTE — PROGRESS NOTES
Daily Note     Today's date: 2023  Patient name: Sania Grande  : 1990  MRN: [id number]  Referring provider: Nahum Dailey,*  Dx:   Encounter Diagnosis     ICD-10-CM    1. Vaginal birth after   O34.219       2. Pelvic floor weakness  N81.89                      Subjective: ***      Objective: See treatment diary below      Assessment: Tolerated treatment {Tolerated treatment :}.  Patient {assessment:}      Plan: {PLAN:[id number]}     Precautions:  2021,  2023  Biofeedback Codes: not covered  Goals: reducing incontinence (mixed), urgency, frequency, help relieve some back pain, strengthen core   Currently breastfeeding   Did not provide bladder diary but educate on avoiding JIC, increasing bladder intervals and urge suppression       IE RE   OMID-18     PFDI-20 nv    V-Q Wrong form given    PGQ         Manuals                                      Neuro Re-Ed             SEMG Biofeedback             Diaphragmatic breathing nv            TA ADIM nv            PFMC Slow Holds nv            PFMC Quick Flicks nv            Seated PFMC nv            Standing PFMC nv            Ther Ex             TA march             TA leg ext             PFMC + bridge             Bridge march             SL bridge             SLR abduction             Clamshells             TA SLR             Quadruped TA PFMC             Quadruped leg ext                                       Ther Activity             Education Done            Urge Suppression nv            Bladder intervals/JIC nv            PFMC + bridge             PFMC + sit to stand **            PFMC + reaching **            PFMC + lift **            PFMC + cough **            PFMC + step ups

## 2023-09-19 NOTE — PROGRESS NOTES
Daily Note     Today's date: 2023  Patient name: Karen Black  : 1990  MRN: [id number]  Referring provider: Cristina Araujo,*  Dx:   Encounter Diagnosis     ICD-10-CM    1. Vaginal birth after   O34.219       2. Pelvic floor weakness  N81.89           Start Time: 05  Stop Time: 920  Total time in clinic (min): 45 minutes    Subjective: Patient reports no changes since IE. She was standing up from the floor yesterday and had a lot of urinary leakage. Objective: See treatment diary below      Assessment: Tolerated treatment well. Initiated plan of care this visit with a focus on diaphragmatic breathing, core and pelvic floor engagement. Good tolerance noted but patient reports fatigue with progressive repetitions and some difficulty with coordination of hip isometrics. Provided patient with handout for urge suppression techniques that she can use to help control UUI. Continue to progress as tolerated. Patient demonstrated fatigue post treatment, exhibited good technique with therapeutic exercises and would benefit from continued PT. Plan: Continue per plan of care.       Precautions:  2021,  2023  Biofeedback Codes: not covered  Goals: reducing incontinence (mixed), urgency, frequency, help relieve some back pain, strengthen core   Currently breastfeeding   Did not provide bladder diary but educate on avoiding JIC, increasing bladder intervals and urge suppression       IE RE   OMID-18     PFDI-20 nv    V-Q Wrong form given    PGQ         Manuals                                      Neuro Re-Ed             SEMG Biofeedback             Diaphragmatic breathing nv Done           TA ADIM nv 10x           PFMC Slow Holds nv 10x           PFMC Quick Flicks nv 19G           Seated PFMC nv nv           Standing PFMC nv nv           Ther Ex             TA march             TA leg ext  10x           PFMC + TB hip abd iso  GTB 10x           PFMC + TB hip add iso 10x           PFMC + bridge  8x           Bridge march             SL bridge             SLR abduction             Clamshells             TA SLR             Quadruped TA PFMC             Quadruped leg ext                                       Ther Activity             Education Done Done           Urge Suppression nv Done           Bladder intervals/JIC nv nv           PFMC + bridge             PFMC + sit to stand **            PFMC + reaching **            PFMC + lift **            PFMC + cough **            PFMC + step ups

## 2023-09-26 ENCOUNTER — OFFICE VISIT (OUTPATIENT)
Dept: PHYSICAL THERAPY | Facility: REHABILITATION | Age: 33
End: 2023-09-26
Payer: COMMERCIAL

## 2023-09-26 DIAGNOSIS — O34.219 VAGINAL BIRTH AFTER CESAREAN: Primary | ICD-10-CM

## 2023-09-26 DIAGNOSIS — N81.89 PELVIC FLOOR WEAKNESS: ICD-10-CM

## 2023-09-26 PROCEDURE — 97530 THERAPEUTIC ACTIVITIES: CPT

## 2023-09-26 PROCEDURE — 97110 THERAPEUTIC EXERCISES: CPT

## 2023-09-26 PROCEDURE — 97112 NEUROMUSCULAR REEDUCATION: CPT

## 2023-09-26 NOTE — PROGRESS NOTES
Daily Note     Today's date: 2023  Patient name: Rubin Allison  : 1990  MRN: [id number]  Referring provider: Albertina Herring,*  Dx:   Encounter Diagnosis     ICD-10-CM    1. Vaginal birth after   O34.219       2. Pelvic floor weakness  N81.89           Start Time: 47  Stop Time: 09  Total time in clinic (min): 44 minutes    Subjective: Patient states she has attempted use of urge suppression techniques at home, with improvements with some things. She reports muscles are still fatiguing quickly and reports some challenge with proper coordination of breathing and TA+PFMC. Patient reports compliance with HEP, and no complaints after previous session, some muscle soreness noted. Objective: See treatment diary below      Assessment: Tolerated treatment well. Patient demonstrated fatigue post treatment, exhibited good technique with therapeutic exercises and would benefit from continued PT Patient educated on JIC trips and bladder intervals, using urge suppression techniques as needed, this session with patient reporting understanding. Patient reports some improvements with coordination today, fatiguing quickly with most TE. Plan: Continue per plan of care. Progress treatment as tolerated.        Precautions:  2021,  2023  Biofeedback Codes: not covered  Goals: reducing incontinence (mixed), urgency, frequency, help relieve some back pain, strengthen core   Currently breastfeeding   Did not provide bladder diary but educate on avoiding JIC, increasing bladder intervals and urge suppression       IE RE   OMID-18     PFDI-20 nv    V-Q Wrong form given    PGQ         Manuals                                     Neuro Re-Ed             SEMG Biofeedback             Diaphragmatic breathing nv Done Done           TA ADIM nv 10x 10x          PFMC Slow Holds nv 10x 10x          PFMC Quick Flicks nv 01H 17I          Seated PFMC nv nv 5x          Standing PFMC nv nv nv          Ther Ex             TA march             TA leg ext  10x 10x          PFMC + TB hip abd iso  GTB 10x GTB 10x          PFMC + TB hip add iso  10x 10x           PFMC + bridge  8x 10x          Bridge march             SL bridge             SLR abduction             Clamshells             TA SLR             Quadruped TA PFMC             Quadruped leg ext                                       Ther Activity             Education Done Done Done           Urge Suppression nv Done           Bladder intervals/JIC nv nv Done           PFMC + bridge             PFMC + sit to stand **            PFMC + reaching **            PFMC + lift **            PFMC + cough **            PFMC + step ups

## 2023-10-03 ENCOUNTER — OFFICE VISIT (OUTPATIENT)
Dept: PHYSICAL THERAPY | Facility: REHABILITATION | Age: 33
End: 2023-10-03
Payer: COMMERCIAL

## 2023-10-03 DIAGNOSIS — O34.219 VAGINAL BIRTH AFTER CESAREAN: Primary | ICD-10-CM

## 2023-10-03 DIAGNOSIS — N81.89 PELVIC FLOOR WEAKNESS: ICD-10-CM

## 2023-10-03 PROCEDURE — 97112 NEUROMUSCULAR REEDUCATION: CPT

## 2023-10-03 PROCEDURE — 97110 THERAPEUTIC EXERCISES: CPT

## 2023-10-03 PROCEDURE — 97530 THERAPEUTIC ACTIVITIES: CPT

## 2023-10-03 NOTE — PROGRESS NOTES
Daily Note     Today's date: 10/3/2023  Patient name: Lyndon Frankel  : 1990  MRN: [id number]  Referring provider: Bing Flores,*  Dx:   Encounter Diagnosis     ICD-10-CM    1. Vaginal birth after   O34.219       2. Pelvic floor weakness  N81.89           Start Time: 830  Stop Time: 915  Total time in clinic (min): 45 minutes    Subjective: Patient reports feeling less soreness and fatigue with completion of exercises. She reports she has attempted to use urge suppression technique with improvements stating "I feel like the leakage is much less with the urgency it is more so with things like sitting and standing." She reports no complaints after previous session. Objective: See treatment diary below      Assessment: Tolerated treatment well. Patient demonstrated fatigue post treatment, exhibited good technique with therapeutic exercises and would benefit from continued PT Less fatigue noted this session compared to previous able to tolerate increased reps with most TE. Trialed additional TE today with patient tolerating well, patient reporting able to initiate [address] in standing, good relaxation, limited endurance. Given updated HEP this session, reporting understanding. Plan: Continue per plan of care. Progress treatment as tolerated.         Precautions:  2021,  2023  Biofeedback Codes: not covered  Goals: reducing incontinence (mixed), urgency, frequency, help relieve some back pain, strengthen core   Currently breastfeeding   Did not provide bladder diary but educate on avoiding JIC, increasing bladder intervals and urge suppression       IE RE   OMID-18     PFDI-20 nv    V-Q Wrong form given    PGQ         Manuals 9/5 9/19 9/26 10/3                                   Neuro Re-Ed             SEMG Biofeedback             Diaphragmatic breathing nv Done Done  Done          TA ADIM nv 10x 10x 10x         PFMC Slow Holds nv 10x 10x 10x         PFMC Quick Flicks nv 10x 10x 10x         Seated PFMC nv nv 5x 10x         Standing PFMC nv nv nv 10x         Ther Ex             TA march             TA leg ext  10x 10x 2x10         PFMC + TB hip abd iso  GTB 10x GTB 10x GTB 2x10         PFMC + TB hip add iso  10x 10x  2x10         PFMC + bridge  8x 10x 10x         Bridge march             SL bridge             SLR abduction             Clamshells             TA SLR             Quadruped TA PFMC             Quadruped leg ext                                       Ther Activity             Education Done Done Done  Done          Urge Suppression nv Done           Bladder intervals/JIC nv nv Done           PFMC + bridge             PFMC + sit to stand **   5x         PFMC + reaching **            PFMC + lift **            PFMC + cough **            PFMC + step ups

## 2023-10-10 ENCOUNTER — OFFICE VISIT (OUTPATIENT)
Dept: PHYSICAL THERAPY | Facility: REHABILITATION | Age: 33
End: 2023-10-10
Payer: COMMERCIAL

## 2023-10-10 DIAGNOSIS — O34.219 VAGINAL BIRTH AFTER CESAREAN: Primary | ICD-10-CM

## 2023-10-10 DIAGNOSIS — N81.89 PELVIC FLOOR WEAKNESS: ICD-10-CM

## 2023-10-10 PROCEDURE — 97110 THERAPEUTIC EXERCISES: CPT | Performed by: PHYSICAL THERAPIST

## 2023-10-10 PROCEDURE — 97112 NEUROMUSCULAR REEDUCATION: CPT | Performed by: PHYSICAL THERAPIST

## 2023-10-10 PROCEDURE — 97530 THERAPEUTIC ACTIVITIES: CPT | Performed by: PHYSICAL THERAPIST

## 2023-10-10 NOTE — PROGRESS NOTES
Daily Note     Today's date: 10/10/2023  Patient name: Lyndon Frankel  : 1990  MRN: [id number]  Referring provider: Bing Flores,*  Dx:   Encounter Diagnosis     ICD-10-CM    1. Vaginal birth after   O34.219       2. Pelvic floor weakness  N81.89           Start Time: 831  Stop Time: 920  Total time in clinic (min): 49 minutes    Subjective: Patient reports an episode of urgency with some leakage yesterday. She reports a strong urge to urinate and was loading the kids into the car including squatting/lifting. She reports trying to do some quick flicks which helped briefly but felt she did not have the endurance to sustain a contraction. She reports increased urgency once she pulls into the driveway at home. Objective: See treatment diary below      Assessment: Tolerated treatment well. Initiated additional core and pelvic floor strengthening with good tolerance and evident fatigue noted. No complaints of urinary leakage throughout session. Educated patient on activating pelvic floor muscles during lifting and squatting picking up her kids or carrying the car seat etc. Also discussed breathing and quick flicks during moments of increased urgency such as when pulling into the driveway at home. Patient demonstrated fatigue post treatment, exhibited good technique with therapeutic exercises and would benefit from continued PT. Plan: Continue per plan of care.       Precautions:  2021,  2023  Biofeedback Codes: not covered  Goals: reducing incontinence (mixed), urgency, frequency, help relieve some back pain, strengthen core   Currently breastfeeding   Did not provide bladder diary but educate on avoiding JIC, increasing bladder intervals and urge suppression       IE RE   OMID-18     PFDI-20 nv    V-Q Wrong form given    [facility]         Manuals 9/5 9/19 9/26 10/3 10/10                                  Neuro Re-Ed             SEMG Biofeedback             Diaphragmatic breathing nv Done Done  Done          TA ADIM nv 10x 10x 10x +PFMC 10x        PFMC Slow Holds nv 10x 10x 10x         PFMC Quick Flicks nv 73M 57D 42U         Seated PFMC nv nv 5x 10x SH 5x  QF 10x        Standing PFMC nv nv nv 10x SH 5x  QF 10x        Ther Ex             TA march             TA leg ext  10x 10x 2x10 2x10        PFMC + TB hip abd iso  GTB 10x GTB 10x GTB 2x10         PFMC + TB hip add iso  10x 10x  2x10         PFMC + bridge  8x 10x 10x PFMC 10x  No PFMC 2x10        Bridge march     nv        SL bridge     nv        SLR abduction             Clamshells     nv        TA SLR     3x10 b/l        TB mid row     Purple   +PFMC 10x  No PFMC 2x10        TB paloff press     +PFMC 10x b/l        Quadruped TA PFMC             Quadruped leg ext                                       Ther Activity             Education Done Done Done  Done          Urge Suppression nv Done           Bladder intervals/JIC nv nv Done           PFMC + bridge             PFMC + sit to stand **   5x 5x        PFMC + squat     5x        PFMC + reaching **    nv        PFMC + lift **    nv        PFMC + cough **            PFMC + step ups

## 2023-10-17 ENCOUNTER — APPOINTMENT (OUTPATIENT)
Dept: PHYSICAL THERAPY | Facility: REHABILITATION | Age: 33
End: 2023-10-17
Payer: COMMERCIAL

## 2023-10-24 ENCOUNTER — OFFICE VISIT (OUTPATIENT)
Dept: PHYSICAL THERAPY | Facility: REHABILITATION | Age: 33
End: 2023-10-24
Payer: COMMERCIAL

## 2023-10-24 DIAGNOSIS — O34.219 VAGINAL BIRTH AFTER CESAREAN: Primary | ICD-10-CM

## 2023-10-24 DIAGNOSIS — N81.89 PELVIC FLOOR WEAKNESS: ICD-10-CM

## 2023-10-24 PROCEDURE — 97112 NEUROMUSCULAR REEDUCATION: CPT | Performed by: PHYSICAL THERAPIST

## 2023-10-24 PROCEDURE — 97110 THERAPEUTIC EXERCISES: CPT | Performed by: PHYSICAL THERAPIST

## 2023-10-24 NOTE — PROGRESS NOTES
Daily Note     Today's date: 10/24/2023  Patient name: Sania Grande  : 1990  MRN: [id number]  Referring provider: Nahum Dailey,*  Dx:   Encounter Diagnosis     ICD-10-CM    1. Vaginal birth after   O34.219       2. Pelvic floor weakness  N81.89           Start Time: 1005  Stop Time: 1100  Total time in clinic (min): 55 minutes    Subjective: Patient reports feeling overall better. She reports having more control with urinary urgency. She reports yesterday she was bending down to get her son's pacifier and had a sudden urge to urinate with leakage prior to making it to the bathroom. She reports urgency when she get homes as well. Objective: See treatment diary below      Assessment: Tolerated treatment well. Initiated additional core and pelvic girdle strengthening with evident fatigue noted. Patient demonstrated fatigue post treatment, exhibited good technique with therapeutic exercises, and would benefit from continued PT. Plan: Continue per plan of care.       Precautions:  2021,  2023  Biofeedback Codes: not covered  Goals: reducing incontinence (mixed), urgency, frequency, help relieve some back pain, strengthen core   Currently breastfeeding   Did not provide bladder diary but educate on avoiding JIC, increasing bladder intervals and urge suppression       IE RE   OMID-18     PFDI-20 nv    V-Q Wrong form given    PGQ         Manuals 9/5 9/19 9/26 10/3 10/10 10/24                                 Neuro Re-Ed             SEMG Biofeedback             Diaphragmatic breathing nv Done Done  Done          TA ADIM nv 10x 10x 10x +PFMC 10x +PFMC 10x       PFMC Slow Holds nv 10x 10x 10x         PFMC Quick Flicks nv 82W 80H 05B         Seated PFMC nv nv 5x 10x SH 5x  QF 10x SH 10x  QF 10x       Standing PFMC nv nv nv 10x SH 5x  QF 10x SH 10x  QF 10x       Ther Ex             TA march             TA leg ext  10x 10x 2x10 2x10        PFMC + TB hip abd iso  GTB 10x GTB 10x GTB 2x10         PFMC + TB hip add iso  10x 10x  2x10         PFMC + bridge  8x 10x 10x PFMC 10x  No PFMC 2x10 PFMC 10x  No PFMC 2x10       Bridge march     nv 2x10       SL bridge     nv        SLR abduction             Clamshells     nv GTB 3x10 b/l       TA SLR     3x10 b/l 3x10 b/l       TB mid row     Purple   +PFMC 10x  No PFMC 2x10        TB low row      Purple PFMC 10x  No PFMC 2x10       TB paloff press     +PFMC 10x b/l Purple +PFMC 10x b/l       Quadruped TA PFMC             Quadruped leg ext                                       Ther Activity             Education Done Done Done  Done          Urge Suppression nv Done           Bladder intervals/JIC nv nv Done           PFMC + bridge             PFMC + sit to stand **   5x 5x nv       PFMC + squat     5x nv       PFMC + reaching **    nv nv       PFMC + lift **    nv nv       PFMC + crouch to stand      nv       PFMC + cough **            PFMC + step ups

## 2023-11-01 ENCOUNTER — ANNUAL EXAM (OUTPATIENT)
Dept: OBGYN CLINIC | Facility: CLINIC | Age: 33
End: 2023-11-01

## 2023-11-01 VITALS
SYSTOLIC BLOOD PRESSURE: 94 MMHG | HEIGHT: 62 IN | BODY MASS INDEX: 29.37 KG/M2 | DIASTOLIC BLOOD PRESSURE: 60 MMHG | WEIGHT: 159.6 LBS

## 2023-11-01 DIAGNOSIS — N39.46 MIXED INCONTINENCE: ICD-10-CM

## 2023-11-01 DIAGNOSIS — Z01.419 ROUTINE GYNECOLOGICAL EXAMINATION: Primary | ICD-10-CM

## 2023-11-01 DIAGNOSIS — Z11.51 SCREENING FOR HPV (HUMAN PAPILLOMAVIRUS): ICD-10-CM

## 2023-11-01 PROBLEM — O09.892 SHORT INTERVAL BETWEEN PREGNANCIES COMPLICATING PREGNANCY, ANTEPARTUM, SECOND TRIMESTER: Status: RESOLVED | Noted: 2023-03-17 | Resolved: 2023-11-01

## 2023-11-01 PROBLEM — O09.219 PREVIOUS PRETERM DELIVERY, ANTEPARTUM: Status: RESOLVED | Noted: 2023-01-13 | Resolved: 2023-11-01

## 2023-11-01 PROBLEM — O34.219 PREVIOUS CESAREAN DELIVERY, ANTEPARTUM: Status: RESOLVED | Noted: 2022-12-19 | Resolved: 2023-11-01

## 2023-11-01 PROBLEM — O47.9 BRAXTON HICK'S CONTRACTION: Status: RESOLVED | Noted: 2023-07-06 | Resolved: 2023-11-01

## 2023-11-01 PROBLEM — Z34.83 PRENATAL CARE, SUBSEQUENT PREGNANCY, THIRD TRIMESTER: Status: RESOLVED | Noted: 2023-05-10 | Resolved: 2023-11-01

## 2023-11-01 PROBLEM — O09.893 HISTORY OF PRETERM DELIVERY, CURRENTLY PREGNANT IN THIRD TRIMESTER: Status: RESOLVED | Noted: 2023-01-21 | Resolved: 2023-11-01

## 2023-11-01 PROBLEM — O21.9 NAUSEA/VOMITING IN PREGNANCY: Status: RESOLVED | Noted: 2023-01-13 | Resolved: 2023-11-01

## 2023-11-01 PROBLEM — O99.211 MATERNAL OBESITY, ANTEPARTUM, FIRST TRIMESTER: Status: RESOLVED | Noted: 2023-01-21 | Resolved: 2023-11-01

## 2023-11-01 PROCEDURE — G0476 HPV COMBO ASSAY CA SCREEN: HCPCS | Performed by: PHYSICIAN ASSISTANT

## 2023-11-01 PROCEDURE — G0145 SCR C/V CYTO,THINLAYER,RESCR: HCPCS | Performed by: PHYSICIAN ASSISTANT

## 2023-11-01 NOTE — PROGRESS NOTES
Assessment   34 y.o. [id number] presenting for annual exam.     Plan   Diagnoses and all orders for this visit:    Routine gynecological examination  -     Liquid-based pap, screening    Normal findings on routine exam.  Encouraged 150 min of exercise per week. Reviewed balanced diet. Multivitamin encouraged   Breast awareness/SBE encouraged    Consistent condom use encouraged for pregnancy prevention. Reviewed contraceptive options and return of ovulation/menses. Couple does not desire more children and  is awaiting preop clearance due to new dx of SVT to have vasectomy. She will call if desires an alternate method. Screening for HPV (human papillomavirus)  -     Liquid-based pap, screening    Mixed incontinence    Reviewed risk factors for same (prior pregnancy, hx of vaginal delivery, obesity,straining and lifting). Currently attending PT and is noting some but slow improvement. Discussed options for referral to urogyn to discuss surgical interventions. Pro/cons of each reviewed. She will complete PT for now. Can plan to RTO to further discuss bladder training or will call if desires referral.     Mother currently breast-feeding        Pap done today   Mammo - due @ 36      RTO one year for yearly exam or sooner as needed. __________________________________________________________________    Subjective     Lyndon Frankel is a 34 y.o. [id number] presenting for annual exam.   This is her first annual following successful  of her daughter, Albertina Martinez. Pregnancy complicated by prior LTCS, short interval pregnancy, history of  delivery, palpitations, and maternal obesity. PP course uncomplicated. 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  (pumped for a full year with her son and plans to do the same for Albertina Martinez. Feeling good overall. No complaints of anxiety or depression. EPDS 1.      Has had a busy few months.  being worked up for SVT, and is awaiting clearance from this to schedule vasectomy. Son Charly Tapia just spent 4 days in hospital due to intussusception. Goes back to work after the new year - gets 6 weeks home with baby since she is based out of [employer]. SCREENING  Last Pap: 2021 NILM   Last Mammo: n/a  Last Colonoscopy: n/a     GYN    Periods - lactational amenorrhea. Sexually active: Yes - single partner -   Concerns: denies pain, bleeding, dryness   Contraception: condoms     Hx Abnormal pap: denies -- did have benign colposcopy in 2019 and subsequent cryosurgery due to PCB   We reviewed ASCCP guidelines for Pap testing today. Gardasil: She believes she has completed the Gardasil series. Denies vaginal discharge, itching, odor, dyspareunia, pelvic pain and vulvar/vaginal symptoms    OB         Complaints: working with pelvic floor pt due to mixed incontinence and urgency   Denies urgency, frequency, hematuria, leakage / change in stream, difficulty urinating. BREAST  Complaints: denies  Denies: breast lump, breast tenderness, nipple discharge, skin color change, and skin lesion(s)  Breastfeeding       Pertinent Family Hx:   Family hx of breast cancer: no  Family hx of ovarian cancer: no  Family hx of colon cancer: no  MGM with uterine cancer    GENERAL  PMH reviewed/updated and is as below. Past Medical History:   Diagnosis Date    Degenerative lumbar disc 2015    Overactive bladder     Urinary tract infection     Verruca        Past Surgical History:   Procedure Laterality Date     SECTION  21    COLPOSCOPY      COLPOSCOPY W/ BIOPSY / CURETTAGE      Resolved 2017    GYNECOLOGIC CRYOSURGERY      AR  DELIVERY ONLY N/A 2021    Procedure:  SECTION ();   Surgeon: Karla Mitchell MD;  Location: [facility] ;  Service: Obstetrics    WISDOM TOOTH EXTRACTION           Current Outpatient Medications:     Prenatal Vit-Fe Fumarate-FA (PRENATAL PO), Take by mouth, Disp: , Rfl:     Allergies   Allergen Reactions    Sulfa Antibiotics Fever and Fatigue    Sulfamethoxazole-Trimethoprim Fever and Fatigue       Social History     Socioeconomic History    Marital status: Single     Spouse name: Not on file    Number of children: Not on file    Years of education: Not on file    Highest education level: Not on file   Occupational History     Employer: [employer] EM[employer]YE[employer]   Tobacco Use    Smoking status: Never    Smokeless tobacco: Never   Vaping Use    Vaping Use: Never used   Substance and Sexual Activity    Alcohol use: Not Currently     Alcohol/week: 2.0 standard drinks of alcohol     Types: 1 Glasses of wine, 1 Standard drinks or equivalent per week    Drug use: No    Sexual activity: Yes     Partners: Male     Birth control/protection: None   Other Topics Concern    Not on file   Social History Narrative    Caffeine Use    Exercising Regularly     always seatbelt in care     employed      Social Determinants of Health     Financial Resource Strain: Low Risk  (3/22/2021)    Overall Financial Resource Strain (CARDIA)     Difficulty of Paying Living Expenses: Not hard at all   Food Insecurity: No Food Insecurity (3/22/2021)    Hunger Vital Sign     Worried About Running Out of Food in the Last Year: Never true     Ran Out of Food in the Last Year: Never true   Transportation Needs: No Transportation Needs (3/22/2021)    PRAPARE - Transportation     Lack of Transportation (Medical): No     Lack of Transportation (Non-Medical):  No   Physical Activity: Sufficiently Active (7/16/2020)    Exercise Vital Sign     Days of Exercise per Week: 5 days     Minutes of Exercise per Session: 40 min   Stress: No Stress Concern Present (7/16/2020)    [address] [place] [address]     Feeling of Stress : Not at all   Social Connections: Unknown (7/16/2020)    Social Connection and Isolation Panel [NHANES]     Frequency of Communication with Friends and Family: More than three times a week     Frequency of Social Gatherings with Friends and Family: More than three times a week     Attends [employer] Services: Not on file     Active Member of Clubs or Organizations: Not on file     Attends Club or Organization Meetings: Not on file     Marital Status: Never    Intimate Partner Violence: Not At Risk (7/16/2020)    Humiliation, Afraid, Rape, and Kick questionnaire     Fear of Current or Ex-Partner: No     Emotionally Abused: No     Physically Abused: No     Sexually Abused: No   Housing Stability: Not on file       Review of Systems     Constitutional: Negative. Respiratory: Negative. Cardiovascular: Negative   Gastrointestinal: Negative   Breasts: As noted above. Genitourinary: As noted above. Psychiatric: Negative     Objective      BP 94/60 (BP Location: Left arm, Patient Position: Sitting, Cuff Size: Standard)   Ht 5' 2" (1.575 m)   Wt 72.4 kg (159 lb 9.6 oz)   LMP  (LMP Unknown)   Breastfeeding Yes   BMI 29.19 kg/m²     Physical Examination:    Patient appears well and is not in distress  Breasts are symmetrical without mass, tenderness, nipple discharge, skin changes or adenopathy. Abdomen is soft and nontender without masses. External genitals are normal without lesions or rashes. Urethral meatus and urethra are normal  Bladder is normal to palpation  Vagina is normal without discharge or bleeding. Cervix is normal without discharge or lesion. Uterus is normal, mobile, nontender without palpable mass. Adnexa are normal, nontender, without palpable mass.

## 2023-11-02 LAB
HPV HR 12 DNA CVX QL NAA+PROBE: NEGATIVE
HPV16 DNA CVX QL NAA+PROBE: NEGATIVE
HPV18 DNA CVX QL NAA+PROBE: NEGATIVE

## 2023-11-06 LAB
LAB AP GYN PRIMARY INTERPRETATION: NORMAL
Lab: NORMAL

## 2023-12-11 ENCOUNTER — OFFICE VISIT (OUTPATIENT)
Dept: FAMILY MEDICINE CLINIC | Facility: CLINIC | Age: 33
End: 2023-12-11
Payer: COMMERCIAL

## 2023-12-11 VITALS
TEMPERATURE: 97 F | RESPIRATION RATE: 18 BRPM | HEIGHT: 62 IN | BODY MASS INDEX: 28.71 KG/M2 | DIASTOLIC BLOOD PRESSURE: 60 MMHG | SYSTOLIC BLOOD PRESSURE: 112 MMHG | HEART RATE: 60 BPM | OXYGEN SATURATION: 99 % | WEIGHT: 156 LBS

## 2023-12-11 DIAGNOSIS — Z13.220 LIPID SCREENING: ICD-10-CM

## 2023-12-11 DIAGNOSIS — R42 DIZZINESS: ICD-10-CM

## 2023-12-11 DIAGNOSIS — Z00.00 ANNUAL PHYSICAL EXAM: Primary | ICD-10-CM

## 2023-12-11 DIAGNOSIS — Z13.1 DIABETES MELLITUS SCREENING: ICD-10-CM

## 2023-12-11 PROCEDURE — 99395 PREV VISIT EST AGE 18-39: CPT | Performed by: FAMILY MEDICINE

## 2023-12-11 NOTE — ASSESSMENT & PLAN NOTE
Combination of orthostatic and also "random."  Decreasing in frequency. Anticipate that breastfeeding-related underfueling and component of dehydration are playing a role. Check CBC, TSH, CMP for completeness - discussed role of hydration with electrolyte intake.

## 2023-12-11 NOTE — PROGRESS NOTES
Family Medicine Physical Office Visit  Gwen Hameed 34 y.o. female   MRN: [id number] : 1990  ENCOUNTER: 2023 11:18 AM    Assessment and Plan   Annual physical exam  Sarina Leger is doing well since the birth of baby Luis Carlos Mehta. She's working back toward a more regular nutritional routine, and is planning a gradual return to physical activity at the 6-month PP darling. UTD on vaccines. Screening labs ordered. Dizziness  Combination of orthostatic and also "random."  Decreasing in frequency. Anticipate that breastfeeding-related underfueling and component of dehydration are playing a role. Check CBC, TSH, CMP for completeness - discussed role of hydration with electrolyte intake. BMI Counseling: Body mass index is 28.53 kg/m². The BMI is above normal. Nutrition recommendations include encouraging healthy choices of fruits and vegetables. Rationale for BMI follow-up plan is due to patient being overweight or obese. Depression Screening and Follow-up Plan: Patient was screened for depression during today's encounter. They screened negative with a PHQ-2 score of 0. I will connect with Sarina Leger regarding lab results    Chief Complaint     Chief Complaint   Patient presents with   • Physical Exam     Dizzy when standing or walk or sitting        History of Present Illness   Gwen Hameed is a 34y.o.-year-old female who presents today for annual physical.      Has been home from work since her baby girl was born Luis Carlos Mehta), and will be returning after the New Year. Has been experiencing episodes of dizziness/nausea/lightheaded, unrelated to position changes. Does have some orthostasis (unrelated). These episodes are becoming less frequent and severe. Physical activity - getting out on walks with Luis Carlos Mehta. Nutrition - getting back into more of a routine. Sleep - 4-5h/night total sleeping, with interruptions to pump and interact with her toddler son José Stewart. No caffeine use.     Review of Systems   Review of Systems   Constitutional:  Negative for activity change, chills, fatigue and fever. HENT:  Negative for congestion, sinus pressure, sinus pain and sore throat. Respiratory:  Negative for cough, shortness of breath and wheezing. Cardiovascular:  Negative for chest pain, palpitations and leg swelling. Gastrointestinal:  Negative for abdominal pain, diarrhea, nausea and vomiting. Genitourinary:  Negative for decreased urine volume, dysuria, frequency and urgency. Musculoskeletal:  Negative for arthralgias, myalgias, neck pain and neck stiffness. Skin:  Negative for rash. Neurological:  Positive for dizziness. Negative for light-headedness, numbness and headaches. Active Problem List     Patient Active Problem List   Diagnosis   • Vitamin D insufficiency   • Palpitation   • Annual physical exam   • Mother currently breast-feeding   • Dizziness       Past Medical History, Past Surgical History, Family History, and Social History were reviewed and updated today as appropriate. Objective   /60 (BP Location: Right arm, Patient Position: Sitting, Cuff Size: Large)   Pulse 60   Temp (!) 97 °F (36.1 °C) (Tympanic)   Resp 18   Ht 5' 2" (1.575 m)   Wt 70.8 kg (156 lb)   SpO2 99%   BMI 28.53 kg/m²     Physical Exam  Constitutional:       General: She is not in acute distress. Appearance: She is well-developed. HENT:      Head: Normocephalic and atraumatic. Eyes:      Pupils: Pupils are equal, round, and reactive to light. Cardiovascular:      Rate and Rhythm: Normal rate and regular rhythm. Heart sounds: Normal heart sounds. No murmur heard. No friction rub. No gallop. Pulmonary:      Effort: Pulmonary effort is normal. No respiratory distress. Breath sounds: Normal breath sounds. No wheezing or rales. Abdominal:      General: There is no distension. Palpations: Abdomen is soft. Musculoskeletal:         General: Normal range of motion. Cervical back: Normal range of motion and neck supple. Neurological:      Mental Status: She is alert and oriented to person, place, and time. Psychiatric:         Behavior: Behavior normal.         Thought Content: Thought content normal.       Diabetic Foot Exam    Pertinent Laboratory/Diagnostic Studies:  Lab Results   Component Value Date    BUN 12 10/16/2022    CREATININE 0.84 10/16/2022    CALCIUM 9.9 10/16/2022    K 3.7 10/16/2022    CO2 28 10/16/2022     10/16/2022     Lab Results   Component Value Date    ALT 10 10/16/2022    AST 16 10/16/2022    ALKPHOS 69 10/16/2022       Lab Results   Component Value Date    WBC 12.85 (H) 07/08/2023    HGB 10.7 (L) 07/08/2023    HCT 34.2 (L) 07/08/2023    MCV 85 07/08/2023     07/08/2023       No results found for: "TSH"    No results found for: "CHOL"  Lab Results   Component Value Date    TRIG 71 06/09/2022     Lab Results   Component Value Date    HDL 64 06/09/2022     Lab Results   Component Value Date    LDLCALC 104 (H) 06/09/2022     Lab Results   Component Value Date    HGBA1C 5.1 06/09/2022       Results for orders placed or performed in visit on 11/01/23   HPV High Risk    Specimen: Cervix;  Thin-Prep Vial   Result Value Ref Range    HPV Other HR Negative Negative    HPV16 Negative Negative    HPV18 Negative Negative   Liquid-based pap, screening   Result Value Ref Range    Case Report       Gynecologic Cytology Report                       Case: [id number]                                  Authorizing Provider:  Jamel Li,     Collected:           11/01/2023 Francisco LUNDBERG                                                                         Ordering Location:     [facility]      Received:            11/01/2023 [address] [place] [address] First Screen:          Nhi Rocha                                                                 Specimen:    LIQUID-BASED PAP, SCREENING, Cervix                                                        Primary Interpretation Negative for intraepithelial lesion or malignancy     Specimen Adequacy       Satisfactory for evaluation. Endocervical/transformation zone component present. Additional Information       [employer]'s FDA approved ,  and ThinPrep Imaging Duo System are utilized with strict adherence to the 's instruction manual to prepare gynecologic and non-gynecologic cytology specimens for the production of ThinPrep slides as well as for gynecologic ThinPrep imaging. These processes have been validated by our laboratory and/or by the . The Pap test is not a diagnostic procedure and should not be used as the sole means to detect cervical cancer. It is only a screening procedure to aid in the detection of cervical cancer and its precursors. Both false-negative and false-positive results have been experienced. Your patient's test result should be interpreted in this context together with the history and clinical findings. Orders Placed This Encounter   Procedures   • CBC and differential   • Lipid Panel with Direct LDL reflex   • Comprehensive metabolic panel   • TSH, 3rd generation with Free T4 reflex           Current Medications     Current Outpatient Medications   Medication Sig Dispense Refill   • Prenatal Vit-Fe Fumarate-FA (PRENATAL PO) Take by mouth       No current facility-administered medications for this visit.        ALLERGIES:  Allergies   Allergen Reactions   • Sulfa Antibiotics Fever and Fatigue   • Sulfamethoxazole-Trimethoprim Fever and Fatigue       Health Maintenance     Health Maintenance   Topic Date Due   • COVID-19 Vaccine (5 - Moderna series) 03/23/2022   • Influenza Vaccine (1) Never done   • PT PLAN OF CARE  10/05/2023   • BMI: Adult  11/01/2024   • Depression Screening  12/11/2024   • BMI: Followup Plan  12/11/2024   • Annual Physical  12/11/2024   • Cervical Cancer Screening  11/01/2026   • DTaP,Tdap,and Td Vaccines (3 - Td or Tdap) 05/25/2033   • HIV Screening  Completed   • Hepatitis C Screening  Completed   • Pneumococcal Vaccine: Pediatrics (0 to 5 Years) and At-Risk Patients (6 to 59 Years)  Aged Out   • HIB Vaccine  Aged Out   • IPV Vaccine  Aged Out   • Hepatitis A Vaccine  Aged Out   • Meningococcal ACWY Vaccine  Aged Out   • HPV Vaccine  Aged [employer] History   Administered Date(s) Administered   • COVID-19 MODERNA VACC 0.25 ML IM BOOSTER 01/26/2022   • COVID-19 MODERNA VACC 0.5 ML IM 12/23/2020, 01/19/2021, 01/26/2022   • Tdap 06/15/2021, 05/25/2023         Randall Proctor MD   [address] [facility]  12/11/2023  11:18 AM    Parts of this note were dictated using Paga dictation software and may have sounds-like errors due to variation in pronunciation.

## 2023-12-11 NOTE — ASSESSMENT & PLAN NOTE
Galina Morales is doing well since the birth of joan Luna. She's working back toward a more regular nutritional routine, and is planning a gradual return to physical activity at the 6-month PP darling. UTD on vaccines. Screening labs ordered.

## 2023-12-19 ENCOUNTER — TELEPHONE (OUTPATIENT)
Dept: OBGYN CLINIC | Facility: CLINIC | Age: 33
End: 2023-12-19

## 2023-12-19 NOTE — LETTER
December 19, 2023     Patient: Enriqueta Pierre  YOB: 1990  Date of Visit: 12/19/2023      To Whom it May Concern:    Enriqueta Pierre has been a patient in this office for her recent pregnancy and  Delivery 07/09/2023.  She is able to return to work with no restrictions 01/02/2024.      Sincerely,     Eileen Calvillo RN

## 2023-12-19 NOTE — TELEPHONE ENCOUNTER
"----- Message from Enriqueta Pierre sent at 12/19/2023  9:34 AM EST -----  Regarding: Return to work note  Contact: [phone]  Hi there,      Am I able to receive a return to work note? I'm returning January 2nd. Per HR,  I need a \"return to work note (on the doctor’s office letterhead is fine; there is no special form) stating full duty or any restrictions\" that can just be placed into my chart.    Thank you so much,  Enriqueta"

## 2023-12-27 ENCOUNTER — APPOINTMENT (OUTPATIENT)
Dept: LAB | Facility: CLINIC | Age: 33
End: 2023-12-27
Payer: COMMERCIAL

## 2023-12-27 DIAGNOSIS — Z13.1 DIABETES MELLITUS SCREENING: ICD-10-CM

## 2023-12-27 DIAGNOSIS — R42 DIZZINESS: ICD-10-CM

## 2023-12-27 DIAGNOSIS — Z13.220 LIPID SCREENING: ICD-10-CM

## 2023-12-27 LAB
ALBUMIN SERPL BCP-MCNC: 4.7 G/DL (ref 3.5–5)
ALP SERPL-CCNC: 79 U/L (ref 34–104)
ALT SERPL W P-5'-P-CCNC: 11 U/L (ref 7–52)
ANION GAP SERPL CALCULATED.3IONS-SCNC: 6 MMOL/L
AST SERPL W P-5'-P-CCNC: 17 U/L (ref 13–39)
BASOPHILS # BLD AUTO: 0.04 THOUSANDS/ÂΜL (ref 0–0.1)
BASOPHILS NFR BLD AUTO: 1 % (ref 0–1)
BILIRUB SERPL-MCNC: 0.66 MG/DL (ref 0.2–1)
BUN SERPL-MCNC: 16 MG/DL (ref 5–25)
CALCIUM SERPL-MCNC: 9.5 MG/DL (ref 8.4–10.2)
CHLORIDE SERPL-SCNC: 103 MMOL/L (ref 96–108)
CHOLEST SERPL-MCNC: 143 MG/DL
CO2 SERPL-SCNC: 30 MMOL/L (ref 21–32)
CREAT SERPL-MCNC: 0.81 MG/DL (ref 0.6–1.3)
EOSINOPHIL # BLD AUTO: 0.11 THOUSAND/ÂΜL (ref 0–0.61)
EOSINOPHIL NFR BLD AUTO: 2 % (ref 0–6)
ERYTHROCYTE [DISTWIDTH] IN BLOOD BY AUTOMATED COUNT: 12.7 % (ref 11.6–15.1)
GFR SERPL CREATININE-BSD FRML MDRD: 97 ML/MIN/1.73SQ M
GLUCOSE P FAST SERPL-MCNC: 79 MG/DL (ref 65–99)
HCT VFR BLD AUTO: 41.5 % (ref 34.8–46.1)
HDLC SERPL-MCNC: 71 MG/DL
HGB BLD-MCNC: 13.7 G/DL (ref 11.5–15.4)
IMM GRANULOCYTES # BLD AUTO: 0.01 THOUSAND/UL (ref 0–0.2)
IMM GRANULOCYTES NFR BLD AUTO: 0 % (ref 0–2)
LDLC SERPL CALC-MCNC: 66 MG/DL (ref 0–100)
LYMPHOCYTES # BLD AUTO: 1.76 THOUSANDS/ÂΜL (ref 0.6–4.47)
LYMPHOCYTES NFR BLD AUTO: 38 % (ref 14–44)
MCH RBC QN AUTO: 30.8 PG (ref 26.8–34.3)
MCHC RBC AUTO-ENTMCNC: 33 G/DL (ref 31.4–37.4)
MCV RBC AUTO: 93 FL (ref 82–98)
MONOCYTES # BLD AUTO: 0.31 THOUSAND/ÂΜL (ref 0.17–1.22)
MONOCYTES NFR BLD AUTO: 7 % (ref 4–12)
NEUTROPHILS # BLD AUTO: 2.42 THOUSANDS/ÂΜL (ref 1.85–7.62)
NEUTS SEG NFR BLD AUTO: 52 % (ref 43–75)
NRBC BLD AUTO-RTO: 0 /100 WBCS
PLATELET # BLD AUTO: 250 THOUSANDS/UL (ref 149–390)
PMV BLD AUTO: 10.6 FL (ref 8.9–12.7)
POTASSIUM SERPL-SCNC: 4 MMOL/L (ref 3.5–5.3)
PROT SERPL-MCNC: 7.4 G/DL (ref 6.4–8.4)
RBC # BLD AUTO: 4.45 MILLION/UL (ref 3.81–5.12)
SODIUM SERPL-SCNC: 139 MMOL/L (ref 135–147)
TRIGL SERPL-MCNC: 32 MG/DL
TSH SERPL DL<=0.05 MIU/L-ACNC: 2.82 UIU/ML (ref 0.45–4.5)
WBC # BLD AUTO: 4.65 THOUSAND/UL (ref 4.31–10.16)

## 2023-12-27 PROCEDURE — 84443 ASSAY THYROID STIM HORMONE: CPT

## 2023-12-27 PROCEDURE — 80061 LIPID PANEL: CPT

## 2023-12-27 PROCEDURE — 36415 COLL VENOUS BLD VENIPUNCTURE: CPT

## 2023-12-27 PROCEDURE — 80053 COMPREHEN METABOLIC PANEL: CPT

## 2023-12-27 PROCEDURE — 85025 COMPLETE CBC W/AUTO DIFF WBC: CPT

## 2024-01-25 ENCOUNTER — TELEPHONE (OUTPATIENT)
Dept: FAMILY MEDICINE CLINIC | Facility: CLINIC | Age: 34
End: 2024-01-25

## 2024-01-25 NOTE — TELEPHONE ENCOUNTER
This patient has called and after some discussion with Dr Veras it is felt she may benefit from an appointment with a DO for her symptoms per this patient conversation. She is having crepitus and pain in her neck which she stated she did not share with Dr Veras but she did discuss with him that she is having. periodic bouts of dizziness and nausea. I have put her in the schedule for OMT on a Monday morning clinic time. I just wanted you to review and determine if appropriate for a clinic slot. Thank you

## 2024-01-30 ENCOUNTER — TELEPHONE (OUTPATIENT)
Dept: FAMILY MEDICINE CLINIC | Facility: CLINIC | Age: 34
End: 2024-01-30

## 2024-01-30 NOTE — TELEPHONE ENCOUNTER
Caller: Patient    Doctor: Eda      Reason for call: Patient call asking if Dr. Veras can put in a order for she can get the flu shot at [facility] where [facility] at.       Number: [phone]

## 2024-01-31 NOTE — TELEPHONE ENCOUNTER
Please confirm which [facility] she is referring to, then please call the [facility] and ask what exactly they need.  I'm not familiar with needing to order the flu vaccine for administration offsite.

## 2024-02-05 ENCOUNTER — PROCEDURE VISIT (OUTPATIENT)
Dept: FAMILY MEDICINE CLINIC | Facility: CLINIC | Age: 34
End: 2024-02-05
Payer: COMMERCIAL

## 2024-02-05 DIAGNOSIS — G44.86 CERVICOGENIC HEADACHE: ICD-10-CM

## 2024-02-05 DIAGNOSIS — M54.2 NECK PAIN: Primary | ICD-10-CM

## 2024-02-05 DIAGNOSIS — M99.08 SOMATIC DYSFUNCTION OF RIB: ICD-10-CM

## 2024-02-05 DIAGNOSIS — R42 DIZZINESS: ICD-10-CM

## 2024-02-05 DIAGNOSIS — M99.01 SOMATIC DYSFUNCTION OF CERVICAL REGION: ICD-10-CM

## 2024-02-05 DIAGNOSIS — M99.00 SOMATIC DYSFUNCTION OF HEAD REGION: ICD-10-CM

## 2024-02-05 DIAGNOSIS — M99.02 SOMATIC DYSFUNCTION OF THORACIC REGION: ICD-10-CM

## 2024-02-05 PROCEDURE — 98928 OSTEOPATH MANJ 7-8 REGIONS: CPT

## 2024-02-05 NOTE — PROGRESS NOTES
The Assessment/Plan     This is a 33 y.o. female who presents for OMT follow-up for:  1. Neck pain  OMT      2. Cervicogenic headache  OMT      3. Dizziness  OMT      4. Somatic dysfunction of head region  OMT      5. Somatic dysfunction of cervical region  OMT      6. Somatic dysfunction of thoracic region  OMT      7. Somatic dysfunction of rib  OMT           1. Patient tolerated OMT well for the above problems,  advised patient to drink fluids and can use NSAID for soreness after treatment     2. OMT Follow up in 2 weeks.    Subjective     Enriqueta Pierre is a 33 y.o. female and is here for a OMT follow up. The patient reports episodes of dizzines, which started within 1 year ago. Describes as not oriented with space.   Triggers: standing at computer and typing while listening. Or standing and cutting vegetables while listening, causes dizziness. Reports associated nausea.     Currently has a Headache, over eye b/l. Reports due to not drinking coffee. Reports crepitus in neck and neck pain. No known neck injuries or trauma.     Is the patient taking Pain medication? yes, Tylenol  Has the patient completed physical therapy for this condition? no  Did Patient symptoms improve from last OMT appointment?  First session    The following portions of the patient's history were reviewed and updated as appropriate: She  has a past medical history of Degenerative lumbar disc (2015), Overactive bladder, Urinary tract infection, and Verruca.  She   Patient Active Problem List    Diagnosis Date Noted    Dizziness 2023    Mother currently breast-feeding 2023    Annual physical exam 2022    Palpitation 2020    Vitamin D insufficiency 2015     She  has a past surgical history that includes [place] tooth extraction; Colposcopy w/ biopsy / curettage; Colposcopy; Gynecologic cryosurgery; pr  delivery only (N/A, 2021); and  section (21).  Her family history includes ALS  in her maternal grandfather and other; Alcohol abuse in her father; Arthritis in her other; Breast cancer in her other; COPD in her maternal grandmother; Endometrial cancer in her maternal grandmother; Endometriosis in her maternal grandmother; Fibroids in her mother; Hiatal hernia in her maternal grandmother; Hypertension in her other; Hypothyroidism in her mother; No Known Problems in her brother, brother, and son; Osteoporosis in her maternal grandmother and other; Parkinsonism in her maternal grandfather and maternal grandmother; Rheum arthritis in her mother; Skin cancer in her maternal grandmother; Thyroid disease in her mother.  She  reports that she has never smoked. She has never used smokeless tobacco. She reports that she does not currently use alcohol after a past usage of about 2.0 standard drinks of alcohol per week. She reports that she does not use drugs.  Current Outpatient Medications   Medication Sig Dispense Refill    Prenatal Vit-Fe Fumarate-FA (PRENATAL PO) Take by mouth       No current facility-administered medications for this visit.     Current Outpatient Medications on File Prior to Visit   Medication Sig    Prenatal Vit-Fe Fumarate-FA (PRENATAL PO) Take by mouth     No current facility-administered medications on file prior to visit.     She is allergic to sulfa antibiotics and sulfamethoxazole-trimethoprim..    Review of Systems  Review of Systems   Musculoskeletal:  Positive for arthralgias, back pain, neck pain and neck stiffness.   Neurological:  Positive for dizziness and headaches. Negative for weakness and numbness.         Objective     OMT Exam     OMT    Performed by: Eli Vanessa DO  Authorized by: Eli Vanessa DO  Universal Protocol:  Consent: Verbal consent obtained.  Patient identity confirmed: verbally with patient      Procedure Details:     Region evaluated and treated:  Head, Cervical, Thoracic and Ribs    Thoracic Information  Thoracic Region: T1  - T4 and T5 - T9  Head Details:     Examination Method:  Tissue Texture Change, Stability, Laxity, Effusions, Tone, Range of Motion, Contracture, Tenderness, Pain and Asymmetry, Misalignment, Crepitation, Defects, Masses    Severity:  Severe    Osteopathic Findings:  Right forehead myofascial restriction  Right occipital hypertrophy and tenderness  Right cervical paraspinal hypertrophy and tenderness  Right trapezius hypertrophy and tenderness  Right C6, C4, C2 tenderpoints  C5-C6 SLRL  Posterior Riht Rib 2, with audible click   Scapula trigger points, worse along medial edge  T1-4 RRSL, T5-T9 RLSR    Treatment Method:  Indirect Treatment, Counterstrain Treatment, Muscle Energy Treatment, Myofascial Release Treatment, Soft Tissue Treatment and Visceral Manipulative Treatment    Response:  Improved - The somatic dysfunction is improved but not completely resolved.    Cervical Details:     Examination Method:  Tissue Texture Change, Stability, Laxity, Effusions, Tone, Range of Motion, Contracture, Asymmetry, Misalignment, Crepitation, Defects, Masses and Tenderness, Pain    Severity:  Severe    Osteopathic Findings:  Right forehead myofascial restriction  Right occipital hypertrophy and tenderness  Right cervical paraspinal hypertrophy and tenderness  Right trapezius hypertrophy and tenderness  Right C6, C4, C2 tenderpoints  C5-C6 SLRL  Posterior Riht Rib 2, with audible click   Scapula trigger points, worse along medial edge  T1-4 RRSL, T5-T9 RLSR    Treatment Method:  Counterstrain Treatment, Direct Treatment, Facilitated Positional Release Treatment, Indirect Treatment, Muscle Energy Treatment, Myofascial Release Treatment, Soft Tissue Treatment and Visceral Manipulative Treatment    Response:  Improved - The somatic dysfunction is improved but not completely resolved.    Thoracic T1 - T4 details:     Examination Method:  Range of Motion, Contracture, Tissue Texture Change, Stability, Laxity, Effusions, Tone,  Asymmetry, Misalignment, Crepitation, Defects, Masses and Tenderness, Pain    Severity:  Severe    Osteopathic Findings:  Right forehead myofascial restriction  Right occipital hypertrophy and tenderness  Right cervical paraspinal hypertrophy and tenderness  Right trapezius hypertrophy and tenderness  Right C6, C4, C2 tenderpoints  C5-C6 SLRL  Posterior Riht Rib 2, with audible click   Scapula trigger points, worse along medial edge  T1-4 RRSL, T5-T9 RLSR    Treatment Method:  Counterstrain Treatment, Direct Treatment, Facilitated Positional Release Treatment, Balanced Ligamentous Tension, Ligamentous Articular Strain Treatment, Articulatory Treatment, Indirect Treatment, Muscle Energy Treatment, Myofascial Release Treatment, Soft Tissue Treatment, High Velocity, Low Amplitude Treatment and Visceral Manipulative Treatment    Response:  Unchanged    Thoracic T5 - T9 details:     Examination Method:  Tissue Texture Change, Stability, Laxity, Effusions, Tone, Range of Motion, Contracture, Asymmetry, Misalignment, Crepitation, Defects, Masses and Tenderness, Pain    Severity:  Severe    Osteopathic Findings:  Right forehead myofascial restriction  Right occipital hypertrophy and tenderness  Right cervical paraspinal hypertrophy and tenderness  Right trapezius hypertrophy and tenderness  Right C6, C4, C2 tenderpoints  C5-C6 SLRL  Posterior Riht Rib 2, with audible click   Scapula trigger points, worse along medial edge  T1-4 RRSL, T5-T9 RLSR    Treatment Method:  Articulatory Treatment, Balanced Ligamentous Tension, Ligamentous Articular Strain Treatment, Counterstrain Treatment, Direct Treatment, Facilitated Positional Release Treatment, High Velocity, Low Amplitude Treatment, Indirect Treatment, Muscle Energy Treatment, Myofascial Release Treatment, Soft Tissue Treatment and Visceral Manipulative Treatment    Response:  Improved - The somatic dysfunction is improved but not completely resolved.    Ribs details:      Examination Method:  Range of Motion, Contracture, Tissue Texture Change, Stability, Laxity, Effusions, Tone, Asymmetry, Misalignment, Crepitation, Defects, Masses and Tenderness, Pain    Severity:  Severe    Osteopathic Findings:  Posterior Riht Rib 2, with audible click   Scapula trigger points, worse along medial edge  T1-4 RRSL, T5-T9 RLSR    Used RA for Rib 2  Attempted HVLA  Did rib raising    Consider Still technique    Treatment Method:  Visceral Manipulative Treatment, Soft Tissue Treatment, Muscle Energy Treatment, High Velocity, Low Amplitude Treatment, Direct Treatment and Indirect Treatment    Response:  Unchanged - The somatic dysfunction is unchanged or the same after treatment.    Total Regions Treated:  4  Attending provider present in exam room for procedure: No

## 2024-02-21 DIAGNOSIS — L65.0 TELOGEN EFFLUVIUM: ICD-10-CM

## 2024-02-21 DIAGNOSIS — E55.9 VITAMIN D INSUFFICIENCY: Primary | ICD-10-CM

## 2024-02-22 ENCOUNTER — TELEPHONE (OUTPATIENT)
Dept: FAMILY MEDICINE CLINIC | Facility: CLINIC | Age: 34
End: 2024-02-22

## 2024-02-22 ENCOUNTER — APPOINTMENT (OUTPATIENT)
Dept: LAB | Facility: CLINIC | Age: 34
End: 2024-02-22
Payer: COMMERCIAL

## 2024-02-22 DIAGNOSIS — L65.0 TELOGEN EFFLUVIUM: ICD-10-CM

## 2024-02-22 DIAGNOSIS — E55.9 VITAMIN D INSUFFICIENCY: ICD-10-CM

## 2024-02-22 DIAGNOSIS — H10.32 ACUTE BACTERIAL CONJUNCTIVITIS OF LEFT EYE: Primary | ICD-10-CM

## 2024-02-22 LAB
25(OH)D3 SERPL-MCNC: 23 NG/ML (ref 30–100)
FERRITIN SERPL-MCNC: 24 NG/ML (ref 11–307)
TSH SERPL DL<=0.05 MIU/L-ACNC: 2.33 UIU/ML (ref 0.45–4.5)

## 2024-02-22 PROCEDURE — 82306 VITAMIN D 25 HYDROXY: CPT

## 2024-02-22 PROCEDURE — 84443 ASSAY THYROID STIM HORMONE: CPT

## 2024-02-22 PROCEDURE — 82728 ASSAY OF FERRITIN: CPT

## 2024-02-22 PROCEDURE — 36415 COLL VENOUS BLD VENIPUNCTURE: CPT

## 2024-02-22 RX ORDER — OFLOXACIN 3 MG/ML
1 SOLUTION/ DROPS OPHTHALMIC 4 TIMES DAILY
Qty: 5 ML | Refills: 0 | Status: SHIPPED | OUTPATIENT
Start: 2024-02-22

## 2024-02-22 NOTE — TELEPHONE ENCOUNTER
Reached out to pt and she stated that its the LEFT eye. Pt also asked on the protocol   how long you have to be out of work with the pink eye.

## 2024-02-22 NOTE — TELEPHONE ENCOUNTER
Sent drops to pharmacy.  She can return to work tomorrow as long as she starts the drops today.  Thank you!

## 2024-02-22 NOTE — TELEPHONE ENCOUNTER
Pt called and stated that her daughter has pink eye and gave it to her, her job stays that she is not able to go to work until she gets the antibiotic eye drop. She is asking if you are able to send something over to the pharmacy or if there is something that she can get over the counter. Pt stated that she might need a work note.

## 2024-02-26 ENCOUNTER — PROCEDURE VISIT (OUTPATIENT)
Dept: FAMILY MEDICINE CLINIC | Facility: CLINIC | Age: 34
End: 2024-02-26
Payer: COMMERCIAL

## 2024-02-26 DIAGNOSIS — M99.00 SOMATIC DYSFUNCTION OF HEAD REGION: ICD-10-CM

## 2024-02-26 DIAGNOSIS — M99.03 SOMATIC DYSFUNCTION OF LUMBAR REGION: ICD-10-CM

## 2024-02-26 DIAGNOSIS — M62.838 TRAPEZIUS MUSCLE SPASM: ICD-10-CM

## 2024-02-26 DIAGNOSIS — E61.1 HYPOFERREMIA: ICD-10-CM

## 2024-02-26 DIAGNOSIS — G90.1 DYSAUTONOMIA (HCC): ICD-10-CM

## 2024-02-26 DIAGNOSIS — R41.89 BRAIN FOG: ICD-10-CM

## 2024-02-26 DIAGNOSIS — M99.06 SOMATIC DYSFUNCTION OF LOWER EXTREMITY: ICD-10-CM

## 2024-02-26 DIAGNOSIS — R42 DIZZINESS: ICD-10-CM

## 2024-02-26 DIAGNOSIS — M35.7 HYPERMOBILITY SYNDROME: Primary | ICD-10-CM

## 2024-02-26 DIAGNOSIS — R23.2 FACIAL FLUSHING: ICD-10-CM

## 2024-02-26 DIAGNOSIS — M99.07 SOMATIC DYSFUNCTION OF UPPER EXTREMITY: ICD-10-CM

## 2024-02-26 DIAGNOSIS — M99.01 SOMATIC DYSFUNCTION OF CERVICAL REGION: ICD-10-CM

## 2024-02-26 DIAGNOSIS — M99.02 SOMATIC DYSFUNCTION OF THORACIC REGION: ICD-10-CM

## 2024-02-26 PROCEDURE — 99214 OFFICE O/P EST MOD 30 MIN: CPT | Performed by: FAMILY MEDICINE

## 2024-02-26 PROCEDURE — 98927 OSTEOPATH MANJ 5-6 REGIONS: CPT

## 2024-02-26 RX ORDER — SODIUM CHLORIDE 9 MG/ML
20 INJECTION, SOLUTION INTRAVENOUS ONCE
OUTPATIENT
Start: 2024-03-25

## 2024-02-26 NOTE — PROGRESS NOTES
Name: Enriqueta Pierre      : 1990      MRN: [id number]  Encounter Provider: Ai Johnston DO  Encounter Date: 2024   Encounter department: [facility]    Assessment & Plan    1. Hypermobility syndrome  -     Vitamin B12; Future  -     MTHFR mutation; Future  -     [facility] Miscellaneous Test; Future    2. Dysautonomia (HCC)  -     Vitamin B12; Future    3. Facial flushing  -     [facility] Miscellaneous Test; Future    4. Brain fog  -     [facility] Miscellaneous Test; Future      Subjective    HPI   Patient presents for initial OMT visit. Endorsed recent postpartum and post covid muscle tension in head, neck, shoulders of several weeks and  intermittent headaches, fatigue, and other dysautonomia exacerbation. Continued low back pain (chronic of many years).    Objective    There were no vitals taken for this visit.     OMT    Performed by: Kurtis Espinoza DO  Authorized by: Kurtis Espinoza DO  Universal Protocol:  Procedure performed by:  Consent: Verbal consent obtained. Written consent not obtained.  Risks and benefits: risks, benefits and alternatives were discussed  Consent given by: patient  Patient identity confirmed: verbally with patient      Procedure Details:     Region evaluated and treated:  Head, Cervical, Lumbar, Right Extremities, Thoracic and Left Extremities    Extremity Information  Extremities: left lower extremity    Extremity Information  Extremities: right upper extremity and right lower extremity    Thoracic Information  Thoracic Region: T1 - T4  Head Details:     Examination Method:  Tissue Texture Change, Stability, Laxity, Effusions, Tone and Tenderness, Pain    Severity:  Mild    Osteopathic Findings:  OA rotated left  Tension in subcapitis      Treatment Method:  Soft Tissue Treatment and Myofascial Release Treatment    Response:  Improved - The somatic dysfunction is improved but not completely resolved.    Cervical Details:     Examination  Method:  Tissue Texture Change, Stability, Laxity, Effusions, Tone and Tenderness, Pain    Severity:  Moderate    Osteopathic Findings:  Tension in SCM  C5 rotated sidebent left  Tension in cervical aspect of trapesius    Treatment Method:  Facilitated Positional Release Treatment, Myofascial Release Treatment and Soft Tissue Treatment    Response:  Improved - The somatic dysfunction is improved but not completely resolved.    Thoracic T1 - T4 details:     Examination Method:  Tissue Texture Change, Stability, Laxity, Effusions, Tone, Tenderness, Pain and Asymmetry, Misalignment, Crepitation, Defects, Masses    Severity:  Moderate    Osteopathic Findings:  Right worse than left muscle tension  Point tenderness      Treatment Method:  Facilitated Positional Release Treatment, Counterstrain Treatment and Soft Tissue Treatment    Lumbar details:     Examination Method:  Tissue Texture Change, Stability, Laxity, Effusions, Tone, Tenderness, Pain and Asymmetry, Misalignment, Crepitation, Defects, Masses    Severity:  Moderate    Osteopathic Findings:  L4 and L2 rotated right side bent left  Muscular tension and tenderness    Treatment Method:  Myofascial Release Treatment and Soft Tissue Treatment    Right Lower Extremity details:     Examination Method:  Asymmetry, Misalignment, Crepitation, Defects, Masses    Osteopathic Findings:  Right short leg    Treatment Method:  Myofascial Release Treatment and Direct Treatment    Response:  Resolved - The somatic dysfunction is completely resolved without evidence of it ever having been present.    Left Lower Extremity details:     Examination Method:  Asymmetry, Misalignment, Crepitation, Defects, Masses    Osteopathic Findings:  Externally rotated tibia    Treatment Method:  Myofascial Release Treatment and Direct Treatment    Response:  Resolved - The somatic dysfunction is completely resolved without evidence of it ever having been present.    Right Upper Extremity details:      Examination Method:  Tissue Texture Change, Stability, Laxity, Effusions, Tone and Tenderness, Pain    Treatment Method:  Soft Tissue Treatment, Myofascial Release Treatment and Counterstrain Treatment    Response:  Improved - The somatic dysfunction is improved but not completely resolved.    Total Regions Treated:  6  Attending provider present in exam room for procedure: Yes    Ai Johnston, DO

## 2024-02-26 NOTE — PATIENT INSTRUCTIONS
Vitassium pills or powder 500 mg   Gateroade zero or propel packets   Soup has about 900 mg of sodium

## 2024-03-08 DIAGNOSIS — E55.9 VITAMIN D INSUFFICIENCY: Primary | ICD-10-CM

## 2024-03-08 RX ORDER — ERGOCALCIFEROL 1.25 MG/1
50000 CAPSULE ORAL WEEKLY
Qty: 12 CAPSULE | Refills: 0 | Status: SHIPPED | OUTPATIENT
Start: 2024-03-08

## 2024-03-11 ENCOUNTER — PROCEDURE VISIT (OUTPATIENT)
Dept: FAMILY MEDICINE CLINIC | Facility: CLINIC | Age: 34
End: 2024-03-11
Payer: COMMERCIAL

## 2024-03-11 DIAGNOSIS — M99.00 SOMATIC DYSFUNCTION OF HEAD REGION: ICD-10-CM

## 2024-03-11 DIAGNOSIS — M99.01 SOMATIC DYSFUNCTION OF CERVICAL REGION: ICD-10-CM

## 2024-03-11 DIAGNOSIS — M62.838 TRAPEZIUS MUSCLE SPASM: Primary | ICD-10-CM

## 2024-03-11 DIAGNOSIS — M99.02 SOMATIC DYSFUNCTION OF SPINE, THORACIC: ICD-10-CM

## 2024-03-11 PROCEDURE — 98926 OSTEOPATH MANJ 3-4 REGIONS: CPT

## 2024-03-11 PROCEDURE — 99213 OFFICE O/P EST LOW 20 MIN: CPT
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
## 2024-03-22 ENCOUNTER — TELEPHONE (OUTPATIENT)
Dept: INFUSION CENTER | Facility: CLINIC | Age: 34
End: 2024-03-22

## 2024-03-22 NOTE — TELEPHONE ENCOUNTER
New pt phone call made for appt Monday 3/25. Reviewed visitor policy, location, and arrival time. Pt offered no questions.

## 2024-03-25 ENCOUNTER — HOSPITAL ENCOUNTER (OUTPATIENT)
Dept: INFUSION CENTER | Facility: CLINIC | Age: 34
Discharge: HOME | End: 2024-03-25
Payer: COMMERCIAL

## 2024-03-25 VITALS
OXYGEN SATURATION: 100 % | SYSTOLIC BLOOD PRESSURE: 108 MMHG | HEART RATE: 65 BPM | DIASTOLIC BLOOD PRESSURE: 58 MMHG | RESPIRATION RATE: 18 BRPM | TEMPERATURE: 97.5 F

## 2024-03-25 DIAGNOSIS — E61.1 HYPOFERREMIA: ICD-10-CM

## 2024-03-25 DIAGNOSIS — R42 DIZZINESS: ICD-10-CM

## 2024-03-25 DIAGNOSIS — R41.89 BRAIN FOG: Primary | ICD-10-CM

## 2024-03-25 DIAGNOSIS — G90.1 DYSAUTONOMIA (HCC): ICD-10-CM

## 2024-03-25 PROCEDURE — 96365 THER/PROPH/DIAG IV INF INIT: CPT

## 2024-03-25 RX ORDER — SODIUM CHLORIDE 9 MG/ML
20 INJECTION, SOLUTION INTRAVENOUS ONCE
Status: COMPLETED | OUTPATIENT
Start: 2024-03-25 | End: 2024-03-25

## 2024-03-25 RX ORDER — SODIUM CHLORIDE 9 MG/ML
20 INJECTION, SOLUTION INTRAVENOUS ONCE
OUTPATIENT
Start: 2024-04-02

## 2024-03-25 RX ADMIN — SODIUM CHLORIDE 20 ML/HR: 0.9 INJECTION, SOLUTION INTRAVENOUS at 08:40

## 2024-03-25 RX ADMIN — IRON SUCROSE 200 MG: 20 INJECTION, SOLUTION INTRAVENOUS at 08:42

## 2024-03-25 NOTE — PROGRESS NOTES
Pt tolerated 1st IV Venofer without incident. Confirmed pts next appt for 4/2/24 @ 1727 @ [facility].

## 2024-04-01 ENCOUNTER — OFFICE VISIT (OUTPATIENT)
Dept: OBGYN CLINIC | Facility: CLINIC | Age: 34
End: 2024-04-01
Payer: COMMERCIAL

## 2024-04-01 ENCOUNTER — APPOINTMENT (OUTPATIENT)
Dept: RADIOLOGY | Facility: AMBULARY SURGERY CENTER | Age: 34
End: 2024-04-01
Attending: STUDENT IN AN ORGANIZED HEALTH CARE EDUCATION/TRAINING PROGRAM
Payer: COMMERCIAL

## 2024-04-01 VITALS
WEIGHT: 150 LBS | DIASTOLIC BLOOD PRESSURE: 70 MMHG | BODY MASS INDEX: 27.6 KG/M2 | HEIGHT: 62 IN | SYSTOLIC BLOOD PRESSURE: 108 MMHG | HEART RATE: 80 BPM

## 2024-04-01 DIAGNOSIS — M25.561 RIGHT KNEE PAIN, UNSPECIFIED CHRONICITY: ICD-10-CM

## 2024-04-01 DIAGNOSIS — M25.561 RIGHT KNEE PAIN, UNSPECIFIED CHRONICITY: Primary | ICD-10-CM

## 2024-04-01 PROCEDURE — 99213 OFFICE O/P EST LOW 20 MIN: CPT | Performed by: STUDENT IN AN ORGANIZED HEALTH CARE EDUCATION/TRAINING PROGRAM

## 2024-04-01 PROCEDURE — 73564 X-RAY EXAM KNEE 4 OR MORE: CPT

## 2024-04-01 NOTE — PROGRESS NOTES
Ortho Sports Medicine Knee New Patient Visit     Assesment:   33 y.o. female right knee pain suspicious for lateral meniscus tear vs discoid meniscus    Plan:    Enriqueta is present in office for evaluation of her right knee. X-rays were performed in office and were reviewed in detail with the patient.  Discussed radiographs demonstrate some squaring of lateral femoral condyle as well as symptoms of lateral sided joint pain and mechanical catching and locking concerning for discoid meniscus.  At this time we discussed and recommend patient get right knee MRI to further evaluated for lateral meniscus tear vs discoid meniscus. Patient is in agreement, MRI was ordered and patient is to follow up post official radiologist read, we will review together.    Conservative treatment:    Ice to knee for 20 minutes at least 1-2 times daily.  OTC NSAIDS prn for pain.  Tylenol for pain.    Imaging:    All imaging from today was reviewed by myself and explained to the patient.       Injection:    No Injection planned at this time.      Surgery:     No surgery is recommended at this point, continue with conservative treatment plan as noted.      Follow up:    No follow-ups on file.        Chief Complaint   Patient presents with    Right Knee - Pain     Mus. Tear in right knee.        History of Present Illness:    The patient is a 33 y.o. female whose occupation is ,  seen in clinic for evaluation of right knee pain.  Patient reports that she had started to notice deep knee pain in 2018 when she was removing tile from her floor. This pain resolved and would occasionally come back a random day. Patient started noticing that her right knee pain started becoming present every day. She typically will sit with her knee in flexion underneath her. She reports that she could sometimes feel catching in her knee and will be unable to full extend her knee. She reports that she will sometimes have patellar crepitus that causes  her intense pain with knee extension. Pain is a 6-7/10 sharp pain on occasion.       Knee Surgical History:  None    Past Medical, Social and Family History:  Past Medical History:   Diagnosis Date    Degenerative lumbar disc 2015    Overactive bladder     Urinary tract infection     Verruca      Past Surgical History:   Procedure Laterality Date     SECTION  21    COLPOSCOPY      COLPOSCOPY W/ BIOPSY / CURETTAGE      Resolved 2017    GYNECOLOGIC CRYOSURGERY      [place]  DELIVERY ONLY N/A 2021    Procedure:  SECTION ();  Surgeon: Estelita Bridges MD;  Location: [facility] ;  Service: Obstetrics    WISDOM TOOTH EXTRACTION       Allergies   Allergen Reactions    Sulfa Antibiotics Fever and Fatigue    Sulfamethoxazole-Trimethoprim Fever and Fatigue     Current Outpatient Medications on File Prior to Visit   Medication Sig Dispense Refill    ergocalciferol (VITAMIN D2) 50,000 units Take 1 capsule (50,000 Units total) by mouth once a week 12 capsule 0    ofloxacin (OCUFLOX) 0.3 % ophthalmic solution Administer 1 drop into the left eye 4 (four) times a day 5 mL 0    Prenatal Vit-Fe Fumarate-FA (PRENATAL PO) Take by mouth       No current facility-administered medications on file prior to visit.     Social History     Socioeconomic History    Marital status: Single     Spouse name: Not on file    Number of children: Not on file    Years of education: Not on file    Highest education level: Not on file   Occupational History     Employer: [employer]   Tobacco Use    Smoking status: Never    Smokeless tobacco: Never   Vaping Use    Vaping status: Never Used   Substance and Sexual Activity    Alcohol use: Not Currently     Alcohol/week: 2.0 standard drinks of alcohol     Types: 1 Glasses of wine, 1 Standard drinks or equivalent per week    Drug use: No    Sexual activity: Yes     Partners: Male     Birth control/protection: None   Other Topics Concern    Not on  file   Social History Narrative    Caffeine Use    Exercising Regularly     always seatbelt in care     employed      Social Determinants of Health     Financial Resource Strain: Low Risk  (3/22/2021)    Overall Financial Resource Strain (CARDIA)     Difficulty of Paying Living Expenses: Not hard at all   Food Insecurity: No Food Insecurity (3/22/2021)    Hunger Vital Sign     Worried About Running Out of Food in the Last Year: Never true     Ran Out of Food in the Last Year: Never true   Transportation Needs: No Transportation Needs (3/22/2021)    PRAPARE - Transportation     Lack of Transportation (Medical): No     Lack of Transportation (Non-Medical): No   Physical Activity: Sufficiently Active (7/16/2020)    Exercise Vital Sign     Days of Exercise per Week: 5 days     Minutes of Exercise per Session: 40 min   Stress: No Stress Concern Present (7/16/2020)    [place] [employer] - Occupational Stress Questionnaire     Feeling of Stress : Not at all   Social Connections: Unknown (7/16/2020)    Social Connection and Isolation Panel [NHANES]     Frequency of Communication with Friends and Family: More than three times a week     Frequency of Social Gatherings with Friends and Family: More than three times a week     Attends Confucianist Services: Not on file     Active Member of Clubs or Organizations: Not on file     Attends Club or Organization Meetings: Not on file     Marital Status: Never    Intimate Partner Violence: Not At Risk (7/16/2020)    Humiliation, Afraid, Rape, and Kick questionnaire     Fear of Current or Ex-Partner: No     Emotionally Abused: No     Physically Abused: No     Sexually Abused: No   Housing Stability: Not on file         I have reviewed the past medical, surgical, social and family history, medications and allergies as documented in the EMR.    Review of systems: ROS is negative other than that noted in the HPI.  Constitutional: Negative for fatigue and fever.  "  HENT: Negative for sore throat.    Respiratory: Negative for shortness of breath.    Cardiovascular: Negative for chest pain.   Gastrointestinal: Negative for abdominal pain.   Endocrine: Negative for cold intolerance and heat intolerance.   Genitourinary: Negative for flank pain.   Musculoskeletal: Negative for back pain.   Skin: Negative for rash.   Allergic/Immunologic: Negative for immunocompromised state.   Neurological: Negative for dizziness.   Psychiatric/Behavioral: Negative for agitation.      Physical Exam:    Blood pressure 108/70, pulse 80, height 5' 2\" (1.575 m), weight 68 kg (150 lb), currently breastfeeding.    General/Constitutional: NAD, well developed, well nourished  HENT: Normocephalic, atraumatic  CV: Intact distal pulses, regular rate  Resp: No respiratory distress or labored breathing  Lymphatic: No lymphadenopathy palpated  Neuro: Alert and Oriented x 3, no focal deficits  Psych: Normal mood, normal affect, normal judgement, normal behavior  Skin: Warm, dry, no rashes, no erythema      Knee Exam (focused):  Visual inspection of the right knee demonstrates normal contour without atrophy.   No previous incisions   There is no significant erythema or edema.    No significant joint effusion   Range of motion is full from 0-130 degrees of flexion   Able to straight leg raise   - medial joint line tenderness, + lateral joint line tenderness  - medial Amrik's, + lateral Amrik's  1A Lachman exam, stable posterior drawer  Stable to varus and valgus stress at both 0 and 30°  Patella tracks normally.  No J sign.  No apprehension.  Translation is approximately 2 quadrants and is equal to the contralateral side  Patellar eversion is similar to the contralateral side    Examination of the patient's ipsilateral hip demonstrates full painless range of motion.  No crepitus.      LE NV Exam: +2 DP/PT pulses bilaterally  Sensation intact to light touch L2-S1 bilaterally     Bilateral hip ROM " demonstrates no pain actively or passively    No calf tenderness to palpation bilaterally    Knee Imaging    X-rays of the right knee were reviewed, which demonstrate no osseous abnormalities.  There is squaring of the lateral femoral condyle with some questionable widening of the lateral joint space.  I have reviewed the radiology report and do not currently have a radiology reading from [facility], but will check the result once the reading is performed.          Scribe Attestation      I,:  Alejandra Torre PA-C am acting as a scribe while in the presence of the attending physician.:       I,:  Joseluis Haines, DO personally performed the services described in this documentation    as scribed in my presence.:

## 2024-04-02 ENCOUNTER — HOSPITAL ENCOUNTER (OUTPATIENT)
Dept: INFUSION CENTER | Facility: CLINIC | Age: 34
Discharge: HOME | End: 2024-04-02
Payer: COMMERCIAL

## 2024-04-02 DIAGNOSIS — E61.1 HYPOFERREMIA: ICD-10-CM

## 2024-04-02 DIAGNOSIS — R41.89 BRAIN FOG: Primary | ICD-10-CM

## 2024-04-02 DIAGNOSIS — G90.1 DYSAUTONOMIA (HCC): ICD-10-CM

## 2024-04-02 DIAGNOSIS — R42 DIZZINESS: ICD-10-CM

## 2024-04-02 PROCEDURE — 96365 THER/PROPH/DIAG IV INF INIT: CPT

## 2024-04-02 RX ORDER — SODIUM CHLORIDE 9 MG/ML
20 INJECTION, SOLUTION INTRAVENOUS ONCE
Status: CANCELLED | OUTPATIENT
Start: 2024-04-02

## 2024-04-02 RX ORDER — SODIUM CHLORIDE 9 MG/ML
20 INJECTION, SOLUTION INTRAVENOUS ONCE
Status: COMPLETED | OUTPATIENT
Start: 2024-04-02 | End: 2024-04-02

## 2024-04-02 RX ADMIN — SODIUM CHLORIDE 20 ML/HR: 0.9 INJECTION, SOLUTION INTRAVENOUS at 08:52

## 2024-04-02 RX ADMIN — IRON SUCROSE 200 MG: 20 INJECTION, SOLUTION INTRAVENOUS at 08:55

## 2024-04-02 NOTE — PROGRESS NOTES
Pt received venofer infusion w/out any adverse effects, offers no complaints. Tx completed, no further appointments

## 2024-04-15 DIAGNOSIS — E61.1 HYPOFERREMIA: Primary | ICD-10-CM

## 2024-04-15 DIAGNOSIS — B96.89 SINUSITIS, BACTERIAL: Primary | ICD-10-CM

## 2024-04-15 DIAGNOSIS — J32.9 SINUSITIS, BACTERIAL: Primary | ICD-10-CM

## 2024-04-15 RX ORDER — AMOXICILLIN AND CLAVULANATE POTASSIUM 875; 125 MG/1; MG/1
1 TABLET, FILM COATED ORAL EVERY 12 HOURS SCHEDULED
Qty: 14 TABLET | Refills: 0 | Status: SHIPPED | OUTPATIENT
Start: 2024-04-15 | End: 2024-04-22

## 2024-04-22 ENCOUNTER — HOSPITAL ENCOUNTER (OUTPATIENT)
Dept: MRI IMAGING | Facility: HOSPITAL | Age: 34
Discharge: HOME | End: 2024-04-22
Payer: COMMERCIAL

## 2024-04-22 DIAGNOSIS — M25.561 RIGHT KNEE PAIN, UNSPECIFIED CHRONICITY: ICD-10-CM

## 2024-04-22 PROCEDURE — 73721 MRI JNT OF LWR EXTRE W/O DYE: CPT

## 2024-04-24 ENCOUNTER — TELEPHONE (OUTPATIENT)
Age: 34
End: 2024-04-24

## 2024-04-24 NOTE — TELEPHONE ENCOUNTER
[facility] calling to see if any results/notes were in our system - told him yes and that's all he needed.  Phone# was [phone]

## 2024-05-30 NOTE — PROGRESS NOTES
"[address]: Ms Rosanna Starkey was seen today for serial cervical length screening ultrasound  See ultrasound report under \"OB Procedures\" tab  The time spent on this established patient on the encounter date included 5 minutes previsit service time reviewing records and precharting, 4 minutes face-to-face service time counseling regarding results and coordinating care, and  5 minutes charting, totalling 14 minutes  Please don't hesitate to contact our office with any concerns or questions    -Dale Crawley MD      " 5/29/2024  I, Praful De Souza 

## 2024-07-01 ENCOUNTER — APPOINTMENT (OUTPATIENT)
Dept: LAB | Facility: AMBULARY SURGERY CENTER | Age: 34
End: 2024-07-01
Payer: COMMERCIAL

## 2024-07-01 DIAGNOSIS — G90.1 DYSAUTONOMIA (HCC): ICD-10-CM

## 2024-07-01 DIAGNOSIS — E61.1 HYPOFERREMIA: ICD-10-CM

## 2024-07-01 DIAGNOSIS — M35.7 HYPERMOBILITY SYNDROME: ICD-10-CM

## 2024-07-01 DIAGNOSIS — R41.89 BRAIN FOG: ICD-10-CM

## 2024-07-01 DIAGNOSIS — Z00.8 HEALTH EXAMINATION IN POPULATION SURVEYS: ICD-10-CM

## 2024-07-01 DIAGNOSIS — R23.2 FACIAL FLUSHING: ICD-10-CM

## 2024-07-01 LAB
BASOPHILS # BLD AUTO: 0.04 THOUSANDS/ÂΜL (ref 0–0.1)
BASOPHILS NFR BLD AUTO: 1 % (ref 0–1)
CHOLEST SERPL-MCNC: 161 MG/DL
EOSINOPHIL # BLD AUTO: 0.14 THOUSAND/ÂΜL (ref 0–0.61)
EOSINOPHIL NFR BLD AUTO: 2 % (ref 0–6)
ERYTHROCYTE [DISTWIDTH] IN BLOOD BY AUTOMATED COUNT: 11.9 % (ref 11.6–15.1)
EST. AVERAGE GLUCOSE BLD GHB EST-MCNC: 100 MG/DL
FERRITIN SERPL-MCNC: 19 NG/ML (ref 11–307)
HBA1C MFR BLD: 5.1 %
HCT VFR BLD AUTO: 43.1 % (ref 34.8–46.1)
HDLC SERPL-MCNC: 60 MG/DL
HGB BLD-MCNC: 14 G/DL (ref 11.5–15.4)
IMM GRANULOCYTES # BLD AUTO: 0.01 THOUSAND/UL (ref 0–0.2)
IMM GRANULOCYTES NFR BLD AUTO: 0 % (ref 0–2)
IRON SATN MFR SERPL: 36 % (ref 15–50)
IRON SERPL-MCNC: 119 UG/DL (ref 50–212)
LDLC SERPL CALC-MCNC: 72 MG/DL (ref 0–100)
LYMPHOCYTES # BLD AUTO: 2.25 THOUSANDS/ÂΜL (ref 0.6–4.47)
LYMPHOCYTES NFR BLD AUTO: 32 % (ref 14–44)
MCH RBC QN AUTO: 30.3 PG (ref 26.8–34.3)
MCHC RBC AUTO-ENTMCNC: 32.5 G/DL (ref 31.4–37.4)
MCV RBC AUTO: 93 FL (ref 82–98)
MONOCYTES # BLD AUTO: 0.35 THOUSAND/ÂΜL (ref 0.17–1.22)
MONOCYTES NFR BLD AUTO: 5 % (ref 4–12)
NEUTROPHILS # BLD AUTO: 4.31 THOUSANDS/ÂΜL (ref 1.85–7.62)
NEUTS SEG NFR BLD AUTO: 60 % (ref 43–75)
NONHDLC SERPL-MCNC: 101 MG/DL
NRBC BLD AUTO-RTO: 0 /100 WBCS
PLATELET # BLD AUTO: 304 THOUSANDS/UL (ref 149–390)
PMV BLD AUTO: 10.1 FL (ref 8.9–12.7)
RBC # BLD AUTO: 4.62 MILLION/UL (ref 3.81–5.12)
TIBC SERPL-MCNC: 332 UG/DL (ref 250–450)
TRIGL SERPL-MCNC: 143 MG/DL
UIBC SERPL-MCNC: 213 UG/DL (ref 155–355)
VIT B12 SERPL-MCNC: 256 PG/ML (ref 180–914)
WBC # BLD AUTO: 7.1 THOUSAND/UL (ref 4.31–10.16)

## 2024-07-01 PROCEDURE — 82728 ASSAY OF FERRITIN: CPT

## 2024-07-01 PROCEDURE — 81291 MTHFR GENE: CPT

## 2024-07-01 PROCEDURE — 80061 LIPID PANEL: CPT

## 2024-07-01 PROCEDURE — 82607 VITAMIN B-12: CPT

## 2024-07-01 PROCEDURE — 86800 THYROGLOBULIN ANTIBODY: CPT

## 2024-07-01 PROCEDURE — 83036 HEMOGLOBIN GLYCOSYLATED A1C: CPT

## 2024-07-01 PROCEDURE — 83550 IRON BINDING TEST: CPT

## 2024-07-01 PROCEDURE — 86376 MICROSOMAL ANTIBODY EACH: CPT

## 2024-07-01 PROCEDURE — 83540 ASSAY OF IRON: CPT

## 2024-07-01 PROCEDURE — 85025 COMPLETE CBC W/AUTO DIFF WBC: CPT

## 2024-07-01 PROCEDURE — 36415 COLL VENOUS BLD VENIPUNCTURE: CPT

## 2024-07-02 ENCOUNTER — TELEPHONE (OUTPATIENT)
Age: 34
End: 2024-07-02

## 2024-07-02 LAB
THYROGLOB AB SERPL-ACNC: <1 IU/ML (ref 0–0.9)
THYROPEROXIDASE AB SERPL-ACNC: <9 IU/ML (ref 0–34)

## 2024-07-02 NOTE — TELEPHONE ENCOUNTER
Pt calling. Would like to review recent lab results with Dr Veras. Pt concerned specifically with Iron levels as she receives iron infusions. Results available, not yet reviewed by PCP.     Please review and advise. Thank you.

## 2024-07-05 ENCOUNTER — TELEPHONE (OUTPATIENT)
Age: 34
End: 2024-07-05

## 2024-07-05 NOTE — TELEPHONE ENCOUNTER
Pt called back to speak to Dr Johnston regarding lab results. Clinical informed me that she has left the office for the afternoon. I let patient know that I would put a message back to Dr. Johnston and let her know that pt would have phone nearby and try to answer as she is in and out of patient rooms until 330pm today.    Please advise patient at [phone]

## 2024-07-08 ENCOUNTER — HOSPITAL ENCOUNTER (OUTPATIENT)
Dept: RADIOLOGY | Facility: HOSPITAL | Age: 34
Discharge: HOME | End: 2024-07-08
Payer: COMMERCIAL

## 2024-07-08 ENCOUNTER — OFFICE VISIT (OUTPATIENT)
Dept: PAIN MEDICINE | Facility: CLINIC | Age: 34
End: 2024-07-08
Payer: COMMERCIAL

## 2024-07-08 VITALS
DIASTOLIC BLOOD PRESSURE: 76 MMHG | RESPIRATION RATE: 18 BRPM | SYSTOLIC BLOOD PRESSURE: 102 MMHG | HEIGHT: 62 IN | WEIGHT: 162 LBS | BODY MASS INDEX: 29.81 KG/M2 | HEART RATE: 80 BPM

## 2024-07-08 DIAGNOSIS — M79.18 MYOFASCIAL PAIN SYNDROME: ICD-10-CM

## 2024-07-08 DIAGNOSIS — M46.1 SACROILIITIS (HCC): ICD-10-CM

## 2024-07-08 DIAGNOSIS — E61.1 HYPOFERREMIA: Primary | ICD-10-CM

## 2024-07-08 DIAGNOSIS — M79.18 MYOFASCIAL PAIN SYNDROME: Primary | ICD-10-CM

## 2024-07-08 DIAGNOSIS — M54.16 LUMBAR RADICULOPATHY: ICD-10-CM

## 2024-07-08 PROCEDURE — 73522 X-RAY EXAM HIPS BI 3-4 VIEWS: CPT

## 2024-07-08 PROCEDURE — 99204 OFFICE O/P NEW MOD 45 MIN: CPT | Performed by: ANESTHESIOLOGY

## 2024-07-08 PROCEDURE — 72050 X-RAY EXAM NECK SPINE 4/5VWS: CPT

## 2024-07-08 NOTE — PROGRESS NOTES
Assessment  1. Myofascial pain syndrome    2. Lumbar radiculopathy    3. Sacroiliitis (HCC)        Plan  The patient is experiencing right radicular symptoms with weakness so at this time I will order an updated MRI of the lumbar spine to better evaluate.  Advised her we will call her with the results and discuss treatment moving forward.  In addition I will order x-rays of the hip/pelvis as well as cervical spine to evaluate those areas where she is also experiencing pain.    My impressions and treatment recommendations were discussed in detail with the patient who verbalized understanding and had no further questions.  Discharge instructions were provided. I personally saw and examined the patient and I agree with the above discussed plan of care.    Orders Placed This Encounter   Procedures   • XR spine cervical complete 4 or 5 vw non injury     Standing Status:   Future     Number of Occurrences:   1     Standing Expiration Date:   7/8/2028     Scheduling Instructions:      Bring along any outside films relating to this procedure.           Order Specific Question:   Reason for Exam:     Answer:   neck pain     Order Specific Question:   Is the patient pregnant?     Answer:   No   • MRI lumbar spine without contrast     Standing Status:   Future     Standing Expiration Date:   7/8/2028     Scheduling Instructions:      There is no preparation for this test. Please leave your jewelry and valuables at home, wedding rings are the exception. All patients will be required to change into a hospital gown and pants.  Street clothes are not permitted in the MRI.  Magnetic nail polish must be removed prior to arrival for your test. Please bring your insurance cards, a form of photo ID and a list of your medications with you. Arrive 15 minutes prior to your appointment time in order to register. Please bring any prior CT or MRI studies of this area that were not performed at a [facility].            To schedule  this appointment, please contact Central Scheduling at [phone].            Prior to your appointment, please make sure you complete the MRI Screening Form when you e-Check in for your appointment. This will be available starting 7 days before your appointment in [facility]. You may receive an e-mail with an activation code if you do not have a [facility] account. If you do not have access to a device, we will complete your screening at your appointment.     Order Specific Question:   What is the patient's sedation requirement?     Answer:   No Sedation     Order Specific Question:   Is the patient pregnant?     Answer:   No     Order Specific Question:   Does this procedure require the 3T MRI at [place] or [place]?     Answer:   No     Order Specific Question:   Release to patient through [email or web address]     Answer:   Immediate     Order Specific Question:   Is order priority selected as STAT?     Answer:   No     Order Specific Question:   Reason for Exam     Answer:   lbp into right leg   • XR hips bilateral 3-4 vw w pelvis if performed     Right SIJ pain     Standing Status:   Future     Number of Occurrences:   1     Standing Expiration Date:   7/8/2028     Scheduling Instructions:      Bring along any outside films relating to this procedure.           Order Specific Question:   Is the patient pregnant?     Answer:   No     No orders of the defined types were placed in this encounter.      History of Present Illness    Enriqueta Pierre is a 33 y.o. female referred by Dr. Veras for lower back and right-sided leg pain that has been present for more than 10 years but worsening over the last 2 years.  Symptoms are moderate rated 6/10 on numeric rating scale is felt nearly constantly described to be cramping with some clicking in her neck pins-and-needles and dull/aching pain down her right leg.  Symptoms are aggravated lying down, standing, walking, menstruation.  Treatment history has included physical therapy, exercise,  chiropractic manipulation which have not been helpful.  She has taken Tylenol and ibuprofen in the past.  In addition she has been experiencing pain in her neck primarily on the right side with clicking.  She is at OMT treatment which unfortunately does not help with releasing the spasm that she has in her neck over there.    I have personally reviewed and/or updated the patient's past medical history, past surgical history, family history, social history, current medications, allergies, and vital signs today.     Review of Systems   Constitutional:  Negative for fever and unexpected weight change.   HENT:  Negative for trouble swallowing.    Eyes:  Negative for visual disturbance.   Respiratory:  Negative for shortness of breath and wheezing.    Cardiovascular:  Negative for chest pain and palpitations.   Gastrointestinal:  Negative for constipation, diarrhea, nausea and vomiting.   Endocrine: Negative for cold intolerance, heat intolerance and polydipsia.   Genitourinary:  Negative for difficulty urinating and frequency.   Musculoskeletal:  Positive for back pain, gait problem, joint swelling and neck pain. Negative for arthralgias and myalgias.   Skin:  Negative for rash.   Neurological:  Negative for dizziness, seizures, syncope, weakness and headaches.   Hematological:  Does not bruise/bleed easily.   Psychiatric/Behavioral:  Negative for dysphoric mood.    All other systems reviewed and are negative.      Patient Active Problem List   Diagnosis   • Vitamin D insufficiency   • Palpitation   • Annual physical exam   • Mother currently breast-feeding   • Dizziness   • Hypermobility syndrome   • Hypoferremia   • Brain fog   • Dysautonomia (HCC)       Past Medical History:   Diagnosis Date   • Degenerative lumbar disc 2015   • Overactive bladder    • Urinary tract infection    • Verruca        Past Surgical History:   Procedure Laterality Date   •  SECTION  21   • COLPOSCOPY     • COLPOSCOPY W/  BIOPSY / CURETTAGE      Resolved 2017   • GYNECOLOGIC CRYOSURGERY     • [place]  DELIVERY ONLY N/A 2021    Procedure:  SECTION ();  Surgeon: Estelita Bridges MD;  Location: [facility] ;  Service: Obstetrics   • WISDOM TOOTH EXTRACTION         Family History   Problem Relation Age of Onset   • Hypothyroidism Mother    • Rheum arthritis Mother    • Fibroids Mother    • Thyroid disease Mother    • Alcohol abuse Father    • No Known Problems Brother    • No Known Problems Brother    • No Known Problems Son    • Hiatal hernia Maternal Grandmother    • Osteoporosis Maternal Grandmother    • Endometrial cancer Maternal Grandmother    • Endometriosis Maternal Grandmother    • COPD Maternal Grandmother    • Parkinsonism Maternal Grandmother    • Skin cancer Maternal Grandmother    • ALS Maternal Grandfather    • Parkinsonism Maternal Grandfather    • Arthritis Other    • Hypertension Other         Benign Essential   • Breast cancer Other    • ALS Other    • Osteoporosis Other        Social History     Occupational History     Employer: [email or web address]   Tobacco Use   • Smoking status: Never   • Smokeless tobacco: Never   Vaping Use   • Vaping status: Never Used   Substance and Sexual Activity   • Alcohol use: Not Currently     Alcohol/week: 2.0 standard drinks of alcohol     Types: 1 Glasses of wine, 1 Standard drinks or equivalent per week   • Drug use: No   • Sexual activity: Yes     Partners: Male     Birth control/protection: None       Current Outpatient Medications on File Prior to Visit   Medication Sig   • ergocalciferol (VITAMIN D2) 50,000 units Take 1 capsule (50,000 Units total) by mouth once a week   • ofloxacin (OCUFLOX) 0.3 % ophthalmic solution Administer 1 drop into the left eye 4 (four) times a day (Patient not taking: Reported on 2024)   • Prenatal Vit-Fe Fumarate-FA (PRENATAL PO) Take by mouth (Patient not taking: Reported on 2024)     No current  "facility-administered medications on file prior to visit.       Allergies   Allergen Reactions   • Sulfa Antibiotics Fever and Fatigue   • Sulfamethoxazole-Trimethoprim Fever and Fatigue       Physical Exam    /76   Pulse 80   Resp 18   Ht 5' 2\" (1.575 m)   Wt 73.5 kg (162 lb)   BMI 29.63 kg/m²     Constitutional: normal, well developed, well nourished, alert, in no distress and non-toxic and no overt pain behavior.  Eyes: anicteric  HEENT: grossly intact  Neck: supple, symmetric, trachea midline and no masses   Pulmonary:even and unlabored  Cardiovascular:No edema or pitting edema present  Skin:Normal without rashes or lesions and well hydrated  Psychiatric:Mood and affect appropriate  Neurologic:Cranial Nerves II-XII grossly intact  Musculoskeletal: Examination neck reveals right cervical paraspinal and trapezius spasm    Lumbar Spine Exam  Appearance:  Normal lordosis  Palpation/Tenderness:  right lumbar paraspinal tenderness  right sacroiliac joint tenderness  Range of Motion:  Flexion:  Minimally limited  with pain  Extension:  Minimally limited  with pain  Rotation - Left:  Minimally limited  with pain  Rotation - Right:  Minimally limited  with pain  Motor Strength:  Left hip flexion:  5/5  Left hip extension:  5/5  Right hip flexion:  5/5  Right hip extension:  5/5  Left knee flexion:  5/5  Left knee extension:  5/5  Right knee flexion:  5/5  Right knee extension:  5/5  Left foot dorsiflexion:  5/5  Left foot plantar flexion:  5/5  Right foot dorsiflexion:  4/5  Right foot plantar flexion:  5/5  Reflexes:  Left Patellar:  2+   Right Patellar:  2+   Left Achilles:  2+   Right Achilles:  2+     "

## 2024-07-09 ENCOUNTER — TELEPHONE (OUTPATIENT)
Dept: PAIN MEDICINE | Facility: CLINIC | Age: 34
End: 2024-07-09

## 2024-07-09 NOTE — TELEPHONE ENCOUNTER
Left a detailed message about xray results as per communication consent.  Left message to CB if any questions.

## 2024-07-09 NOTE — TELEPHONE ENCOUNTER
Please let patient know that I reviewed the x-rays of the cervical spine and it look normal with no evidence of any degeneration or arthritic changes.  X-rays of the hips/pelvis also look normal with no degenerative changes and normal alignment.  Will await results of upcoming lumbar MRI

## 2024-07-11 ENCOUNTER — TELEPHONE (OUTPATIENT)
Dept: HEMATOLOGY ONCOLOGY | Facility: CLINIC | Age: 34
End: 2024-07-11

## 2024-07-11 NOTE — TELEPHONE ENCOUNTER
I called Enriqueta in response to a referral that was received for patient to establish care with Hematology    Outreach was made to schedule a consultation.    Patient declined scheduling. The referral has been closed.

## 2024-07-19 LAB
Lab: NORMAL
MTHFR GENE MUT ANL BLD/T: NORMAL

## 2024-07-29 ENCOUNTER — HOSPITAL ENCOUNTER (OUTPATIENT)
Dept: MRI IMAGING | Facility: HOSPITAL | Age: 34
Discharge: HOME | End: 2024-07-29
Payer: COMMERCIAL

## 2024-07-29 DIAGNOSIS — M54.16 LUMBAR RADICULOPATHY: ICD-10-CM

## 2024-07-29 PROCEDURE — 72148 MRI LUMBAR SPINE W/O DYE: CPT

## 2024-08-02 ENCOUNTER — TELEPHONE (OUTPATIENT)
Dept: PAIN MEDICINE | Facility: CLINIC | Age: 34
End: 2024-08-02

## 2024-08-02 DIAGNOSIS — M51.16 INTERVERTEBRAL DISC DISORDER WITH RADICULOPATHY OF LUMBAR REGION: Primary | ICD-10-CM

## 2024-08-02 NOTE — TELEPHONE ENCOUNTER
Please let patient know I reviewed the MRI lumbar spine and her L4-5 disc looks more degenerative and there is a slight tear in the disc itself which is likely why she has ongoing pain.  We discussed at her office visit that the corticosteroid injection previously had not helped.  Not sure if she wants to try one of those again or see a surgeon

## 2024-08-02 NOTE — TELEPHONE ENCOUNTER
S/w the patient and reviewed. She would like to try the L4-L5 KIERAN first to see if it will help. She also stated she would like her B/L SIJI done again if possible also because her pain is also in that area. She would like to start PT again because she stated that helped also. She denies taking anticoagulants.  Please order if appropriate   And Schedule.   Thank you

## 2024-08-02 NOTE — TELEPHONE ENCOUNTER
PT order placed.    Please schedule L4 LESI and bilateral sacroiliac joint injection.  She can schedule whichever she feels is worse for her first

## 2024-08-06 ENCOUNTER — OFFICE VISIT (OUTPATIENT)
Age: 34
End: 2024-08-06
Payer: COMMERCIAL

## 2024-08-06 VITALS
DIASTOLIC BLOOD PRESSURE: 73 MMHG | BODY MASS INDEX: 29.81 KG/M2 | HEART RATE: 98 BPM | WEIGHT: 162 LBS | SYSTOLIC BLOOD PRESSURE: 123 MMHG | HEIGHT: 62 IN | OXYGEN SATURATION: 99 %

## 2024-08-06 DIAGNOSIS — M79.18 MYOFASCIAL PAIN ON RIGHT SIDE: Primary | ICD-10-CM

## 2024-08-06 DIAGNOSIS — G44.86 CERVICOGENIC HEADACHE: ICD-10-CM

## 2024-08-06 DIAGNOSIS — M99.01 SEGMENTAL DYSFUNCTION OF CERVICAL REGION: ICD-10-CM

## 2024-08-06 DIAGNOSIS — M99.02 SEGMENTAL DYSFUNCTION OF THORACIC REGION: ICD-10-CM

## 2024-08-06 PROCEDURE — 97140 MANUAL THERAPY 1/> REGIONS: CPT | Performed by: CHIROPRACTOR

## 2024-08-06 PROCEDURE — 99203 OFFICE O/P NEW LOW 30 MIN: CPT | Performed by: CHIROPRACTOR

## 2024-08-06 PROCEDURE — 98940 CHIROPRACT MANJ 1-2 REGIONS: CPT | Performed by: CHIROPRACTOR

## 2024-08-06 NOTE — PROGRESS NOTES
"     1. Myofascial pain on right side        2. Cervicogenic headache        3. Segmental dysfunction of cervical region        4. Segmental dysfunction of thoracic region          Assessment/Plan:    Review of Diagnostic Studies and Office Notes:    The patient's July 29, 2024 MRI report of the lumbar spine, July 8, 2024 x-ray report of the cervical spine, Artemio Flower MD July 8, 2024 pain medicine office note (myofascial pain syndrome, lumbar radiculopathy, sacroiliitis, \"The patient is experiencing right radicular symptoms with weakness so at this time I will order an updated MRI of the lumbar spine to better evaluate.  Advised her we will call her with the results and discuss treatment moving forward.  In addition I will order x-rays of the hip/pelvis as well as cervical spine to evaluate those areas where she is also experiencing pain.\"\"Enriqueta Pierre is a 33 y.o. female referred by Dr. Veras for lower back and right-sided leg pain that has been present for more than 10 years but worsening over the last 2 years.  Symptoms are moderate rated 6/10 on numeric rating scale is felt nearly constantly described to be cramping with some clicking in her neck pins-and-needles and dull/aching pain down her right leg.  Symptoms are aggravated lying down, standing, walking, menstruation.  Treatment history has included physical therapy, exercise, chiropractic manipulation which have not been helpful.  She has taken Tylenol and ibuprofen in the past.  In addition she has been experiencing pain in her neck primarily on the right side with clicking.  She is at OMT treatment which unfortunately does not help with releasing the spasm that she has in her neck over there.\") were reviewed prior to the chiropractic evaluation today.    Narrative & Impression   MRI LUMBAR SPINE WITHOUT CONTRAST     INDICATION: M54.16: Radiculopathy, lumbar region.     bp into right leg     COMPARISON: CT abdomen pelvis with contrast " 10/16/2022. MRI lumbar spine without contrast 8/13/2015. Lumbar spine radiograph 6/25/2015.     TECHNIQUE:  Multiplanar, multisequence imaging of the lumbar spine was performed. .        IMAGE QUALITY:  Diagnostic     FINDINGS:     VERTEBRAL BODIES:  There are 5 lumbar type vertebral bodies.  Normal alignment of the lumbar spine.  No spondylolysis or spondylolisthesis. No scoliosis.  No compression fracture.     Normal marrow signal is identified within the visualized bony structures.  No discrete marrow lesion.     SACRUM:  Normal signal within the sacrum. No evidence of insufficiency or stress fracture.     DISTAL CORD AND CONUS:  Normal size and signal within the distal cord and conus.     PARASPINAL SOFT TISSUES:  Paraspinal soft tissues are unremarkable.     LOWER THORACIC DISC SPACES:  Normal disc height and signal.  No disc herniation, canal stenosis or foraminal narrowing.     LUMBAR DISC SPACES: Mild disc height loss at L4-L5.     L1-L2:  Normal.     L2-L3:  Normal.     L3-L4:  Normal.     L4-L5: Mild diffuse disc bulge, small central disc protrusion with new small posterior annular fissure, new tiny right foraminal disc protrusion with tiny right posterolateral annular fissure. Mild facet arthropathy. Mild canal stenosis. No significant   foraminal narrowing.     L5-S1:  Normal.     OTHER FINDINGS: Retroaortic left renal vein, normal variant.     IMPRESSION:     Mild degenerative disc disease with mild canal stenosis at L4-L5, as detailed above.     No acute abnormality of lumbar spine.        Workstation performed: [id number]     Narrative & Impression         CERVICAL SPINE     INDICATION:   Myalgia, other site.      COMPARISON: None.      VIEWS:  XR SPINE CERVICAL COMPLETE 4 OR 5 VW NON INJURY   Images: 6     FINDINGS:     No fracture.      Normal alignment without subluxation.     The intervertebral disc spaces are preserved.      The neuroforamina are patent.     The prevertebral soft tissues are  within normal limits.       The lung apices are clear.     IMPRESSION:        Unremarkable cervical spine.     Electronically signed: 07/08/2024 09:31 AM Lan Penn MPH,MD     The patient had cervical x-rays.    My clinical impression:  AP/lateral/bilateral oblique/AP OM views were reviewed.  Mild DDD at the C5-C6 level no significant neuroforaminal narrowing throughout the cervical spine noted on the oblique views, hyperlordosis of the cervical spine    Decision and Treatment Plan:    The patient has more limited range of motion in the right glenohumeral joint when compared to the left.  Therefore she may have compensation in the right cervical/upper thoracic and periscapular musculature during movements throughout the day.  The patient has a rounded shoulder posture with a forward head carriage placing abnormal stress on the cervical/upper thoracic musculature. The patient has myofascial findings as well as cervical facet restrictions and thoracic facet restrictions which can contribute to limited range of motion and pain.    The patient will be treated with conservative chiropractic care including myofascial release, joint mobilization, and a home stretching and icing program.    The patient was taught a pectoralis/subscapularis stretch today. The patient was advised to do the stretch for 3 sets of 15-20 seconds several times per day.The need to stretch to the point of tension only was discussed. ROM was measured with an [employer] digital dual inclinometer.    The patient will initially be seen 2 times per week and then 1 time per week as there are decreased symptoms and improvement in objective findings. The patient will then be discharged.    Patient Instructions:    The patient was advised to apply ice to the region in 15-minute intervals a minimum of 3 times per day.  The patient was advised to avoid sleeping in the prone position. Proper sleeping postures and use of pillows were discussed.  The patient was  advised to avoid prolonged cervical flexion. Proper techniques to use her phone and read were discussed.  The patient was informed that she may experience additional soreness following the today's treatment visit. The need to continue with the stretching exercises and apply ice in 15-minute intervals was discussed with the patient.   The patient was informed they may see redness or possible bruising in the region of Graston technique treatment.     Goals:    JMLGOALS: Improve patient's ability to tolerate prolonged physical activity and Improve cervical range of motion    Time/Reviewed with Patient:    At least 30 minutes of time was spent with the patient during the consultation including the patient's history/current complaints, physical exam, reviewing the diagnostic report/office notes, reviewing home instruction/reducing risk factors with the patient, reviewing my findings with the patient, and discussing the treatment/treatment plan with the patient.      This is a separate identifiable portion of today's visit from the E and M code billed.    Treatment today consisted of myofascial release via Graston Technique to the upper thoracic portion of the right medial superior trapezius (superficial). GT 3 was used.  Myofascial release via active release technique was also performed to the right subscapularis, right levator scapula at the superior angle of the scapula, and right latissimus dorsi including the right inferior angle of the scapula region (patient performed active movements).  At least 8 minutes of myofascial release was performed.    Manipulation was performed to the right lower cervical restrictions via grade 2 mobilization techniques and low force diversified maneuvers.  An audible release occurred and was well-tolerated.    Subjective:      Enriqueta Pierre is a 33 y.o. female who presents today with pain in the right upper to mid posterior lateral cervical region and right upper thoracic region.   "The patient confirmed that she had an evaluation with Artemio Flower MD (pain medicine) for cervical as well as lower back and right lower extremity symptoms/pain.  The patient reported that she has been having longstanding pain in the right cervical/upper thoracic region despite having OMT with Dr. Veras.  The patient stated that she sometimes does self manipulation of the cervical spine which provides her some relief.  The patient stated that she can experience pain that radiates from the cervical region and causes a headache in the right orbital region.  She stated that she experiences dizziness at times but not necessarily at the time that she is experiencing the headache.  However she does report occasional nausea associated with the right orbital headache.  She denied experiencing blurred vision associated with the headache.  She denied experiencing pain or paresthesias in the upper extremities.  She indicated having difficulty with right cervical rotation due to the right cervical/upper thoracic stiffness/pain.  She described her neck pain as a 3 on a 0-10 pain scale today.  The patient also describes experiencing pain in the midline lumbar and right greater than left sacroiliac region.  She stated the lower back and sacroiliac region pain has been present for proximately 16 years.  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  She stated that she did frequent lumbar extension movements as well as tumbling.  The patient stated she is an  but currently works as an extender for orthopedics.  The patient stated she has more pronounced pain in her lower back and sacroiliac region with standing for greater than 5 minutes.  She reported that she has a \"deep\" pain in her right anterior lateral thigh region that travels distally and deep \"behind my tibia.\"  The patient stated that she has decreased symptoms with stretching but as soon as she " "discontinues the stretch her symptoms return.  The patient stated that when she has her lower extremities extended out in front of her she feels a \"9 V battery sensation in my foot.\"  The patient also reported that her right foot can become tingling and the sensation of falling asleep with certain positions of her right lower extremity.  The patient stated she will begin physical therapy for her lower back condition next week and would like me to focus on her cervical/upper thoracic symptoms.         Objective:     /73   Pulse 98   Ht 5' 2\" (1.575 m)   Wt 73.5 kg (162 lb)   SpO2 99%   BMI 29.63 kg/m²     Appearance: Well developed, well nourished, and in no acute distress    Alert and oriented x 3    Mood/Affect: calm and pleasant    Gait/Posture:    Gait: Normal    Antalgic posture: None    Lordosis: Cervical- decreased    Lordosis: Lumbar- normal    Kyphosis: Thoracic- normal    Upper extremity reflexes:    Deep tendon reflexes were normal and symmetrical in the upper extremities.    Upper Extremity Muscle Testing:    Muscle testing of the bilateral upper extremities was normal and graded +5/5.    Sensory:    Sensation to light touch was normal and symmetrical in the bilateral upper extremities.    Gutierrez's was negative bilaterally.    Cervical Orthopedic Tests:    Maximal Foraminal Compression was was negative  bilaterally.    Rotator Cuff Muscle Testing:    Right    Supraspinatus grade 5 - 100% normal (N) complete motion against gravity and full resistance    Infraspinatus grade 5 - 100% normal (N) complete motion against gravity and full resistance    Subscapularis grade 5 - 100% normal (N) complete motion against gravity and full resistance  No pain with muscle testing of the right rotator cuff musculature.    Inferior scratch test on the right was positive as the patient was able to touch the mid to lower thoracic region but approximately 1 inch lower than the left upper extremity.Discomfort was " reported in the right upper thoracic region when performing inferior scratch test on the right.    Inferior scratch test on the left was negative  as the patient was able to touch the the mid to lower thoracic region but approximately 1 inch higher than the right upper extremity.      Superior scratch test on the right was positive as the patient was able to touch the T4-5 level. Discomfort was reported in the right upper thoracic region when performing superior scratch test on the right.    Superior scratch test on the left was negative  as the patient was able to touch the T5-6 level.    Active Cervical Ranges of Motion:    Flexion: 45 degrees    Extension: 32 degrees    Right lateral flexion: 23 degrees    Left Lateral flexion: 29 degrees    Right Rotation: 63 degrees    Left Rotation: 65 degrees    Palpation:    Active myofascial trigger points were present in the right superior trapezius, levator scapulae, semispinalis capitis, splenius capitis, right rectus capitis posterior major and minor. Pressure to the above listed trigger points reproduced some of the pain described in today's chief complaint. Intersegmental motion was decreased in the cervical spine including joint dysfunctions at C1-C2, C2-3, C5-C6, and C6-C7. Intersegmental motion was decreased in the thoracic spine including joint dysfunctions at T3-4, T6-7, and T7-8.         Dictation voice to text software has been used in the creation of this document. Please consider this in light of any contextual or grammatical errors.

## 2024-08-06 NOTE — PATIENT INSTRUCTIONS
The patient was advised to apply ice to the region in 15-minute intervals a minimum of 3 times per day.  The patient was advised to avoid sleeping in the prone position. Proper sleeping postures and use of pillows were discussed.  The patient was advised to avoid prolonged cervical flexion. Proper techniques to use her phone and read were discussed.  The patient was informed that she may experience additional soreness following the today's treatment visit. The need to continue with the stretching exercises and apply ice in 15-minute intervals was discussed with the patient.   The patient was informed they may see redness or possible bruising in the region of Graston technique treatment.

## 2024-08-07 NOTE — TELEPHONE ENCOUNTER
Patient has been scheduled for their procedure, please see [employer] message for additional details.

## 2024-08-07 NOTE — TELEPHONE ENCOUNTER
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

## 2024-08-08 ENCOUNTER — PROCEDURE VISIT (OUTPATIENT)
Age: 34
End: 2024-08-08
Payer: COMMERCIAL

## 2024-08-08 VITALS
WEIGHT: 162.04 LBS | OXYGEN SATURATION: 100 % | DIASTOLIC BLOOD PRESSURE: 68 MMHG | BODY MASS INDEX: 29.82 KG/M2 | HEART RATE: 65 BPM | SYSTOLIC BLOOD PRESSURE: 104 MMHG | HEIGHT: 62 IN

## 2024-08-08 DIAGNOSIS — G44.86 CERVICOGENIC HEADACHE: ICD-10-CM

## 2024-08-08 DIAGNOSIS — M79.18 MYOFASCIAL PAIN ON RIGHT SIDE: Primary | ICD-10-CM

## 2024-08-08 DIAGNOSIS — M99.02 SEGMENTAL DYSFUNCTION OF THORACIC REGION: ICD-10-CM

## 2024-08-08 DIAGNOSIS — M99.01 SEGMENTAL DYSFUNCTION OF CERVICAL REGION: ICD-10-CM

## 2024-08-08 PROCEDURE — 97140 MANUAL THERAPY 1/> REGIONS: CPT | Performed by: CHIROPRACTOR

## 2024-08-08 PROCEDURE — 98940 CHIROPRACT MANJ 1-2 REGIONS: CPT | Performed by: CHIROPRACTOR

## 2024-08-08 NOTE — PATIENT INSTRUCTIONS
The patient was advised to continue with the icing and stretching instructions.   The patient was informed that she may experience additional soreness following the today's treatment visit. The need to continue with the stretching exercises and apply ice in 15-minute intervals was discussed with the patient.

## 2024-08-08 NOTE — PROGRESS NOTES
Assessment:  The patient is progressing as expected.  She indicated having relief associated with the initial chiropractic treatment visit indicating the correct structures are being addressed.  The patient has a long history of symptoms.    1. Myofascial pain on right side        2. Cervicogenic headache        3. Segmental dysfunction of cervical region        4. Segmental dysfunction of thoracic region          Treatment today consisted of myofascial release via Graston Technique to the right upper thoracic musculature including the levator scapula at the superior angle of the scapula (superficial). GT 3, GT 4, and GT 5 were used. Myofascial release via active release technique was also performed to the upper thoracic portion of the right medial superior trapezius.  At least 8-10 minutes of myofascial release was performed.    Manipulation was performed to the right lower cervical restrictions via grade 2 mobilization techniques and low force diversified maneuvers.  An audible release occurred and was well-tolerated.    Subjective:  The patient reported feeling relief f following the initial chiropractic treatment visit as well as the following day.  The patient stated that she had less pain and stiffness and reported she had improvement right cervical rotation and less clicking.  However she stated that her symptoms began to return last night.  Overall she reported feeling about the same as her initial chiropractic evaluation.  She indicated having pain in the right upper to mid posterior lateral cervical region and right upper thoracic region.  She reported that she did not have a significant amount of soreness following her initial chiropractic treatment visit/Graston Technique.  The patient reported that she is aware of a mild right orbital headache today as well.  She attributed this to stress at work.  She indicated having difficulty with right cervical rotation due to the right cervical/upper thoracic  stiffness/pain.  She described her neck pain as a 3 on a 0-10 pain scale today.     Objective:  Active myofascial trigger points were present in the right superior trapezius, levator scapulae, semispinalis capitis, splenius capitis, right rectus capitis posterior major and minor. Intersegmental motion was decreased in the cervical spine including joint dysfunctions at C1-C2, C5-C6, and C6-C7. Intersegmental motion was decreased in the thoracic spine including joint dysfunctions at T3-4, T6-7, and T7-8.       I have used the Epic copy/forward function to compose this note.  I have reviewed my current note to ensure it reflects the current patient status, exam, assessment and plan.    Dictation voice to text software has been used in the creation of this document. Please consider this in light of any contextual or grammatical errors.

## 2024-08-14 ENCOUNTER — PROCEDURE VISIT (OUTPATIENT)
Age: 34
End: 2024-08-14
Payer: COMMERCIAL

## 2024-08-14 VITALS
WEIGHT: 162 LBS | BODY MASS INDEX: 29.81 KG/M2 | HEIGHT: 62 IN | OXYGEN SATURATION: 99 % | SYSTOLIC BLOOD PRESSURE: 100 MMHG | DIASTOLIC BLOOD PRESSURE: 66 MMHG | HEART RATE: 83 BPM

## 2024-08-14 DIAGNOSIS — M99.02 SEGMENTAL DYSFUNCTION OF THORACIC REGION: ICD-10-CM

## 2024-08-14 DIAGNOSIS — G44.86 CERVICOGENIC HEADACHE: ICD-10-CM

## 2024-08-14 DIAGNOSIS — M79.18 MYOFASCIAL PAIN ON RIGHT SIDE: Primary | ICD-10-CM

## 2024-08-14 DIAGNOSIS — M99.01 SEGMENTAL DYSFUNCTION OF CERVICAL REGION: ICD-10-CM

## 2024-08-14 PROCEDURE — 98940 CHIROPRACT MANJ 1-2 REGIONS: CPT | Performed by: CHIROPRACTOR

## 2024-08-14 PROCEDURE — 97140 MANUAL THERAPY 1/> REGIONS: CPT | Performed by: CHIROPRACTOR

## 2024-08-14 NOTE — PROGRESS NOTES
Assessment:  The patient was advised to do the pectoralis/subscapularis stretch a minimum of 3 times per day. Otherwise progress will be slowed. The need to stretch to the point of tension only was emphasized.  She was advised to monitor her positions or activities of daily living to determine those that aggravate her condition.  Those activities can then be modified.  The patient was taught an axial retraction exercise to perform at 0 - 10: 2 sets of 0 - 10: 10 repetitions 0 - 10: 2 time per day.  The patient was also advised to avoid trying to make an additional audible click in her cervical spine when she is having symptoms.  The patient was advised to avoid cervical flexion coupled with cervical rotation stretching exercises as the patient already has a forward head carriage and I do not want to accentuate that incorrect cervical positioning.    1. Myofascial pain on right side        2. Cervicogenic headache        3. Segmental dysfunction of cervical region        4. Segmental dysfunction of thoracic region          Treatment today consisted of myofascial release via ischemic compression to the right upper thoracic musculature and upper thoracic portion of the right medial superior trapezius. Myofascial release via active release technique was also performed to the upper thoracic portion of the right levator scapula at the superior angle of the scapula, upper thoracic portion of the right superior trapezius, and right latissimus dorsi including the right inferior angle of the scapula region (patient performed active movements).At least 8-10 minutes of myofascial release was performed.    Manipulation was performed to the right lower cervical restrictions via grade 2 mobilization techniques and low force diversified maneuvers.  An audible release occurred and was well-tolerated.    Subjective:  The patient reported feeling better following the last chiropractic treatment visit.  The patient reported that she  notices that she has relief following chiropractic visits for 1 to 2 days but her symptoms then returned.  She stated she had an exacerbation of symptoms on Saturday but is unsure as to why she had more pronounced symptoms.  The patient reported that she initially thought it may be from sleeping in her toddler's bed.  However she reported that the symptoms were not more pronounced upon waking that morning.  When asked to demonstrate the stretching exercise she indicated that she was doing active cervical lateral flexion maneuvers as well as cervical flexion coupled with left cervical rotation.  She indicated that she does not remember being prescribed a pectoralis/subscapularis stretch on her initial for chiropractic visit with me.  She stated that she has periods where she is aware of clicking in the cervical spine.  She reported that she will move her neck into alternating lateral flexion and will occasionally hear another click which will provide her temporary relief.  The patient stated that her pain is not constant but is intermittently noted with cervical movements the patient stated there are times when she is aware of discomfort in the right of midline posterior cervical region.  She also indicated that she is aware of tension in her right upper extremity but not pain. She indicated having difficulty with right cervical rotation due to the right cervical/upper thoracic stiffness/pain.  She described her neck pain as a 3 on a 0-10 pain scale today.     Objective:  Active myofascial trigger points were present in the right superior trapezius, levator scapulae, semispinalis capitis, splenius capitis, right rectus capitis posterior major and minor. Intersegmental motion was decreased in the cervical spine including joint dysfunctions at C1-C2, C5-C6, and C6-C7. Intersegmental motion was decreased in the thoracic spine including joint dysfunctions at T3-4, T6-7, and T7-8.       I have used the Epic copy/forward  function to compose this note.  I have reviewed my current note to ensure it reflects the current patient status, exam, assessment and plan.    Dictation voice to text software has been used in the creation of this document. Please consider this in light of any contextual or grammatical errors.

## 2024-08-14 NOTE — PATIENT INSTRUCTIONS
The patient was advised to continue with the icing and stretching instructions.   The patient was advised to do the pectoralis/subscapularis stretch a minimum of 3 times per day. Otherwise progress will be slowed. The need to stretch to the point of tension only was emphasized.  She was advised to monitor her positions or activities of daily living to determine those that aggravate her condition.  Those activities can then be modified.  The patient was taught an axial retraction exercise to perform at 0 - 10: 2 sets of 0 - 10: 10 repetitions 0 - 10: 2 time per day.  The patient was also advised to avoid trying to make an additional audible click in her cervical spine when she is having symptoms.  The patient was advised to avoid cervical flexion coupled with cervical rotation stretching exercises as the patient already has a forward head carriage and I do not want to accentuate that incorrect cervical positioning.

## 2024-08-22 ENCOUNTER — EVALUATION (OUTPATIENT)
Dept: PHYSICAL THERAPY | Facility: CLINIC | Age: 34
End: 2024-08-22
Payer: COMMERCIAL

## 2024-08-22 DIAGNOSIS — M54.16 LUMBAR RADICULOPATHY: Primary | ICD-10-CM

## 2024-08-22 PROCEDURE — 97112 NEUROMUSCULAR REEDUCATION: CPT | Performed by: PHYSICAL THERAPIST

## 2024-08-22 PROCEDURE — 97161 PT EVAL LOW COMPLEX 20 MIN: CPT | Performed by: PHYSICAL THERAPIST

## 2024-08-22 PROCEDURE — 97110 THERAPEUTIC EXERCISES: CPT | Performed by: PHYSICAL THERAPIST

## 2024-08-22 NOTE — PROGRESS NOTES
PT Evaluation     Today's date: 2024  Patient name: Enriqueta Pierre  : 1990  MRN: [id number]  Referring provider: Artemio Flower MD  Dx:   Encounter Diagnosis     ICD-10-CM    1. Lumbar radiculopathy  M54.16                      Assessment  Impairments: abnormal or restricted ROM, activity intolerance, impaired physical strength, lacks appropriate home exercise program, pain with function, poor posture , poor body mechanics, activity limitations and endurance    Assessment details: Pt presents with signs and symptoms synonymous of admitting diagnosis as well as potential mechanical diagnosis of of MUR vs Other Stabilization.  Pt presents with pain, decreased strength, decreased range, flexibility, as well as tolerance to activity and postural awareness.  Pt would benefit skilled PT intervention in order to address these impairments in order to be able to perform all desired activities with minimal to nil symptom exacerbation.  If she fails to find improvement over the next 3-4 weeks, appropriate referral will be performed which she is in coordination of Dr. Flower.  Thank you very much for this kind referral.     Understanding of Dx/Px/POC: good     Prognosis: good    Goals  STG 4 Weeks:  Decrease pain at worst to 5  Improve range to Improve LS range to mod  Improve strength to improve LE symptom to 5/5  Independent with HEP  LTG 8 Weeks:  Decrease pain at worst to 2  Improve range to LS range to min  Improve strength to LE strength to 5/5.   Able to perform all desired activities with minimal to nil symptom exacerbation      Plan  Patient would benefit from: skilled physical therapy  Planned modality interventions: cryotherapy and thermotherapy: hydrocollator packs    Planned therapy interventions: joint mobilization, manual therapy, neuromuscular re-education, postural training, strengthening, stretching, abdominal trunk stabilization, activity modification, behavior modification, body mechanics  training, flexibility, functional ROM exercises, gait training, therapeutic activities, therapeutic exercise, therapeutic training and transfer training    Frequency: 2x week  Duration in weeks: 10  Treatment plan discussed with: patient      Subjective Evaluation    History of Present Illness  Date of onset: 8/22/2024  Mechanism of injury: Pt is a 33 yofemale who presents today stating that she has had low back pain since high school.  Pt reports that finally met with Dr. Flower this year, who had MRI indicating of HNP.  Pt reports that she is an , standing would create L > R leg symptoms.  Pt reports that she has had also had discussion of possible surgical consult which pt is not prepared for.  Pt reports that she is scheduled for Epidural next week and SI injection in 3 weeks.  Pt reports that she has had epidural in 2014 which she does not recall assistance.  Pt reports that she has had PT in the past, great for strength, did not assist with low back pain.  Pt reports that her low back pain became constant in low back and glute in 2020.  Pt reports that she developed leg symptoms intermittently since high school.  R thigh into shin and foot.  New after second child was top of foot this is new very sharp electrical sensation.  Pt reports that L le symptoms have been present in the last 8 years goes to L thigh.  Pt reports standing makes leg pain worse, walking makes leg pain worse, sitting alleviates temporarily, but if it is too long this will increase back, but if she elevates leg, there is relief, but once stopped symptoms return.  Pt reports that laying flat with arched back does feel better.  Pt reports laying on stomach hurts back, unaware of symptoms in legs.  Pt reports that there is recent change in bowel or bladder due to having second child in the last year, has had pelvic floor intervention to address.  Pt reports no difficulty falling asleep, will occasionally awaken due to pain,  and can fall asleep again.  Pt reports goals are to reduce pain in legs and in back, improve standing, walking tolerance and improve current presentation and avoid further consults and interventions.    Pt does report possible EDS and Goss, does have history of dizziness, has been monitored for Cardiac conditions.    Quality of life: good    Patient Goals  Patient goals for therapy: increased strength, return to sport/leisure activities, increased motion and decreased pain    Pain  Current pain ratin  At best pain ratin  At worst pain ratin  Quality: dull ache, sharp, tight and cramping  Relieving factors: change in position and rest  Aggravating factors: sitting, standing, walking and lifting  Progression: worsening      Diagnostic Tests  X-ray: normal  MRI studies: normal  Treatments  Previous treatment: physical therapy and medication      Objective     Active Range of Motion     Lumbar   Flexion:  WFL  Extension:  with pain Restriction level: maximal  Left lateral flexion:  Restriction level: minimal  Right lateral flexion:  Restriction level: minimal    Additional Active Range of Motion Details  Forward head, rounded shoulders, Lordosis  B/L Sensation intact to L3,4,5,S1,S2  DTR Patella R 2+, L 1+ Arbon 2+ R, 1+ L.    Postural correction  low back pain.    Repeated motion   Flex  Ext R low back.  REIL  I NW.  REIL Pt OP I W.  REIn Kneeling NW.  REISitting I NW.  RFIL D B.  Improved R hip flexion and L Gtoe Ext  SB R  SB L  LE Strength  Hip   L Flex Ext Abd Add all 5/5  R Flex 4- Ext Abd Add rest 5/5.    LE Screen all 5/5 except L Gtoe 3+   Joint Mobility  Palpation  STs             Precautions: Nil      Manuals                                                                 Neuro Re-Ed             Education and progression 10 Min            Hip Abd + Ext  RTB           Tband Side Step  RTB           Bridge                                                    Ther Ex             RFIL 2 x 10  better            RFISitting 2 x 10            Progression?             Pball Roll out                                                    Return to ext trial?              Ther Activity             R hip Flex/L Gtoe Ext 4- to 5 hip, L gtoe ext 3+ to 4                                      Sit<>Stand             Mini Squat             Modalities             CP/HP Prn

## 2024-08-29 ENCOUNTER — HOSPITAL ENCOUNTER (OUTPATIENT)
Dept: RADIOLOGY | Facility: CLINIC | Age: 34
End: 2024-08-29
Payer: COMMERCIAL

## 2024-08-29 VITALS
DIASTOLIC BLOOD PRESSURE: 54 MMHG | SYSTOLIC BLOOD PRESSURE: 102 MMHG | HEART RATE: 77 BPM | RESPIRATION RATE: 18 BRPM | TEMPERATURE: 98 F | OXYGEN SATURATION: 93 %

## 2024-08-29 DIAGNOSIS — M54.16 LUMBAR RADICULOPATHY: ICD-10-CM

## 2024-08-29 PROCEDURE — 62323 NJX INTERLAMINAR LMBR/SAC: CPT | Performed by: ANESTHESIOLOGY

## 2024-08-29 RX ORDER — METHYLPREDNISOLONE ACETATE 80 MG/ML
80 INJECTION, SUSPENSION INTRA-ARTICULAR; INTRALESIONAL; INTRAMUSCULAR; PARENTERAL; SOFT TISSUE ONCE
Status: COMPLETED | OUTPATIENT
Start: 2024-08-29 | End: 2024-08-29

## 2024-08-29 RX ORDER — BUPIVACAINE HCL/PF 2.5 MG/ML
2 VIAL (ML) INJECTION ONCE
Status: COMPLETED | OUTPATIENT
Start: 2024-08-29 | End: 2024-08-29

## 2024-08-29 RX ADMIN — METHYLPREDNISOLONE ACETATE 80 MG: 80 INJECTION, SUSPENSION INTRA-ARTICULAR; INTRALESIONAL; INTRAMUSCULAR; PARENTERAL; SOFT TISSUE at 15:53

## 2024-08-29 RX ADMIN — IOHEXOL 1 ML: 300 INJECTION, SOLUTION INTRAVENOUS at 15:53

## 2024-08-29 RX ADMIN — BUPIVACAINE HYDROCHLORIDE 2 ML: 2.5 INJECTION, SOLUTION EPIDURAL; INFILTRATION; INTRACAUDAL at 15:53

## 2024-08-29 NOTE — H&P
History of Present Illness: The patient is a 33 y.o. female who presents with complaints of lower back pain and is here today for an L4 epidural steroid injection    Past Medical History:   Diagnosis Date    Degenerative lumbar disc 2015    Overactive bladder     Urinary tract infection     Verruca        Past Surgical History:   Procedure Laterality Date     SECTION  21    COLPOSCOPY      COLPOSCOPY W/ BIOPSY / CURETTAGE      Resolved 2017    GYNECOLOGIC CRYOSURGERY      [place]IV[place] N/A 2021    Procedure:  SECTION ();  Surgeon: Estelita Bridges MD;  Location: [facility] ;  Service: Obstetrics    WISDOM TOOTH EXTRACTION           Current Outpatient Medications:     ergocalciferol (VITAMIN D2) 50,000 units, Take 1 capsule (50,000 Units total) by mouth once a week, Disp: 12 capsule, Rfl: 0    ofloxacin (OCUFLOX) 0.3 % ophthalmic solution, Administer 1 drop into the left eye 4 (four) times a day (Patient not taking: Reported on 2024), Disp: 5 mL, Rfl: 0    Prenatal Vit-Fe Fumarate-FA (PRENATAL PO), Take by mouth (Patient not taking: Reported on 2024), Disp: , Rfl:     Current Facility-Administered Medications:     bupivacaine (PF) (MARCAINE) 0.25 % injection 2 mL, 2 mL, Epidural, Once, Artemio Flower MD    iohexol (OMNIPAQUE) 300 mg/mL injection 1 mL, 1 mL, Epidural, Once, Artemio Flower MD    methylPREDNISolone acetate (DEPO-MEDROL) injection 80 mg, 80 mg, Epidural, Once, Artemio Flower MD    Allergies   Allergen Reactions    Sulfa Antibiotics Fever and Fatigue    Sulfamethoxazole-Trimethoprim Fever and Fatigue       Physical Exam:   Vitals:    24 1527   BP: 105/69   Pulse: 71   Resp: 18   Temp: 98 °F (36.7 °C)   SpO2: 100%     General: Awake, Alert, Oriented x 3, Mood and affect appropriate  Respiratory: Respirations even and unlabored  Cardiovascular: Peripheral pulses intact; no edema  Musculoskeletal Exam: Lower back pain    ASA Score:  3    Patient/Chart Verification  Patient ID Verified: Verbal  ID Band Applied: No  Consents Confirmed: Procedural, To be obtained in the Pre-Procedure area  H&P( within 30 days) Verified: To be obtained in the Pre-Procedure area  Allergies Reviewed: Yes  Anticoag/NSAID held?: No  Currently on antibiotics?: No  Pregnancy denied?: Yes    Assessment:   1. Lumbar radiculopathy        Plan: L4 LESI

## 2024-08-29 NOTE — DISCHARGE INSTR - LAB
Epidural Steroid Injection   WHAT YOU NEED TO KNOW:   An epidural steroid injection (KIERAN) is a procedure to inject steroid medicine into the epidural space. The epidural space is between your spinal cord and vertebrae. Steroids reduce inflammation and fluid buildup in your spine that may be causing pain. You may be given pain medicine along with the steroids.          ACTIVITY  Do not drive or operate machinery today.  No strenuous activity today - bending, lifting, etc.  You may resume normal activites starting tomorrow - start slowly and as tolerated.  You may shower today, but no tub baths or hot tubs.  You may have numbness for several hours from the local anesthetic. Please use caution and common sense, especially with weight-bearing activities.    CARE OF THE INJECTION SITE  If you have soreness or pain, apply ice to the area today (20 minutes on/20 minutes off).  Starting tomorrow, you may use warm, moist heat or ice if needed.  You may have an increase or change in your discomfort for 36-48 hours after your treatment.  Apply ice and continue with any pain medication you have been prescribed.  Notify the [facility] if you have any of the following: redness, drainage, swelling, headache, stiff neck or fever above 100°F.    SPECIAL INSTRUCTIONS  Our office will contact you in approximately 14 days for a progress report.    MEDICATIONS  Continue to take all routine medications.  Our office may have instructed you to hold some medications.    As no general anesthesia was used in today's procedure, you should not experience any side effects related to anesthesia.     If you are diabetic, the steroids used in today's injection may temporarily increase your blood sugar levels after the first few days after your injection. Please keep a close eye on your sugars and alert the doctor who manages your diabetes if your sugars are significantly high from your baseline or you are symptomatic.     If you have a  problem specifically related to your procedure, please call our office at [phone].  Problems not related to your procedure should be directed to your primary care physician.

## 2024-09-12 ENCOUNTER — HOSPITAL ENCOUNTER (OUTPATIENT)
Dept: RADIOLOGY | Facility: CLINIC | Age: 34
Discharge: HOME | End: 2024-09-12
Payer: COMMERCIAL

## 2024-09-12 VITALS
HEART RATE: 82 BPM | DIASTOLIC BLOOD PRESSURE: 71 MMHG | RESPIRATION RATE: 20 BRPM | TEMPERATURE: 98.7 F | SYSTOLIC BLOOD PRESSURE: 104 MMHG | OXYGEN SATURATION: 97 %

## 2024-09-12 DIAGNOSIS — M46.1 SACROILIITIS (HCC): ICD-10-CM

## 2024-09-12 PROCEDURE — 27096 INJECT SACROILIAC JOINT: CPT | Performed by: ANESTHESIOLOGY

## 2024-09-12 RX ORDER — ROPIVACAINE HYDROCHLORIDE 2 MG/ML
7 INJECTION, SOLUTION EPIDURAL; INFILTRATION; PERINEURAL ONCE
Status: COMPLETED | OUTPATIENT
Start: 2024-09-12 | End: 2024-09-12

## 2024-09-12 RX ORDER — METHYLPREDNISOLONE ACETATE 80 MG/ML
80 INJECTION, SUSPENSION INTRA-ARTICULAR; INTRALESIONAL; INTRAMUSCULAR; PARENTERAL; SOFT TISSUE ONCE
Status: COMPLETED | OUTPATIENT
Start: 2024-09-12 | End: 2024-09-12

## 2024-09-12 RX ORDER — 0.9 % SODIUM CHLORIDE 0.9 %
4 VIAL (ML) INJECTION ONCE
Status: COMPLETED | OUTPATIENT
Start: 2024-09-12 | End: 2024-09-12

## 2024-09-12 RX ADMIN — IOHEXOL 2 ML: 300 INJECTION, SOLUTION INTRAVENOUS at 16:02

## 2024-09-12 RX ADMIN — Medication 4 ML: at 16:01

## 2024-09-12 RX ADMIN — ROPIVACAINE HYDROCHLORIDE 7 ML: 2 INJECTION, SOLUTION EPIDURAL; INFILTRATION; PERINEURAL at 16:02

## 2024-09-12 RX ADMIN — METHYLPREDNISOLONE ACETATE 80 MG: 80 INJECTION, SUSPENSION INTRA-ARTICULAR; INTRALESIONAL; INTRAMUSCULAR; PARENTERAL; SOFT TISSUE at 16:02

## 2024-09-12 NOTE — H&P
History of Present Illness: The patient is a 33 y.o. female who presents with complaints of lower back pain and is here today for bilateral sacroiliac joint injections    Past Medical History:   Diagnosis Date    Degenerative lumbar disc 2015    Overactive bladder     Urinary tract infection     Verruca        Past Surgical History:   Procedure Laterality Date     SECTION  21    COLPOSCOPY      COLPOSCOPY W/ BIOPSY / CURETTAGE      Resolved 2017    GYNECOLOGIC CRYOSURGERY      [place]  DELIVERY ONLY N/A 2021    Procedure:  SECTION ();  Surgeon: Estelita Bridges MD;  Location: [facility] ;  Service: Obstetrics    WISDOM TOOTH EXTRACTION           Current Outpatient Medications:     ergocalciferol (VITAMIN D2) 50,000 units, Take 1 capsule (50,000 Units total) by mouth once a week, Disp: 12 capsule, Rfl: 0    ofloxacin (OCUFLOX) 0.3 % ophthalmic solution, Administer 1 drop into the left eye 4 (four) times a day (Patient not taking: Reported on 2024), Disp: 5 mL, Rfl: 0    Prenatal Vit-Fe Fumarate-FA (PRENATAL PO), Take by mouth (Patient not taking: Reported on 2024), Disp: , Rfl:     Current Facility-Administered Medications:     iohexol (OMNIPAQUE) 300 mg/mL injection 2 mL, 2 mL, Intra-articular, Once, Artemio Flower MD    lidocaine (PF) (XYLOCAINE-MPF) 2 % injection 4 mL, 4 mL, Infiltration, Once, Artemio Flower MD    methylPREDNISolone acetate (DEPO-MEDROL) injection 80 mg, 80 mg, Intra-articular, Once, Artemio Flower MD    ropivacaine (NAROPIN) injection 7 mL, 7 mL, Intra-articular, Once, Artemio Flower MD    sodium chloride (PF) 0.9 % injection 4 mL, 4 mL, Infiltration, Once, Artemio Flower MD    Allergies   Allergen Reactions    Sulfa Antibiotics Fever and Fatigue    Sulfamethoxazole-Trimethoprim Fever and Fatigue       Physical Exam:   Vitals:    24 1545   BP: 116/77   Pulse: 90   Resp: 20   Temp: 98.7 °F (37.1 °C)   SpO2: 98%     General: Awake,  Alert, Oriented x 3, Mood and affect appropriate  Respiratory: Respirations even and unlabored  Cardiovascular: Peripheral pulses intact; no edema  Musculoskeletal Exam: Lower back pain    ASA Score: 3    Patient/Chart Verification  Patient ID Verified: Verbal  ID Band Applied: No  Consents Confirmed: Procedural, To be obtained in the Pre-Procedure area  Interval H&P(within 24 hr) Complete (required for Outpatients and Surgery Admit only): To be obtained in the Procedural area  Allergies Reviewed: Yes  Anticoag/NSAID held?: NA  Currently on antibiotics?: No  Pregnancy denied?: Yes    Assessment:   1. Sacroiliitis (HCC)        Plan: B/L SIJ

## 2024-09-12 NOTE — DISCHARGE INSTR - LAB

## 2024-09-20 ENCOUNTER — TELEPHONE (OUTPATIENT)
Age: 34
End: 2024-09-20

## 2024-09-20 NOTE — TELEPHONE ENCOUNTER
Patient called in regarding concern for kidney infection. She's currently at [place]. She's having pain on the right back side low region. She's requesting an anvx. I did advised urgent care. She is asking if anbx can be called in as they have been to urgent care already for her kid, and her other kid she had to do CPR on last night, and is in hospital. Please advise.

## 2024-09-23 NOTE — TELEPHONE ENCOUNTER
I did speak to Enriqueta today who is currently traveling home from [place].She is experiencing pain under r rib but toward flank area. She cannot deny or confirm fever with children also ill during this vacation. She does note some frequency of urination and some incontinence also this is noted since birth of her last child so not particularly a new symptom. The pain under rib to flank is new and she thought she could get an antibiotic or something. She will be getting home and is off from work tomorrow. She was concerned this feeling was related to possible kidney infection. WE do have a few appointments tomorrow if you can kindly review with this your patient. I did let her know you are just going to receive this message today . Thank you

## 2024-09-24 ENCOUNTER — OFFICE VISIT (OUTPATIENT)
Dept: FAMILY MEDICINE CLINIC | Facility: CLINIC | Age: 34
End: 2024-09-24
Payer: COMMERCIAL

## 2024-09-24 VITALS
SYSTOLIC BLOOD PRESSURE: 119 MMHG | HEIGHT: 62 IN | BODY MASS INDEX: 29.81 KG/M2 | WEIGHT: 162 LBS | OXYGEN SATURATION: 99 % | HEART RATE: 74 BPM | DIASTOLIC BLOOD PRESSURE: 76 MMHG | TEMPERATURE: 95.2 F

## 2024-09-24 DIAGNOSIS — N39.0 URINARY TRACT INFECTION WITHOUT HEMATURIA, SITE UNSPECIFIED: Primary | ICD-10-CM

## 2024-09-24 DIAGNOSIS — F43.9 STRESS: ICD-10-CM

## 2024-09-24 LAB
SL AMB  POCT GLUCOSE, UA: ABNORMAL
SL AMB LEUKOCYTE ESTERASE,UA: ABNORMAL
SL AMB POCT BILIRUBIN,UA: ABNORMAL
SL AMB POCT BLOOD,UA: ABNORMAL
SL AMB POCT CLARITY,UA: CLEAR
SL AMB POCT COLOR,UA: YELLOW
SL AMB POCT KETONES,UA: ABNORMAL
SL AMB POCT NITRITE,UA: ABNORMAL
SL AMB POCT PH,UA: 7.5
SL AMB POCT SPECIFIC GRAVITY,UA: 1.01
SL AMB POCT URINE PROTEIN: ABNORMAL
SL AMB POCT UROBILINOGEN: ABNORMAL

## 2024-09-24 PROCEDURE — 87086 URINE CULTURE/COLONY COUNT: CPT

## 2024-09-24 PROCEDURE — 99213 OFFICE O/P EST LOW 20 MIN: CPT

## 2024-09-24 PROCEDURE — 81002 URINALYSIS NONAUTO W/O SCOPE: CPT

## 2024-09-24 RX ORDER — NITROFURANTOIN 25; 75 MG/1; MG/1
100 CAPSULE ORAL 2 TIMES DAILY
Qty: 14 CAPSULE | Refills: 0 | Status: SHIPPED | OUTPATIENT
Start: 2024-09-24 | End: 2024-10-01

## 2024-09-24 NOTE — PROGRESS NOTES
"Ambulatory Visit  Name: Enriqueta Pierre      : 1990      MRN: [id number]  Encounter Provider: Mar Nelson DO  Encounter Date: 2024   Encounter department: [facility]    Assessment & Plan  Dysuria    Orders:    POCT urine dip       History of Present Illness     HPI      Review of Systems        Objective     /76 (BP Location: Left arm, Patient Position: Sitting, Cuff Size: Standard)   Pulse 74   Temp (!) 95.2 °F (35.1 °C) (Temporal)   Ht 5' 2\" (1.575 m)   Wt 73.5 kg (162 lb)   SpO2 99%   BMI 29.63 kg/m²     Physical Exam    "

## 2024-09-24 NOTE — TELEPHONE ENCOUNTER
I attempted to call Enriqueta - it went to .  I will attempt additional calls - please reach out directly to me via secure chat if she calls back.

## 2024-09-24 NOTE — PROGRESS NOTES
Ambulatory Visit  Name: Enriqueta Pierre      : 1990      MRN: [id number]  Encounter Provider: Mar Nelson DO  Encounter Date: 2024   Encounter department: [facility]    Assessment & Plan  Urinary tract infection without hematuria, site unspecified  Patient presenting with right sided flank pain/discomfort.   - She reports a hx of multiple UTIs with similar presentation  - No dysuria, hematuria, or urinary frequency  UA in office negative for Leuks/Nitrites. pH increased  US kidneys and Bladder form 2017:  1.  Normal sonographic appearance of the kidneys.  2.  Incomplete evaluation of the urinary bladder due to under distention.  If warranted, repeat examination with full bladder distention is suggested.      Plan:  Will send urine to culture  Start Macrobid 100 mg BID for 7 days  - patient instructed to call office if symptoms progress or fail to improve by   US of kidney and bladder  Orders:    POCT urine dip    US kidney and bladder; Future    nitrofurantoin (MACROBID) 100 mg capsule; Take 1 capsule (100 mg total) by mouth 2 (two) times a day for 7 days    Urine culture; Future    Urine culture    Stress  Patient reports a recent stressful event during which her daughter had a febrile seizure  - reported increase in anxiety since the event and expresses a desire to talk to a provider about the event      Plan:  Handout on talk therapy provided through HR reviewed in office  Discussed stressful event in office and patients preferred mechanisms for managing stress  - no interest in medication management at this time  Reviewed support system.           History of Present Illness     Urinary Tract Infection   Associated symptoms include flank pain. Pertinent negatives include no frequency, nausea or vomiting.   Flank Pain  Pertinent negatives include no abdominal pain, chest pain, dysuria, fever or pelvic pain.     33-year-old female presenting to the office with  "concerns of right flank pain.  The pain is described as a throbbing, deep pain that is rated at a 2/10 at rest but worsens to a 6/10 with sudden movements such as sneezing.  The pain has been progressive in nature since its onset on 9/20.  She denies any blood in her urine, burning with urination, or difficulty urinating.  She does note an increase in urinary frequency and occasional incontinence at baseline since the birth of her child last year.  She expresses that she was out in the sun over the weekend and is unsure if she had any fevers.  She tried drinking more water and taking Tylenol with no relief.  Of note, patient has a history of multiple UTIs, which she expresses present similar to her current symptoms.  She does not tolerate sulfa drugs.  She was following with urology at [facility] years ago and had a cystoscopy which was negative.    She also notes that her daughter had a febrile seizure recently and that she is experiencing increased anxiety since this event.  She expresses that it is manageable, but she would like to talk to somebody, preferably virtually.  She is not interested in medication management at this time.      History obtained from : patient  Review of Systems   Constitutional:  Negative for activity change, appetite change and fever.   Respiratory:  Negative for shortness of breath.    Cardiovascular:  Negative for chest pain.   Gastrointestinal:  Negative for abdominal pain, constipation, diarrhea, nausea and vomiting.   Genitourinary:  Positive for flank pain. Negative for difficulty urinating, dysuria, frequency and pelvic pain.   Psychiatric/Behavioral:  The patient is nervous/anxious.      Medical History Reviewed by provider this encounter:  Tobacco  Allergies  Meds  Problems  Med Hx  Surg Hx  Fam Hx       Objective   /76 (BP Location: Left arm, Patient Position: Sitting, Cuff Size: Standard)   Pulse 74   Temp (!) 95.2 °F (35.1 °C) (Temporal)   Ht 5' 2\" (1.575 " m)   Wt 73.5 kg (162 lb)   SpO2 99%   BMI 29.63 kg/m²     Physical Exam  Constitutional:       General: She is not in acute distress.     Appearance: She is not toxic-appearing.   Cardiovascular:      Rate and Rhythm: Normal rate and regular rhythm.      Heart sounds: Normal heart sounds. No murmur heard.  Pulmonary:      Effort: Pulmonary effort is normal. No respiratory distress.      Breath sounds: Normal breath sounds. No wheezing.   Abdominal:      General: Bowel sounds are normal.      Palpations: Abdomen is soft.      Tenderness: There is no abdominal tenderness. There is no guarding or rebound.      Comments: Discomfort at Right CVA. Left CVA tenderness negative   Neurological:      Mental Status: She is alert.   Psychiatric:         Mood and Affect: Mood normal.         Behavior: Behavior normal.

## 2024-09-24 NOTE — ASSESSMENT & PLAN NOTE
Patient reports a recent stressful event during which her daughter had a febrile seizure  - reported increase in anxiety since the event and expresses a desire to talk to a provider about the event      Plan:  Handout on talk therapy provided through HR reviewed in office  Discussed stressful event in office and patients preferred mechanisms for managing stress  - no interest in medication management at this time  Reviewed support system.

## 2024-09-25 LAB — BACTERIA UR CULT: NORMAL

## 2024-09-26 ENCOUNTER — TELEPHONE (OUTPATIENT)
Dept: PAIN MEDICINE | Facility: CLINIC | Age: 34
End: 2024-09-26

## 2024-09-27 ENCOUNTER — TELEPHONE (OUTPATIENT)
Age: 34
End: 2024-09-27

## 2024-10-01 ENCOUNTER — TELEPHONE (OUTPATIENT)
Age: 34
End: 2024-10-01

## 2024-11-11 ENCOUNTER — OFFICE VISIT (OUTPATIENT)
Dept: PAIN MEDICINE | Facility: CLINIC | Age: 34
End: 2024-11-11
Payer: COMMERCIAL

## 2024-11-11 VITALS
HEART RATE: 66 BPM | SYSTOLIC BLOOD PRESSURE: 100 MMHG | BODY MASS INDEX: 31.47 KG/M2 | RESPIRATION RATE: 18 BRPM | DIASTOLIC BLOOD PRESSURE: 68 MMHG | HEIGHT: 62 IN | WEIGHT: 171 LBS

## 2024-11-11 DIAGNOSIS — M51.16 INTERVERTEBRAL DISC DISORDER WITH RADICULOPATHY OF LUMBAR REGION: Primary | ICD-10-CM

## 2024-11-11 PROCEDURE — 99214 OFFICE O/P EST MOD 30 MIN: CPT | Performed by: ANESTHESIOLOGY

## 2024-11-11 NOTE — PROGRESS NOTES
Assessment:  1. Intervertebral disc disorder with radiculopathy of lumbar region        Plan:  The patient had significant relief with the epidural steroid injection performed in August.  Symptoms have regressed following Halloween walking and she would like to get that repeated sometime prior to Robert and will be scheduled accordingly.    My impressions and treatment recommendations were discussed in detail with the patient who verbalized understanding and had no further questions.  Discharge instructions were provided. I personally saw and examined the patient and I agree with the above discussed plan of care.    Orders Placed This Encounter   Procedures    FL spine and pain procedure     Standing Status:   Future     Standing Expiration Date:   11/11/2028     Order Specific Question:   Reason for Exam:     Answer:   L4 LESI     Order Specific Question:   Is the patient pregnant?     Answer:   No     Order Specific Question:   Anticoagulant hold needed?     Answer:   No     No orders of the defined types were placed in this encounter.      History of Present Illness:  Enriqueta Pierre is a 34 y.o. female who presents for a follow up office visit in regards to Back Pain.   The patient has a history of lumbar disc disease, lumbar to colopathy returns for follow-up.  She is status post lumbar epidural injection in August followed by sacroiliac injections 2 weeks later.  She reports she had significantly more relief with the epidural steroid injection.  She reports that symptoms got worse after Halloween when she was doing a lot of walking.  Pain is moderate rated 1-5/10 on a numeric rating scale that is felt across the lower back aggravated by bending and twisting motions.    I have personally reviewed and/or updated the patient's past medical history, past surgical history, family history, social history, current medications, allergies, and vital signs today.     Review of Systems   Respiratory:  Negative for  shortness of breath.    Cardiovascular:  Negative for chest pain.   Gastrointestinal:  Negative for constipation, diarrhea, nausea and vomiting.   Musculoskeletal:  Positive for back pain and gait problem. Negative for arthralgias, joint swelling and myalgias.   Skin:  Negative for rash.   Neurological:  Negative for dizziness, seizures and weakness.   All other systems reviewed and are negative.      Patient Active Problem List   Diagnosis    Lumbar radiculopathy    Sacroiliitis (HCC)    Vitamin D insufficiency    Palpitation    Annual physical exam    Mother currently breast-feeding    Dizziness    Hypermobility syndrome    Hypoferremia    Brain fog    Dysautonomia (HCC)    Myofascial pain on right side    Cervicogenic headache    Segmental dysfunction of cervical region    Segmental dysfunction of thoracic region    Stress       Past Medical History:   Diagnosis Date    Degenerative lumbar disc 2015    Overactive bladder     Urinary tract infection     Verruca        Past Surgical History:   Procedure Laterality Date     SECTION  21    COLPOSCOPY      COLPOSCOPY W/ BIOPSY / CURETTAGE      Resolved 2017    GYNECOLOGIC CRYOSURGERY      [facility]  DELIVERY ONLY N/A 2021    Procedure:  SECTION ();  Surgeon: Estelita Bridges MD;  Location: [facility] ;  Service: Obstetrics    WISDOM TOOTH EXTRACTION         Family History   Problem Relation Age of Onset    Hypothyroidism Mother     Rheum arthritis Mother     Fibroids Mother     Thyroid disease Mother     Alcohol abuse Father     No Known Problems Brother     No Known Problems Brother     No Known Problems Son     Hiatal hernia Maternal Grandmother     Osteoporosis Maternal Grandmother     Endometrial cancer Maternal Grandmother     Endometriosis Maternal Grandmother     COPD Maternal Grandmother     Parkinsonism Maternal Grandmother     Skin cancer Maternal Grandmother     ALS Maternal Grandfather     Parkinsonism Maternal  "Grandfather     Arthritis Other     Hypertension Other         Benign Essential    Breast cancer Other     ALS Other     Osteoporosis Other        Social History     Occupational History     Employer: [employer]   Tobacco Use    Smoking status: Never    Smokeless tobacco: Never   Vaping Use    Vaping status: Never Used   Substance and Sexual Activity    Alcohol use: Not Currently     Alcohol/week: 2.0 standard drinks of alcohol     Types: 1 Glasses of wine, 1 Standard drinks or equivalent per week    Drug use: No    Sexual activity: Yes     Partners: Male     Birth control/protection: None       Current Outpatient Medications on File Prior to Visit   Medication Sig    ergocalciferol (VITAMIN D2) 50,000 units Take 1 capsule (50,000 Units total) by mouth once a week (Patient not taking: Reported on 9/24/2024)    ofloxacin (OCUFLOX) 0.3 % ophthalmic solution Administer 1 drop into the left eye 4 (four) times a day (Patient not taking: Reported on 7/8/2024)    Prenatal Vit-Fe Fumarate-FA (PRENATAL PO) Take by mouth (Patient not taking: Reported on 7/8/2024)     No current facility-administered medications on file prior to visit.       Allergies   Allergen Reactions    Sulfa Antibiotics Fever and Fatigue    Sulfamethoxazole-Trimethoprim Fever and Fatigue       Physical Exam:    /68   Pulse 66   Resp 18   Ht 5' 2\" (1.575 m)   Wt 77.6 kg (171 lb)   BMI 31.28 kg/m²     Constitutional:normal, well developed, well nourished, alert, in no distress and non-toxic and no overt pain behavior.  Eyes:anicteric  HEENT:grossly intact  Neck:supple, symmetric, trachea midline and no masses   Pulmonary:even and unlabored  Cardiovascular:No edema or pitting edema present  Skin:Normal without rashes or lesions and well hydrated  Psychiatric:Mood and affect appropriate  Neurologic:Cranial Nerves II-XII grossly intact  Musculoskeletal:normal    Lumbar Spine Exam  Appearance:  Normal " lordosis  Palpation/Tenderness:  left lumbar paraspinal tenderness  right lumbar paraspinal tenderness  Range of Motion:  Flexion:  Minimally limited  with pain  Extension:  Minimally limited  with pain  Motor Strength:  Left hip flexion:  5/5  Left hip extension:  5/5  Right hip flexion:  5/5  Right hip extension:  5/5  Left knee flexion:  5/5  Left knee extension:  5/5  Right knee flexion:  5/5  Right knee extension:  5/5  Left foot dorsiflexion:  5/5  Left foot plantar flexion:  5/5  Right foot dorsiflexion:  5/5    Imaging    MRI LUMBAR SPINE WITHOUT CONTRAST (7/29/2024)     INDICATION: M54.16: Radiculopathy, lumbar region.     bp into right leg     COMPARISON: CT abdomen pelvis with contrast 10/16/2022. MRI lumbar spine without contrast 8/13/2015. Lumbar spine radiograph 6/25/2015.     TECHNIQUE:  Multiplanar, multisequence imaging of the lumbar spine was performed. .        IMAGE QUALITY:  Diagnostic     FINDINGS:     VERTEBRAL BODIES:  There are 5 lumbar type vertebral bodies.  Normal alignment of the lumbar spine.  No spondylolysis or spondylolisthesis. No scoliosis.  No compression fracture.     Normal marrow signal is identified within the visualized bony structures.  No discrete marrow lesion.     SACRUM:  Normal signal within the sacrum. No evidence of insufficiency or stress fracture.     DISTAL CORD AND CONUS:  Normal size and signal within the distal cord and conus.     PARASPINAL SOFT TISSUES:  Paraspinal soft tissues are unremarkable.     LOWER THORACIC DISC SPACES:  Normal disc height and signal.  No disc herniation, canal stenosis or foraminal narrowing.     LUMBAR DISC SPACES: Mild disc height loss at L4-L5.     L1-L2:  Normal.     L2-L3:  Normal.     L3-L4:  Normal.     L4-L5: Mild diffuse disc bulge, small central disc protrusion with new small posterior annular fissure, new tiny right foraminal disc protrusion with tiny right posterolateral annular fissure. Mild facet arthropathy. Mild canal  stenosis. No significant   foraminal narrowing.     L5-S1:  Normal.     OTHER FINDINGS: Retroaortic left renal vein, normal variant.

## 2024-11-27 ENCOUNTER — ANNUAL EXAM (OUTPATIENT)
Dept: OBGYN CLINIC | Facility: CLINIC | Age: 34
End: 2024-11-27
Payer: COMMERCIAL

## 2024-11-27 VITALS
BODY MASS INDEX: 31.5 KG/M2 | SYSTOLIC BLOOD PRESSURE: 108 MMHG | HEIGHT: 62 IN | DIASTOLIC BLOOD PRESSURE: 68 MMHG | WEIGHT: 171.2 LBS

## 2024-11-27 DIAGNOSIS — Z01.419 WELL WOMAN EXAM WITH ROUTINE GYNECOLOGICAL EXAM: Primary | ICD-10-CM

## 2024-11-27 DIAGNOSIS — N92.0 MENORRHAGIA WITH REGULAR CYCLE: ICD-10-CM

## 2024-11-27 PROCEDURE — S0612 ANNUAL GYNECOLOGICAL EXAMINA: HCPCS | Performed by: PHYSICIAN ASSISTANT

## 2024-11-27 NOTE — PROGRESS NOTES
Assessment   34 y.o.  presenting for annual exam.     Plan   Diagnoses and all orders for this visit:    Well woman exam with routine gynecological exam    Menorrhagia with regular cycle  -     US pelvis complete w transvaginal; Future        Pap up to date   Menorrhagia with regular cycle- TSH and CBC up to date and wnl. Advised to complete TVUS to assess for structural cause. Can return to office in 6-8 weeks for discussion regarding management    Perineal hygiene reviewed. Weight bearing exercises minium of 150 mins/weekly advised. Kegel exercises recommended.   SBE encouraged, A yearly mammogram is recommended for breast cancer screening starting at age 40. ASCCP guidelines reviewed. Condoms encouraged with all sexual activity to prevent STI's. Gardisil vaccines recommended up to age 45. Calcium/ Vit D dietary requirements discussed.   Advised to call with any issues, all concerns & questions addressed.   See provided information in your after visit summary     F/U Annually and PRN    Results will be released to [employer], if abnormal will call or message to review and discuss treatment plan.     __________________________________________________________________    Subjective     Enriqueta Pierre is a 34 y.o.  presenting for annual exam.     Pt does have concerns regarding heavier menses since the birth of her last child. Menses are regular and monthly. First 2 days of flow are light to moderate. 3rd day is extremely heavy requiring pad and tampon change <1 hour with cramping and large clots. Last 2 days of flow are light.     She additionally reports more pain with deep penetration with intercourse    SCREENING  Last Pap: 2023 neg/neg      GYN  The patient's menstrual history is as follows:    ;  ; Period Cycle (Days): 28; Period Duration (Days): 5-6; Period Pattern: Regular; Menstrual Flow: Heavy, Light; Dysmenorrhea: (!) Mild; Dysmenorrhea Symptoms: Cramping, Headache    Sexually active: Yes -  single partner - male  Contraception: condoms     Hx Abnormal pap: hx of colpo and cryo in 2019 for PCB  We reviewed ASCCP guidelines for Pap testing today.  Gardasil: She completed the Gardasil series.    Denies vaginal discharge, itching, odor, dyspareunia, pelvic pain and vulvar/vaginal symptoms      OB           Complaints: denies   Denies urgency, frequency, hematuria, leakage / change in stream, difficulty urinating.       BREAST  Complaints: denies   Denies: breast lump, breast tenderness, nipple discharge, skin color change, and skin lesion(s)  Personal hx: none      Pertinent Family Hx:   Family hx of breast cancer: no  Family hx of ovarian cancer: no  Family hx of colon cancer: no  Family hx of uterine cancer: [facility] [place]    GENERAL  PMH reviewed/updated and is as below.   Patient does follow with a PCP.    SOCIAL  Smoking: no  Alcohol:social  Drug: no  Occupation: ortho-       Past Medical History:   Diagnosis Date    Degenerative lumbar disc 2015    Overactive bladder     Urinary tract infection     Verruca        Past Surgical History:   Procedure Laterality Date     SECTION  21    COLPOSCOPY      COLPOSCOPY W/ BIOPSY / CURETTAGE      Resolved 2017    GYNECOLOGIC CRYOSURGERY      [facility]    Procedure:  SECTION ();  Surgeon: Estelita Bridges MD;  Location: [facility] ;  Service: Obstetrics    WISDOM TOOTH EXTRACTION           Current Outpatient Medications:     ergocalciferol (VITAMIN D2) 50,000 units, Take 1 capsule (50,000 Units total) by mouth once a week (Patient not taking: Reported on 2024), Disp: 12 capsule, Rfl: 0    ofloxacin (OCUFLOX) 0.3 % ophthalmic solution, Administer 1 drop into the left eye 4 (four) times a day (Patient not taking: Reported on 2024), Disp: 5 mL, Rfl: 0    Prenatal Vit-Fe Fumarate-FA (PRENATAL PO), Take by mouth (Patient not taking: Reported on 2024), Disp: , Rfl:      Allergies   Allergen Reactions    Sulfa Antibiotics Fever and Fatigue    Sulfamethoxazole-Trimethoprim Fever and Fatigue       Social History     Socioeconomic History    Marital status: Single     Spouse name: Not on file    Number of children: Not on file    Years of education: Not on file    Highest education level: Not on file   Occupational History     Employer: [employer]   Tobacco Use    Smoking status: Never    Smokeless tobacco: Never   Vaping Use    Vaping status: Never Used   Substance and Sexual Activity    Alcohol use: Yes     Alcohol/week: 2.0 standard drinks of alcohol     Types: 1 Glasses of wine, 1 Standard drinks or equivalent per week     Comment: social    Drug use: No    Sexual activity: Yes     Partners: Male     Birth control/protection: Condom Male   Other Topics Concern    Not on file   Social History Narrative    Caffeine Use    Exercising Regularly     always seatbelt in care     employed      Social Drivers of Health     Financial Resource Strain: Low Risk  (3/22/2021)    Overall Financial Resource Strain (CARDIA)     Difficulty of Paying Living Expenses: Not hard at all   Food Insecurity: No Food Insecurity (3/22/2021)    Hunger Vital Sign     Worried About Running Out of Food in the Last Year: Never true     Ran Out of Food in the Last Year: Never true   Transportation Needs: No Transportation Needs (3/22/2021)    PRAPARE - Transportation     Lack of Transportation (Medical): No     Lack of Transportation (Non-Medical): No   Physical Activity: Sufficiently Active (7/16/2020)    Exercise Vital Sign     Days of Exercise per Week: 5 days     Minutes of Exercise per Session: 40 min   Stress: No Stress Concern Present (7/16/2020)    [place] [employer] - Occupational Stress Questionnaire     Feeling of Stress : Not at all   Social Connections: Unknown (7/16/2020)    Social Connection and Isolation Panel [NHANES]     Frequency of Communication with  "Friends and Family: More than three times a week     Frequency of Social Gatherings with Friends and Family: More than three times a week     Attends Restorationism Services: Not on file     Active Member of Clubs or Organizations: Not on file     Attends Club or Organization Meetings: Not on file     Marital Status: Never    Intimate Partner Violence: Not At Risk (7/16/2020)    Humiliation, Afraid, Rape, and Kick questionnaire     Fear of Current or Ex-Partner: No     Emotionally Abused: No     Physically Abused: No     Sexually Abused: No   Housing Stability: Not on file       Review of Systems     ROS:  Constitutional: Negative for fatigue and unexpected weight change.   Respiratory: Negative for cough and shortness of breath.    Cardiovascular: Negative for chest pain and palpitations.   Gastrointestinal: Negative for abdominal pain and change in bowel habits  Breasts:  Negative, other than as noted above.   Genitourinary: Negative, other than as noted above.   Psychiatric: Negative for mood difficulties.      Objective      /68 (BP Location: Left arm, Patient Position: Sitting, Cuff Size: Standard)   Ht 5' 2\" (1.575 m)   Wt 77.7 kg (171 lb 3.2 oz)   LMP 11/04/2024 (Approximate)   Breastfeeding No   BMI 31.31 kg/m²     Physical Examination:    Patient appears well and is not in distress  Neck is supple without masses, no cervical or supraclavicular lymphadenopathy  Cardiovascular: regular rate and rhythm; no murmurs  Lungs: clear to auscultation bilaterally; no wheezes  Breasts are symmetrical without mass, tenderness, nipple discharge, skin changes or adenopathy.   Abdomen is soft and nontender without masses.   External genitals are normal without lesions or rashes.  Urethral meatus and urethra are normal  Bladder is normal to palpation  Vagina is normal without discharge or bleeding.   Cervix is normal without discharge or lesion.   Uterus is normal, mobile, nontender without palpable " mass.  Adnexa are normal, nontender, without palpable mass.

## 2024-12-05 ENCOUNTER — TELEPHONE (OUTPATIENT)
Dept: HEMATOLOGY ONCOLOGY | Facility: CLINIC | Age: 34
End: 2024-12-05

## 2024-12-05 ENCOUNTER — CONSULT (OUTPATIENT)
Dept: HEMATOLOGY ONCOLOGY | Facility: CLINIC | Age: 34
End: 2024-12-05
Payer: COMMERCIAL

## 2024-12-05 VITALS
SYSTOLIC BLOOD PRESSURE: 112 MMHG | HEIGHT: 62 IN | RESPIRATION RATE: 16 BRPM | WEIGHT: 174 LBS | OXYGEN SATURATION: 100 % | TEMPERATURE: 97.4 F | BODY MASS INDEX: 32.02 KG/M2 | DIASTOLIC BLOOD PRESSURE: 60 MMHG | HEART RATE: 87 BPM

## 2024-12-05 DIAGNOSIS — R79.0 LOW FERRITIN: ICD-10-CM

## 2024-12-05 DIAGNOSIS — G90.A POTS (POSTURAL ORTHOSTATIC TACHYCARDIA SYNDROME): Primary | ICD-10-CM

## 2024-12-05 DIAGNOSIS — E61.1 HYPOFERREMIA: ICD-10-CM

## 2024-12-05 PROBLEM — R41.89 BRAIN FOG: Status: RESOLVED | Noted: 2024-02-26 | Resolved: 2024-12-05

## 2024-12-05 PROBLEM — R00.2 PALPITATION: Status: RESOLVED | Noted: 2020-07-16 | Resolved: 2024-12-05

## 2024-12-05 PROBLEM — Z00.00 ANNUAL PHYSICAL EXAM: Status: RESOLVED | Noted: 2022-06-06 | Resolved: 2024-12-05

## 2024-12-05 PROCEDURE — 99243 OFF/OP CNSLTJ NEW/EST LOW 30: CPT | Performed by: INTERNAL MEDICINE

## 2024-12-05 NOTE — PROGRESS NOTES
Oncology Consult Note  Enriqueta Pierre 34 y.o. female MRN: [id number]  Unit/Bed#:  Encounter: [id number]      Presenting Complaint: Low ferritin level    History of Presenting Illness: Enriqueta Pierre is seen for initial consultation 12/5/2024 at the referral of Ai Johnston DO   34-year-old premenopausal female with history of lumbar radiculopathy, POTS syndrome, overactive bladder, low ferritin level, menorrhagia with menses lasting 7 days, she received IV iron in the past with significant improvement, now with fatigue, pica, palpitation, anhedonia    Denied any melena hematochezia    She never had any surgery on her abdomen except vaginal delivery    She does not drink or smoke      Review of Systems - As stated in the HPI otherwise the fourteen point review of systems was negative.    Past Medical History:   Diagnosis Date    Degenerative lumbar disc 08/28/2015    Overactive bladder     Urinary tract infection     Verruca        Social History     Socioeconomic History    Marital status: Single     Spouse name: None    Number of children: None    Years of education: None    Highest education level: None   Occupational History     Employer: [employer]   Tobacco Use    Smoking status: Never    Smokeless tobacco: Never   Vaping Use    Vaping status: Never Used   Substance and Sexual Activity    Alcohol use: Yes     Alcohol/week: 2.0 standard drinks of alcohol     Types: 1 Glasses of wine, 1 Standard drinks or equivalent per week     Comment: social    Drug use: No    Sexual activity: Yes     Partners: Male     Birth control/protection: Condom Male   Other Topics Concern    None   Social History Narrative    Caffeine Use    Exercising Regularly     always seatbelt in care     employed      Social Drivers of Health     Financial Resource Strain: Low Risk  (3/22/2021)    Overall Financial Resource Strain (CARDIA)     Difficulty of Paying Living Expenses: Not hard at all   Food Insecurity: No  Food Insecurity (3/22/2021)    Hunger Vital Sign     Worried About Running Out of Food in the Last Year: Never true     Ran Out of Food in the Last Year: Never true   Transportation Needs: No Transportation Needs (3/22/2021)    PRAPARE - Transportation     Lack of Transportation (Medical): No     Lack of Transportation (Non-Medical): No   Physical Activity: Sufficiently Active (7/16/2020)    Exercise Vital Sign     Days of Exercise per Week: 5 days     Minutes of Exercise per Session: 40 min   Stress: No Stress Concern Present (7/16/2020)    [place] of Occupational Health - Occupational Stress Questionnaire     Feeling of Stress : Not at all   Social Connections: Unknown (7/16/2020)    Social Connection and Isolation Panel [NHANES]     Frequency of Communication with Friends and Family: More than three times a week     Frequency of Social Gatherings with Friends and Family: More than three times a week     Attends Uatsdin Services: Not on file     Active Member of Clubs or Organizations: Not on file     Attends Club or Organization Meetings: Not on file     Marital Status: Never    Intimate Partner Violence: Not At Risk (7/16/2020)    Humiliation, Afraid, Rape, and Kick questionnaire     Fear of Current or Ex-Partner: No     Emotionally Abused: No     Physically Abused: No     Sexually Abused: No   Housing Stability: Not on file       Family History   Problem Relation Age of Onset    Hypothyroidism Mother     Rheum arthritis Mother     Fibroids Mother     Thyroid disease Mother     Alcohol abuse Father     No Known Problems Brother     No Known Problems Brother     No Known Problems Son     Hiatal hernia Maternal Grandmother     Osteoporosis Maternal Grandmother     Endometrial cancer Maternal Grandmother     Endometriosis Maternal Grandmother     COPD Maternal Grandmother     Parkinsonism Maternal Grandmother     Skin cancer Maternal Grandmother     ALS Maternal Grandfather     Parkinsonism  "Maternal Grandfather     Arthritis Other     Hypertension Other         Benign Essential    Breast cancer Other     ALS Other     Osteoporosis Other     Colon cancer Neg Hx     Ovarian cancer Neg Hx     Cervical cancer Neg Hx        Allergies   Allergen Reactions    Sulfa Antibiotics Fever and Fatigue    Sulfamethoxazole-Trimethoprim Fever and Fatigue         Current Outpatient Medications:     ergocalciferol (VITAMIN D2) 50,000 units, Take 1 capsule (50,000 Units total) by mouth once a week (Patient not taking: Reported on 9/24/2024), Disp: 12 capsule, Rfl: 0    ofloxacin (OCUFLOX) 0.3 % ophthalmic solution, Administer 1 drop into the left eye 4 (four) times a day (Patient not taking: Reported on 7/8/2024), Disp: 5 mL, Rfl: 0    Prenatal Vit-Fe Fumarate-FA (PRENATAL PO), Take by mouth (Patient not taking: Reported on 7/8/2024), Disp: , Rfl:       /60 (BP Location: Left arm, Patient Position: Sitting, Cuff Size: Large)   Pulse 87   Temp (!) 97.4 °F (36.3 °C) (Temporal)   Resp 16   Ht 5' 2\" (1.575 m)   Wt 78.9 kg (174 lb)   LMP 11/04/2024 (Approximate)   SpO2 100%   BMI 31.83 kg/m²       General Appearance:    Alert, oriented        Eyes:    PERRL   Ears:    Normal external ear canals, both ears   Nose:   Nares normal, septum midline   Throat:   Mucosa moist. Pharynx without injection.    Neck:   Supple       Lungs:     Clear to auscultation bilaterally   Chest Wall:    No tenderness or deformity    Heart:    Regular rate and rhythm       Abdomen:     Soft, non-tender, bowel sounds +, no organomegaly           Extremities:   Extremities no cyanosis or edema       Skin:   no rash or icterus.    Lymph nodes:   Cervical, supraclavicular, and axillary nodes normal   Neurologic:   CNII-XII intact, normal strength, sensation and reflexes     Throughout               No results found for this or any previous visit (from the past 48 hours).      No results found.  ECOG PS:0      Assessment and " plan:  Menorrhagia with subsequent low ferritin and symptoms related to POTS, she received IV iron in the past    The patient is symptomatic    Will order CBC, ferritin, vitamin B12 level    If she was found to have low ferritin level we will arrange for IV iron as she tried oral iron before with constipation

## 2024-12-06 ENCOUNTER — HOSPITAL ENCOUNTER (OUTPATIENT)
Dept: RADIOLOGY | Facility: CLINIC | Age: 34
End: 2024-12-06
Payer: COMMERCIAL

## 2024-12-06 ENCOUNTER — TELEPHONE (OUTPATIENT)
Age: 34
End: 2024-12-06

## 2024-12-06 ENCOUNTER — APPOINTMENT (OUTPATIENT)
Dept: LAB | Facility: AMBULARY SURGERY CENTER | Age: 34
End: 2024-12-06
Payer: COMMERCIAL

## 2024-12-06 VITALS
RESPIRATION RATE: 20 BRPM | SYSTOLIC BLOOD PRESSURE: 116 MMHG | DIASTOLIC BLOOD PRESSURE: 77 MMHG | OXYGEN SATURATION: 97 % | HEART RATE: 79 BPM | TEMPERATURE: 98.1 F

## 2024-12-06 DIAGNOSIS — E61.1 HYPOFERREMIA: ICD-10-CM

## 2024-12-06 DIAGNOSIS — G90.A POTS (POSTURAL ORTHOSTATIC TACHYCARDIA SYNDROME): ICD-10-CM

## 2024-12-06 DIAGNOSIS — M51.16 INTERVERTEBRAL DISC DISORDER WITH RADICULOPATHY OF LUMBAR REGION: ICD-10-CM

## 2024-12-06 DIAGNOSIS — R79.0 LOW FERRITIN: ICD-10-CM

## 2024-12-06 LAB
BASOPHILS # BLD AUTO: 0.04 THOUSANDS/ÂΜL (ref 0–0.1)
BASOPHILS NFR BLD AUTO: 1 % (ref 0–1)
EOSINOPHIL # BLD AUTO: 0.14 THOUSAND/ÂΜL (ref 0–0.61)
EOSINOPHIL NFR BLD AUTO: 2 % (ref 0–6)
ERYTHROCYTE [DISTWIDTH] IN BLOOD BY AUTOMATED COUNT: 12.1 % (ref 11.6–15.1)
FERRITIN SERPL-MCNC: 16 NG/ML (ref 11–307)
HCT VFR BLD AUTO: 41.6 % (ref 34.8–46.1)
HGB BLD-MCNC: 13.4 G/DL (ref 11.5–15.4)
IMM GRANULOCYTES # BLD AUTO: 0.01 THOUSAND/UL (ref 0–0.2)
IMM GRANULOCYTES NFR BLD AUTO: 0 % (ref 0–2)
IRON SATN MFR SERPL: 22 % (ref 15–50)
IRON SERPL-MCNC: 78 UG/DL (ref 50–212)
LYMPHOCYTES # BLD AUTO: 2.05 THOUSANDS/ÂΜL (ref 0.6–4.47)
LYMPHOCYTES NFR BLD AUTO: 33 % (ref 14–44)
MCH RBC QN AUTO: 30 PG (ref 26.8–34.3)
MCHC RBC AUTO-ENTMCNC: 32.2 G/DL (ref 31.4–37.4)
MCV RBC AUTO: 93 FL (ref 82–98)
MONOCYTES # BLD AUTO: 0.4 THOUSAND/ÂΜL (ref 0.17–1.22)
MONOCYTES NFR BLD AUTO: 6 % (ref 4–12)
NEUTROPHILS # BLD AUTO: 3.65 THOUSANDS/ÂΜL (ref 1.85–7.62)
NEUTS SEG NFR BLD AUTO: 58 % (ref 43–75)
NRBC BLD AUTO-RTO: 0 /100 WBCS
PLATELET # BLD AUTO: 290 THOUSANDS/UL (ref 149–390)
PMV BLD AUTO: 10.6 FL (ref 8.9–12.7)
RBC # BLD AUTO: 4.47 MILLION/UL (ref 3.81–5.12)
TIBC SERPL-MCNC: 351 UG/DL (ref 250–450)
UIBC SERPL-MCNC: 273 UG/DL (ref 155–355)
VIT B12 SERPL-MCNC: 232 PG/ML (ref 180–914)
WBC # BLD AUTO: 6.29 THOUSAND/UL (ref 4.31–10.16)

## 2024-12-06 PROCEDURE — 36415 COLL VENOUS BLD VENIPUNCTURE: CPT

## 2024-12-06 PROCEDURE — 85025 COMPLETE CBC W/AUTO DIFF WBC: CPT

## 2024-12-06 PROCEDURE — 83540 ASSAY OF IRON: CPT

## 2024-12-06 PROCEDURE — 82728 ASSAY OF FERRITIN: CPT

## 2024-12-06 PROCEDURE — 83550 IRON BINDING TEST: CPT

## 2024-12-06 PROCEDURE — 82607 VITAMIN B-12: CPT

## 2024-12-06 PROCEDURE — 62323 NJX INTERLAMINAR LMBR/SAC: CPT | Performed by: ANESTHESIOLOGY

## 2024-12-06 RX ORDER — METHYLPREDNISOLONE ACETATE 80 MG/ML
80 INJECTION, SUSPENSION INTRA-ARTICULAR; INTRALESIONAL; INTRAMUSCULAR; PARENTERAL; SOFT TISSUE ONCE
Status: COMPLETED | OUTPATIENT
Start: 2024-12-06 | End: 2024-12-06

## 2024-12-06 RX ORDER — BUPIVACAINE HCL/PF 2.5 MG/ML
2 VIAL (ML) INJECTION ONCE
Status: COMPLETED | OUTPATIENT
Start: 2024-12-06 | End: 2024-12-06

## 2024-12-06 RX ADMIN — IOHEXOL 1 ML: 300 INJECTION, SOLUTION INTRAVENOUS at 11:53

## 2024-12-06 RX ADMIN — METHYLPREDNISOLONE ACETATE 80 MG: 80 INJECTION, SUSPENSION INTRA-ARTICULAR; INTRALESIONAL; INTRAMUSCULAR; SOFT TISSUE at 11:54

## 2024-12-06 RX ADMIN — BUPIVACAINE HYDROCHLORIDE 2 ML: 2.5 INJECTION, SOLUTION EPIDURAL; INFILTRATION; INTRACAUDAL at 11:54

## 2024-12-06 NOTE — TELEPHONE ENCOUNTER
[facility] called stating they received a fax for the pt and she is not one of their pt's.  Not sure who faxed or what was faxed

## 2024-12-06 NOTE — H&P
History of Present Illness: The patient is a 34 y.o. female who presents with complaints of lower back pain and is here today for a lumbar epidural steroid injection    Past Medical History:   Diagnosis Date    Degenerative lumbar disc 2015    Overactive bladder     Urinary tract infection     Verruca        Past Surgical History:   Procedure Laterality Date     SECTION  21    COLPOSCOPY      COLPOSCOPY W/ BIOPSY / CURETTAGE      Resolved 2017    GYNECOLOGIC CRYOSURGERY      [place]  DELIVERY ONLY N/A 2021    Procedure:  SECTION ();  Surgeon: Estelita Bridges MD;  Location: [facility] ;  Service: Obstetrics    WISDOM TOOTH EXTRACTION           Current Outpatient Medications:     ergocalciferol (VITAMIN D2) 50,000 units, Take 1 capsule (50,000 Units total) by mouth once a week (Patient not taking: Reported on 2024), Disp: 12 capsule, Rfl: 0    Current Facility-Administered Medications:     bupivacaine (PF) (MARCAINE) 0.25 % injection 2 mL, 2 mL, Epidural, Once, Artemio Flower MD    iohexol (OMNIPAQUE) 300 mg/mL injection 1 mL, 1 mL, Epidural, Once, Artemio Flower MD    methylPREDNISolone acetate (DEPO-MEDROL) injection 80 mg, 80 mg, Epidural, Once, Artemio Flower MD    Allergies   Allergen Reactions    Sulfa Antibiotics Fever and Fatigue    Sulfamethoxazole-Trimethoprim Fever and Fatigue       Physical Exam:   Vitals:    24 1143   BP: 112/72   Pulse: 88   Resp: 20   Temp: 98.1 °F (36.7 °C)   SpO2: 98%     General: Awake, Alert, Oriented x 3, Mood and affect appropriate  Respiratory: Respirations even and unlabored  Cardiovascular: Peripheral pulses intact; no edema  Musculoskeletal Exam: Lower back pain    ASA Score: 3    Patient/Chart Verification  Patient ID Verified: Verbal  Consents Confirmed: To be obtained in the Pre-Procedure area  Interval H&P(within 24 hr) Complete (required for Outpatients and Surgery Admit only): To be obtained in the Procedural  area  Allergies Reviewed: Yes  Anticoag/NSAID held?: NA  Currently on antibiotics?: No  Pregnancy denied?: Yes    Assessment:   1. Intervertebral disc disorder with radiculopathy of lumbar region        Plan: L4 LESI

## 2024-12-06 NOTE — DISCHARGE INSTR - LAB
Epidural Steroid Injection   WHAT YOU NEED TO KNOW:   An epidural steroid injection (KIERAN) is a procedure to inject steroid medicine into the epidural space. The epidural space is between your spinal cord and vertebrae. Steroids reduce inflammation and fluid buildup in your spine that may be causing pain. You may be given pain medicine along with the steroids.          ACTIVITY  Do not drive or operate machinery today.  No strenuous activity today - bending, lifting, etc.  You may resume normal activites starting tomorrow - start slowly and as tolerated.  You may shower today, but no tub baths or hot tubs.  You may have numbness for several hours from the local anesthetic. Please use caution and common sense, especially with weight-bearing activities.    CARE OF THE INJECTION SITE  If you have soreness or pain, apply ice to the area today (20 minutes on/20 minutes off).  Starting tomorrow, you may use warm, moist heat or ice if needed.  You may have an increase or change in your discomfort for 36-48 hours after your treatment.  Apply ice and continue with any pain medication you have been prescribed.  Notify the [facility] if you have any of the following: redness, drainage, swelling, headache, stiff neck or fever above 100°F.    SPECIAL INSTRUCTIONS  Our office will contact you in approximately 14 days for a progress report.    MEDICATIONS  Continue to take all routine medications.  Our office may have instructed you to hold some medications.    As no general anesthesia was used in today's procedure, you should not experience any side effects related to anesthesia.     If you are diabetic, the steroids used in today's injection may temporarily increase your blood sugar levels after the first few days after your injection. Please keep a close eye on your sugars and alert the doctor who manages your diabetes if your sugars are significantly high from your baseline or you are symptomatic.     If you have a  problem specifically related to your procedure, please call our office at [phone].  Problems not related to your procedure should be directed to your primary care physician.

## 2024-12-12 ENCOUNTER — TELEPHONE (OUTPATIENT)
Dept: HEMATOLOGY ONCOLOGY | Facility: CLINIC | Age: 34
End: 2024-12-12

## 2024-12-12 ENCOUNTER — HOSPITAL ENCOUNTER (OUTPATIENT)
Dept: ULTRASOUND IMAGING | Facility: HOSPITAL | Age: 34
End: 2024-12-12
Payer: COMMERCIAL

## 2024-12-12 ENCOUNTER — TELEMEDICINE (OUTPATIENT)
Dept: HEMATOLOGY ONCOLOGY | Facility: CLINIC | Age: 34
End: 2024-12-12
Payer: COMMERCIAL

## 2024-12-12 DIAGNOSIS — N39.0 URINARY TRACT INFECTION WITHOUT HEMATURIA, SITE UNSPECIFIED: ICD-10-CM

## 2024-12-12 DIAGNOSIS — E53.8 VITAMIN B12 DEFICIENCY: Primary | ICD-10-CM

## 2024-12-12 DIAGNOSIS — N92.0 MENORRHAGIA WITH REGULAR CYCLE: ICD-10-CM

## 2024-12-12 DIAGNOSIS — E61.1 HYPOFERREMIA: ICD-10-CM

## 2024-12-12 PROCEDURE — 99212 OFFICE O/P EST SF 10 MIN: CPT | Performed by: INTERNAL MEDICINE

## 2024-12-12 PROCEDURE — 76775 US EXAM ABDO BACK WALL LIM: CPT

## 2024-12-12 PROCEDURE — 76856 US EXAM PELVIC COMPLETE: CPT

## 2024-12-12 PROCEDURE — 76830 TRANSVAGINAL US NON-OB: CPT

## 2024-12-12 RX ORDER — SODIUM CHLORIDE 9 MG/ML
20 INJECTION, SOLUTION INTRAVENOUS ONCE
OUTPATIENT
Start: 2024-12-19

## 2024-12-12 RX ORDER — CYANOCOBALAMIN 1000 UG/ML
1000 INJECTION, SOLUTION INTRAMUSCULAR; SUBCUTANEOUS ONCE
OUTPATIENT
Start: 2024-12-19 | End: 2024-12-19

## 2024-12-12 NOTE — PROGRESS NOTES
Virtual Brief Visit  Name: Enriqueta Pierre      : 1990      MRN: [id number]  Encounter Provider: Bertin Moreno MD  Encounter Date: 2024   Encounter department: [facility] [place]    This Visit is being completed by telephone. The Patient is located at Other and in the following state in which I hold an active license [place]    The patient was identified by name and date of birth. Enriqueta Pierre was informed that this is a telemedicine visit and that the visit is being conducted through Telephone.  My office door was closed. No one else was in the room.  She acknowledged consent and understanding of privacy and security of the video platform. The patient has agreed to participate and understands they can discontinue the visit at any time.    Patient is aware this is a billable service.     :Low ferritin level secondary to menorrhagia and symptoms related to POTS syndrome she received IV iron in the past, will arrange for Venofer 200 g IV x 3 doses    Low vitamin B12, vitamin B12 1000 mcg IM shot x 3 doses concurrent with IV iron and then the patient to take vitamin B12 sublingual 500 mcg every other day    We suggest CBC, iron studies and vitamin B12 in 6 months    Follow-up on as-needed basis      History of Present Illness   HPI  34-year-old premenopausal female with history of lumbar radiculopathy, POTS syndrome, overactive bladder, low ferritin level, menorrhagia with menses lasting 7 days, she received IV iron in the past with significant improvement, now with fatigue, pica, palpitation, anhedonia     Denied any melena hematochezia     She never had any surgery on her abdomen except vaginal delivery     She does not drink or smoke  Visit Time  Total Visit Duration: 10 min

## 2024-12-17 ENCOUNTER — RESULTS FOLLOW-UP (OUTPATIENT)
Dept: OBGYN CLINIC | Facility: CLINIC | Age: 34
End: 2024-12-17

## 2024-12-19 ENCOUNTER — RESULTS FOLLOW-UP (OUTPATIENT)
Dept: FAMILY MEDICINE CLINIC | Facility: CLINIC | Age: 34
End: 2024-12-19

## 2024-12-20 ENCOUNTER — TELEPHONE (OUTPATIENT)
Dept: RADIOLOGY | Facility: MEDICAL CENTER | Age: 34
End: 2024-12-20

## 2024-12-27 ENCOUNTER — HOSPITAL ENCOUNTER (OUTPATIENT)
Dept: INFUSION CENTER | Facility: CLINIC | Age: 34
Discharge: HOME | End: 2024-12-27

## 2024-12-27 DIAGNOSIS — E61.1 HYPOFERREMIA: ICD-10-CM

## 2024-12-27 DIAGNOSIS — E53.8 VITAMIN B12 DEFICIENCY: ICD-10-CM

## 2024-12-27 NOTE — PROGRESS NOTES
Patient arrived for Venofer.  She has anxiety over IV sticks. First IV attempt, unsuccessful patient was crying.  2nd attempt, was successful with use of ultra sound, flushed with 2 - 10mls NSS, flushed easily however patient was crying and stated that it hurt too much.  IV removed and patient did not want us to try another IV, she left and rescheduled appointment for 1/3 1600

## 2025-01-03 ENCOUNTER — HOSPITAL ENCOUNTER (OUTPATIENT)
Dept: INFUSION CENTER | Facility: CLINIC | Age: 35
End: 2025-01-03
Payer: COMMERCIAL

## 2025-01-03 VITALS
DIASTOLIC BLOOD PRESSURE: 77 MMHG | OXYGEN SATURATION: 99 % | SYSTOLIC BLOOD PRESSURE: 117 MMHG | TEMPERATURE: 98.3 F | HEART RATE: 68 BPM | RESPIRATION RATE: 18 BRPM

## 2025-01-03 DIAGNOSIS — E53.8 VITAMIN B12 DEFICIENCY: Primary | ICD-10-CM

## 2025-01-03 DIAGNOSIS — E61.1 HYPOFERREMIA: ICD-10-CM

## 2025-01-03 PROCEDURE — 96365 THER/PROPH/DIAG IV INF INIT: CPT

## 2025-01-03 PROCEDURE — 96372 THER/PROPH/DIAG INJ SC/IM: CPT

## 2025-01-03 RX ORDER — CYANOCOBALAMIN 1000 UG/ML
1000 INJECTION, SOLUTION INTRAMUSCULAR; SUBCUTANEOUS ONCE
Status: CANCELLED | OUTPATIENT
Start: 2025-01-10 | End: 2025-01-10

## 2025-01-03 RX ORDER — SODIUM CHLORIDE 9 MG/ML
20 INJECTION, SOLUTION INTRAVENOUS ONCE
Status: COMPLETED | OUTPATIENT
Start: 2025-01-03 | End: 2025-01-03

## 2025-01-03 RX ORDER — CYANOCOBALAMIN 1000 UG/ML
1000 INJECTION, SOLUTION INTRAMUSCULAR; SUBCUTANEOUS ONCE
Status: COMPLETED | OUTPATIENT
Start: 2025-01-03 | End: 2025-01-03

## 2025-01-03 RX ORDER — SODIUM CHLORIDE 9 MG/ML
20 INJECTION, SOLUTION INTRAVENOUS ONCE
Status: CANCELLED | OUTPATIENT
Start: 2025-01-10

## 2025-01-03 RX ADMIN — CYANOCOBALAMIN 1000 MCG: 1000 INJECTION, SOLUTION INTRAMUSCULAR at 16:30

## 2025-01-03 RX ADMIN — SODIUM CHLORIDE 20 ML/HR: 0.9 INJECTION, SOLUTION INTRAVENOUS at 16:27

## 2025-01-03 RX ADMIN — IRON SUCROSE 200 MG: 20 INJECTION, SOLUTION INTRAVENOUS at 16:27

## 2025-01-03 NOTE — PROGRESS NOTES
Pt to clinic for venofer infusion and B12 injection. Pt tolerated B12 in right arm and infusion without complications. Next appointment Rodrigo 10 at 7:30

## 2025-01-10 ENCOUNTER — HOSPITAL ENCOUNTER (OUTPATIENT)
Dept: INFUSION CENTER | Facility: CLINIC | Age: 35
End: 2025-01-10
Payer: COMMERCIAL

## 2025-01-10 VITALS
SYSTOLIC BLOOD PRESSURE: 112 MMHG | DIASTOLIC BLOOD PRESSURE: 74 MMHG | HEART RATE: 68 BPM | OXYGEN SATURATION: 100 % | RESPIRATION RATE: 16 BRPM | TEMPERATURE: 97.4 F

## 2025-01-10 DIAGNOSIS — E61.1 HYPOFERREMIA: Primary | ICD-10-CM

## 2025-01-10 DIAGNOSIS — E53.8 VITAMIN B12 DEFICIENCY: ICD-10-CM

## 2025-01-10 PROCEDURE — 96372 THER/PROPH/DIAG INJ SC/IM: CPT

## 2025-01-10 PROCEDURE — 96365 THER/PROPH/DIAG IV INF INIT: CPT

## 2025-01-10 RX ORDER — SODIUM CHLORIDE 9 MG/ML
20 INJECTION, SOLUTION INTRAVENOUS ONCE
Status: COMPLETED | OUTPATIENT
Start: 2025-01-10 | End: 2025-01-10

## 2025-01-10 RX ORDER — CYANOCOBALAMIN 1000 UG/ML
1000 INJECTION, SOLUTION INTRAMUSCULAR; SUBCUTANEOUS ONCE
Status: CANCELLED | OUTPATIENT
Start: 2025-01-17 | End: 2025-01-17

## 2025-01-10 RX ORDER — CYANOCOBALAMIN 1000 UG/ML
1000 INJECTION, SOLUTION INTRAMUSCULAR; SUBCUTANEOUS ONCE
Status: COMPLETED | OUTPATIENT
Start: 2025-01-10 | End: 2025-01-10

## 2025-01-10 RX ORDER — SODIUM CHLORIDE 9 MG/ML
20 INJECTION, SOLUTION INTRAVENOUS ONCE
Status: CANCELLED | OUTPATIENT
Start: 2025-01-17

## 2025-01-10 RX ADMIN — CYANOCOBALAMIN 1000 MCG: 1000 INJECTION, SOLUTION INTRAMUSCULAR at 07:48

## 2025-01-10 RX ADMIN — IRON SUCROSE 200 MG: 20 INJECTION, SOLUTION INTRAVENOUS at 07:48

## 2025-01-10 RX ADMIN — SODIUM CHLORIDE 20 ML/HR: 0.9 INJECTION, SOLUTION INTRAVENOUS at 07:47

## 2025-01-10 NOTE — PROGRESS NOTES
Pt here for venofer and B12, injection given in R arm. Pt tolerated tx well with no complaints at this time. Next appt 1/17 at 1130 at [facility]. Declined AVS.

## 2025-01-17 ENCOUNTER — HOSPITAL ENCOUNTER (OUTPATIENT)
Dept: INFUSION CENTER | Facility: CLINIC | Age: 35
End: 2025-01-17
Payer: COMMERCIAL

## 2025-01-17 VITALS
TEMPERATURE: 97.6 F | HEART RATE: 72 BPM | RESPIRATION RATE: 18 BRPM | SYSTOLIC BLOOD PRESSURE: 112 MMHG | OXYGEN SATURATION: 100 % | DIASTOLIC BLOOD PRESSURE: 67 MMHG

## 2025-01-17 DIAGNOSIS — E61.1 HYPOFERREMIA: Primary | ICD-10-CM

## 2025-01-17 DIAGNOSIS — E53.8 VITAMIN B12 DEFICIENCY: ICD-10-CM

## 2025-01-17 PROCEDURE — 96365 THER/PROPH/DIAG IV INF INIT: CPT

## 2025-01-17 PROCEDURE — 96372 THER/PROPH/DIAG INJ SC/IM: CPT

## 2025-01-17 RX ORDER — SODIUM CHLORIDE 9 MG/ML
20 INJECTION, SOLUTION INTRAVENOUS ONCE
Status: COMPLETED | OUTPATIENT
Start: 2025-01-17 | End: 2025-01-17

## 2025-01-17 RX ORDER — CYANOCOBALAMIN 1000 UG/ML
1000 INJECTION, SOLUTION INTRAMUSCULAR; SUBCUTANEOUS ONCE
Status: COMPLETED | OUTPATIENT
Start: 2025-01-17 | End: 2025-01-17

## 2025-01-17 RX ORDER — CYANOCOBALAMIN 1000 UG/ML
1000 INJECTION, SOLUTION INTRAMUSCULAR; SUBCUTANEOUS ONCE
Status: CANCELLED | OUTPATIENT
Start: 2025-01-17 | End: 2025-01-17

## 2025-01-17 RX ORDER — SODIUM CHLORIDE 9 MG/ML
20 INJECTION, SOLUTION INTRAVENOUS ONCE
Status: CANCELLED | OUTPATIENT
Start: 2025-01-17

## 2025-01-17 RX ADMIN — SODIUM CHLORIDE 20 ML/HR: 0.9 INJECTION, SOLUTION INTRAVENOUS at 07:53

## 2025-01-17 RX ADMIN — CYANOCOBALAMIN 1000 MCG: 1000 INJECTION, SOLUTION INTRAMUSCULAR at 07:58

## 2025-01-17 RX ADMIN — IRON SUCROSE 200 MG: 20 INJECTION, SOLUTION INTRAVENOUS at 07:53

## 2025-01-17 NOTE — PROGRESS NOTES
Patient tolerated treatment today.  This is last ordered infusion.  She is aware to have labs done prior to her follow up appointment, she will call office today to make that.  She declined AVS

## 2025-01-22 ENCOUNTER — TELEPHONE (OUTPATIENT)
Age: 35
End: 2025-01-22

## 2025-01-22 NOTE — TELEPHONE ENCOUNTER
Pt called to schedule an appt w/ Dr. Veras because she was told to f/u 6 months from now after having her iron infusions. She also is overdue for a physical so scheduled for both on 7/7 and asked if bloodwork can be ordered for that date.    Please advise and notify pt once completed, TY.

## 2025-01-31 ENCOUNTER — OFFICE VISIT (OUTPATIENT)
Dept: OBGYN CLINIC | Facility: CLINIC | Age: 35
End: 2025-01-31
Payer: COMMERCIAL

## 2025-01-31 VITALS
HEART RATE: 81 BPM | HEIGHT: 62 IN | WEIGHT: 176 LBS | BODY MASS INDEX: 32.39 KG/M2 | DIASTOLIC BLOOD PRESSURE: 74 MMHG | SYSTOLIC BLOOD PRESSURE: 108 MMHG | OXYGEN SATURATION: 99 %

## 2025-01-31 DIAGNOSIS — N92.0 MENORRHAGIA WITH REGULAR CYCLE: Primary | ICD-10-CM

## 2025-01-31 DIAGNOSIS — R39.9 UTI SYMPTOMS: ICD-10-CM

## 2025-01-31 DIAGNOSIS — Z30.430 ENCOUNTER FOR IUD INSERTION: ICD-10-CM

## 2025-01-31 LAB
BACTERIA UR QL AUTO: ABNORMAL /HPF
BILIRUB UR QL STRIP: NEGATIVE
CLARITY UR: CLEAR
COLOR UR: ABNORMAL
GLUCOSE UR STRIP-MCNC: NEGATIVE MG/DL
HGB UR QL STRIP.AUTO: NEGATIVE
KETONES UR STRIP-MCNC: NEGATIVE MG/DL
LEUKOCYTE ESTERASE UR QL STRIP: NEGATIVE
NITRITE UR QL STRIP: NEGATIVE
NON-SQ EPI CELLS URNS QL MICRO: ABNORMAL /HPF
PH UR STRIP.AUTO: 6.5 [PH]
PROT UR STRIP-MCNC: ABNORMAL MG/DL
RBC #/AREA URNS AUTO: ABNORMAL /HPF
SP GR UR STRIP.AUTO: 1.02 (ref 1–1.03)
UROBILINOGEN UR STRIP-ACNC: <2 MG/DL
WBC #/AREA URNS AUTO: ABNORMAL /HPF

## 2025-01-31 PROCEDURE — 99213 OFFICE O/P EST LOW 20 MIN: CPT | Performed by: PHYSICIAN ASSISTANT

## 2025-01-31 PROCEDURE — 81001 URINALYSIS AUTO W/SCOPE: CPT | Performed by: PHYSICIAN ASSISTANT

## 2025-01-31 PROCEDURE — 58300 INSERT INTRAUTERINE DEVICE: CPT | Performed by: PHYSICIAN ASSISTANT

## 2025-01-31 PROCEDURE — 87086 URINE CULTURE/COLONY COUNT: CPT | Performed by: PHYSICIAN ASSISTANT

## 2025-01-31 NOTE — PROGRESS NOTES
IUD Procedure    Date/Time: 1/31/2025 8:13 AM    Performed by: Kelly Doan PA-C  Authorized by: Kelly Doan PA-C    Verbal consent obtained?: Yes    Written consent obtained?: Yes    Risks and benefits: Risks, benefits and alternatives were discussed    Consent given by:  Patient  Patient states understanding of procedure being performed: Yes    Patient's understanding of procedure matches consent: Yes    Procedure consent matches procedure scheduled: Yes    Relevant documents present and verified: Yes    Patient identity confirmed:  Verbally with patient  Select procedure: IUD insertion    IUD Insertion:     Pelvic exam performed: yes      Negative GC/chlamydia test: no      Negative urine pregnancy test: yes      Cervix cleaned and prepped: yes      Speculum placed in vagina: yes      Tenaculum applied to cervix: yes      IUD inserted with no complications: yes      Strings trimmed: yes      Uterus sounded: yes      Uterus sound depth (cm):  9    IUD type:  1 each Levonorgestrel 20 MCG/DAY  Post-procedure:     Patient tolerated procedure well: yes      Patient will follow up after next period: yes    Insertion Comments:      Cervix was cleansed with betadine. Tenaculum was placed on anterior lip. Uterus sounded to 8 cm. Utilizing sterile technique, the IUD applicator was advanced through the cervix to the uterine fundus.  The IUD was deployed and applicator was removed. Strings visible and trimmed. Patient tolerated procedure well. Follow up for 6 week string check.

## 2025-01-31 NOTE — PROGRESS NOTES
Name: Enriqueta Pierre      : 1990      MRN: [id number]  Encounter Provider: Kelly Doan PA-C  Encounter Date: 2025   Encounter department: [facility] [place]  :  Assessment & Plan  Menorrhagia with regular cycle  -See discussion below.  Patient desires Mirena IUD placement.  Will place IUD today.  See separate procedure note.  Negative urine hCG in office today.  Patient is presently on her menses.  Return to office 6 to 8 weeks after placement for string check       Encounter for IUD insertion    Orders:    IUD Procedure    levonorgestrel (MIRENA) 52 mg intrauterine device (IUD)    UTI symptoms    Orders:    Urine culture    Urinalysis with microscopic        History of Present Illness   HPI  Enriqueta Pierre is a 34 y.o. female who presents to the office today for a follow-up visit to discuss menorrhagia with regular cycle.  She reported a change in menstrual cycle at her annual exam.  Changes have been present for a while since the birth of her last child.  Menses are extremely heavy with large clots and last for about 7 days when present.    She is starting to experience more PMS symptoms leading up to menses including acne, headaches with menses, debilitating cramping, as well as mood changes.  Feel some of her mood changes are related to situational stressors.  Her daughter had a febrile seizure a few months ago which required patient to perform CPR on her.  This was traumatic for patient.  She has been having trouble sleeping.  She is on a wait list for counseling.  Is hoping to avoid medication for anxiety management.     On her heaviest day of cycle is changing tampon every 15 to 30 minutes.  She is receiving IV iron infusions through hematology for iron deficiency anemia.  Reviewed this is likely related to her heavy menses.    She completed a transvaginal ultrasound after her annual exam on 2024 which yielded normal findings.  She presents today  "to discuss management options    We reviewed management options in detail today including risks and benefits of all.  Discussed management with combined OCPs versus NuvaRing.  Discussed very briefly Depo and Nexplanon.  She is not interested in any of these options.      We then reviewed benefits of a Mirena IUD including little to no menses once IUD is placed.  We also reviewed the benefit of endometrial protection as well as limited systemic hormone.  We also briefly reviewed surgical interventions.  She is not interested in surgery.  IUD is appealing  to patient.  She is on her menses today and does desire placement.    Experienced some irritation and slight dysuria this week. Requesting UTI studies today    Review of Systems       Objective   /74 (BP Location: Left arm, Patient Position: Sitting, Cuff Size: Standard)   Pulse 81   Ht 5' 2\" (1.575 m)   Wt 79.8 kg (176 lb)   LMP 01/30/2025 (Exact Date)   SpO2 99%   BMI 32.19 kg/m²      Physical Exam  Vitals reviewed.   Constitutional:       Appearance: Normal appearance.   HENT:      Head: Normocephalic and atraumatic.   Pulmonary:      Effort: Pulmonary effort is normal.   Abdominal:      General: Abdomen is flat.      Hernia: There is no hernia in the left inguinal area or right inguinal area.   Genitourinary:     General: Normal vulva.      Exam position: Lithotomy position.      Labia:         Right: No rash, tenderness or lesion.         Left: No rash, tenderness or lesion.       Vagina: Bleeding present. No prolapsed vaginal walls.      Cervix: No cervical motion tenderness, discharge, lesion or cervical bleeding.      Uterus: Not enlarged and not tender.       Adnexa:         Right: No mass, tenderness or fullness.          Left: No mass, tenderness or fullness.     Musculoskeletal:      Right lower leg: No edema.      Left lower leg: No edema.   Lymphadenopathy:      Lower Body: No right inguinal adenopathy. No left inguinal adenopathy. "   Skin:     General: Skin is warm and dry.   Neurological:      General: No focal deficit present.      Mental Status: She is alert. Mental status is at baseline.   Psychiatric:         Mood and Affect: Mood normal.         Behavior: Behavior normal.         Thought Content: Thought content normal.         Judgment: Judgment normal.

## 2025-02-01 LAB — BACTERIA UR CULT: NORMAL

## 2025-02-03 ENCOUNTER — RESULTS FOLLOW-UP (OUTPATIENT)
Dept: OBGYN CLINIC | Facility: CLINIC | Age: 35
End: 2025-02-03

## 2025-03-17 ENCOUNTER — OFFICE VISIT (OUTPATIENT)
Dept: FAMILY MEDICINE CLINIC | Facility: CLINIC | Age: 35
End: 2025-03-17
Payer: COMMERCIAL

## 2025-03-17 VITALS
HEART RATE: 87 BPM | DIASTOLIC BLOOD PRESSURE: 60 MMHG | BODY MASS INDEX: 32.19 KG/M2 | OXYGEN SATURATION: 98 % | SYSTOLIC BLOOD PRESSURE: 100 MMHG | TEMPERATURE: 98.3 F | WEIGHT: 176 LBS

## 2025-03-17 DIAGNOSIS — J06.9 VIRAL URI WITH COUGH: Primary | ICD-10-CM

## 2025-03-17 PROCEDURE — 99214 OFFICE O/P EST MOD 30 MIN: CPT

## 2025-03-17 NOTE — PROGRESS NOTES
Name: Enriqueta Pierre      : 1990      MRN: [id number]  Encounter Provider: Kavita Conde MD  Encounter Date: 3/17/2025   Encounter department: [facility]  :  Assessment & Plan  Viral URI with cough  Chest congestion, productive cough likely due to Flu A/B. Deferred swabbing today as family members already tested positive for Flu recently.   Unfortunately, out of the window for Tamiflu  No SOB, afebrile. Lungs clear to auscultation with adequate air movement  Low suspicion for bacterial pneumonia or atypical pneumonia at this time.   Recommended supportive care  Discussed reaching back if develops high fevers, SOB. Will consider treating empirically with Azithromycin if needed at that time.              History of Present Illness   34 year old female presenting today with concerns for chest congestion.    Her children tested + for Flu A, B    Pt started having symptoms this past Wednesday     Yesterday she started having burning sensation with breathing, Coughing a lot. Felt like crackles when breathing  Coughing up sputum/phlegm    Only took Tylenol when fever  Saline nebs at home with minimal resolution. Has not tried any other medications.    Denies fever, chills, SOB, CP.       Review of Systems   HENT:  Negative for congestion.    Respiratory:  Positive for cough. Negative for chest tightness (chest congestion) and shortness of breath.    Cardiovascular:  Negative for chest pain.   Gastrointestinal:  Negative for abdominal pain, constipation, diarrhea, nausea and vomiting.       Objective   /60 (BP Location: Left arm, Patient Position: Sitting, Cuff Size: Standard)   Pulse 87   Temp 98.3 °F (36.8 °C) (Temporal)   Wt 79.8 kg (176 lb)   SpO2 98%   BMI 32.19 kg/m²      Physical Exam  Constitutional:       General: She is not in acute distress.     Appearance: Normal appearance. She is not ill-appearing.   HENT:      Head: Normocephalic and atraumatic.       Nose: Nose normal.      Mouth/Throat:      Mouth: Mucous membranes are moist.   Cardiovascular:      Rate and Rhythm: Normal rate and regular rhythm.      Pulses: Normal pulses.      Heart sounds: Normal heart sounds.   Pulmonary:      Effort: Pulmonary effort is normal. No respiratory distress.      Breath sounds: Normal breath sounds. No wheezing, rhonchi or rales.   Skin:     General: Skin is warm.      Capillary Refill: Capillary refill takes less than 2 seconds.   Neurological:      Mental Status: She is alert and oriented to person, place, and time.

## 2025-04-01 ENCOUNTER — OFFICE VISIT (OUTPATIENT)
Dept: OBGYN CLINIC | Facility: MEDICAL CENTER | Age: 35
End: 2025-04-01
Payer: COMMERCIAL

## 2025-04-01 VITALS
SYSTOLIC BLOOD PRESSURE: 118 MMHG | HEIGHT: 62 IN | BODY MASS INDEX: 32.39 KG/M2 | DIASTOLIC BLOOD PRESSURE: 64 MMHG | WEIGHT: 176 LBS

## 2025-04-01 DIAGNOSIS — Z30.431 SURVEILLANCE OF INTRAUTERINE CONTRACEPTIVE DEVICE: Primary | ICD-10-CM

## 2025-04-01 DIAGNOSIS — N39.41 URGE INCONTINENCE: ICD-10-CM

## 2025-04-01 PROCEDURE — 99213 OFFICE O/P EST LOW 20 MIN: CPT | Performed by: CLINICAL NURSE SPECIALIST

## 2025-04-01 NOTE — PROGRESS NOTES
"Name: Enriqueta Pirere      : 1990      MRN: [id number]  Encounter Provider: RYAN Kearney  Encounter Date: 2025   Encounter department: [facility] [place]    :  Assessment & Plan  Surveillance of intrauterine contraceptive device  Mirena IUD inserted 25  Still having some intermittent bldg.  Denies pain or cramping  Strings noted on exam  Reassurred bldg should improve 3-6 months from insertion. To call at 5-6 months if still bothersome   Urge incontinence  Pt c/o urge incont.  Mild ALISON as well  Not sure if done w/ childbearing.  2 diff kinds of incont that are treated differntly.   Did pelvic PT already  Urge incont can be improved with medication while ALISON may require surgery if PT not effective.-   Will trial gemtesa.   Did pelvic PT.    Orders:  •  Vibegron 75 MG TABS; Take 1 tablet by mouth in the morning             Subjective:   History of Present Illness     Enriqueta Pierre is a 34 y.o. female.She is here for Follow-up (Mirena insertion 25)  Mirena IUD place to manage heavy menses  Inserted 25  Still having intermittent bldg.    Wt No change  Moods- denies  Sex- no new c/o of discomfort during.  Has some baseline dyspareunia with deeper penetration  Partner w/o c/o during sexual activty     Menstrual History:  No LMP recorded.          Review of Systems  The following portions of the patient's history were reviewed and updated as appropriate: allergies, current medications, past family history, past medical history, past social history, past surgical history, and problem list.          Objective:   Objective   /64 (BP Location: Left arm, Patient Position: Sitting, Cuff Size: Standard)   Ht 5' 2\" (1.575 m)   Wt 79.8 kg (176 lb)   BMI 32.19 kg/m²     Physical Exam  Constitutional:       General: She is not in acute distress.     Appearance: Normal appearance.   Genitourinary:      Urethral meatus normal.      No lesions in the " vagina.      Right Labia: No rash, lesions or skin changes.     Left Labia: No lesions, skin changes or rash.     No vaginal discharge, erythema, tenderness, bleeding or ulceration.      No vaginal prolapse present.     No vaginal atrophy present.       Right Adnexa: not tender, not full and no mass present.     Left Adnexa: not tender, not full and no mass present.     No cervical motion tenderness, discharge, friability or lesion.      IUD strings visualized.      Uterus is not enlarged or tender.      Bladder is not tender.       Pelvic exam was performed with patient in the lithotomy position.   Rectum:      No external hemorrhoid.   HENT:      Head: Normocephalic.   Cardiovascular:      Rate and Rhythm: Normal rate.   Pulmonary:      Effort: Pulmonary effort is normal.   Neurological:      Mental Status: She is alert and oriented to person, place, and time.   Psychiatric:         Mood and Affect: Mood normal.         Behavior: Behavior normal.   Vitals reviewed.

## 2025-04-01 NOTE — ASSESSMENT & PLAN NOTE
Mirena IUD inserted 1/31/25  Still having some intermittent bldg.  Denies pain or cramping  Strings noted on exam  Reassurred bldg should improve 3-6 months from insertion. To call at 5-6 months if still bothersome

## 2025-04-03 ENCOUNTER — PATIENT MESSAGE (OUTPATIENT)
Dept: OBGYN CLINIC | Facility: MEDICAL CENTER | Age: 35
End: 2025-04-03

## 2025-04-03 DIAGNOSIS — N39.41 URGE INCONTINENCE: ICD-10-CM

## 2025-04-03 RX ORDER — MIRABEGRON 50 MG/1
1 TABLET, FILM COATED, EXTENDED RELEASE ORAL DAILY
Qty: 90 TABLET | Refills: 1 | Status: SHIPPED | OUTPATIENT
Start: 2025-04-03

## 2025-04-14 ENCOUNTER — PATIENT MESSAGE (OUTPATIENT)
Dept: OBGYN CLINIC | Facility: MEDICAL CENTER | Age: 35
End: 2025-04-14

## 2025-04-14 DIAGNOSIS — N39.41 URGE INCONTINENCE: ICD-10-CM

## 2025-04-14 RX ORDER — MIRABEGRON 50 MG/1
1 TABLET, FILM COATED, EXTENDED RELEASE ORAL DAILY
Qty: 90 TABLET | Refills: 1 | Status: SHIPPED | OUTPATIENT
Start: 2025-04-14

## 2025-05-06 ENCOUNTER — TELEPHONE (OUTPATIENT)
Age: 35
End: 2025-05-06

## 2025-05-06 DIAGNOSIS — E61.1 HYPOFERREMIA: Primary | ICD-10-CM

## 2025-05-06 DIAGNOSIS — Z13.1 DIABETES MELLITUS SCREENING: ICD-10-CM

## 2025-05-06 DIAGNOSIS — Z13.220 LIPID SCREENING: ICD-10-CM

## 2025-05-06 DIAGNOSIS — R53.83 OTHER FATIGUE: ICD-10-CM

## 2025-05-06 DIAGNOSIS — E55.9 VITAMIN D INSUFFICIENCY: ICD-10-CM

## 2025-05-06 DIAGNOSIS — E53.8 VITAMIN B12 DEFICIENCY: ICD-10-CM

## 2025-05-06 NOTE — TELEPHONE ENCOUNTER
I ordered labs for Enriqueta to complete.  We can attempt to move her physical earlier if she would like, that's fine with me!

## 2025-05-06 NOTE — TELEPHONE ENCOUNTER
Patient called because has been feeling really fatigued and really out of it. And she feels that maybe her iron levels are low again because she's been feeling a little dizzy as well. She wanted to know if doctor Eda wanted her to move her physical appointment up sooner than July so that all of these levels and labs could be checked or if he wanted her to wait till July but put in labs for her to get her iron checked now since she isn't feeling well again about this. Can someone forward this to doctor Veras and then get back to the patient as to what he would like to do please?     thank you

## 2025-05-06 NOTE — TELEPHONE ENCOUNTER
Called Enriqueta, she says she will get the labs done. She also confirmed she is okay with her annual physical being in July, she did not want to move it forward.Thank you.

## 2025-05-08 ENCOUNTER — APPOINTMENT (OUTPATIENT)
Dept: LAB | Facility: AMBULARY SURGERY CENTER | Age: 35
End: 2025-05-08
Payer: COMMERCIAL

## 2025-05-08 DIAGNOSIS — E61.1 HYPOFERREMIA: ICD-10-CM

## 2025-05-08 DIAGNOSIS — R53.83 OTHER FATIGUE: ICD-10-CM

## 2025-05-08 DIAGNOSIS — E53.8 VITAMIN B12 DEFICIENCY: ICD-10-CM

## 2025-05-08 DIAGNOSIS — E55.9 VITAMIN D INSUFFICIENCY: ICD-10-CM

## 2025-05-08 DIAGNOSIS — Z13.1 DIABETES MELLITUS SCREENING: ICD-10-CM

## 2025-05-08 LAB
25(OH)D3 SERPL-MCNC: 16.8 NG/ML (ref 30–100)
BASOPHILS # BLD AUTO: 0.05 THOUSANDS/ÂΜL (ref 0–0.1)
BASOPHILS NFR BLD AUTO: 1 % (ref 0–1)
EOSINOPHIL # BLD AUTO: 0.15 THOUSAND/ÂΜL (ref 0–0.61)
EOSINOPHIL NFR BLD AUTO: 2 % (ref 0–6)
ERYTHROCYTE [DISTWIDTH] IN BLOOD BY AUTOMATED COUNT: 12 % (ref 11.6–15.1)
EST. AVERAGE GLUCOSE BLD GHB EST-MCNC: 103 MG/DL
FERRITIN SERPL-MCNC: 40 NG/ML (ref 30–307)
HBA1C MFR BLD: 5.2 %
HCT VFR BLD AUTO: 42.1 % (ref 34.8–46.1)
HGB BLD-MCNC: 14 G/DL (ref 11.5–15.4)
IMM GRANULOCYTES # BLD AUTO: 0.02 THOUSAND/UL (ref 0–0.2)
IMM GRANULOCYTES NFR BLD AUTO: 0 % (ref 0–2)
IRON SATN MFR SERPL: 37 % (ref 15–50)
IRON SERPL-MCNC: 123 UG/DL (ref 50–212)
LYMPHOCYTES # BLD AUTO: 2.34 THOUSANDS/ÂΜL (ref 0.6–4.47)
LYMPHOCYTES NFR BLD AUTO: 35 % (ref 14–44)
MCH RBC QN AUTO: 30.4 PG (ref 26.8–34.3)
MCHC RBC AUTO-ENTMCNC: 33.3 G/DL (ref 31.4–37.4)
MCV RBC AUTO: 91 FL (ref 82–98)
MONOCYTES # BLD AUTO: 0.46 THOUSAND/ÂΜL (ref 0.17–1.22)
MONOCYTES NFR BLD AUTO: 7 % (ref 4–12)
NEUTROPHILS # BLD AUTO: 3.73 THOUSANDS/ÂΜL (ref 1.85–7.62)
NEUTS SEG NFR BLD AUTO: 55 % (ref 43–75)
NRBC BLD AUTO-RTO: 0 /100 WBCS
PLATELET # BLD AUTO: 296 THOUSANDS/UL (ref 149–390)
PMV BLD AUTO: 10.4 FL (ref 8.9–12.7)
RBC # BLD AUTO: 4.61 MILLION/UL (ref 3.81–5.12)
TIBC SERPL-MCNC: 331.8 UG/DL (ref 250–450)
TRANSFERRIN SERPL-MCNC: 237 MG/DL (ref 203–362)
TSH SERPL DL<=0.05 MIU/L-ACNC: 2.97 UIU/ML (ref 0.45–4.5)
UIBC SERPL-MCNC: 209 UG/DL (ref 155–355)
VIT B12 SERPL-MCNC: 235 PG/ML (ref 180–914)
WBC # BLD AUTO: 6.75 THOUSAND/UL (ref 4.31–10.16)

## 2025-05-08 PROCEDURE — 83036 HEMOGLOBIN GLYCOSYLATED A1C: CPT

## 2025-05-08 PROCEDURE — 85025 COMPLETE CBC W/AUTO DIFF WBC: CPT

## 2025-05-08 PROCEDURE — 82306 VITAMIN D 25 HYDROXY: CPT

## 2025-05-08 PROCEDURE — 83540 ASSAY OF IRON: CPT

## 2025-05-08 PROCEDURE — 82728 ASSAY OF FERRITIN: CPT

## 2025-05-08 PROCEDURE — 84443 ASSAY THYROID STIM HORMONE: CPT

## 2025-05-08 PROCEDURE — 82607 VITAMIN B-12: CPT

## 2025-05-08 PROCEDURE — 36415 COLL VENOUS BLD VENIPUNCTURE: CPT

## 2025-05-08 PROCEDURE — 83550 IRON BINDING TEST: CPT

## 2025-05-09 ENCOUNTER — NURSE TRIAGE (OUTPATIENT)
Dept: OTHER | Facility: OTHER | Age: 35
End: 2025-05-09

## 2025-05-09 NOTE — TELEPHONE ENCOUNTER
"Regarding: Lump on labia  ----- Message from Kera SWANN sent at 5/9/2025  7:37 AM EDT -----  \"I have a strange bump near my labia. Its sore and painful. I can not pop it and I dont think its an ingrown hair. I noticed it about a week ago. Its tender and swollen\"    "

## 2025-05-09 NOTE — TELEPHONE ENCOUNTER
"FOLLOW UP: Please call patient back to schedule an appointment for this morning. She goes to work for 1pm today.    REASON FOR CONVERSATION: Genital Warts    SYMPTOMS: lump left labia for one week. Getting more tender and pain radiating upwards. Patient has a wart on her finger and lump looks the same.    OTHER: No other symptoms.    DISPOSITION: See PCP Within 24 Hours      Reason for Disposition  • [1] MILD-MODERATE pain AND [2] present > 24 hours  (Exception: Chronic pain.)    Answer Assessment - Initial Assessment Questions  1. SYMPTOM: \"What's the main symptom you're concerned about?\" (e.g., pain, itching, dryness)      Painful lump-patient is concerned that it may be a wart since it looks like the wart that she has on her finger.    2. LOCATION: \"Where is the  lump located?\" (e.g., inside/outside, left/right)      Left outside labia    3. ONSET: \"When did the  pain  start?\"      One week ago    4. PAIN: \"Is there any pain?\" If Yes, ask: \"How bad is it?\" (Scale: 1-10; mild, moderate, severe)      Tenderness initially but it is getting more painful. Pain is now radiating upwards. Not something that she can pop-she first thought that it was an ingrown hair.    5. ITCHING: \"Is there any itching?\" If Yes, ask: \"How bad is it?\" (Scale: 1-10; mild, moderate, severe)      None    6. CAUSE: \"What do you think is causing the discharge?\" \"Have you had the same problem before?\" \"What happened then?\"      No discharge or drainage from the lump    7. OTHER SYMPTOMS: \"Do you have any other symptoms?\" (e.g., fever, itching, vaginal bleeding, pain with urination, injury to genital area, vaginal foreign body)      Mo other symptoms.    8. PREGNANCY: \"Is there any chance you are pregnant?\" \"When was your last menstrual period?\"     No    Protocols used: Vaginal Symptoms-Adult-AH    "

## 2025-05-15 ENCOUNTER — TELEPHONE (OUTPATIENT)
Age: 35
End: 2025-05-15

## 2025-05-15 NOTE — TELEPHONE ENCOUNTER
Patient still interested and no preference prefer [facility] or virtual. Patient has been on  since Oct 2024.  [facility] Employee with [employer].    Patient states she is open for virtual T/A.

## 2025-05-22 ENCOUNTER — TELEPHONE (OUTPATIENT)
Dept: PSYCHIATRY | Facility: CLINIC | Age: 35
End: 2025-05-22

## 2025-06-04 ENCOUNTER — TELEMEDICINE (OUTPATIENT)
Dept: PSYCHIATRY | Facility: CLINIC | Age: 35
End: 2025-06-04
Payer: COMMERCIAL

## 2025-06-04 DIAGNOSIS — F41.9 ANXIETY DISORDER, UNSPECIFIED TYPE: Primary | ICD-10-CM

## 2025-06-04 PROCEDURE — 90791 PSYCH DIAGNOSTIC EVALUATION: CPT

## 2025-06-04 NOTE — BH TREATMENT PLAN
"Outpatient Behavioral Health Psychotherapy Treatment Plan    Enriqueta Pierre  1990      Date of Initial Psychotherapy Assessment: 06/04/25   Date of Current Treatment Plan: 06/04/25   Treatment Plan Target Date: 12/01/25  Treatment Plan Expiration Date: 12/01/25    Diagnosis:   1. Anxiety disorder, unspecified type             Area(s) of Need: coping skills, stress management, and emotional support distress tolerance skills     Long Term Goal 1 (in the client's own words): \"Be more mentally aware and talk through and process feelings\"    Stage of Change: Contemplation    Target Date for completion: 12/01/25     Anticipated therapeutic modalities: CBT, behavior modification, insight oriented therapy, psychoeducation, mindfulness, problem solving skills, communication skills, stress management skills, assertiveness skills, emotional needs meeting.      People identified to complete this goal: Emma clinician      Objective 1: Create a safe space for the client to acknowledge and express their stress; validate and  normalize their emotional responses.      Objective 2: Cultivate mindfulness and self-awareness to recognize signs and triggers of stress in the   moment.     Objective 3: Explore and develop healthy coping mechanisms to manage stress, both in the short and  long term.      I am currently under the care of a [facility] psychiatric provider: no    My [facility] psychiatric provider is: N/A    I am currently taking psychiatric medications: No    I feel that I will be ready for discharge from mental health care when I reach the following: when identified goals are met and baseline and wellbeing is being maintained without BH support.    For children and adults who have a legal guardian:   Has there been any change to custody orders and/or guardianship status? N/A. If yes, attach updated documentation.    I have created my Crisis Plan and have been offered a copy of this plan    Behavioral " Health Treatment Plan [facility]: Diagnosis and Treatment Plan explained to Enriqueta Pierre acknowledges an understanding of their diagnosis. Enriqueta Pierre agrees to this treatment plan.    I have been offered a copy of this Treatment Plan. yes    Enriqueta Ignacio, 1990, actively participated in the creation of this treatment plan during a virtual session, using the Epic Embedded platform.   Enriqueta Pierre  provided verbal consent on 6/4/2025 at 1:15 PM. The treatment plan was transcribed into the Electronic Health Record at a later time.

## 2025-06-04 NOTE — BH CRISIS PLAN
"Client Name: Enriqueta Pierre       Client YOB: 1990    [facility] Safety Plan      Creation Date: 6/4/25 Update Date: 6/4/26   Created By: Rita Cervantes LCSW       Step 1: Warning Signs:   Warning Signs   withdraw from family   crying a lot   yelling a lot/\"ragey\" towards kids            Step 2: Internal Coping Strategies:   Internal Coping Strategies   get a coffee   take a step away and breathe   think through frustrations and process   cleaning            Step 3: People and social settings that provide distraction:   Name Contact Information    in cell phone   Leah Cook (mom) [phone]   Coworkers/friends in cell phone    Places   walking trail at work           Step 4: People whom I can ask for help during a crisis:      Name Contact Information     in cell phone    Leah Cook (mom) [phone]      Step 5: Professionals or agencies I can contact during a crisis:      Clinican/Agency Name Phone Emergency Contact    [facility] Crisis [phone] self,       Local Emergency Department Emergency Department Phone Emergency Department Address    [facility] Emergency Room [phone] [facility] [place] [address]        Crisis Phone Numbers:   Suicide Prevention Lifeline: Call or Text  877 Crisis Text Line: Text HOME to 307-715   Please note: Some [facility] do not have a separate number for Child/Adolescent specific crisis. If your county is not listed under Child/Adolescent, please call the adult number for your county      Adult Crisis Numbers: Child/Adolescent Crisis Numbers   [facility]: [phone] [facility]: [phone]   [facility]: [phone] [facility]: [phone]   [facility]: [phone] [place]: [phone]   [facility]: [phone] [place] [facility]: [phone]   [place] [facility]: [phone]   [facility]: [phone]   [facility]: " "[phone]   [facility]: [phone] (daytime) [phone] (after hours, weekends, holidays)      Step 6: Making the environment safer (plan for lethal means safety):   Patient did not identify any lethal methods: Yes     Optional: What is most important to me and worth living for?   \"My kids, my , my family overall\"     Veto Safety Plan. Marla Hunter and Cabrera Alonso. Used with permission of the authors.           "

## 2025-06-04 NOTE — PSYCH
Virtual Regular VisitName: Enriqueta Pierre      : 1990      MRN: [id number]  Encounter Provider: Rita Cervantes LCSW  Encounter Date: 2025   Encounter department: [facility]  :  Assessment & Plan  Anxiety disorder, unspecified type         Anxiety disorder, unspecified type     Reason for visit is No chief complaint on file.     Recent Visits  No visits were found meeting these conditions.  Showing recent visits within past 7 days and meeting all other requirements  Today's Visits  Date Type Provider Dept   25 Telemedicine Rita Cervantes LCSW  [facility]   Showing today's visits and meeting all other requirements  Future Appointments  No visits were found meeting these conditions.  Showing future appointments within next 150 days and meeting all other requirements     History of Present Illness     HPI    Past Medical History   Past Medical History:   Diagnosis Date    Degenerative lumbar disc 2015    Overactive bladder     Urinary tract infection     Verruca      Past Surgical History:   Procedure Laterality Date     SECTION  21    COLPOSCOPY      COLPOSCOPY W/ BIOPSY / CURETTAGE      Resolved 2017    GYNECOLOGIC CRYOSURGERY      [place]  DELIVERY ONLY N/A 2021    Procedure:  SECTION ();  Surgeon: Estelita Bridges MD;  Location: [facility] ;  Service: Obstetrics    WISDOM TOOTH EXTRACTION       Current Outpatient Medications   Medication Instructions    ergocalciferol (VITAMIN D2) 50,000 Units, Oral, Weekly    Mirabegron ER (MYRBETRIQ) 50 mg, Oral, Daily     Allergies   Allergen Reactions    Sulfa Antibiotics Fever and Fatigue    Sulfamethoxazole-Trimethoprim Fever and Fatigue       Objective   There were no vitals taken for this visit.    Video Exam  Physical Exam    Psychiatric:         Behavior: Behavior is cooperative.          Administrative Statements   Encounter provider  "Rita Cervantes LCSW    The Patient is located at Other and in the following state in which I hold an active license [place].    The patient was identified by name and date of birth. Enriqueta Pierre was informed that this is a telemedicine visit and that the visit is being conducted through the Epic Embedded platform. She agrees to proceed..  My office door was closed. No one else was in the room.  She acknowledged consent and understanding of privacy and security of the video platform. The patient has agreed to participate and understands they can discontinue the visit at any time.     Behavioral Health Psychotherapy Assessment    Date of Initial Psychotherapy Assessment: 25  Referral Source: self  Has a release of information been signed for the referral source? N/A    Preferred Name: Enriqueta Pierre  Preferred Pronouns: She/her  YOB: 1990 Age: 34 y.o.  Sex assigned at birth: female   Gender Identity: female  Race:   Preferred Language: English    Emergency Contact:  Full Name: Leah Cook   Relationship to Client: mother  Contact information: [phone]    Primary Care Physician:  Daniel Veras MD  [address] [place], [place] [address]  [phone]  Has a release of information been signed? N/A    Physical Health History:  Past surgical procedures:  in 2021  Do you have a history of any of the following: none   Do you have any mobility issues? No  Developmental History: None    Relevant Family History:  She was raised by her mom and dad and she has 2 younger brothers. Her grandparents lived nearby and were around a lot in her childhood. Enriqueta states she had a \"great\" childhood. Her dad did struggle with alcohol and his work schedule made it so he wasn't around as much as mom. Her parents  when she was 7th grade. Her father remarried and he's still with this woman. Enriqueta states she stopped talking to her dad in . She was tired of their relationship " "being \"one sided\" with her always reaching out. She stopped reaching out and he never reached out to her. Enriqueta's mom is remarried to Kurtis, who she gets along well with.    Presenting Problem (What brings you in?)  \"I have two kids, 5 yo & 1 yo. I always thought I had normal 'mom' anxiety but we had a traumatic experience with my daughter\". While on vacation, her daughter had an ear infection, and stopped at urgent care. On the way to [employer] for motrin/children's tylenol, her daughter took a seizure due to the rapid spike in her temperature. For two months after, Enriqueta had nightmares about her kids dying. She's struggled with feeling really irritable and not sleeping well since then.    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 Allison called back in October for therapy and in the time since then, she's experiencing anxiety in general. She states she did struggle postpartum with her weight and her emotions. She doesn't feel like she's depressed but she's not happy where she's at.    She needs help with managing her emotions and irritability. Be more mentally aware and talk through and process feelings.    Mental Health Advance Directive:  Do you currently have a Mental Health Advance Directive?no    Diagnosis:   Diagnosis ICD-10-CM Associated Orders   1. Anxiety disorder, unspecified type  F41.9           Initial Assessment:     Current Mental Status:    Appearance: appropriate      Behavior/Manner: cooperative      Affect/Mood:  Stable    Speech:  Normal    Sleep:  Normal    Oriented to: oriented to self, oriented to place and oriented to time       Clinical Symptoms    Depression: yes      Anxiety: yes      Depression Symptoms: fatigue, weight gain and irritable      Anxiety Symptoms: fatigues easily, irritable and nervous/anxious      Have you ever been assaultive to others or the environment: No  "     Have you ever been self-injurious: No      Counseling History:  Previous Counseling or Treatment  (Mental Health or Drug & Alcohol): No    Have you previously taken psychiatric medications: No      Suicide Risk Assessment  Have you ever had a suicide attempt: No    Have you had incidents of suicidal ideation: No    Are you currently experiencing suicidal thoughts: No      Substance Abuse/Addiction Assessment:  Alcohol: No    Heroin: No    Fentanyl: No    Opiates: No    Cocaine: No    Amphetamines: No    Hallucinogens: No    Club Drugs: No    Benzodiazepines: No    Other Rx Meds: No    Marijuana: No    Tobacco/Nicotine: No    Have you experienced blackouts as a result of substance use: No    Have you had any periods of abstinence: No    Have you experienced symptoms of withdrawal: No    Have you ever overdosed on any substances?: No    Are you currently using any Medication Assisted Treatment for Substance Use: No      Compulsive Behaviors:  Compulsive Behavior Information:  N/A    Disordered Eating History:  Do you have a history of disordered eating: No      Social Determinants of Health:    SDOH:  None    Trauma and Abuse History:    Have you ever been abused: No      Legal History:    Have you ever been arrested  or had a DUI: No      Have you been incarcerated: No      Are you currently on parole/probation: No      Any current Children and Youth involvement: No      Any pending legal charges: No      Relationship History:    Current marital status:       Natural Supports:  Mother    Relationship History:      Employment History    Are you currently employed: Yes      Longest period of employment:  10 years    Employer/ Job title:      Sources of income/financial support:  Work     History:      Status: no history of  duty  Educational History:     Have you ever been diagnosed with a learning disability: No      Highest level of education:  Bachelor's  Degree    Have you ever had an IEP or 504-plan: No      Do you need assistance with reading or writing: No      Recommended Treatment:     Psychotherapy:  Individual sessions    Frequency:  2 times    Session frequency:  Monthly      Visit start and stop times:    06/04/25  Start Time: 1230

## 2025-06-05 ENCOUNTER — OFFICE VISIT (OUTPATIENT)
Dept: FAMILY MEDICINE CLINIC | Facility: CLINIC | Age: 35
End: 2025-06-05
Payer: COMMERCIAL

## 2025-06-05 VITALS
OXYGEN SATURATION: 99 % | WEIGHT: 178 LBS | BODY MASS INDEX: 30.39 KG/M2 | HEART RATE: 78 BPM | SYSTOLIC BLOOD PRESSURE: 110 MMHG | HEIGHT: 64 IN | DIASTOLIC BLOOD PRESSURE: 58 MMHG | TEMPERATURE: 98 F

## 2025-06-05 DIAGNOSIS — E55.9 VITAMIN D INSUFFICIENCY: ICD-10-CM

## 2025-06-05 DIAGNOSIS — E61.1 HYPOFERREMIA: ICD-10-CM

## 2025-06-05 DIAGNOSIS — Z00.00 ANNUAL PHYSICAL EXAM: Primary | ICD-10-CM

## 2025-06-05 PROCEDURE — 99395 PREV VISIT EST AGE 18-39: CPT | Performed by: FAMILY MEDICINE

## 2025-06-05 RX ORDER — ERGOCALCIFEROL 1.25 MG/1
50000 CAPSULE, LIQUID FILLED ORAL WEEKLY
Qty: 12 CAPSULE | Refills: 0 | Status: SHIPPED | OUTPATIENT
Start: 2025-06-05

## 2025-06-05 NOTE — ASSESSMENT & PLAN NOTE
" - Patient coming in for annual physical exam. We discussed her improved iron labs after the iron infusion with all in normal range, no need for further infusions  - After taking the ergocalciferol, she can continue taking daily vitamin D.  - Discussed her occasional periods of being \"in and out\" and \"waking up\" mid conversion during her job. Recommended vit D supplementation, more sleep, her iron studies are normal.    - She has orders placed for getting Lipids and CMP which she will get once fasted.  "

## 2025-06-05 NOTE — ASSESSMENT & PLAN NOTE
Orders:    ergocalciferol (VITAMIN D2) 50,000 units; Take 1 capsule (50,000 Units total) by mouth once a week   - Vitamin D level was low 16.8, recommend taking the ergocalciferol for 12 weeks then get recheck. After medication, also recommend oral Vitamin D gummies/supplements daily.

## 2025-06-05 NOTE — ASSESSMENT & PLAN NOTE
- Iron studies are normal with iron at 123 and ferritin at 40 which is lower end of normal ().   - Does not need further iron infusions. Can take daily oral iron supplements.

## 2025-06-05 NOTE — PROGRESS NOTES
"Adult Annual Physical  Name: Enriqueta Pierre      : 1990      MRN: [id number]  Encounter Provider: Daniel Veras MD  Encounter Date: 2025   Encounter department: [facility]    :  Assessment & Plan  Vitamin D insufficiency    Orders:    ergocalciferol (VITAMIN D2) 50,000 units; Take 1 capsule (50,000 Units total) by mouth once a week   - Vitamin D level was low 16.8, recommend taking the ergocalciferol for 12 weeks then get recheck. After medication, also recommend oral Vitamin D gummies/supplements daily.  Annual physical exam   - Patient coming in for annual physical exam. We discussed her improved iron labs after the iron infusion with all in normal range, no need for further infusions  - After taking the ergocalciferol, she can continue taking daily vitamin D.  - Discussed her occasional periods of being \"in and out\" and \"waking up\" mid conversion during her job. Recommended vit D supplementation, more sleep, her iron studies are normal.    - She has orders placed for getting Lipids and CMP which she will get once fasted.  Hypoferremia   - Iron studies are normal with iron at 123 and ferritin at 40 which is lower end of normal ().   - Does not need further iron infusions. Can take daily oral iron supplements.            Preventive Screenings:  - Diabetes Screening: screening up-to-date  - Cholesterol Screening: orders placed   - Hepatitis C screening: screening up-to-date   - HIV screening: screening up-to-date   - Cervical cancer screening: screening up-to-date   - Colon cancer screening: screening not indicated   - Lung cancer screening: screening not indicated     Counseling/Anticipatory Guidance:  - Alcohol: discussed moderation in alcohol intake and recommendations for healthy alcohol use.   - Drug use: discussed harms of illicit drug use and how it can negatively impact mental/physical health.   - Tobacco use: discussed harms of tobacco use and management " "options for quitting.   - Dental health: discussed importance of regular tooth brushing, flossing, and dental visits.   - Sexual health: discussed sexually transmitted diseases, partner selection, use of condoms, avoidance of unintended pregnancy, and contraceptive alternatives.   - Diet: discussed recommendations for a healthy/well-balanced diet.   - Exercise: the importance of regular exercise/physical activity was discussed. Recommend exercise 3-5 times per week for at least 30 minutes.   - Injury prevention: discussed safety/seat belts, safety helmets, smoke detectors, carbon monoxide detectors, and smoking near bedding or upholstery.          History of Present Illness       Enriqueta Pierre is a 35 y/o female patient here for her annual physical as well as to discuss her iron labs. Since placement of Mirena IUD her menstrual bleeding has improved and she does not bleed anymore. We discussed the need for additional iron infusions which are not needed as her iron and ferritin levels are now in normal range. She does say she gets a feeling of \"waking up\" or being out of it while at work which we discussed may be due to her lack of sleep or need for vitamin D supplementation.     Adult Annual Physical:  Patient presents for annual physical.     Diet and Physical Activity:  - Diet/Nutrition: well balanced diet and consuming 3-5 servings of fruits/vegetables daily. Breakfast is skipped or light. Lunch is normally a salad. Dinner cooks for her 2 toddlers does get at least 3 servings of vegetables/fruit.  - Exercise: walking and 1-2 times a week on average. Walks about 2-3 times per week for 30 minutes.    General Health:  - Sleep: 4-6 hours of sleep on average. Some interruption in sleep with toddlers at night  - Hearing: normal hearing bilateral ears.  - Vision: no vision problems.  - Dental: regular dental visits, brushes teeth twice daily and floss regularly.    /GYN Health:  - Follows with GYN: yes.   - History " "of STDs: no  - Contraception: IUD placement.      Review of Systems   Constitutional:  Negative for chills, fatigue and fever.   HENT:  Negative for ear pain, rhinorrhea, sinus pain and sore throat.    Eyes:  Negative for pain and visual disturbance.   Respiratory:  Negative for cough and shortness of breath.    Cardiovascular:  Negative for chest pain and leg swelling.   Gastrointestinal:  Negative for abdominal pain, constipation, diarrhea, nausea and vomiting.   Genitourinary:  Negative for dysuria, hematuria, vaginal discharge and vaginal pain.   Musculoskeletal:  Positive for back pain. Negative for arthralgias.   Skin:  Negative for color change and rash.   Neurological:  Negative for weakness, light-headedness and headaches.   Psychiatric/Behavioral:  Negative for dysphoric mood. The patient is not nervous/anxious.    All other systems reviewed and are negative.        Objective   /58 (BP Location: Left arm, Patient Position: Sitting, Cuff Size: Standard)   Pulse 78   Temp 98 °F (36.7 °C) (Temporal)   Ht 5' 3.78\" (1.62 m)   Wt 80.7 kg (178 lb)   SpO2 99%   BMI 30.76 kg/m²     Physical Exam  Constitutional:       Appearance: Normal appearance.   HENT:      Head: Normocephalic and atraumatic.      Mouth/Throat:      Mouth: Mucous membranes are moist.      Pharynx: Oropharynx is clear.     Eyes:      Conjunctiva/sclera: Conjunctivae normal.      Pupils: Pupils are equal, round, and reactive to light.       Cardiovascular:      Rate and Rhythm: Normal rate and regular rhythm.      Pulses: Normal pulses.      Heart sounds: Normal heart sounds.   Pulmonary:      Effort: Pulmonary effort is normal.      Breath sounds: Normal breath sounds.   Abdominal:      General: Abdomen is flat.      Palpations: Abdomen is soft.     Skin:     General: Skin is warm and dry.     Neurological:      Mental Status: She is alert and oriented to person, place, and time.     Psychiatric:         Mood and Affect: Mood normal. "         Behavior: Behavior normal.

## 2025-06-13 ENCOUNTER — APPOINTMENT (OUTPATIENT)
Dept: LAB | Facility: AMBULARY SURGERY CENTER | Age: 35
End: 2025-06-13
Attending: FAMILY MEDICINE
Payer: COMMERCIAL

## 2025-06-13 DIAGNOSIS — Z13.1 DIABETES MELLITUS SCREENING: ICD-10-CM

## 2025-06-13 DIAGNOSIS — Z13.220 LIPID SCREENING: ICD-10-CM

## 2025-06-13 LAB
ALBUMIN SERPL BCG-MCNC: 4.6 G/DL (ref 3.5–5)
ALP SERPL-CCNC: 52 U/L (ref 34–104)
ALT SERPL W P-5'-P-CCNC: 12 U/L (ref 7–52)
ANION GAP SERPL CALCULATED.3IONS-SCNC: 14 MMOL/L (ref 4–13)
AST SERPL W P-5'-P-CCNC: 25 U/L (ref 13–39)
BILIRUB SERPL-MCNC: 0.56 MG/DL (ref 0.2–1)
BUN SERPL-MCNC: 13 MG/DL (ref 5–25)
CALCIUM SERPL-MCNC: 9.4 MG/DL (ref 8.4–10.2)
CHLORIDE SERPL-SCNC: 103 MMOL/L (ref 96–108)
CHOLEST SERPL-MCNC: 158 MG/DL (ref ?–200)
CO2 SERPL-SCNC: 24 MMOL/L (ref 21–32)
CREAT SERPL-MCNC: 0.79 MG/DL (ref 0.6–1.3)
GFR SERPL CREATININE-BSD FRML MDRD: 99 ML/MIN/1.73SQ M
GLUCOSE P FAST SERPL-MCNC: 71 MG/DL (ref 65–99)
HDLC SERPL-MCNC: 46 MG/DL
LDLC SERPL CALC-MCNC: 89 MG/DL (ref 0–100)
POTASSIUM SERPL-SCNC: 4 MMOL/L (ref 3.5–5.3)
PROT SERPL-MCNC: 7.4 G/DL (ref 6.4–8.4)
SODIUM SERPL-SCNC: 141 MMOL/L (ref 135–147)
TRIGL SERPL-MCNC: 117 MG/DL (ref ?–150)

## 2025-06-13 PROCEDURE — 80061 LIPID PANEL: CPT

## 2025-06-13 PROCEDURE — 36415 COLL VENOUS BLD VENIPUNCTURE: CPT

## 2025-06-13 PROCEDURE — 80053 COMPREHEN METABOLIC PANEL: CPT

## 2025-06-18 ENCOUNTER — TELEMEDICINE (OUTPATIENT)
Dept: PSYCHIATRY | Facility: CLINIC | Age: 35
End: 2025-06-18
Payer: COMMERCIAL

## 2025-06-18 DIAGNOSIS — F41.9 ANXIETY DISORDER, UNSPECIFIED TYPE: Primary | ICD-10-CM

## 2025-06-18 PROCEDURE — 90832 PSYTX W PT 30 MINUTES: CPT

## 2025-06-18 NOTE — PSYCH
"Virtual Regular VisitName: Enriqueta Pierre      : 1990      MRN: [id number]  Encounter Provider: Rita Cervantes LCSW  Encounter Date: 2025   Encounter department: [facility]  :  Assessment & Plan  Anxiety disorder, unspecified type               Goals addressed in session: Goal 1     DATA: Met with Enriqeuta Pierre for a scheduled individual session.  Topics discussed included emotional wellness, coping skills, and physical health concerns.  Enriqueta is feeling \"good, busy, less ragey but a little more anxious\".  She states she'll have these episodes where she feels \"asleep while awake\" at work. It mostly happens in the afternoon. Her vitamin d is low and she's going to work on getting that level up.She doesn't feel anxious when this happens, it seems like her brain goes into autopilot and other thoughts push their way in. We talked about doing small \"brain dumps\" during the day so she is better able to focus.  She's feeling very bad about her weight gain and her clothes not fitting. She tries to walk every day but it's hard when she's so busy and worn out. We talked about giving herself a \"nap card\" where she using her work break for a power nap rather than a walk, but limiting those \"nap cards\" to so many in a week or month. We also talked about referencing her work schedule and on days she has more of a break, maybe trying to do both so she gets that small refresher nap in along with a shorter walk to get some of  her steps in. Enriqueta needed to end the session early due to work. she shows evidence of utilizing effective communication skills, engaging in problem solving, and maintaining emotional composure to manage mental health symptoms.  During this session, this clinician used the following therapeutic modalities: Engagement Strategies, Solution-Focused Therapy, and Supportive Psychotherapy    Substance Abuse was not addressed during this session. If the " "client is diagnosed with a co-occurring substance use disorder, please indicate any changes in the frequency or amount of use: N/A. Stage of change for addressing substance use diagnoses: No substance use/Not applicable    ASSESSMENT:  Enriqueta presents with a Euthymic/ normal mood. Enriqueta's affect is Normal range and intensity, which is congruent, with their mood and the content of the session. The client has made progress on their goals as evidenced by talking out life stressors.    Enriqueta presents with a none risk of suicide, none risk of self-harm, and none risk of harm to others.    For any risk assessment that surpasses a \"low\" rating, a safety plan must be developed.    A safety plan was indicated: no  If yes, describe in detail n/a    PLAN: Between sessions, Enriqueta will try mixing up short naps on lunch with walking based on need and if she has a little extra time, maybe taking a shorter walk after the nap. At the next session, the therapist will use Engagement Strategies, Solution-Focused Therapy, and Supportive Psychotherapy to address anxiety.    Behavioral Health Treatment Plan [facility]: Diagnosis and Treatment Plan explained to Enriqueta, Enriqueta relates understanding diagnosis and is agreeable to Treatment Plan. Yes     Depression Follow-up Plan Completed: Not applicable     Reason for visit is No chief complaint on file.     Recent Visits  No visits were found meeting these conditions.  Showing recent visits within past 7 days and meeting all other requirements  Future Appointments  No visits were found meeting these conditions.  Showing future appointments within next 150 days and meeting all other requirements     History of Present Illness     HPI    Past Medical History   Past Medical History:   Diagnosis Date    Degenerative lumbar disc 2015    Overactive bladder     Urinary tract infection     Verruca      Past Surgical History:   Procedure Laterality Date     SECTION  21    " COLPOSCOPY      COLPOSCOPY W/ BIOPSY / CURETTAGE      Resolved 2017    GYNECOLOGIC CRYOSURGERY      [place]IVERY ONLY N/A 2021    Procedure:  SECTION ();  Surgeon: Estelita Bridges MD;  Location: [facility] ;  Service: Obstetrics    WISDOM TOOTH EXTRACTION       Current Outpatient Medications   Medication Instructions    ergocalciferol (VITAMIN D2) 50,000 Units, Oral, Weekly    Mirabegron ER (MYRBETRIQ) 50 mg, Oral, Daily     Allergies   Allergen Reactions    Sulfa Antibiotics Fever and Fatigue    Sulfamethoxazole-Trimethoprim Fever and Fatigue       Objective   There were no vitals taken for this visit.    Video Exam  Physical Exam     Administrative Statements   Encounter provider Rita Cervantes LCSW    The Patient is located at Home and in the following state in which I hold an active license [place].    The patient was identified by name and date of birth. Enriqueta Pierre was informed that this is a telemedicine visit and that the visit is being conducted through the Epic Embedded platform. She agrees to proceed..  My office door was closed. No one else was in the room.  She acknowledged consent and understanding of privacy and security of the video platform. The patient has agreed to participate and understands they can discontinue the visit at any time.    Visit Time

## 2025-07-02 DIAGNOSIS — M72.2 PLANTAR FASCIITIS: Primary | ICD-10-CM

## 2025-07-02 RX ORDER — METHYLPREDNISOLONE 4 MG/1
TABLET ORAL
Qty: 21 EACH | Refills: 0 | Status: SHIPPED | OUTPATIENT
Start: 2025-07-02

## 2025-08-18 ENCOUNTER — TELEPHONE (OUTPATIENT)
Age: 35
End: 2025-08-18

## 2025-08-21 ENCOUNTER — TELEPHONE (OUTPATIENT)
Age: 35
End: 2025-08-21

## 2025-08-21 ENCOUNTER — OFFICE VISIT (OUTPATIENT)
Dept: FAMILY MEDICINE CLINIC | Facility: CLINIC | Age: 35
End: 2025-08-21
Payer: COMMERCIAL

## 2025-08-21 VITALS
BODY MASS INDEX: 32.76 KG/M2 | HEART RATE: 68 BPM | HEIGHT: 62 IN | WEIGHT: 178 LBS | SYSTOLIC BLOOD PRESSURE: 117 MMHG | TEMPERATURE: 97.3 F | DIASTOLIC BLOOD PRESSURE: 68 MMHG | OXYGEN SATURATION: 96 %

## 2025-08-21 DIAGNOSIS — G47.19 EXCESSIVE DAYTIME SLEEPINESS: ICD-10-CM

## 2025-08-21 DIAGNOSIS — E55.9 VITAMIN D INSUFFICIENCY: Primary | ICD-10-CM

## 2025-08-21 DIAGNOSIS — E61.1 HYPOFERREMIA: ICD-10-CM

## 2025-08-21 DIAGNOSIS — E66.9 OBESITY (BMI 30-39.9): ICD-10-CM

## 2025-08-21 PROCEDURE — 99214 OFFICE O/P EST MOD 30 MIN: CPT | Performed by: FAMILY MEDICINE

## (undated) DEVICE — SUT MONOCRYL 3-0 UNDYED KS CS-1 Y523H

## (undated) DEVICE — SUT VICRYL 0 CTX 36 IN J978H

## (undated) DEVICE — GLOVE SRG BIOGEL ECLIPSE 7

## (undated) DEVICE — Device

## (undated) DEVICE — SUT VICRYL 0 CT-1 27 IN J260H

## (undated) DEVICE — MEDI-VAC YANKAUER SUCTION HANDLE W/STRAIGHT TIP & CONTROL VENT: Brand: CARDINAL HEALTH

## (undated) DEVICE — GLOVE INDICATOR PI UNDERGLOVE SZ 7 BLUE

## (undated) DEVICE — ADHESIVE SKIN HIGH VISCOSITY EXOFIN 1ML

## (undated) DEVICE — SKIN MARKER DUAL TIP WITH RULER CAP, FLEXIBLE RULER AND LABELS: Brand: DEVON

## (undated) DEVICE — PACK C-SECTION PBDS

## (undated) DEVICE — CHLORAPREP HI-LITE 26ML ORANGE

## (undated) DEVICE — SUT PLAIN 2-0 CTX 27 IN 872H

## (undated) DEVICE — ADHESIVE SKN CLSR HISTOACRYL FLEX 0.5ML LF